# Patient Record
Sex: MALE | Race: WHITE | Employment: OTHER | RURAL
[De-identification: names, ages, dates, MRNs, and addresses within clinical notes are randomized per-mention and may not be internally consistent; named-entity substitution may affect disease eponyms.]

---

## 2017-01-15 RX ORDER — FUROSEMIDE 20 MG/1
TABLET ORAL
Qty: 30 TAB | Refills: 3 | Status: SHIPPED | OUTPATIENT
Start: 2017-01-15 | End: 2017-01-17 | Stop reason: SDUPTHER

## 2017-01-17 RX ORDER — FUROSEMIDE 20 MG/1
TABLET ORAL
Qty: 30 TAB | Refills: 0 | Status: SHIPPED | OUTPATIENT
Start: 2017-01-17 | End: 2017-01-25 | Stop reason: SDUPTHER

## 2017-01-23 DIAGNOSIS — E11.9 CONTROLLED TYPE 2 DIABETES MELLITUS WITHOUT COMPLICATION (HCC): ICD-10-CM

## 2017-01-23 DIAGNOSIS — E11.9 CONTROLLED TYPE 2 DIABETES MELLITUS WITHOUT COMPLICATION, WITHOUT LONG-TERM CURRENT USE OF INSULIN (HCC): Primary | ICD-10-CM

## 2017-01-24 RX ORDER — GLIMEPIRIDE 4 MG/1
TABLET ORAL
Qty: 30 TAB | Refills: 5 | Status: SHIPPED | OUTPATIENT
Start: 2017-01-24 | End: 2017-07-17 | Stop reason: SDUPTHER

## 2017-01-25 ENCOUNTER — OFFICE VISIT (OUTPATIENT)
Dept: FAMILY MEDICINE CLINIC | Age: 82
End: 2017-01-25

## 2017-01-25 VITALS
OXYGEN SATURATION: 98 % | SYSTOLIC BLOOD PRESSURE: 134 MMHG | HEART RATE: 61 BPM | BODY MASS INDEX: 26.82 KG/M2 | TEMPERATURE: 97.9 F | RESPIRATION RATE: 20 BRPM | DIASTOLIC BLOOD PRESSURE: 76 MMHG | HEIGHT: 72 IN | WEIGHT: 198 LBS

## 2017-01-25 DIAGNOSIS — E78.2 MIXED HYPERLIPIDEMIA: ICD-10-CM

## 2017-01-25 DIAGNOSIS — E11.9 CONTROLLED TYPE 2 DIABETES MELLITUS WITHOUT COMPLICATION, WITHOUT LONG-TERM CURRENT USE OF INSULIN (HCC): Primary | ICD-10-CM

## 2017-01-25 DIAGNOSIS — I10 ESSENTIAL HYPERTENSION, BENIGN: ICD-10-CM

## 2017-01-25 DIAGNOSIS — E03.9 ACQUIRED HYPOTHYROIDISM: ICD-10-CM

## 2017-01-25 DIAGNOSIS — Z23 ENCOUNTER FOR IMMUNIZATION: ICD-10-CM

## 2017-01-25 DIAGNOSIS — Z79.899 ENCOUNTER FOR LONG-TERM CURRENT USE OF MEDICATION: ICD-10-CM

## 2017-01-25 DIAGNOSIS — B35.1 ONYCHOMYCOSIS: ICD-10-CM

## 2017-01-25 RX ORDER — FUROSEMIDE 20 MG/1
TABLET ORAL
Qty: 90 TAB | Refills: 3 | Status: SHIPPED | COMMUNITY
Start: 2017-01-25 | End: 2017-10-14 | Stop reason: SDUPTHER

## 2017-01-25 NOTE — PATIENT INSTRUCTIONS
Vaccine Information Statement    Influenza (Flu) Vaccine (Inactivated or Recombinant): What you need to know    Many Vaccine Information Statements are available in Sets[place] and other languages. See www.immunize.org/vis  Hojas de Información Sobre Vacunas están disponibles en Español y en muchos otros idiomas. Visite www.immunize.org/vis    1. Why get vaccinated? Influenza (flu) is a contagious disease that spreads around the [place] every year, usually between October and May. Flu is caused by influenza viruses, and is spread mainly by coughing, sneezing, and close contact. Anyone can get flu. Flu strikes suddenly and can last several days. Symptoms vary by age, but can include:   fever/chills   sore throat   muscle aches   fatigue   cough   headache    runny or stuffy nose    Flu can also lead to pneumonia and blood infections, and cause diarrhea and seizures in children. If you have a medical condition, such as heart or lung disease, flu can make it worse. Flu is more dangerous for some people. Infants and young children, people 72years of age and older, pregnant women, and people with certain health conditions or a weakened immune system are at greatest risk. Each year thousands of people in the [facility] die from flu, and many more are hospitalized. Flu vaccine can:   keep you from getting flu,   make flu less severe if you do get it, and   keep you from spreading flu to your family and other people. 2. Inactivated and recombinant flu vaccines    A dose of flu vaccine is recommended every flu season. Children 6 months through 6years of age may need two doses during the same flu season. Everyone else needs only one dose each flu season.        Some inactivated flu vaccines contain a very small amount of a mercury-based preservative called thimerosal. Studies have not shown thimerosal in vaccines to be harmful, but flu vaccines that do not contain thimerosal are available. There is no live flu virus in flu shots. They cannot cause the flu. There are many flu viruses, and they are always changing. Each year a new flu vaccine is made to protect against three or four viruses that are likely to cause disease in the upcoming flu season. But even when the vaccine doesnt exactly match these viruses, it may still provide some protection    Flu vaccine cannot prevent:   flu that is caused by a virus not covered by the vaccine, or   illnesses that look like flu but are not. It takes about 2 weeks for protection to develop after vaccination, and protection lasts through the flu season. 3. Some people should not get this vaccine    Tell the person who is giving you the vaccine:     If you have any severe, life-threatening allergies. If you ever had a life-threatening allergic reaction after a dose of flu vaccine, or have a severe allergy to any part of this vaccine, you may be advised not to get vaccinated. Most, but not all, types of flu vaccine contain a small amount of egg protein.  If you ever had Guillain-Barré Syndrome (also called GBS). Some people with a history of GBS should not get this vaccine. This should be discussed with your doctor.  If you are not feeling well. It is usually okay to get flu vaccine when you have a mild illness, but you might be asked to come back when you feel better. 4. Risks of a vaccine reaction    With any medicine, including vaccines, there is a chance of reactions. These are usually mild and go away on their own, but serious reactions are also possible. Most people who get a flu shot do not have any problems with it.      Minor problems following a flu shot include:    soreness, redness, or swelling where the shot was given     hoarseness   sore, red or itchy eyes   cough   fever   aches   headache   itching   fatigue  If these problems occur, they usually begin soon after the shot and last 1 or 2 days. More serious problems following a flu shot can include the following:     There may be a small increased risk of Guillain-Barré Syndrome (GBS) after inactivated flu vaccine. This risk has been estimated at 1 or 2 additional cases per million people vaccinated. This is much lower than the risk of severe complications from flu, which can be prevented by flu vaccine.  Young children who get the flu shot along with pneumococcal vaccine (PCV13) and/or DTaP vaccine at the same time might be slightly more likely to have a seizure caused by fever. Ask your doctor for more information. Tell your doctor if a child who is getting flu vaccine has ever had a seizure. Problems that could happen after any injected vaccine:      People sometimes faint after a medical procedure, including vaccination. Sitting or lying down for about 15 minutes can help prevent fainting, and injuries caused by a fall. Tell your doctor if you feel dizzy, or have vision changes or ringing in the ears.  Some people get severe pain in the shoulder and have difficulty moving the arm where a shot was given. This happens very rarely.  Any medication can cause a severe allergic reaction. Such reactions from a vaccine are very rare, estimated at about 1 in a million doses, and would happen within a few minutes to a few hours after the vaccination. As with any medicine, there is a very remote chance of a vaccine causing a serious injury or death. The safety of vaccines is always being monitored. For more information, visit: www.cdc.gov/vaccinesafety/    5. What if there is a serious reaction? What should I look for?  Look for anything that concerns you, such as signs of a severe allergic reaction, very high fever, or unusual behavior.     Signs of a severe allergic reaction can include hives, swelling of the face and throat, difficulty breathing, a fast heartbeat, dizziness, and weakness - usually within a few minutes to a few hours after the vaccination. What should I do?  If you think it is a severe allergic reaction or other emergency that cant wait, call 9-1-1 and get the person to the nearest hospital. Otherwise, call your doctor.  Reactions should be reported to the Vaccine Adverse Event Reporting System (VAERS). Your doctor should file this report, or you can do it yourself through  the VAERS web site at www.vaers. [facility].gov, or by calling 7-198.351.4803. VAERS does not give medical advice. 6. The National Vaccine Injury Compensation Program    The [facility] Vaccine Injury Compensation Program (VICP) is a federal program that was created to compensate people who may have been injured by certain vaccines. Persons who believe they may have been injured by a vaccine can learn about the program and about filing a claim by calling 6-863-153--533-356-0349 or visiting the [employer] website at www.[facility].gov/vaccinecompensation. There is a time limit to file a claim for compensation. 7. How can I learn more?  Ask your healthcare provider. He or she can give you the vaccine package insert or suggest other sources of information.  Call your local or state health department.  Contact the Centers for Disease Control and Prevention (CDC):  - Call 1-136.444.5690 (1-800-CDC-INFO) or  - Visit CDCs website at www.cdc.gov/flu    Vaccine Information Statement   Inactivated Influenza Vaccine   8/7/2015  [address] KRISTINA [address] 507PR-10    Department of Health and Human Services  Centers for Disease Control and Prevention    Office Use Only       Diabetes Foot Health: Care Instructions  Your Care Instructions    When you have diabetes, your feet need extra care and attention. Diabetes can damage the nerve endings and blood vessels in your feet, making you less likely to notice when your feet are injured. Diabetes also limits your body's ability to fight infection and get blood to areas that need it.  If you get a minor foot injury, it could become an ulcer or a serious infection. With good foot care, you can prevent most of these problems. Caring for your feet can be quick and easy. Most of the care can be done when you are bathing or getting ready for bed. Follow-up care is a key part of your treatment and safety. Be sure to make and go to all appointments, and call your doctor if you are having problems. Its also a good idea to know your test results and keep a list of the medicines you take. How can you care for yourself at home? · Keep your blood sugar close to normal by watching what and how much you eat, monitoring blood sugar, taking medicines if prescribed, and getting regular exercise. · Do not smoke. Smoking affects blood flow and can make foot problems worse. If you need help quitting, talk to your doctor about stop-smoking programs and medicines. These can increase your chances of quitting for good. · Eat a diet that is low in fats. High fat intake can cause fat to build up in your blood vessels and decrease blood flow. · Inspect your feet daily for blisters, cuts, cracks, or sores. If you cannot see well, use a mirror or have someone help you. · Take care of your feet:  [facility] your feet every day. Use warm (not hot) water. Check the water temperature with your wrists or other part of your body, not your feet. ¨ Dry your feet well. Pat them dry. Do not rub the skin on your feet too hard. Dry well between your toes. If the skin on your feet stays moist, bacteria or a fungus can grow, which can lead to infection. ¨ Keep your skin soft. Use moisturizing skin cream to keep the skin on your feet soft and prevent calluses and cracks. But do not put the cream between your toes, and stop using any cream that causes a rash. ¨ Clean underneath your toenails carefully. Do not use a sharp object to clean underneath your toenails. Use the blunt end of a nail file or other rounded tool.   ¨ Trim and file your toenails straight across to prevent ingrown toenails. Use a nail clipper, not scissors. Use an emery board to smooth the edges. · Change socks daily. Socks without seams are best, because seams often rub the feet. You can find socks for people with diabetes from specialty catalogs. · Look inside your shoes every day for things like gravel or torn linings, which could cause blisters or sores. · Buy shoes that fit well:  ¨ Look for shoes that have plenty of space around the toes. This helps prevent bunions and blisters. ¨ Try on shoes while wearing the kind of socks you will usually wear with the shoes. ¨ Avoid plastic shoes. They may rub your feet and cause blisters. Good shoes should be made of materials that are flexible and breathable, such as leather or cloth. ¨ Break in new shoes slowly by wearing them for no more than an hour a day for several days. Take extra time to check your feet for red areas, blisters, or other problems after you wear new shoes. · Do not go barefoot. Do not wear sandals, and do not wear shoes with very thin soles. Thin soles are easy to puncture. They also do not protect your feet from hot pavement or cold weather. · Have your doctor check your feet during each visit. If you have a foot problem, see your doctor. Do not try to treat an early foot problem at home. Home remedies or treatments that you can buy without a prescription (such as corn removers) can be harmful. · Always get early treatment for foot problems. A minor irritation can lead to a major problem if not properly cared for early. When should you call for help? Call your doctor now or seek immediate medical care if:  · You have a foot sore, an ulcer or break in the skin that is not healing after 4 days, bleeding corns or calluses, or an ingrown toenail. · You have blue or black areas, which can mean bruising or blood flow problems. · You have peeling skin or tiny blisters between your toes or cracking or oozing of the skin.   · You have a fever for more than 24 hours and a foot sore. · You have new numbness or tingling in your feet that does not go away after you move your feet or change positions. · You have unexplained or unusual swelling of the foot or ankle. Watch closely for changes in your health, and be sure to contact your doctor if:  · You cannot do proper foot care. Where can you learn more? Go to http://milton-saud.info/. Enter A739 in the search box to learn more about \"Diabetes Foot Health: Care Instructions. \"  Current as of: May 23, 2016  Content Version: 11.1  © 3679-9448 [employer]. Care instructions adapted under license by [employer] (which disclaims liability or warranty for this information). If you have questions about a medical condition or this instruction, always ask your healthcare professional. [address] any warranty or liability for your use of this information.

## 2017-01-25 NOTE — PROGRESS NOTES
Identified pt with two pt identifiers(name and ). Chief Complaint   Patient presents with    Diabetes    Thyroid Problem    Hypertension    Immunization/Injection     flu        Health Maintenance Due   Topic    Pneumococcal 65+ High/Highest Risk (1 of 2 - PCV13)    EYE EXAM RETINAL OR DILATED Q1     MEDICARE YEARLY EXAM     FOOT EXAM Q1     HEMOGLOBIN A1C Q6M        Wt Readings from Last 3 Encounters:   17 198 lb (89.8 kg)   08/15/16 198 lb 6.4 oz (90 kg)   16 211 lb (95.7 kg)     Temp Readings from Last 3 Encounters:   17 97.9 °F (36.6 °C) (Oral)   08/15/16 97.8 °F (36.6 °C) (Oral)   16 97.9 °F (36.6 °C) (Oral)     BP Readings from Last 3 Encounters:   17 134/76   08/15/16 135/54   16 177/67     Pulse Readings from Last 3 Encounters:   17 61   08/15/16 (!) 58   16 (!) 50         Learning Assessment:  :     Learning Assessment 3/19/2014   PRIMARY LEARNER Patient   HIGHEST LEVEL OF EDUCATION - PRIMARY LEARNER  GRADUATED HIGH SCHOOL OR GED   BARRIERS PRIMARY LEARNER NONE   CO-LEARNER CAREGIVER No   PRIMARY LANGUAGE ENGLISH   LEARNER PREFERENCE PRIMARY LISTENING     DEMONSTRATION   ANSWERED BY patient   RELATIONSHIP SELF       Depression Screening:  :     PHQ 2 / 9, over the last two weeks 2016   Little interest or pleasure in doing things Not at all   Feeling down, depressed or hopeless Not at all   Total Score PHQ 2 0       Fall Risk Assessment:  :     Fall Risk Assessment, last 12 mths 2016   Able to walk? Yes   Fall in past 12 months? No       Abuse Screening:  :     Abuse Screening Questionnaire 8/15/2016 2015 3/19/2014   Do you ever feel afraid of your partner? N N N   Are you in a relationship with someone who physically or mentally threatens you? N N N   Is it safe for you to go home?  Alejandrina Evangelista       Coordination of Care Questionnaire:  :     1) Have you been to an emergency room, urgent care clinic since your last visit? no Hospitalized since your last visit? no             2) Have you seen or consulted any other health care providers outside of [address] [place] [address] since your last visit? yes   Tomorrow Dr Kashif Fuentes  (Include any pap smears or colon screenings in this section.)    3) Do you have an Advance Directive on file? yes  Are you interested in receiving information about Advance Directives? Yes paperwork given to add granddaughter. Patient is accompanied by daughter I have received verbal consent from Najma Michelle to discuss any/all medical information while they are present in the room. Reviewed record in preparation for visit and have obtained necessary documentation. Medication reconciliation up to date and corrected with patient at this time. Najma Michelle is a 80 y.o. male who presents for routine immunizations. He denies any symptoms , reactions or allergies that would exclude them from being immunized today. Risks and adverse reactions were discussed and the VIS was given to them. All questions were addressed. He was observed for 10 min post injection. There were no reactions observed.     Eliu Valdovinos LPN

## 2017-01-25 NOTE — LETTER
2/6/2017 1:41 PM 
 
Mr. Alexis [facility] [phone] Dear Santy Peterson.: 
 
Please find your most recent results below. Resulted Orders LIPID PANEL Result Value Ref Range Cholesterol, total 184 100 - 199 mg/dL Triglyceride 255 (H) 0 - 149 mg/dL HDL Cholesterol 33 (L) >39 mg/dL VLDL, calculated 51 (H) 5 - 40 mg/dL LDL, calculated 100 (H) 0 - 99 mg/dL Narrative Performed at:  [address]  [id number] : Kenia Tsai MD, Phone:  [phone] TSH 3RD GENERATION Result Value Ref Range TSH 0.455 0.450 - 4.500 uIU/mL Narrative Performed at:  [address]  [id number] : Kenia Tsai MD, Phone:  [phone] T4, FREE Result Value Ref Range T4, Free 0.93 0.82 - 1.77 ng/dL Narrative Performed at:  [address]  [id number] : Kenia Tsai MD, Phone:  [phone] HEMOGLOBIN A1C WITH EAG Result Value Ref Range Hemoglobin A1c 8.1 (H) 4.8 - 5.6 % Comment:  
            Pre-diabetes: 5.7 - 6.4 Diabetes: >6.4 Glycemic control for adults with diabetes: <7.0 Estimated average glucose 186 mg/dL Narrative Performed at:  [address]  [id number] : Kenia Tsai MD, Phone:  [phone] HEPATIC FUNCTION PANEL Result Value Ref Range Protein, total 7.4 6.0 - 8.5 g/dL Albumin 4.2 3.5 - 4.7 g/dL Bilirubin, total 0.8 0.0 - 1.2 mg/dL Bilirubin, direct 0.17 0.00 - 0.40 mg/dL Comment: **Effective January 30, 2017 the reference interval** 
  for Bilirubin, Direct will be changing to: Age                Male          Female 0 - 31 days       0.00 - 0.60    0.00 - 0.60     Children >31 days and 
 Adults:                 0.00 - 0.40    0.00 - 0.40 Alk. phosphatase 105 39 - 117 IU/L  
 AST (SGOT) 16 0 - 40 IU/L  
 ALT (SGPT) 8 0 - 44 IU/L Narrative Performed at:  [address]  [id number] : Melody Frey MD, Phone:  [phone] CVD REPORT Result Value Ref Range INTERPRETATION Note Comment:  
   Supplement report is available. Narrative Performed at:  [address] [place] [address] [address] [place] [address]  [id number] : Dell Gillis PhD, Phone:  [phone] RECOMMENDATIONS: 
Labs show good thyroid level. Cholesterol numbers are stable.  Blood sugar control is not at goal with high A1C.  We want this to be < 7%.  You are at 8.1%.  Need to continue Glimepiride daily and also cut back on sugar and starches in diet. Follow up in 6 months. Please call me if you have any questions: [phone] Sincerely, Marge Meyer MD

## 2017-01-25 NOTE — MR AVS SNAPSHOT
Visit Information Date & Time Provider Department Dept. Phone Encounter #  
 5/70/3558 92:05 AM Agnieszka DanielleAshu 108 [phone] [id number] Upcoming Health Maintenance Date Due Pneumococcal 65+ High/Highest Risk (1 of 2 - PCV13) 3/8/1996 EYE EXAM RETINAL OR DILATED Q1 10/20/2016 MEDICARE YEARLY EXAM 10/26/2016 FOOT EXAM Q1 10/26/2016 HEMOGLOBIN A1C Q6M 2/17/2017 MICROALBUMIN Q1 4/13/2017 LIPID PANEL Q1 8/17/2017 GLAUCOMA SCREENING Q2Y 10/20/2017 DTaP/Tdap/Td series (2 - Td) 1/25/2027 Allergies as of 1/25/2017  Review Complete On: 4/64/1681 By: Agnieszka Danielle MD  
  
 Severity Noted Reaction Type Reactions Synthroid [Levothyroxine]  08/20/2013   Systemic Other (comments) Confused, hallucinations Current Immunizations  Reviewed on 4/2/2015 Name Date Influenza High Dose Vaccine PF  Incomplete Influenza Vaccine 9/12/2014, 9/11/2013 Influenza Vaccine (Quad) PF 10/30/2015  9:31 AM  
 Influenza Vaccine Whole 10/16/2012 Zoster 10/16/2012 Not reviewed this visit You Were Diagnosed With   
  
 Codes Comments Controlled type 2 diabetes mellitus without complication, without long-term current use of insulin ([facility].)    -  Primary ICD-10-CM: E11.9 ICD-9-CM: 250.00 Encounter for immunization     ICD-10-CM: X81 ICD-9-CM: V03.89 Essential hypertension, benign     ICD-10-CM: I10 
ICD-9-CM: 401.1 Mixed hyperlipidemia     ICD-10-CM: E78.2 ICD-9-CM: 272.2 Acquired hypothyroidism     ICD-10-CM: E03.9 ICD-9-CM: 244.9 Encounter for long-term current use of medication     ICD-10-CM: Z79.899 ICD-9-CM: V58.69 Onychomycosis     ICD-10-CM: B35.1 ICD-9-CM: 110.1 Vitals BP Pulse Temp Resp Height(growth percentile) Weight(growth percentile) 134/76 (BP 1 Location: Left arm, BP Patient Position: Sitting) 61 97.9 °F (36.6 °C) (Oral) 20 6' (1.829 m) 198 lb (89.8 kg) SpO2 BMI Smoking Status 98% 26.85 kg/m2 Former Smoker Vitals History BMI and BSA Data Body Mass Index Body Surface Area  
 26.85 kg/m 2 2.14 m 2 Preferred Pharmacy Pharmacy Name Phone Rika Lucas [address] [place] [address] [address] [facility] [address] [place] [address] [phone] Your Updated Medication List  
  
   
This list is accurate as of: 1/25/17 12:18 PM.  Always use your most recent med list. amLODIPine 10 mg tablet Commonly known as:  Marine Colon TAKE 1 TABLET EVERY DAY FOR HYPERTENSION  (INCREASE  IN  DOSE  TO  10MG) aspirin 81 mg chewable tablet Take 1 Tab by mouth daily. CENTRUM SILVER Tab tablet Generic drug:  multivitamins-minerals-lutein Take 1 Tab by mouth daily. cetirizine 10 mg tablet Commonly known as:  ZYRTEC  
TAKE 1 TABLET EVERY DAY AS NEEDED FOR ALLERGIES  
  
 donepezil 10 mg tablet Commonly known as:  ARICEPT  
TAKE 1 TABLET EVERY NIGHT  
  
 ergocalciferol 50,000 unit capsule Commonly known as:  ERGOCALCIFEROL  
TAKE 1 CAPSULE EVERY 7 DAYS  
  
 ferrous sulfate 325 mg (65 mg iron) tablet Commonly known as:  Iron Take 1 Tab by mouth Daily (before breakfast). furosemide 20 mg tablet Commonly known as:  LASIX TAKE 1 TABLET BY MOUTH ONCE A DAY AS DIRECTED  
  
 glimepiride 4 mg tablet Commonly known as:  AMARYL  
TAKE 1 TABLET BY MOUTH EVERY MORNING. INCREASE IN DOSE.  
  
 lansoprazole 30 mg capsule Commonly known as:  PREVACID TAKE 1 CAPSULE EVERY DAY  (BEFORE BREAKFAST). loperamide 2 mg capsule Commonly known as:  IMODIUM Take 1 Cap by mouth Before breakfast, lunch, dinner and at bedtime. For diarrhea  
  
 losartan 100 mg tablet Commonly known as:  COZAAR  
TAKE ONE TABLET BY MOUTH DAILY  
  
 nicotinic acid 500 mg tablet Commonly known as:  NIACIN Take 500 mg by mouth Daily (before breakfast). thyroid (Pork) 60 mg tablet Commonly known as:  BRAIN THYROID  
 Take 1 tablet daily only six days a week. Skip on Sundays. Indications: HYPOTHYROIDISM Prescriptions Sent to Mail Order Refills  
 furosemide (LASIX) 20 mg tablet 3 Sig: TAKE 1 TABLET BY MOUTH ONCE A DAY AS DIRECTED Class: Mail Order Pharmacy: [address] [place] [address], [address] [place] [address] #: [phone] We Performed the Following ADMIN INFLUENZA VIRUS VAC [ HCPCS] HEMOGLOBIN A1C WITH EAG [42788 CPT(R)] HEPATIC FUNCTION PANEL [62295 CPT(R)]  DIABETES FOOT EXAM [HM7 Custom] INFLUENZA VIRUS VACCINE, HIGH DOSE SEASONAL, PRESERVATIVE FREE [11146 CPT(R)] LIPID PANEL [77356 CPT(R)] REFERRAL TO PODIATRY [REF90 Custom] Comments:  
 Please evaluate patient for left toe nail fungus and diabetic foot check. T4, FREE [id number] CPT(R)] TSH 3RD GENERATION [36376 CPT(R)] Referral Information Referral ID Referred By Referred To  
  
 [id number] Rubén HIDALGO, DPM   
   [address] [facility] [address] Phone: [phone] Fax: [phone] Visits Status Start Date End Date 1 New Request 1/25/17 1/25/18 If your referral has a status of pending review or denied, additional information will be sent to support the outcome of this decision. Patient Instructions Vaccine Information Statement Influenza (Flu) Vaccine (Inactivated or Recombinant): What you need to know Many Vaccine Information Statements are available in [place] and other languages. See www.immunize.org/vis Hojas de Información Sobre Vacunas están disponibles en Español y en muchos otros idiomas. Visite www.immunize.org/vis 1. Why get vaccinated? Influenza (flu) is a contagious disease that spreads around the [place] every year, usually between October and May. Flu is caused by influenza viruses, and is spread mainly by coughing, sneezing, and close contact. Anyone can get flu. Flu strikes suddenly and can last several days. Symptoms vary by age, but can include: 
 fever/chills  sore throat  muscle aches  fatigue  cough  headache  runny or stuffy nose Flu can also lead to pneumonia and blood infections, and cause diarrhea and seizures in children. If you have a medical condition, such as heart or lung disease, flu can make it worse. Flu is more dangerous for some people. Infants and young children, people 72years of age and older, pregnant women, and people with certain health conditions or a weakened immune system are at greatest risk. Each year thousands of people in the [facility] die from flu, and many more are hospitalized. Flu vaccine can: 
 keep you from getting flu, 
 make flu less severe if you do get it, and 
 keep you from spreading flu to your family and other people. 2. Inactivated and recombinant flu vaccines A dose of flu vaccine is recommended every flu season. Children 6 months through 6years of age may need two doses during the same flu season. Everyone else needs only one dose each flu season. Some inactivated flu vaccines contain a very small amount of a mercury-based preservative called thimerosal. Studies have not shown thimerosal in vaccines to be harmful, but flu vaccines that do not contain thimerosal are available. There is no live flu virus in flu shots. They cannot cause the flu. There are many flu viruses, and they are always changing. Each year a new flu vaccine is made to protect against three or four viruses that are likely to cause disease in the upcoming flu season. But even when the vaccine doesnt exactly match these viruses, it may still provide some protection Flu vaccine cannot prevent: 
 flu that is caused by a virus not covered by the vaccine, or 
 illnesses that look like flu but are not.  
 
It takes about 2 weeks for protection to develop after vaccination, and protection lasts through the flu season. 3. Some people should not get this vaccine Tell the person who is giving you the vaccine:  If you have any severe, life-threatening allergies. If you ever had a life-threatening allergic reaction after a dose of flu vaccine, or have a severe allergy to any part of this vaccine, you may be advised not to get vaccinated. Most, but not all, types of flu vaccine contain a small amount of egg protein.  If you ever had Guillain-Barré Syndrome (also called GBS). Some people with a history of GBS should not get this vaccine. This should be discussed with your doctor.  If you are not feeling well. It is usually okay to get flu vaccine when you have a mild illness, but you might be asked to come back when you feel better. 4. Risks of a vaccine reaction With any medicine, including vaccines, there is a chance of reactions. These are usually mild and go away on their own, but serious reactions are also possible. Most people who get a flu shot do not have any problems with it. Minor problems following a flu shot include:  
 soreness, redness, or swelling where the shot was given  hoarseness  sore, red or itchy eyes  cough  fever  aches  headache  itching  fatigue If these problems occur, they usually begin soon after the shot and last 1 or 2 days. More serious problems following a flu shot can include the following:  There may be a small increased risk of Guillain-Barré Syndrome (GBS) after inactivated flu vaccine. This risk has been estimated at 1 or 2 additional cases per million people vaccinated. This is much lower than the risk of severe complications from flu, which can be prevented by flu vaccine.    
 
 Young children who get the flu shot along with pneumococcal vaccine (PCV13) and/or DTaP vaccine at the same time might be slightly more likely to have a seizure caused by fever. Ask your doctor for more information. Tell your doctor if a child who is getting flu vaccine has ever had a seizure. Problems that could happen after any injected vaccine:  People sometimes faint after a medical procedure, including vaccination. Sitting or lying down for about 15 minutes can help prevent fainting, and injuries caused by a fall. Tell your doctor if you feel dizzy, or have vision changes or ringing in the ears.  Some people get severe pain in the shoulder and have difficulty moving the arm where a shot was given. This happens very rarely.  Any medication can cause a severe allergic reaction. Such reactions from a vaccine are very rare, estimated at about 1 in a million doses, and would happen within a few minutes to a few hours after the vaccination. As with any medicine, there is a very remote chance of a vaccine causing a serious injury or death. The safety of vaccines is always being monitored. For more information, visit: www.cdc.gov/vaccinesafety/ 
 
5. What if there is a serious reaction? What should I look for?  Look for anything that concerns you, such as signs of a severe allergic reaction, very high fever, or unusual behavior. Signs of a severe allergic reaction can include hives, swelling of the face and throat, difficulty breathing, a fast heartbeat, dizziness, and weakness  usually within a few minutes to a few hours after the vaccination. What should I do?  If you think it is a severe allergic reaction or other emergency that cant wait, call 9-1-1 and get the person to the nearest hospital. Otherwise, call your doctor.  Reactions should be reported to the Vaccine Adverse Event Reporting System (VAERS). Your doctor should file this report, or you can do it yourself through  the VAERS web site at www.vaers. [facility].gov, or by calling 4-801.521.9981. VAERS does not give medical advice. 6. The National Vaccine Injury Compensation Program 
 
The [facility] Vaccine Injury Compensation Program (VICP) is a federal program that was created to compensate people who may have been injured by certain vaccines. Persons who believe they may have been injured by a vaccine can learn about the program and about filing a claim by calling 5-610.644.5018 or visiting the [employer] website at www.[facility].gov/vaccinecompensation. There is a time limit to file a claim for compensation. 7. How can I learn more?  Ask your healthcare provider. He or she can give you the vaccine package insert or suggest other sources of information.  Call your local or state health department.  Contact the Centers for Disease Control and Prevention (CDC): 
- Call 6-885.627.5194 (1-800-CDC-INFO) or 
- Visit CDCs website at www.cdc.gov/flu Vaccine Information Statement Inactivated Influenza Vaccine 8/7/2015 
42 KRISTINA Deleon [id number] [facility] and [employer] Centers for Disease Control and Prevention Office Use Only Diabetes Foot Health: Care Instructions Your Care Instructions When you have diabetes, your feet need extra care and attention. Diabetes can damage the nerve endings and blood vessels in your feet, making you less likely to notice when your feet are injured. Diabetes also limits your body's ability to fight infection and get blood to areas that need it. If you get a minor foot injury, it could become an ulcer or a serious infection. With good foot care, you can prevent most of these problems. Caring for your feet can be quick and easy. Most of the care can be done when you are bathing or getting ready for bed. Follow-up care is a key part of your treatment and safety. Be sure to make and go to all appointments, and call your doctor if you are having problems. Its also a good idea to know your test results and keep a list of the medicines you take. How can you care for yourself at home? · Keep your blood sugar close to normal by watching what and how much you eat, monitoring blood sugar, taking medicines if prescribed, and getting regular exercise. · Do not smoke. Smoking affects blood flow and can make foot problems worse. If you need help quitting, talk to your doctor about stop-smoking programs and medicines. These can increase your chances of quitting for good. · Eat a diet that is low in fats. High fat intake can cause fat to build up in your blood vessels and decrease blood flow. · Inspect your feet daily for blisters, cuts, cracks, or sores. If you cannot see well, use a mirror or have someone help you. · Take care of your feet: 
[facility] AUTHORITY your feet every day. Use warm (not hot) water. Check the water temperature with your wrists or other part of your body, not your feet. ¨ Dry your feet well. Pat them dry. Do not rub the skin on your feet too hard. Dry well between your toes. If the skin on your feet stays moist, bacteria or a fungus can grow, which can lead to infection. ¨ Keep your skin soft. Use moisturizing skin cream to keep the skin on your feet soft and prevent calluses and cracks. But do not put the cream between your toes, and stop using any cream that causes a rash. ¨ Clean underneath your toenails carefully. Do not use a sharp object to clean underneath your toenails. Use the blunt end of a nail file or other rounded tool. ¨ Trim and file your toenails straight across to prevent ingrown toenails. Use a nail clipper, not scissors. Use an emery board to smooth the edges. · Change socks daily. Socks without seams are best, because seams often rub the feet. You can find socks for people with diabetes from specialty catalogs. · Look inside your shoes every day for things like gravel or torn linings, which could cause blisters or sores. · Buy shoes that fit well: 
¨ Look for shoes that have plenty of space around the toes. This helps prevent bunions and blisters. ¨ Try on shoes while wearing the kind of socks you will usually wear with the shoes. ¨ Avoid plastic shoes. They may rub your feet and cause blisters. Good shoes should be made of materials that are flexible and breathable, such as leather or cloth. ¨ Break in new shoes slowly by wearing them for no more than an hour a day for several days. Take extra time to check your feet for red areas, blisters, or other problems after you wear new shoes. · Do not go barefoot. Do not wear sandals, and do not wear shoes with very thin soles. Thin soles are easy to puncture. They also do not protect your feet from hot pavement or cold weather. · Have your doctor check your feet during each visit. If you have a foot problem, see your doctor. Do not try to treat an early foot problem at home. Home remedies or treatments that you can buy without a prescription (such as corn removers) can be harmful. · Always get early treatment for foot problems. A minor irritation can lead to a major problem if not properly cared for early. When should you call for help? Call your doctor now or seek immediate medical care if: 
· You have a foot sore, an ulcer or break in the skin that is not healing after 4 days, bleeding corns or calluses, or an ingrown toenail. · You have blue or black areas, which can mean bruising or blood flow problems. · You have peeling skin or tiny blisters between your toes or cracking or oozing of the skin. · You have a fever for more than 24 hours and a foot sore. · You have new numbness or tingling in your feet that does not go away after you move your feet or change positions. · You have unexplained or unusual swelling of the foot or ankle. Watch closely for changes in your health, and be sure to contact your doctor if: 
· You cannot do proper foot care. Where can you learn more? Go to http://milton-saud.info/.  
Enter A728 in the search box to learn more about \"Diabetes Foot Health: Care Instructions. \" Current as of: May 23, 2016 Content Version: 11.1 © 4575-8988 [employer]. Care instructions adapted under license by [employer] (which disclaims liability or warranty for this information). If you have questions about a medical condition or this instruction, always ask your healthcare professional. [address] any warranty or liability for your use of this information. Introducing [facility]! Nannette Harman introduces CatchFree patient portal. Now you can access parts of your medical record, email your doctor's office, and request medication refills online. 1. In your internet browser, go to [email or web address] 2. Click on the First Time User? Click Here link in the Sign In box. You will see the New Member Sign Up page. 3. Enter your CatchFree Access Code exactly as it appears below. You will not need to use this code after youve completed the sign-up process. If you do not sign up before the expiration date, you must request a new code. · CatchFree Access Code: [email or web address] Expires: 4/25/2017 11:04 AM 
 
4. Enter the last four digits of your Social Security Number (xxxx) and Date of Birth (mm/dd/yyyy) as indicated and click Submit. You will be taken to the next sign-up page. 5. Create a CatchFree ID. This will be your CatchFree login ID and cannot be changed, so think of one that is secure and easy to remember. 6. Create a CatchFree password. You can change your password at any time. 7. Enter your Password Reset Question and Answer. This can be used at a later time if you forget your password. 8. Enter your e-mail address. You will receive e-mail notification when new information is available in [address] 9. Click Sign Up. You can now view and download portions of your medical record. 10. Click the Download Summary menu link to download a portable copy of your medical information. If you have questions, please visit the Frequently Asked Questions section of the [facility] website. Remember, [employer] is NOT to be used for urgent needs. For medical emergencies, dial 911. Now available from your iPhone and Android! Please provide this summary of care documentation to your next provider. Your primary care clinician is listed as Bonilla Cleary. If you have any questions after today's visit, please call [phone].

## 2017-01-26 LAB
ALBUMIN SERPL-MCNC: 4.2 G/DL (ref 3.5–4.7)
ALP SERPL-CCNC: 105 IU/L (ref 39–117)
ALT SERPL-CCNC: 8 IU/L (ref 0–44)
AST SERPL-CCNC: 16 IU/L (ref 0–40)
BILIRUB DIRECT SERPL-MCNC: 0.17 MG/DL (ref 0–0.4)
BILIRUB SERPL-MCNC: 0.8 MG/DL (ref 0–1.2)
CHOLEST SERPL-MCNC: 184 MG/DL (ref 100–199)
EST. AVERAGE GLUCOSE BLD GHB EST-MCNC: 186 MG/DL
HBA1C MFR BLD: 8.1 % (ref 4.8–5.6)
HDLC SERPL-MCNC: 33 MG/DL
INTERPRETATION, 910389: NORMAL
LDLC SERPL CALC-MCNC: 100 MG/DL (ref 0–99)
PROT SERPL-MCNC: 7.4 G/DL (ref 6–8.5)
T4 FREE SERPL-MCNC: 0.93 NG/DL (ref 0.82–1.77)
TRIGL SERPL-MCNC: 255 MG/DL (ref 0–149)
TSH SERPL DL<=0.005 MIU/L-ACNC: 0.46 UIU/ML (ref 0.45–4.5)
VLDLC SERPL CALC-MCNC: 51 MG/DL (ref 5–40)

## 2017-01-26 NOTE — PROGRESS NOTES
Subjective: Karen Modi is a 80 y.o. male seen for follow up of diabetes. He also has hyperlipidemia, history of prior stroke and obesity. Diabetic Review of Systems - medication compliance: compliant most of the time, diabetic diet compliance: compliant most of the time, home glucose monitoring: is not performed. Other symptoms and concerns: he is due for fasting labs. Has appt with Nephrology tomorrow. Patient Active Problem List    Diagnosis Date Noted    Dementia without behavioral disturbance 08/16/2016    CKD (chronic kidney disease) 10/26/2015    Hypothyroidism, adult 07/15/2015   [facility] discharge follow-up 04/15/2015    UTI (lower urinary tract infection) 04/01/2015    Controlled type 2 diabetes mellitus without complication ([facility] [address] 54/42/8241    Altered mental status 08/28/2014    WILTON (acute kidney injury) ([facility] 08/02/2014    Urosepsis 07/29/2014    Unspecified vitamin D deficiency 09/11/2013    Odontoid fracture ([facility] [address] 09/10/2012    Colon cancer ([facility] [address] 08/23/2012    Polyp of colon 08/15/2012    Pernicious anemia 07/31/2012    Acquired hypothyroidism 04/26/2012    Microalbuminuria 03/06/2012    Diabetes mellitus type 2, controlled ([address] 02/09/2012    Essential hypertension, benign 02/09/2012    Mixed hyperlipidemia 02/09/2012    Allergic rhinitis due to other allergen 02/09/2012    Esophageal reflux 02/09/2012     Current Outpatient Prescriptions   Medication Sig Dispense Refill    furosemide (LASIX) 20 mg tablet TAKE 1 TABLET BY MOUTH ONCE A DAY AS DIRECTED 90 Tab 3    glimepiride (AMARYL) 4 mg tablet TAKE 1 TABLET BY MOUTH EVERY MORNING. INCREASE IN DOSE. 30 Tab 5    loperamide (IMODIUM) 2 mg capsule Take 1 Cap by mouth Before breakfast, lunch, dinner and at bedtime.  For diarrhea 270 Cap 1    donepezil (ARICEPT) 10 mg tablet TAKE 1 TABLET EVERY NIGHT 90 Tab 1    amLODIPine (NORVASC) 10 mg tablet TAKE 1 TABLET EVERY DAY FOR HYPERTENSION  (INCREASE  IN DOSE  TO  10MG) 90 Tab 1    lansoprazole (PREVACID) 30 mg capsule TAKE 1 CAPSULE EVERY DAY  (BEFORE BREAKFAST). 90 Cap 1    ergocalciferol (ERGOCALCIFEROL) 50,000 unit capsule TAKE 1 CAPSULE EVERY 7 DAYS 13 Cap 3    cetirizine (ZYRTEC) 10 mg tablet TAKE 1 TABLET EVERY DAY AS NEEDED FOR ALLERGIES 90 Tab 3    losartan (COZAAR) 100 mg tablet TAKE ONE TABLET BY MOUTH DAILY (Patient taking differently: TAKE ONE half TABLET BY MOUTH DAILY) 30 Tab 5    thyroid, Pork, (ARMOUR THYROID) 60 mg tablet Take 1 tablet daily only six days a week. Skip on Sundays. Indications: HYPOTHYROIDISM 30 Tab 5    aspirin 81 mg chewable tablet Take 1 Tab by mouth daily. 14 Tab 0    ferrous sulfate (IRON) 325 mg (65 mg iron) tablet Take 1 Tab by mouth Daily (before breakfast). 30 Tab 5    multivitamins-minerals-lutein (CENTRUM SILVER) tab tablet Take 1 Tab by mouth daily.  nicotinic acid (NIACIN) 500 mg tablet Take 500 mg by mouth Daily (before breakfast).          Allergies   Allergen Reactions    Synthroid [Levothyroxine] Other (comments)     Confused, hallucinations     Past Medical History   Diagnosis Date    WILTON [facility]acute kidney injury) ([facility] 8/2/2014    Cancer ([facility]      colon    Diabetes ([facility] [address] 2008     type 2    Dyslipidemia     Esophageal reflux     Esophageal reflux 2/9/2012    Hypertension 2008    Mixed hyperlipidemia 2/9/2012    Other ill-defined conditions(799.89)      broken neck 2012    Pernicious anemia 7/31/2012    Psychiatric disorder      dementia     Unspecified hypothyroidism 4/26/2012     Past Surgical History   Procedure Laterality Date    Hx cataract removal      Hx tonsil and adenoidectomy      Colonoscopy  5/29/12     Rectosigmoid adenocarcinoma    Hx tonsillectomy  1936    Hx hernia repair       Bilateral inguinal    Hx other surgical       colon resection     Family History   Problem Relation Age of Onset    Heart Disease Mother     Hypertension Mother     Heart Disease Father    Manish Georgiecora Hypertension Father      Social History   Substance Use Topics    Smoking status: Former Smoker     Packs/day: 1.50     Years: 30.00     Quit date: 7/30/1982    Smokeless tobacco: Never Used    Alcohol use No        Lab Results  Component Value Date/Time   Hemoglobin A1c 8.1 01/25/2017 12:12 PM   Hemoglobin A1c 7.6 08/17/2016 08:59 AM   Hemoglobin A1c 7.6 04/13/2016 11:17 AM   Hemoglobin A1c, External 7.6 04/13/2016   Glucose 149 08/17/2016 08:59 AM   Glucose (POC) 138 04/05/2015 07:42 AM   Glucose (POC) 200 08/28/2014 01:13 AM   Microalb/Creat ratio (ug/mg creat.) 39.2 09/24/2012 12:00 AM   LDL, calculated 100 01/25/2017 12:12 PM   Creatinine (POC) 2.6 08/28/2014 01:13 AM   Creatinine 1.87 08/17/2016 08:59 AM      Lab Results  Component Value Date/Time   Cholesterol, total 184 01/25/2017 12:12 PM   HDL Cholesterol 33 01/25/2017 12:12 PM   LDL, calculated 100 01/25/2017 12:12 PM   Triglyceride 255 01/25/2017 12:12 PM       Lab Results  Component Value Date/Time   ALT 8 01/25/2017 12:12 PM   AST 16 01/25/2017 12:12 PM   Alk. phosphatase 105 01/25/2017 12:12 PM   Bilirubin, direct 0.17 01/25/2017 12:12 PM   Bilirubin, total 0.8 01/25/2017 12:12 PM          Review of Systems  Pertinent items are noted in HPI. Objective:     Visit Vitals    /76 (BP 1 Location: Left arm, BP Patient Position: Sitting)  Comment: gian    Pulse 61    Temp 97.9 °F (36.6 °C) (Oral)    Resp 20    Ht 6' (1.829 m)    Wt 198 lb (89.8 kg)    SpO2 98%    BMI 26.85 kg/m2     Appearance: alert, well appearing, and in no distress and overweight.   Exam: heart sounds normal rate, regular rhythm, normal S1, S2, no murmurs, rubs, clicks or gallops, chest clear  Diabetic foot exam:     Left:   Filament test normal sensation with micro filament   Pulse DP: 2+ (normal)   Pulse PT: 2+ (normal)   Deformities: Yes - fungal nail first toe  Right:    Filament test normal sensation with micro filament   Pulse DP: 2+ (normal)   Pulse PT: 2+ (normal)   Deformities: None  Lab review: orders written for new lab studies as appropriate; see orders. Assessment/Plan:     diabetes , hypertension stable, hyperlipidemia . Diabetic issues reviewed with him: annual eye examinations at Ophthalmology discussed. ICD-10-CM ICD-9-CM    1. Controlled type 2 diabetes mellitus without complication, without long-term current use of insulin (HCC) E11.9 250.00 HEMOGLOBIN A1C WITH EAG      HM DIABETES FOOT EXAM      REFERRAL TO PODIATRY   2. Encounter for immunization Z23 V03.89 INFLUENZA VIRUS VACCINE, HIGH DOSE SEASONAL, PRESERVATIVE FREE      ADMIN INFLUENZA VIRUS VAC   3. Essential hypertension, benign I10 401.1 furosemide (LASIX) 20 mg tablet   4. Mixed hyperlipidemia E78.2 272.2 LIPID PANEL   5. Acquired hypothyroidism E03.9 244.9 TSH 3RD GENERATION      T4, FREE   6. Encounter for long-term current use of medication Z79.899 V58.69 HEPATIC FUNCTION PANEL   7. Onychomycosis B35.1 110.1 REFERRAL TO PODIATRY     Labs drawn. HD Flu vaccine given. Refer to Podiatry. Patient Instructions     Vaccine Information Statement    Influenza (Flu) Vaccine (Inactivated or Recombinant): What you need to know    Many Vaccine Information Statements are available in [place] and other languages. See www.immunize.org/vis  Hojas de Información Sobre Vacunas están disponibles en Español y en muchos otros idiomas. Visite www.immunize.org/vis    1. Why get vaccinated? Influenza (flu) is a contagious disease that spreads around the [place] every year, usually between October and May. Flu is caused by influenza viruses, and is spread mainly by coughing, sneezing, and close contact. Anyone can get flu. Flu strikes suddenly and can last several days.  Symptoms vary by age, but can include:   fever/chills   sore throat   muscle aches   fatigue   cough   headache    runny or stuffy nose    Flu can also lead to pneumonia and blood infections, and cause diarrhea and seizures in children. If you have a medical condition, such as heart or lung disease, flu can make it worse. Flu is more dangerous for some people. Infants and young children, people 72years of age and older, pregnant women, and people with certain health conditions or a weakened immune system are at greatest risk. Each year thousands of people in the [facility] die from flu, and many more are hospitalized. Flu vaccine can:   keep you from getting flu,   make flu less severe if you do get it, and   keep you from spreading flu to your family and other people. 2. Inactivated and recombinant flu vaccines    A dose of flu vaccine is recommended every flu season. Children 6 months through 6years of age may need two doses during the same flu season. Everyone else needs only one dose each flu season. Some inactivated flu vaccines contain a very small amount of a mercury-based preservative called thimerosal. Studies have not shown thimerosal in vaccines to be harmful, but flu vaccines that do not contain thimerosal are available. There is no live flu virus in flu shots. They cannot cause the flu. There are many flu viruses, and they are always changing. Each year a new flu vaccine is made to protect against three or four viruses that are likely to cause disease in the upcoming flu season. But even when the vaccine doesnt exactly match these viruses, it may still provide some protection    Flu vaccine cannot prevent:   flu that is caused by a virus not covered by the vaccine, or   illnesses that look like flu but are not. It takes about 2 weeks for protection to develop after vaccination, and protection lasts through the flu season. 3. Some people should not get this vaccine    Tell the person who is giving you the vaccine:     If you have any severe, life-threatening allergies.     If you ever had a life-threatening allergic reaction after a dose of flu vaccine, or have a severe allergy to any part of this vaccine, you may be advised not to get vaccinated. Most, but not all, types of flu vaccine contain a small amount of egg protein.  If you ever had Guillain-Barré Syndrome (also called GBS). Some people with a history of GBS should not get this vaccine. This should be discussed with your doctor.  If you are not feeling well. It is usually okay to get flu vaccine when you have a mild illness, but you might be asked to come back when you feel better. 4. Risks of a vaccine reaction    With any medicine, including vaccines, there is a chance of reactions. These are usually mild and go away on their own, but serious reactions are also possible. Most people who get a flu shot do not have any problems with it. Minor problems following a flu shot include:    soreness, redness, or swelling where the shot was given     hoarseness   sore, red or itchy eyes   cough   fever   aches   headache   itching   fatigue  If these problems occur, they usually begin soon after the shot and last 1 or 2 days. More serious problems following a flu shot can include the following:     There may be a small increased risk of Guillain-Barré Syndrome (GBS) after inactivated flu vaccine. This risk has been estimated at 1 or 2 additional cases per million people vaccinated. This is much lower than the risk of severe complications from flu, which can be prevented by flu vaccine.  Young children who get the flu shot along with pneumococcal vaccine (PCV13) and/or DTaP vaccine at the same time might be slightly more likely to have a seizure caused by fever. Ask your doctor for more information. Tell your doctor if a child who is getting flu vaccine has ever had a seizure. Problems that could happen after any injected vaccine:      People sometimes faint after a medical procedure, including vaccination.  Sitting or lying down for about 15 minutes can help prevent fainting, and injuries caused by a fall. Tell your doctor if you feel dizzy, or have vision changes or ringing in the ears.  Some people get severe pain in the shoulder and have difficulty moving the arm where a shot was given. This happens very rarely.  Any medication can cause a severe allergic reaction. Such reactions from a vaccine are very rare, estimated at about 1 in a million doses, and would happen within a few minutes to a few hours after the vaccination. As with any medicine, there is a very remote chance of a vaccine causing a serious injury or death. The safety of vaccines is always being monitored. For more information, visit: www.cdc.gov/vaccinesafety/    5. What if there is a serious reaction? What should I look for?  Look for anything that concerns you, such as signs of a severe allergic reaction, very high fever, or unusual behavior. Signs of a severe allergic reaction can include hives, swelling of the face and throat, difficulty breathing, a fast heartbeat, dizziness, and weakness - usually within a few minutes to a few hours after the vaccination. What should I do?  If you think it is a severe allergic reaction or other emergency that cant wait, call 9-1-1 and get the person to the nearest hospital. Otherwise, call your doctor.  Reactions should be reported to the Vaccine Adverse Event Reporting System (VAERS). Your doctor should file this report, or you can do it yourself through  the VAERS web site at www.vaers. hhs.gov, or by calling 7-477.972.7344. VAERS does not give medical advice. 6. The National Vaccine Injury Compensation Program    The [facility] Vaccine Injury Compensation Program (VICP) is a federal program that was created to compensate people who may have been injured by certain vaccines.     Persons who believe they may have been injured by a vaccine can learn about the program and about filing a claim by calling 8-805.309.3570 or visiting the VICP website at www.hrsa.gov/vaccinecompensation. There is a time limit to file a claim for compensation. 7. How can I learn more?  Ask your healthcare provider. He or she can give you the vaccine package insert or suggest other sources of information.  Call your local or state health department.  Contact the Centers for Disease Control and Prevention (CDC):  - Call 1-807.486.5762 (1-800-CDC-INFO) or  - Visit CDCs website at www.cdc.gov/flu    Vaccine Information Statement   Inactivated Influenza Vaccine   8/7/2015  42 KRISTINA Medina [id number]    Department of Health and Human Services  Centers for Disease Control and Prevention    Office Use Only       Diabetes Foot Health: Care Instructions  Your Care Instructions    When you have diabetes, your feet need extra care and attention. Diabetes can damage the nerve endings and blood vessels in your feet, making you less likely to notice when your feet are injured. Diabetes also limits your body's ability to fight infection and get blood to areas that need it. If you get a minor foot injury, it could become an ulcer or a serious infection. With good foot care, you can prevent most of these problems. Caring for your feet can be quick and easy. Most of the care can be done when you are bathing or getting ready for bed. Follow-up care is a key part of your treatment and safety. Be sure to make and go to all appointments, and call your doctor if you are having problems. Its also a good idea to know your test results and keep a list of the medicines you take. How can you care for yourself at home? · Keep your blood sugar close to normal by watching what and how much you eat, monitoring blood sugar, taking medicines if prescribed, and getting regular exercise. · Do not smoke. Smoking affects blood flow and can make foot problems worse. If you need help quitting, talk to your doctor about stop-smoking programs and medicines.  These can increase your chances of quitting for good. · Eat a diet that is low in fats. High fat intake can cause fat to build up in your blood vessels and decrease blood flow. · Inspect your feet daily for blisters, cuts, cracks, or sores. If you cannot see well, use a mirror or have someone help you. · Take care of your feet:  [facility] your feet every day. Use warm (not hot) water. Check the water temperature with your wrists or other part of your body, not your feet. ¨ Dry your feet well. Pat them dry. Do not rub the skin on your feet too hard. Dry well between your toes. If the skin on your feet stays moist, bacteria or a fungus can grow, which can lead to infection. ¨ Keep your skin soft. Use moisturizing skin cream to keep the skin on your feet soft and prevent calluses and cracks. But do not put the cream between your toes, and stop using any cream that causes a rash. ¨ Clean underneath your toenails carefully. Do not use a sharp object to clean underneath your toenails. Use the blunt end of a nail file or other rounded tool. ¨ Trim and file your toenails straight across to prevent ingrown toenails. Use a nail clipper, not scissors. Use an emery board to smooth the edges. · Change socks daily. Socks without seams are best, because seams often rub the feet. You can find socks for people with diabetes from specialty catalogs. · Look inside your shoes every day for things like gravel or torn linings, which could cause blisters or sores. · Buy shoes that fit well:  ¨ Look for shoes that have plenty of space around the toes. This helps prevent bunions and blisters. ¨ Try on shoes while wearing the kind of socks you will usually wear with the shoes. ¨ Avoid plastic shoes. They may rub your feet and cause blisters. Good shoes should be made of materials that are flexible and breathable, such as leather or cloth. ¨ Break in new shoes slowly by wearing them for no more than an hour a day for several days.  Take extra time to check your feet for red areas, blisters, or other problems after you wear new shoes. · Do not go barefoot. Do not wear sandals, and do not wear shoes with very thin soles. Thin soles are easy to puncture. They also do not protect your feet from hot pavement or cold weather. · Have your doctor check your feet during each visit. If you have a foot problem, see your doctor. Do not try to treat an early foot problem at home. Home remedies or treatments that you can buy without a prescription (such as corn removers) can be harmful. · Always get early treatment for foot problems. A minor irritation can lead to a major problem if not properly cared for early. When should you call for help? Call your doctor now or seek immediate medical care if:  · You have a foot sore, an ulcer or break in the skin that is not healing after 4 days, bleeding corns or calluses, or an ingrown toenail. · You have blue or black areas, which can mean bruising or blood flow problems. · You have peeling skin or tiny blisters between your toes or cracking or oozing of the skin. · You have a fever for more than 24 hours and a foot sore. · You have new numbness or tingling in your feet that does not go away after you move your feet or change positions. · You have unexplained or unusual swelling of the foot or ankle. Watch closely for changes in your health, and be sure to contact your doctor if:  · You cannot do proper foot care. Where can you learn more? Go to http://milton-saud.info/. Enter A739 in the search box to learn more about \"Diabetes Foot Health: Care Instructions. \"  Current as of: May 23, 2016  Content Version: 11.1  © 8355-1919 [employer]. Care instructions adapted under license by [employer] (which disclaims liability or warranty for this information).  If you have questions about a medical condition or this instruction, always ask your healthcare professional. Anthony Shoemaker disclaims any warranty or liability for your use of this information.

## 2017-02-05 NOTE — PROGRESS NOTES
Labs show good thyroid level. Cholesterol numbers are stable. Blood sugar control is not at goal with high A1C. We want this to be < 7%. You are at 8.1%. Need to continue Glimepiride daily and also cut back on sugar and starches in diet. Follow up in 6 months.

## 2017-02-27 DIAGNOSIS — E03.9 HYPOTHYROIDISM, ADULT: ICD-10-CM

## 2017-02-27 RX ORDER — LEVOTHYROXINE AND LIOTHYRONINE 38; 9 UG/1; UG/1
TABLET ORAL
Qty: 30 TAB | Refills: 5 | Status: SHIPPED | OUTPATIENT
Start: 2017-02-27 | End: 2017-08-15 | Stop reason: SDUPTHER

## 2017-02-28 ENCOUNTER — TELEPHONE (OUTPATIENT)
Dept: FAMILY MEDICINE CLINIC | Age: 82
End: 2017-02-28

## 2017-03-31 RX ORDER — LOPERAMIDE HYDROCHLORIDE 2 MG/1
CAPSULE ORAL
Qty: 360 CAP | Refills: 1 | Status: SHIPPED | OUTPATIENT
Start: 2017-03-31 | End: 2017-12-31 | Stop reason: SDUPTHER

## 2017-05-31 ENCOUNTER — OFFICE VISIT (OUTPATIENT)
Dept: FAMILY MEDICINE CLINIC | Age: 82
End: 2017-05-31

## 2017-05-31 VITALS
BODY MASS INDEX: 26.82 KG/M2 | WEIGHT: 198 LBS | OXYGEN SATURATION: 97 % | HEART RATE: 78 BPM | TEMPERATURE: 98.4 F | DIASTOLIC BLOOD PRESSURE: 60 MMHG | RESPIRATION RATE: 16 BRPM | HEIGHT: 72 IN | SYSTOLIC BLOOD PRESSURE: 126 MMHG

## 2017-05-31 DIAGNOSIS — Z23 NEED FOR PROPHYLACTIC VACCINATION WITH STREPTOCOCCUS PNEUMONIAE (PNEUMOCOCCUS) AND INFLUENZA VACCINES: ICD-10-CM

## 2017-05-31 DIAGNOSIS — E11.9 CONTROLLED TYPE 2 DIABETES MELLITUS WITHOUT COMPLICATION, WITHOUT LONG-TERM CURRENT USE OF INSULIN (HCC): ICD-10-CM

## 2017-05-31 DIAGNOSIS — Z00.00 MEDICARE ANNUAL WELLNESS VISIT, SUBSEQUENT: Primary | ICD-10-CM

## 2017-05-31 LAB
ALBUMIN UR QL STRIP: NORMAL MG/L
CREATININE, URINE POC: NORMAL MG/DL
MICROALBUMIN/CREAT RATIO POC: >300 MG/G

## 2017-05-31 NOTE — PROGRESS NOTES
Date of visit: 5/31/2017       This is a Subsequent Medicare Annual Wellness Visit (AWV), (Performed more than 12 months after effective date of Medicare Part B enrollment and 12 months after last preventive visit, Once in a lifetime)    I have reviewed the patient's medical history in detail and updated the computerized patient record. Effie Lewis is a 80 y.o. male   History obtained from: the patient.     Concerns today   (Patient understands that medical problems addressed today may incur additional cost as this is a preventive visit)  -    History     Patient Active Problem List   Diagnosis Code    Diabetes mellitus type 2, controlled ([address] E11.9    Essential hypertension, benign I10    Mixed hyperlipidemia E78.2    Allergic rhinitis due to other allergen J30.89    Esophageal reflux K21.9    Microalbuminuria R80.9    Acquired hypothyroidism E03.9    Pernicious anemia D51.0    Polyp of colon K63.5    Colon cancer (HCC) C18.9    Odontoid fracture ([address] S12.100A    Unspecified vitamin D deficiency E55.9    Urosepsis A41.9, N39.0    WILTON (acute kidney injury) ([address] N17.9    Controlled type 2 diabetes mellitus without complication ([address] X66.9    Altered mental status R41.82    UTI (lower urinary tract infection) N39.0   [facility] discharge follow-up Z09    Hypothyroidism, adult E03.9    CKD (chronic kidney disease) N18.9    Dementia without behavioral disturbance F03.90    Medicare annual wellness visit, subsequent Z00.00     Past Medical History:   Diagnosis Date    WILTON (acute kidney injury) ([address] 8/2/2014    Cancer ([address]     colon    Diabetes ([address] 2008    type 2    Dyslipidemia     Esophageal reflux     Esophageal reflux 2/9/2012    Hypertension 2008    Mixed hyperlipidemia 2/9/2012    Other ill-defined conditions     broken neck 2012    Pernicious anemia 7/31/2012    Psychiatric disorder     dementia     Unspecified hypothyroidism 4/26/2012      Past Surgical History:   Procedure Laterality Date    COLONOSCOPY  5/29/12    Rectosigmoid adenocarcinoma    HX CATARACT REMOVAL      HX HERNIA REPAIR      Bilateral inguinal    HX OTHER SURGICAL      colon resection    HX TONSIL AND ADENOIDECTOMY      HX TONSILLECTOMY  1936     Allergies   Allergen Reactions    Synthroid [Levothyroxine] Other (comments)     Confused, hallucinations     Current Outpatient Prescriptions   Medication Sig Dispense Refill    loperamide (IMODIUM) 2 mg capsule TAKE 1 CAPSULE BEFORE BREAKFAST, LUNCH, DINNER AND AT BEDTIME FOR DIARRHEA 360 Cap 1    thyroid, Pork, (ARMOUR THYROID) 60 mg tablet Take 1 tablet daily only six days a week. Skip on Sundays. Indications: hypothyroidism 30 Tab 5    furosemide (LASIX) 20 mg tablet TAKE 1 TABLET BY MOUTH ONCE A DAY AS DIRECTED 90 Tab 3    glimepiride (AMARYL) 4 mg tablet TAKE 1 TABLET BY MOUTH EVERY MORNING. INCREASE IN DOSE. 30 Tab 5    donepezil (ARICEPT) 10 mg tablet TAKE 1 TABLET EVERY NIGHT 90 Tab 1    amLODIPine (NORVASC) 10 mg tablet TAKE 1 TABLET EVERY DAY FOR HYPERTENSION  (INCREASE  IN  DOSE  TO  10MG) 90 Tab 1    lansoprazole (PREVACID) 30 mg capsule TAKE 1 CAPSULE EVERY DAY  (BEFORE BREAKFAST). 90 Cap 1    ergocalciferol (ERGOCALCIFEROL) 50,000 unit capsule TAKE 1 CAPSULE EVERY 7 DAYS 13 Cap 3    cetirizine (ZYRTEC) 10 mg tablet TAKE 1 TABLET EVERY DAY AS NEEDED FOR ALLERGIES 90 Tab 3    losartan (COZAAR) 100 mg tablet TAKE ONE TABLET BY MOUTH DAILY (Patient taking differently: TAKE ONE half TABLET BY MOUTH DAILY) 30 Tab 5    aspirin 81 mg chewable tablet Take 1 Tab by mouth daily. 14 Tab 0    ferrous sulfate (IRON) 325 mg (65 mg iron) tablet Take 1 Tab by mouth Daily (before breakfast). 30 Tab 5    multivitamins-minerals-lutein (CENTRUM SILVER) tab tablet Take 1 Tab by mouth daily.  nicotinic acid (NIACIN) 500 mg tablet Take 500 mg by mouth Daily (before breakfast).          Family History   Problem Relation Age of Onset    Heart Disease Mother     Hypertension Mother     Heart Disease Father     Hypertension Father      Social History   Substance Use Topics    Smoking status: Former Smoker     Packs/day: 1.50     Years: 30.00     Quit date: 7/30/1982    Smokeless tobacco: Never Used    Alcohol use No       Specialists/Care Team   Garrison Patch. has established care with the following healthcare providers:  Patient Care Team:  Castillo Haji MD as PCP - General (Family Practice)      Health Risk Assessment     Demographics   male  80 y.o. General Health Questions   -During the past 4 weeks:   -how would you rate your health in general? Good   -how often have you been bothered by feeling dizzy when standing up? occasionally   -how much have you been bothered by bodily pain? not   -Have you noticed any hearing difficulties? no   -has your physical and emotional health limited your social activities with family or friends? no    Emotional Health Questions   -Do you have a history of depression, anxiety, or emotional problems? no  -Over the past 2 weeks, have you felt down, depressed or hopeless? no  -Over the past 2 weeks, have you felt little interest or pleasure in doing things? no    Health Habits   Please describe your diet habits:   Do you get 5 servings of fruits or vegetables daily? no  Do you exercise regularly? no    Activities of Daily Living and Functional Status   -Do you need help with eating, walking, dressing, bathing, toileting, the phone, transportation, shopping, preparing meals, housework, laundry, medications or managing money? no  -In the past four weeks, was someone available to help you if you needed and wanted help with anything? yes  -Are you confident are you that you can control and manage most of your health problems? yes  -Have you been given information to help you keep track of your medications?  yes  -How often do you have trouble taking your medications as prescribed? never    Fall Risk and Home Safety   Have you fallen 2 or more times in the past year? no  Does your home have rugs in the hallway, lack grab bars in the bathroom, lack handrails on the stairs or have poor lighting? no  Do you have smoke detectors and check them regularly? yes  Do you have difficulties driving a car? He can drive, but he chooses not to  Do you always fasten your seat belt when you are in a car? yes    Review of Systems (if indicated for problems addressed today)       Physical Examination     Vitals:    05/31/17 1412   BP: 126/60   Pulse: 78   Resp: 16   Temp: 98.4 °F (36.9 °C)   TempSrc: Oral   SpO2: 97%   Weight: 198 lb (89.8 kg)   Height: 6' (1.829 m)     Body mass index is 26.85 kg/(m^2). No exam data present  Was the patient's timed Up & Go test unsteady or longer than 30 seconds? no    Evaluation of Cognitive Function   Mood/affect:  neutral, happy  Orientation: Person, Place, Time and Situation  Appearance: age appropriate  Family member/caregiver input:     Additional exam if indicated for problems addressed today:    Advice/Referrals/Counseling (as indicated)   Education and counseling provided for any problems identified above:     Preventive Services     (Preventive care checklist to be included in patient instructions)  Discussed today Done Previously Not Needed       Pneumococcal vaccines      Flu vaccine      Hepatitis B vaccine (if at risk)      Shingles vaccine      TDAP vaccine      Digital rectal exam      PSA      Colorectal cancer screening      Low-dose CT for lung cancer screening      Bone density test      Glaucoma screening      Cholesterol test      Diabetes screening test       Diabetes self-management class      Nutritionist referral for diabetes or renal disease     Discussion of Advance Directive   Discussed with Herbert Bravo. his ability to prepare and advance directive in the case that an injury or illness causes him to be unable to make health care decisions.    Pt has advanced directive and it is on file. Assessment/Plan   Z00.00    ICD-10-CM ICD-9-CM    1. Medicare annual wellness visit, subsequent Z00.00 V70.0    2. Controlled type 2 diabetes mellitus without complication, without long-term current use of insulin (HCC) E11.9 250.00 AMB POC URINE, MICROALBUMIN, SEMIQUANT (3 RESULTS)       Orders Placed This Encounter    AMB POC URINE, MICROALBUMIN, SEMIQUANT (3 RESULTS)     Rx for vaccine given. They are making appointment for eye doctor this month. Pt informed to return to office with worsening of symptoms, or PRN with any questions or concerns. Pt verbalizes understanding of plan of care and denies further questions or concerns at this time.       Follow-up Disposition: Not on File    Izzy Singh NP

## 2017-05-31 NOTE — PATIENT INSTRUCTIONS
Pneumococcal 13-Valent Vaccine, Diphtheria Conjugate (By injection)   Pneumococcal 13-Valent Vaccine, Diphtheria Conjugate (RPJ-qzq-FCG-al 13-VAY-lent VAX-een, dif-THEER-ee-a PLW-qdq-agks)  Prevents infections, such as pneumonia and meningitis. Brand Name(s): Prevnar 13   There may be other brand names for this medicine. When This Medicine Should Not Be Used: This vaccine is not right for everyone. You should not receive it if you had an allergic reaction to pneumococcal or diphtheria vaccine. How to Use This Medicine:   Injectable  · A nurse or other health provider will give you this medicine. This vaccine is usually given as a shot into a muscle in the thigh or upper arm. · The vaccine schedule is different for different people. ¨ Tell your doctor if your child was born prematurely. Children who were premature may need to follow a different schedule. ¨ Children younger than 10years of age: This vaccine is usually given as 3 or 4 separate shots over several months. Your child's doctor will tell you how many shots are needed and when to come back for the next one. ¨ Children older than 10years of age: This vaccine is given as a single shot. If your child recently received another pneumonia vaccine, this one should be given at least 8 weeks later. ¨ Adults older than 25years of age: This vaccine is given as a single dose. · It is very important for your child to receive all of the shots for the vaccine. · Missed dose: This vaccine must be given on a fixed schedule. If your child misses a dose, call your child's doctor for another appointment. Drugs and Foods to Avoid:   Ask your doctor or pharmacist before using any other medicine, including over-the-counter medicines, vitamins, and herbal products. · Some foods and medicines can affect how this vaccine works. Tell your doctor if you are receiving a treatment or medicine that causes a weak immune system.  This includes radiation treatment, steroid medicine (including hydrocortisone, methylprednisolone, prednisolone, prednisone), or cancer medicine. Warnings While Using This Medicine:   · Tell your doctor if you are pregnant or breastfeeding. · Tell your doctor if you have a weak immune system. You may not be fully protected by this vaccine. Possible Side Effects While Using This Medicine:   Call your doctor right away if you notice any of these side effects:  · Allergic reaction: Itching or hives, swelling in your face or hands, swelling or tingling in your mouth or throat, chest tightness, trouble breathing  · High fever  If you notice these less serious side effects, talk with your doctor:   · Crying, irritability, or fussiness  · Joint or muscle pain  · Mild skin rash  · Pain, burning, redness, or swelling where the shot was given  · Poor appetite  · Sleep changes  If you notice other side effects that you think are caused by this medicine, tell your doctor. Call your doctor for medical advice about side effects. You may report side effects to FDA at 5-059-FDA-2787  © 2017 [address] [employer] Information is for End User's use only and may not be sold, redistributed or otherwise used for commercial purposes. The above information is an  only. It is not intended as medical advice for individual conditions or treatments. Talk to your doctor, nurse or pharmacist before following any medical regimen to see if it is safe and effective for you.

## 2017-05-31 NOTE — MR AVS SNAPSHOT
Visit Information Date & Time Provider Department Dept. Phone Encounter #  
 5/31/2017  2:00 PM Rukhsana George  [address] [phone] [id number] Follow-up Instructions Return in about 1 year (around 5/31/2018), or if symptoms worsen or fail to improve. Upcoming Health Maintenance Date Due Pneumococcal 65+ High/Highest Risk (1 of 2 - PCV13) 7/21/2017* EYE EXAM RETINAL OR DILATED Q1 7/21/2017* HEMOGLOBIN A1C Q6M 7/25/2017 INFLUENZA AGE 9 TO ADULT 8/1/2017 GLAUCOMA SCREENING Q2Y 10/20/2017 FOOT EXAM Q1 1/25/2018 LIPID PANEL Q1 1/25/2018 MICROALBUMIN Q1 5/31/2018 MEDICARE YEARLY EXAM 6/1/2018 DTaP/Tdap/Td series (2 - Td) 1/25/2027 *Topic was postponed. The date shown is not the original due date. Allergies as of 5/31/2017  Review Complete On: 5/31/2017 By: Rukhsana George NP Severity Noted Reaction Type Reactions Synthroid [Levothyroxine]  08/20/2013   Systemic Other (comments) Confused, hallucinations Current Immunizations  Reviewed on 4/2/2015 Name Date Influenza High Dose Vaccine PF 1/25/2017 Influenza Vaccine 9/12/2014, 9/11/2013 Influenza Vaccine (Quad) PF 10/30/2015  9:31 AM  
 Influenza Vaccine Whole 10/16/2012 Zoster 10/16/2012 Not reviewed this visit You Were Diagnosed With   
  
 Codes Comments Medicare annual wellness visit, subsequent    -  Primary ICD-10-CM: Z00.00 ICD-9-CM: V70.0 Controlled type 2 diabetes mellitus without complication, without long-term current use of insulin ([facility]     ICD-10-CM: E11.9 ICD-9-CM: 250.00 Need for prophylactic vaccination with Streptococcus pneumoniae (Pneumococcus) and Influenza vaccines     ICD-10-CM: Z23 ICD-9-CM: V06.6 Vitals BP Pulse Temp Resp Height(growth percentile) Weight(growth percentile) 126/60 78 98.4 °F (36.9 °C) (Oral) 16 6' (1.829 m) 198 lb (89.8 kg) SpO2 BMI Smoking Status 97% 26.85 kg/m2 Former Smoker BMI and BSA Data Body Mass Index Body Surface Area  
 26.85 kg/m 2 2.14 m 2 Preferred Pharmacy Pharmacy Name Phone Rika Porras [facility] [id number] [facility] [address] [place] [address] [phone] Your Updated Medication List  
  
   
This list is accurate as of: 5/31/17  2:25 PM.  Always use your most recent med list. amLODIPine 10 mg tablet Commonly known as:  Niraj Card TAKE 1 TABLET EVERY DAY FOR HYPERTENSION  (INCREASE  IN  DOSE  TO  10MG) aspirin 81 mg chewable tablet Take 1 Tab by mouth daily. CENTRUM SILVER Tab tablet Generic drug:  multivitamins-minerals-lutein Take 1 Tab by mouth daily. cetirizine 10 mg tablet Commonly known as:  ZYRTEC  
TAKE 1 TABLET EVERY DAY AS NEEDED FOR ALLERGIES  
  
 donepezil 10 mg tablet Commonly known as:  ARICEPT  
TAKE 1 TABLET EVERY NIGHT  
  
 ergocalciferol 50,000 unit capsule Commonly known as:  ERGOCALCIFEROL  
TAKE 1 CAPSULE EVERY 7 DAYS  
  
 ferrous sulfate 325 mg (65 mg iron) tablet Commonly known as:  Iron Take 1 Tab by mouth Daily (before breakfast). furosemide 20 mg tablet Commonly known as:  LASIX TAKE 1 TABLET BY MOUTH ONCE A DAY AS DIRECTED  
  
 glimepiride 4 mg tablet Commonly known as:  AMARYL  
TAKE 1 TABLET BY MOUTH EVERY MORNING. INCREASE IN DOSE.  
  
 lansoprazole 30 mg capsule Commonly known as:  PREVACID TAKE 1 CAPSULE EVERY DAY  (BEFORE BREAKFAST). loperamide 2 mg capsule Commonly known as:  IMODIUM  
TAKE 1 CAPSULE BEFORE BREAKFAST, LUNCH, DINNER AND AT BEDTIME FOR DIARRHEA  
  
 losartan 100 mg tablet Commonly known as:  COZAAR  
TAKE ONE TABLET BY MOUTH DAILY  
  
 nicotinic acid 500 mg tablet Commonly known as:  NIACIN Take 500 mg by mouth Daily (before breakfast). pneumococcal 13 estrella conj dip 0.5 mL Syrg injection Commonly known as:  PREVNAR-13  
0.5 mL by IntraMUSCular route once for 1 dose. thyroid (Pork) 60 mg tablet Commonly known as:  BRAIN THYROID Take 1 tablet daily only six days a week. Skip on Sundays. Indications: hypothyroidism Prescriptions Printed Refills  
 pneumococcal 13 estrella conj dip (PREVNAR-13) 0.5 mL syrg injection 0 Si.5 mL by IntraMUSCular route once for 1 dose. Class: Print Route: IntraMUSCular We Performed the Following AMB POC URINE, MICROALBUMIN, SEMIQUANT (3 RESULTS) [81091 CPT(R)] Follow-up Instructions Return in about 1 year (around 2018), or if symptoms worsen or fail to improve. Patient Instructions Pneumococcal 13-Valent Vaccine, Diphtheria Conjugate (By injection) Pneumococcal 13-Valent Vaccine, Diphtheria Conjugate (EXL-gbv-HIY-al 13-VAY-lent VAX-een, dif-THEER-ee-a QKT-tpm-oduz) Prevents infections, such as pneumonia and meningitis. Brand Name(s): Prevnar 13 There may be other brand names for this medicine. When This Medicine Should Not Be Used: This vaccine is not right for everyone. You should not receive it if you had an allergic reaction to pneumococcal or diphtheria vaccine. How to Use This Medicine:  
Injectable · A nurse or other health provider will give you this medicine. This vaccine is usually given as a shot into a muscle in the thigh or upper arm. · The vaccine schedule is different for different people. ¨ Tell your doctor if your child was born prematurely. Children who were premature may need to follow a different schedule. ¨ Children younger than 10years of age: This vaccine is usually given as 3 or 4 separate shots over several months. Your child's doctor will tell you how many shots are needed and when to come back for the next one. ¨ Children older than 10years of age: This vaccine is given as a single shot. If your child recently received another pneumonia vaccine, this one should be given at least 8 weeks later. ¨ Adults older than 25years of age: This vaccine is given as a single dose. · It is very important for your child to receive all of the shots for the vaccine. · Missed dose: This vaccine must be given on a fixed schedule. If your child misses a dose, call your child's doctor for another appointment. Drugs and Foods to Avoid: Ask your doctor or pharmacist before using any other medicine, including over-the-counter medicines, vitamins, and herbal products. · Some foods and medicines can affect how this vaccine works. Tell your doctor if you are receiving a treatment or medicine that causes a weak immune system. This includes radiation treatment, steroid medicine (including hydrocortisone, methylprednisolone, prednisolone, prednisone), or cancer medicine. Warnings While Using This Medicine: · Tell your doctor if you are pregnant or breastfeeding. · Tell your doctor if you have a weak immune system. You may not be fully protected by this vaccine. Possible Side Effects While Using This Medicine:  
Call your doctor right away if you notice any of these side effects: · Allergic reaction: Itching or hives, swelling in your face or hands, swelling or tingling in your mouth or throat, chest tightness, trouble breathing · High fever If you notice these less serious side effects, talk with your doctor: · Crying, irritability, or fussiness · Joint or muscle pain · Mild skin rash · Pain, burning, redness, or swelling where the shot was given · Poor appetite · Sleep changes If you notice other side effects that you think are caused by this medicine, tell your doctor. Call your doctor for medical advice about side effects. You may report side effects to FDA at 6-483-QFR-0774 © 2017 [address] Information is for End User's use only and may not be sold, redistributed or otherwise used for commercial purposes. The above information is an  only.  It is not intended as medical advice for individual conditions or treatments. Talk to your doctor, nurse or pharmacist before following any medical regimen to see if it is safe and effective for you. Introducing [facility]! Yassine Guillen introduces [employer] patient portal. Now you can access parts of your medical record, email your doctor's office, and request medication refills online. 1. In your internet browser, go to [email or web address] 2. Click on the First Time User? Click Here link in the Sign In box. You will see the New Member Sign Up page. 3. Enter your [employer] Access Code exactly as it appears below. You will not need to use this code after youve completed the sign-up process. If you do not sign up before the expiration date, you must request a new code. · [employer] Access Code: [email or web address] Expires: 8/29/2017  2:25 PM 
 
4. Enter the last four digits of your Social Security Number (xxxx) and Date of Birth (mm/dd/yyyy) as indicated and click Submit. You will be taken to the next sign-up page. 5. Create a [employer] ID. This will be your [employer] login ID and cannot be changed, so think of one that is secure and easy to remember. 6. Create a [employer] password. You can change your password at any time. 7. Enter your Password Reset Question and Answer. This can be used at a later time if you forget your password. 8. Enter your e-mail address. You will receive e-mail notification when new information is available in [address] 9. Click Sign Up. You can now view and download portions of your medical record. 10. Click the Download Summary menu link to download a portable copy of your medical information. If you have questions, please visit the Frequently Asked Questions section of the [employer] website. Remember, [employer] is NOT to be used for urgent needs. For medical emergencies, dial 911. Now available from your iPhone and Android! Please provide this summary of care documentation to your next provider. Your primary care clinician is listed as Yeni Diane. If you have any questions after today's visit, please call [phone].

## 2017-05-31 NOTE — PROGRESS NOTES
Identified pt with two pt identifiers(name and ). Chief Complaint   Patient presents with    Annual Wellness Visit        Health Maintenance Due   Topic    Pneumococcal 65+ High/Highest Risk (1 of 2 - PCV13)    EYE EXAM RETINAL OR DILATED Q1        Wt Readings from Last 3 Encounters:   17 198 lb (89.8 kg)   17 198 lb (89.8 kg)   08/15/16 198 lb 6.4 oz (90 kg)     Temp Readings from Last 3 Encounters:   17 98.4 °F (36.9 °C) (Oral)   17 97.9 °F (36.6 °C) (Oral)   08/15/16 97.8 °F (36.6 °C) (Oral)     BP Readings from Last 3 Encounters:   17 126/60   17 134/76   08/15/16 135/54     Pulse Readings from Last 3 Encounters:   17 78   17 61   08/15/16 (!) 58         Learning Assessment:  :     Learning Assessment 3/19/2014   PRIMARY LEARNER Patient   HIGHEST LEVEL OF EDUCATION - PRIMARY LEARNER  GRADUATED HIGH SCHOOL OR GED   BARRIERS PRIMARY LEARNER NONE   CO-LEARNER CAREGIVER No   PRIMARY LANGUAGE ENGLISH   LEARNER PREFERENCE PRIMARY LISTENING     DEMONSTRATION   ANSWERED BY patient   RELATIONSHIP SELF       Depression Screening:  :     PHQ over the last two weeks 2017   Little interest or pleasure in doing things Not at all   Feeling down, depressed or hopeless Not at all   Total Score PHQ 2 0       Fall Risk Assessment:  :     Fall Risk Assessment, last 12 mths 2017   Able to walk? Yes   Fall in past 12 months? No       Abuse Screening:  :     Abuse Screening Questionnaire 8/15/2016 2015 3/19/2014   Do you ever feel afraid of your partner? N N N   Are you in a relationship with someone who physically or mentally threatens you? N N N   Is it safe for you to go home?  Giana Pereyra       Coordination of Care Questionnaire:  :     1) Have you been to an emergency room, urgent care clinic since your last visit? no   Hospitalized since your last visit? no             2) Have you seen or consulted any other health care providers outside of [facility] since your last visit? no  (Include any pap smears or colon screenings in this section.)    3) Do you have an Advance Directive on file? yes      Patient is accompanied by self I have received verbal consent from Tina Rider. to discuss any/all medical information while they are present in the room. Reviewed record in preparation for visit and have obtained necessary documentation. Medication reconciliation up to date and corrected with patient at this time.

## 2017-07-12 ENCOUNTER — TELEPHONE (OUTPATIENT)
Dept: FAMILY MEDICINE CLINIC | Age: 82
End: 2017-07-12

## 2017-07-12 DIAGNOSIS — E78.00 HYPERCHOLESTEROLEMIA: ICD-10-CM

## 2017-07-12 DIAGNOSIS — E11.9 CONTROLLED TYPE 2 DIABETES MELLITUS WITHOUT COMPLICATION, WITHOUT LONG-TERM CURRENT USE OF INSULIN (HCC): ICD-10-CM

## 2017-07-12 DIAGNOSIS — I10 ESSENTIAL HYPERTENSION: ICD-10-CM

## 2017-07-12 DIAGNOSIS — D51.0 PERNICIOUS ANEMIA: ICD-10-CM

## 2017-07-12 DIAGNOSIS — E03.9 ACQUIRED HYPOTHYROIDISM: ICD-10-CM

## 2017-07-12 DIAGNOSIS — Z79.899 ENCOUNTER FOR LONG-TERM CURRENT USE OF MEDICATION: Primary | ICD-10-CM

## 2017-07-12 NOTE — TELEPHONE ENCOUNTER
Pt's daughter returned office call about pt coming in for fasting labs but no orders in the system and they wanted to know does he need an appt and if the kidney blood work can be added on as well.  Please call Jessica and confirm

## 2017-07-19 LAB
ALBUMIN SERPL-MCNC: 4 G/DL (ref 3.5–4.7)
ALBUMIN/GLOB SERPL: 1.5 {RATIO} (ref 1.2–2.2)
ALP SERPL-CCNC: 100 IU/L (ref 39–117)
ALT SERPL-CCNC: 9 IU/L (ref 0–44)
AST SERPL-CCNC: 14 IU/L (ref 0–40)
BILIRUB SERPL-MCNC: 0.6 MG/DL (ref 0–1.2)
BUN SERPL-MCNC: 27 MG/DL (ref 8–27)
BUN/CREAT SERPL: 15 (ref 10–24)
CALCIUM SERPL-MCNC: 8.9 MG/DL (ref 8.6–10.2)
CHLORIDE SERPL-SCNC: 101 MMOL/L (ref 96–106)
CHOLEST SERPL-MCNC: 168 MG/DL (ref 100–199)
CO2 SERPL-SCNC: 24 MMOL/L (ref 18–29)
CREAT SERPL-MCNC: 1.83 MG/DL (ref 0.76–1.27)
ERYTHROCYTE [DISTWIDTH] IN BLOOD BY AUTOMATED COUNT: 13.2 % (ref 12.3–15.4)
EST. AVERAGE GLUCOSE BLD GHB EST-MCNC: 189 MG/DL
GLOBULIN SER CALC-MCNC: 2.6 G/DL (ref 1.5–4.5)
GLUCOSE SERPL-MCNC: 168 MG/DL (ref 65–99)
HBA1C MFR BLD: 8.2 % (ref 4.8–5.6)
HCT VFR BLD AUTO: 40.7 % (ref 37.5–51)
HDLC SERPL-MCNC: 32 MG/DL
HGB BLD-MCNC: 13.8 G/DL (ref 12.6–17.7)
INTERPRETATION, 910389: NORMAL
INTERPRETATION: NORMAL
LDLC SERPL CALC-MCNC: 89 MG/DL (ref 0–99)
Lab: NORMAL
MCH RBC QN AUTO: 33.2 PG (ref 26.6–33)
MCHC RBC AUTO-ENTMCNC: 33.9 G/DL (ref 31.5–35.7)
MCV RBC AUTO: 98 FL (ref 79–97)
PDF IMAGE, 910387: NORMAL
PLATELET # BLD AUTO: 245 X10E3/UL (ref 150–379)
POTASSIUM SERPL-SCNC: 4.7 MMOL/L (ref 3.5–5.2)
PROT SERPL-MCNC: 6.6 G/DL (ref 6–8.5)
RBC # BLD AUTO: 4.16 X10E6/UL (ref 4.14–5.8)
SODIUM SERPL-SCNC: 143 MMOL/L (ref 134–144)
T4 FREE SERPL-MCNC: 0.81 NG/DL (ref 0.82–1.77)
TRIGL SERPL-MCNC: 233 MG/DL (ref 0–149)
TSH SERPL DL<=0.005 MIU/L-ACNC: 13.9 UIU/ML (ref 0.45–4.5)
VIT B12 SERPL-MCNC: 573 PG/ML (ref 211–946)
VLDLC SERPL CALC-MCNC: 47 MG/DL (ref 5–40)
WBC # BLD AUTO: 9.1 X10E3/UL (ref 3.4–10.8)

## 2017-07-27 ENCOUNTER — TELEPHONE (OUTPATIENT)
Dept: FAMILY MEDICINE CLINIC | Age: 82
End: 2017-07-27

## 2017-07-27 NOTE — TELEPHONE ENCOUNTER
Please call pt's daughter. Need OV to discuss abnormal labs: high TSH, high A1C. Is he taking all medicines?

## 2017-07-28 NOTE — TELEPHONE ENCOUNTER
Alie Pompa [facility]) will bring him in next week. To discuss labs. He says he is 80 and will eat what he wants. So diet is out.

## 2017-08-15 DIAGNOSIS — E03.9 HYPOTHYROIDISM, ADULT: ICD-10-CM

## 2017-08-15 RX ORDER — LEVOTHYROXINE AND LIOTHYRONINE 38; 9 UG/1; UG/1
60 TABLET ORAL DAILY
Qty: 30 TAB | Refills: 0 | Status: SHIPPED | OUTPATIENT
Start: 2017-08-15 | End: 2017-08-30 | Stop reason: SDUPTHER

## 2017-08-21 DIAGNOSIS — E11.9 CONTROLLED TYPE 2 DIABETES MELLITUS WITHOUT COMPLICATION, WITHOUT LONG-TERM CURRENT USE OF INSULIN (HCC): ICD-10-CM

## 2017-08-21 RX ORDER — GLIMEPIRIDE 4 MG/1
TABLET ORAL
Qty: 30 TAB | Refills: 0 | Status: SHIPPED | OUTPATIENT
Start: 2017-08-21 | End: 2017-08-30 | Stop reason: SDUPTHER

## 2017-08-30 ENCOUNTER — OFFICE VISIT (OUTPATIENT)
Dept: FAMILY MEDICINE CLINIC | Age: 82
End: 2017-08-30

## 2017-08-30 VITALS
DIASTOLIC BLOOD PRESSURE: 66 MMHG | RESPIRATION RATE: 20 BRPM | HEART RATE: 63 BPM | WEIGHT: 201.8 LBS | OXYGEN SATURATION: 95 % | BODY MASS INDEX: 27.33 KG/M2 | TEMPERATURE: 98.2 F | HEIGHT: 72 IN | SYSTOLIC BLOOD PRESSURE: 136 MMHG

## 2017-08-30 DIAGNOSIS — E03.9 HYPOTHYROIDISM, ADULT: ICD-10-CM

## 2017-08-30 DIAGNOSIS — I10 ESSENTIAL HYPERTENSION, BENIGN: ICD-10-CM

## 2017-08-30 DIAGNOSIS — Z23 ENCOUNTER FOR IMMUNIZATION: ICD-10-CM

## 2017-08-30 DIAGNOSIS — E11.9 CONTROLLED TYPE 2 DIABETES MELLITUS WITHOUT COMPLICATION, WITHOUT LONG-TERM CURRENT USE OF INSULIN (HCC): ICD-10-CM

## 2017-08-30 RX ORDER — LEVOTHYROXINE AND LIOTHYRONINE 38; 9 UG/1; UG/1
60 TABLET ORAL DAILY
Qty: 30 TAB | Refills: 3 | Status: SHIPPED | OUTPATIENT
Start: 2017-08-30 | End: 2017-10-18 | Stop reason: SDUPTHER

## 2017-08-30 RX ORDER — LOSARTAN POTASSIUM 25 MG/1
25 TABLET ORAL DAILY
COMMUNITY
Start: 2017-07-12 | End: 2018-03-28 | Stop reason: SDUPTHER

## 2017-08-30 RX ORDER — GLIMEPIRIDE 4 MG/1
TABLET ORAL
Qty: 30 TAB | Refills: 5 | Status: SHIPPED | OUTPATIENT
Start: 2017-08-30 | End: 2017-10-18 | Stop reason: SDUPTHER

## 2017-08-30 NOTE — PROGRESS NOTES
Subjective: Jessica Ta. is a 80 y.o. male who presents for follow up of diabetes, hypertension, hyperlipidemia and hypothyroidism. Diet and Lifestyle: not attempting to follow a low fat, low cholesterol diet, not attempting to follow a low sodium diet, does not rigorously follow a diabetic diet, sedentary, nonsmoker  Home BP Monitoring: is not measured at home    Cardiovascular ROS: taking medications as instructed, no medication side effects noted, no TIA's, no chest pain on exertion, no dyspnea on exertion, no swelling of ankles. New concerns: his daughter fixes his pill box for meds. Claims not missed medicines. He does take the [place] thyroid with the rest of AM meds. Labs show elevated A1C and very low thyroid.  kidney function stable.     Patient Active Problem List    Diagnosis Date Noted    Medicare annual wellness visit, subsequent 05/31/2017    Need for prophylactic vaccination with Streptococcus pneumoniae (Pneumococcus) and Influenza vaccines 05/31/2017    Dementia without behavioral disturbance 08/16/2016    CKD (chronic kidney disease) 10/26/2015    Hypothyroidism, adult 07/15/2015   [facility] discharge follow-up 04/15/2015    UTI (lower urinary tract infection) 04/01/2015    Controlled type 2 diabetes mellitus without complication ([address] 82/41/0265    Altered mental status 08/28/2014    WILTON (acute kidney injury) ([address] 08/02/2014    Urosepsis 07/29/2014    Unspecified vitamin D deficiency 09/11/2013    Odontoid fracture ([address] 09/10/2012    Colon cancer ([address] 08/23/2012    Polyp of colon 08/15/2012    Pernicious anemia 07/31/2012    Acquired hypothyroidism 04/26/2012    Microalbuminuria 03/06/2012    Diabetes mellitus type 2, controlled ([address] 02/09/2012    Essential hypertension, benign 02/09/2012    Mixed hyperlipidemia 02/09/2012    Allergic rhinitis due to other allergen 02/09/2012    Esophageal reflux 02/09/2012     Current Outpatient Prescriptions Medication Sig Dispense Refill    losartan (COZAAR) 25 mg tablet Take 25 mg by mouth daily.  thyroid, Pork, (ARMOUR THYROID) 60 mg tablet Take 1 Tab by mouth daily. 30 Tab 3    glimepiride (AMARYL) 4 mg tablet TAKE 1 TABLET BY MOUTH EVERY MORNING. INCREASE IN DOSE. 30 Tab 5    amLODIPine (NORVASC) 10 mg tablet TAKE 1 TABLET EVERY DAY FOR HYPERTENSION  (INCREASE  IN  DOSE  TO  10MG) 90 Tab 1    lansoprazole (PREVACID) 30 mg capsule TAKE 1 CAPSULE EVERY DAY  BEFORE  BREAKFAST 90 Cap 1    loperamide (IMODIUM) 2 mg capsule TAKE 1 CAPSULE BEFORE BREAKFAST, LUNCH, DINNER AND AT BEDTIME FOR DIARRHEA 360 Cap 1    furosemide (LASIX) 20 mg tablet TAKE 1 TABLET BY MOUTH ONCE A DAY AS DIRECTED 90 Tab 3    donepezil (ARICEPT) 10 mg tablet TAKE 1 TABLET EVERY NIGHT 90 Tab 1    ergocalciferol (ERGOCALCIFEROL) 50,000 unit capsule TAKE 1 CAPSULE EVERY 7 DAYS 13 Cap 3    cetirizine (ZYRTEC) 10 mg tablet TAKE 1 TABLET EVERY DAY AS NEEDED FOR ALLERGIES 90 Tab 3    aspirin 81 mg chewable tablet Take 1 Tab by mouth daily. 14 Tab 0    ferrous sulfate (IRON) 325 mg (65 mg iron) tablet Take 1 Tab by mouth Daily (before breakfast). 30 Tab 5    multivitamins-minerals-lutein (CENTRUM SILVER) tab tablet Take 1 Tab by mouth daily.  nicotinic acid (NIACIN) 500 mg tablet Take 500 mg by mouth Daily (before breakfast).          Allergies   Allergen Reactions    Synthroid [Levothyroxine] Other (comments)     Confused, hallucinations     Past Medical History:   Diagnosis Date    WILTON (acute kidney injury) ([facility] 8/2/2014    Cancer ([facility]     colon    Diabetes ([facility] 2008    type 2    Dyslipidemia     Esophageal reflux     Esophageal reflux 2/9/2012    Hypertension 2008    Mixed hyperlipidemia 2/9/2012    Other ill-defined conditions     broken neck 2012    Pernicious anemia 7/31/2012    Psychiatric disorder     dementia     Unspecified hypothyroidism 4/26/2012     Past Surgical History:   Procedure Laterality Date    COLONOSCOPY  5/29/12    Rectosigmoid adenocarcinoma    HX CATARACT REMOVAL      HX HERNIA REPAIR      Bilateral inguinal    HX OTHER SURGICAL      colon resection    HX TONSIL AND ADENOIDECTOMY      HX TONSILLECTOMY  1936     Family History   Problem Relation Age of Onset    Heart Disease Mother     Hypertension Mother     Heart Disease Father     Hypertension Father      Social History   Substance Use Topics    Smoking status: Former Smoker     Packs/day: 1.50     Years: 30.00     Quit date: 7/30/1982    Smokeless tobacco: Never Used    Alcohol use No        Lab Results  Component Value Date/Time   Hemoglobin A1c 8.2 07/18/2017 09:35 AM   Hemoglobin A1c 8.1 01/25/2017 12:12 PM   Hemoglobin A1c 7.6 08/17/2016 08:59 AM   Hemoglobin A1c, External 7.6 04/13/2016   Glucose 168 07/18/2017 09:35 AM   Glucose (POC) 138 04/05/2015 07:42 AM   Microalb/Creat ratio (ug/mg creat.) 39.2 09/24/2012 12:00 AM   LDL, calculated 89 07/18/2017 09:35 AM   Creatinine (POC) 2.6 08/28/2014 01:13 AM   Creatinine 1.83 07/18/2017 09:35 AM      Lab Results  Component Value Date/Time   Cholesterol, total 168 07/18/2017 09:35 AM   HDL Cholesterol 32 07/18/2017 09:35 AM   LDL, calculated 89 07/18/2017 09:35 AM   Triglyceride 233 07/18/2017 09:35 AM   Lab Results  Component Value Date/Time   ALT (SGPT) 9 07/18/2017 09:35 AM   AST (SGOT) 14 07/18/2017 09:35 AM   Alk.  phosphatase 100 07/18/2017 09:35 AM   Bilirubin, direct 0.17 01/25/2017 12:12 PM   Bilirubin, total 0.6 07/18/2017 09:35 AM   Albumin 4.0 07/18/2017 09:35 AM   Protein, total 6.6 07/18/2017 09:35 AM   Ammonia <25 06/18/2012 03:54 PM   INR 1.1 07/29/2014 04:45 PM   Prothrombin time 11.0 07/29/2014 04:45 PM   PLATELET 962 87/46/1376 09:35 AM       Lab Results  Component Value Date/Time   GFR est non-AA 33 07/18/2017 09:35 AM   GFRNA, POC 24 08/28/2014 01:13 AM   GFR est AA 38 07/18/2017 09:35 AM   GFRAA, POC 29 08/28/2014 01:13 AM   Creatinine 1.83 07/18/2017 09:35 AM Creatinine (POC) 2.6 08/28/2014 01:13 AM   BUN 27 07/18/2017 09:35 AM   BUN (POC) 31 08/28/2014 01:13 AM   Sodium 143 07/18/2017 09:35 AM   Sodium (POC) 141 08/28/2014 01:13 AM   Potassium 4.7 07/18/2017 09:35 AM   Potassium (POC) 4.6 08/28/2014 01:13 AM   Chloride 101 07/18/2017 09:35 AM   Chloride (POC) 107 08/28/2014 01:13 AM   CO2 24 07/18/2017 09:35 AM   Magnesium 1.5 06/18/2012 03:45 AM   Phosphorus 3.1 06/17/2012 10:25 PM   Lab Results  Component Value Date/Time   TSH 13.900 07/18/2017 09:35 AM   T4, Free 0.81 07/18/2017 09:35 AM                       Review of Systems, additional:  Pertinent items are noted in HPI. Objective:   Visit Vitals    /66 (BP 1 Location: Right arm, BP Patient Position: Sitting)  Comment: gian    Pulse 63    Temp 98.2 °F (36.8 °C) (Oral)    Resp 20    Ht 6' (1.829 m)    Wt 201 lb 12.8 oz (91.5 kg)    SpO2 95%    BMI 27.37 kg/m2     Appearance: alert, well appearing, and in no distress. General exam: CVS exam BP noted to be well controlled today in office, S1, S2 normal, no gallop, no murmur, chest clear, no JVD, no HSM, no edema. Lab review: orders written for new lab studies as appropriate; see orders. Assessment/Plan:     diabetes poorly controlled, hypertension stable, hyperlipidemia stable. specific diabetic recommendations: diabetic diet discussed in detail, written exchange diet given and glycohemoglobin and other lab monitoring discussed. ICD-10-CM ICD-9-CM    1. Uncontrolled type 2 diabetes mellitus without complication, without long-term current use of insulin (HCC) E11.65 250.02 HEMOGLOBIN A1C WITH EAG   2. Essential hypertension, benign I10 401.1    3. Encounter for immunization Z23 V03.89 INFLUENZA VIRUS VACCINE, HIGH DOSE SEASONAL, PRESERVATIVE FREE      ADMIN INFLUENZA VIRUS VAC   4. Hypothyroidism, adult E03.9 244.9 TSH 3RD GENERATION      T4, FREE      thyroid, Pork, (ARMOUR THYROID) 60 mg tablet   5.  Controlled type 2 diabetes mellitus without complication, without long-term current use of insulin (HCC) E11.9 250.00 glimepiride (AMARYL) 4 mg tablet     Discuss importance of taking thyroid med by itself. Plan to recheck labs in 3 months. HD Flu vaccine today.

## 2017-08-30 NOTE — PROGRESS NOTES
Identified pt with two pt identifiers(name and ). Chief Complaint   Patient presents with    Diabetes     discuss labs, he is pretty good at taking his meds     Thyroid Problem        Health Maintenance Due   Topic    Pneumococcal 65+ High/Highest Risk (1 of 2 - PCV13)    EYE EXAM RETINAL OR DILATED Q1     INFLUENZA AGE 9 TO ADULT     GLAUCOMA SCREENING Q2Y        Wt Readings from Last 3 Encounters:   17 201 lb 12.8 oz (91.5 kg)   17 198 lb (89.8 kg)   17 198 lb (89.8 kg)     Temp Readings from Last 3 Encounters:   17 98.2 °F (36.8 °C) (Oral)   17 98.4 °F (36.9 °C) (Oral)   17 97.9 °F (36.6 °C) (Oral)     BP Readings from Last 3 Encounters:   17 136/66   17 126/60   17 134/76     Pulse Readings from Last 3 Encounters:   17 63   17 78   17 61         Learning Assessment:  :     Learning Assessment 3/19/2014   PRIMARY LEARNER Patient   HIGHEST LEVEL OF EDUCATION - PRIMARY LEARNER  GRADUATED HIGH SCHOOL OR GED   BARRIERS PRIMARY LEARNER NONE   CO-LEARNER CAREGIVER No   PRIMARY LANGUAGE ENGLISH   LEARNER PREFERENCE PRIMARY LISTENING     DEMONSTRATION   ANSWERED BY patient   RELATIONSHIP SELF       Depression Screening:  :     PHQ over the last two weeks 2017   Little interest or pleasure in doing things Not at all   Feeling down, depressed or hopeless Not at all   Total Score PHQ 2 0       Fall Risk Assessment:  :     Fall Risk Assessment, last 12 mths 2017   Able to walk? Yes   Fall in past 12 months? No   Fall with injury? -   Number of falls in past 12 months -       Abuse Screening:  :     Abuse Screening Questionnaire 2017 8/15/2016 2015 3/19/2014   Do you ever feel afraid of your partner? N N N N   Are you in a relationship with someone who physically or mentally threatens you? N N N N   Is it safe for you to go home?  Chandrakant Sanchez       Coordination of Care Questionnaire:  :     1) Have you been to an emergency room, urgent care clinic since your last visit? no   Hospitalized since your last visit? no             2) Have you seen or consulted any other health care providers outside of [address] [place] [address] since your last visit? no  (Include any pap smears or colon screenings in this section.)    3) Do you have an Advance Directive on file? yes  Are you interested in receiving information about Advance Directives? no    Patient is accompanied by daughter I have received verbal consent from Luis Salcedo to discuss any/all medical information while they are present in the room. Reviewed record in preparation for visit and have obtained necessary documentation. Medication reconciliation up to date and corrected with patient at this time. Luis Salcedo is a 80 y.o. male who presents for routine immunizations. He denies any symptoms , reactions or allergies that would exclude them from being immunized today. Risks and adverse reactions were discussed and the VIS was given to them. All questions were addressed. He was observed for 10 min post injection. There were no reactions observed.     Manish Spain LPN

## 2017-08-30 NOTE — PATIENT INSTRUCTIONS
Vaccine Information Statement    Influenza (Flu) Vaccine (Inactivated or Recombinant): What you need to know    Many Vaccine Information Statements are available in Nepali and other languages. See www.immunize.org/vis  Hojas de Información Sobre Vacunas están disponibles en Español y en muchos otros idiomas. Visite www.immunize.org/vis    1. Why get vaccinated? Influenza (flu) is a contagious disease that spreads around the [place] every year, usually between October and May. Flu is caused by influenza viruses, and is spread mainly by coughing, sneezing, and close contact. Anyone can get flu. Flu strikes suddenly and can last several days. Symptoms vary by age, but can include:   fever/chills   sore throat   muscle aches   fatigue   cough   headache    runny or stuffy nose    Flu can also lead to pneumonia and blood infections, and cause diarrhea and seizures in children. If you have a medical condition, such as heart or lung disease, flu can make it worse. Flu is more dangerous for some people. Infants and young children, people 72years of age and older, pregnant women, and people with certain health conditions or a weakened immune system are at greatest risk. Each year thousands of people in the [facility] die from flu, and many more are hospitalized. Flu vaccine can:   keep you from getting flu,   make flu less severe if you do get it, and   keep you from spreading flu to your family and other people. 2. Inactivated and recombinant flu vaccines    A dose of flu vaccine is recommended every flu season. Children 6 months through 6years of age may need two doses during the same flu season. Everyone else needs only one dose each flu season.        Some inactivated flu vaccines contain a very small amount of a mercury-based preservative called thimerosal. Studies have not shown thimerosal in vaccines to be harmful, but flu vaccines that do not contain thimerosal are available. There is no live flu virus in flu shots. They cannot cause the flu. There are many flu viruses, and they are always changing. Each year a new flu vaccine is made to protect against three or four viruses that are likely to cause disease in the upcoming flu season. But even when the vaccine doesnt exactly match these viruses, it may still provide some protection    Flu vaccine cannot prevent:   flu that is caused by a virus not covered by the vaccine, or   illnesses that look like flu but are not. It takes about 2 weeks for protection to develop after vaccination, and protection lasts through the flu season. 3. Some people should not get this vaccine    Tell the person who is giving you the vaccine:     If you have any severe, life-threatening allergies. If you ever had a life-threatening allergic reaction after a dose of flu vaccine, or have a severe allergy to any part of this vaccine, you may be advised not to get vaccinated. Most, but not all, types of flu vaccine contain a small amount of egg protein.  If you ever had Guillain-Barré Syndrome (also called GBS). Some people with a history of GBS should not get this vaccine. This should be discussed with your doctor.  If you are not feeling well. It is usually okay to get flu vaccine when you have a mild illness, but you might be asked to come back when you feel better. 4. Risks of a vaccine reaction    With any medicine, including vaccines, there is a chance of reactions. These are usually mild and go away on their own, but serious reactions are also possible. Most people who get a flu shot do not have any problems with it.      Minor problems following a flu shot include:    soreness, redness, or swelling where the shot was given     hoarseness   sore, red or itchy eyes   cough   fever   aches   headache   itching   fatigue  If these problems occur, they usually begin soon after the shot and last 1 or 2 days. More serious problems following a flu shot can include the following:     There may be a small increased risk of Guillain-Barré Syndrome (GBS) after inactivated flu vaccine. This risk has been estimated at 1 or 2 additional cases per million people vaccinated. This is much lower than the risk of severe complications from flu, which can be prevented by flu vaccine.  Young children who get the flu shot along with pneumococcal vaccine (PCV13) and/or DTaP vaccine at the same time might be slightly more likely to have a seizure caused by fever. Ask your doctor for more information. Tell your doctor if a child who is getting flu vaccine has ever had a seizure. Problems that could happen after any injected vaccine:      People sometimes faint after a medical procedure, including vaccination. Sitting or lying down for about 15 minutes can help prevent fainting, and injuries caused by a fall. Tell your doctor if you feel dizzy, or have vision changes or ringing in the ears.  Some people get severe pain in the shoulder and have difficulty moving the arm where a shot was given. This happens very rarely.  Any medication can cause a severe allergic reaction. Such reactions from a vaccine are very rare, estimated at about 1 in a million doses, and would happen within a few minutes to a few hours after the vaccination. As with any medicine, there is a very remote chance of a vaccine causing a serious injury or death. The safety of vaccines is always being monitored. For more information, visit: www.cdc.gov/vaccinesafety/    5. What if there is a serious reaction? What should I look for?  Look for anything that concerns you, such as signs of a severe allergic reaction, very high fever, or unusual behavior.     Signs of a severe allergic reaction can include hives, swelling of the face and throat, difficulty breathing, a fast heartbeat, dizziness, and weakness - usually within a few minutes to a few hours after the vaccination. What should I do?  If you think it is a severe allergic reaction or other emergency that cant wait, call 9-1-1 and get the person to the nearest hospital. Otherwise, call your doctor.  Reactions should be reported to the Vaccine Adverse Event Reporting System (VAERS). Your doctor should file this report, or you can do it yourself through  the VAERS web site at www.vaers. [facility].gov, or by calling 4-202.274.4610. VAERS does not give medical advice. 6. The National Vaccine Injury Compensation Program    The [facility] Vaccine Injury Compensation Program (VICP) is a federal program that was created to compensate people who may have been injured by certain vaccines. Persons who believe they may have been injured by a vaccine can learn about the program and about filing a claim by calling 4-907.328.8958 or visiting the [facility] website at www.[facility].gov/vaccinecompensation. There is a time limit to file a claim for compensation. 7. How can I learn more?  Ask your healthcare provider. He or she can give you the vaccine package insert or suggest other sources of information.  Call your local or state health department.  Contact the Centers for Disease Control and Prevention (CDC):  - Call 0-777.506.7420 (1-800-CDC-INFO) or  - Visit CDCs website at www.cdc.gov/flu    Vaccine Information Statement   Inactivated Influenza Vaccine   8/7/2015  42 MARKDurga Vargas [id number]    Department of Health and Human Services  Centers for Disease Control and Prevention    Office Use Only       Diabetes Foot Health: Care Instructions  Your Care Instructions    When you have diabetes, your feet need extra care and attention. Diabetes can damage the nerve endings and blood vessels in your feet, making you less likely to notice when your feet are injured. Diabetes also limits your body's ability to fight infection and get blood to areas that need it.  If you get a minor foot injury, it could become an ulcer or a serious infection. With good foot care, you can prevent most of these problems. Caring for your feet can be quick and easy. Most of the care can be done when you are bathing or getting ready for bed. Follow-up care is a key part of your treatment and safety. Be sure to make and go to all appointments, and call your doctor if you are having problems. Its also a good idea to know your test results and keep a list of the medicines you take. How can you care for yourself at home? · Keep your blood sugar close to normal by watching what and how much you eat, monitoring blood sugar, taking medicines if prescribed, and getting regular exercise. · Do not smoke. Smoking affects blood flow and can make foot problems worse. If you need help quitting, talk to your doctor about stop-smoking programs and medicines. These can increase your chances of quitting for good. · Eat a diet that is low in fats. High fat intake can cause fat to build up in your blood vessels and decrease blood flow. · Inspect your feet daily for blisters, cuts, cracks, or sores. If you cannot see well, use a mirror or have someone help you. · Take care of your feet:  [facility] your feet every day. Use warm (not hot) water. Check the water temperature with your wrists or other part of your body, not your feet. ¨ Dry your feet well. Pat them dry. Do not rub the skin on your feet too hard. Dry well between your toes. If the skin on your feet stays moist, bacteria or a fungus can grow, which can lead to infection. ¨ Keep your skin soft. Use moisturizing skin cream to keep the skin on your feet soft and prevent calluses and cracks. But do not put the cream between your toes, and stop using any cream that causes a rash. ¨ Clean underneath your toenails carefully. Do not use a sharp object to clean underneath your toenails. Use the blunt end of a nail file or other rounded tool.   ¨ Trim and file your toenails straight across to prevent ingrown toenails. Use a nail clipper, not scissors. Use an emery board to smooth the edges. · Change socks daily. Socks without seams are best, because seams often rub the feet. You can find socks for people with diabetes from specialty catalogs. · Look inside your shoes every day for things like gravel or torn linings, which could cause blisters or sores. · Buy shoes that fit well:  ¨ Look for shoes that have plenty of space around the toes. This helps prevent bunions and blisters. ¨ Try on shoes while wearing the kind of socks you will usually wear with the shoes. ¨ Avoid plastic shoes. They may rub your feet and cause blisters. Good shoes should be made of materials that are flexible and breathable, such as leather or cloth. ¨ Break in new shoes slowly by wearing them for no more than an hour a day for several days. Take extra time to check your feet for red areas, blisters, or other problems after you wear new shoes. · Do not go barefoot. Do not wear sandals, and do not wear shoes with very thin soles. Thin soles are easy to puncture. They also do not protect your feet from hot pavement or cold weather. · Have your doctor check your feet during each visit. If you have a foot problem, see your doctor. Do not try to treat an early foot problem at home. Home remedies or treatments that you can buy without a prescription (such as corn removers) can be harmful. · Always get early treatment for foot problems. A minor irritation can lead to a major problem if not properly cared for early. When should you call for help? Call your doctor now or seek immediate medical care if:  · You have a foot sore, an ulcer or break in the skin that is not healing after 4 days, bleeding corns or calluses, or an ingrown toenail. · You have blue or black areas, which can mean bruising or blood flow problems. · You have peeling skin or tiny blisters between your toes or cracking or oozing of the skin.   · You have a fever for more than 24 hours and a foot sore. · You have new numbness or tingling in your feet that does not go away after you move your feet or change positions. · You have unexplained or unusual swelling of the foot or ankle. Watch closely for changes in your health, and be sure to contact your doctor if:  · You cannot do proper foot care. Where can you learn more? Go to http://milton-saud.info/. Enter A739 in the search box to learn more about \"Diabetes Foot Health: Care Instructions. \"  Current as of: March 13, 2017  Content Version: 11.3  © 6258-2907 [employer]. Care instructions adapted under license by [employer] (which disclaims liability or warranty for this information). If you have questions about a medical condition or this instruction, always ask your healthcare professional. [address] any warranty or liability for your use of this information.

## 2017-08-30 NOTE — MR AVS SNAPSHOT
Visit Information Date & Time Provider Department Dept. Phone Encounter #  
 [id number]/[id number]  3:21 PM Milly RamirezAshu  Follow-up Instructions Return in about 3 months (around 11/30/2017) for labs only. Upcoming Health Maintenance Date Due Pneumococcal 65+ High/Highest Risk (1 of 2 - PCV13) 3/8/1996 EYE EXAM RETINAL OR DILATED Q1 10/20/2016 INFLUENZA AGE 9 TO ADULT 8/1/2017 GLAUCOMA SCREENING Q2Y 10/20/2017 HEMOGLOBIN A1C Q6M 1/18/2018 FOOT EXAM Q1 1/25/2018 MICROALBUMIN Q1 5/31/2018 MEDICARE YEARLY EXAM 6/1/2018 LIPID PANEL Q1 7/18/2018 DTaP/Tdap/Td series (2 - Td) 1/25/2027 Allergies as of 8/30/2017  Review Complete On: 3/88/0533 By: Milly Ramirez MD  
  
 Severity Noted Reaction Type Reactions Synthroid [Levothyroxine]  08/20/2013   Systemic Other (comments) Confused, hallucinations Current Immunizations  Reviewed on 4/2/2015 Name Date Influenza High Dose Vaccine PF  Incomplete, 1/25/2017 Influenza Vaccine 9/12/2014, 9/11/2013 Influenza Vaccine (Quad) PF 10/30/2015  9:31 AM  
 Influenza Vaccine Whole 10/16/2012 Zoster 10/16/2012 Not reviewed this visit You Were Diagnosed With   
  
 Codes Comments Uncontrolled type 2 diabetes mellitus without complication, without long-term current use of insulin ([facility] [address]    -  Primary ICD-10-CM: E11.65 ICD-9-CM: 250.02 Essential hypertension, benign     ICD-10-CM: I10 
ICD-9-CM: 401.1 Encounter for immunization     ICD-10-CM: G25 ICD-9-CM: V03.89 Hypothyroidism, adult     ICD-10-CM: E03.9 ICD-9-CM: 244.9 Controlled type 2 diabetes mellitus without complication, without long-term current use of insulin ([address]     ICD-10-CM: E11.9 ICD-9-CM: 250.00 Vitals BP Pulse Temp Resp Height(growth percentile) Weight(growth percentile)  136/66 (BP 1 Location: Right arm, BP Patient Position: Sitting) 63 98.2 °F (36.8 °C) (Oral) 20 6' (1.829 m) 201 lb 12.8 oz (91.5 kg) SpO2 BMI Smoking Status 95% 27.37 kg/m2 Former Smoker Vitals History BMI and BSA Data Body Mass Index Body Surface Area  
 27.37 kg/m 2 2.16 m 2 Preferred Pharmacy Pharmacy Name Phone [facility] [place] - [address] [phone] Your Updated Medication List  
  
   
This list is accurate as of: 8/30/17  2:47 PM.  Always use your most recent med list. amLODIPine 10 mg tablet Commonly known as:  Caralee Dupes TAKE 1 TABLET EVERY DAY FOR HYPERTENSION  (INCREASE  IN  DOSE  TO  10MG) aspirin 81 mg chewable tablet Take 1 Tab by mouth daily. CENTRUM SILVER Tab tablet Generic drug:  multivitamins-minerals-lutein Take 1 Tab by mouth daily. cetirizine 10 mg tablet Commonly known as:  ZYRTEC  
TAKE 1 TABLET EVERY DAY AS NEEDED FOR ALLERGIES  
  
 donepezil 10 mg tablet Commonly known as:  ARICEPT  
TAKE 1 TABLET EVERY NIGHT  
  
 ergocalciferol 50,000 unit capsule Commonly known as:  ERGOCALCIFEROL  
TAKE 1 CAPSULE EVERY 7 DAYS  
  
 ferrous sulfate 325 mg (65 mg iron) tablet Commonly known as:  Iron Take 1 Tab by mouth Daily (before breakfast). furosemide 20 mg tablet Commonly known as:  LASIX TAKE 1 TABLET BY MOUTH ONCE A DAY AS DIRECTED  
  
 glimepiride 4 mg tablet Commonly known as:  AMARYL  
TAKE 1 TABLET BY MOUTH EVERY MORNING. INCREASE IN DOSE.  
  
 lansoprazole 30 mg capsule Commonly known as:  PREVACID TAKE 1 CAPSULE EVERY DAY  BEFORE  BREAKFAST  
  
 loperamide 2 mg capsule Commonly known as:  IMODIUM  
TAKE 1 CAPSULE BEFORE BREAKFAST, LUNCH, DINNER AND AT BEDTIME FOR DIARRHEA  
  
 losartan 25 mg tablet Commonly known as:  COZAAR Take 25 mg by mouth daily. nicotinic acid 500 mg tablet Commonly known as:  NIACIN Take 500 mg by mouth Daily (before breakfast). thyroid (Pork) 60 mg tablet Commonly known as:  ARMOUR THYROID Take 1 Tab by mouth daily. Prescriptions Sent to Pharmacy Refills  
 thyroid, Pork, (ARMOUR THYROID) 60 mg tablet 3 Sig: Take 1 Tab by mouth daily. Class: Normal  
 [id number] [address] Ph #: [phone] Route: Oral  
 glimepiride (AMARYL) 4 mg tablet 5 Sig: TAKE 1 TABLET BY MOUTH EVERY MORNING. INCREASE IN DOSE. Class: Normal  
 [id number] [place] [id number] [facility]. Ph #: [phone] We Performed the Following ADMIN INFLUENZA VIRUS VAC [ HCPCS] HEMOGLOBIN A1C WITH EAG [89513 CPT(R)] INFLUENZA VIRUS VACCINE, HIGH DOSE SEASONAL, PRESERVATIVE FREE [26554 CPT(R)] T4, FREE [id number] CPT(R)] TSH 3RD GENERATION [12913 CPT(R)] Follow-up Instructions Return in about 3 months (around 11/30/2017) for labs only. Patient Instructions Vaccine Information Statement Influenza (Flu) Vaccine (Inactivated or Recombinant): What you need to know Many Vaccine Information Statements are available in Hebrew and other languages. See www.immunize.org/vis Hojas de Información Sobre Vacunas están disponibles en Español y en muchos otros idiomas. Visite www.immunize.org/vis 1. Why get vaccinated? Influenza (flu) is a contagious disease that spreads around the [place] every year, usually between October and May. Flu is caused by influenza viruses, and is spread mainly by coughing, sneezing, and close contact. Anyone can get flu. Flu strikes suddenly and can last several days. Symptoms vary by age, but can include: 
 fever/chills  sore throat  muscle aches  fatigue  cough  headache  runny or stuffy nose Flu can also lead to pneumonia and blood infections, and cause diarrhea and seizures in children.   If you have a medical condition, such as heart or lung disease, flu can make it worse. Flu is more dangerous for some people. Infants and young children, people 72years of age and older, pregnant women, and people with certain health conditions or a weakened immune system are at greatest risk. Each year thousands of people in the [facility] die from flu, and many more are hospitalized. Flu vaccine can: 
 keep you from getting flu, 
 make flu less severe if you do get it, and 
 keep you from spreading flu to your family and other people. 2. Inactivated and recombinant flu vaccines A dose of flu vaccine is recommended every flu season. Children 6 months through 6years of age may need two doses during the same flu season. Everyone else needs only one dose each flu season. Some inactivated flu vaccines contain a very small amount of a mercury-based preservative called thimerosal. Studies have not shown thimerosal in vaccines to be harmful, but flu vaccines that do not contain thimerosal are available. There is no live flu virus in flu shots. They cannot cause the flu. There are many flu viruses, and they are always changing. Each year a new flu vaccine is made to protect against three or four viruses that are likely to cause disease in the upcoming flu season. But even when the vaccine doesnt exactly match these viruses, it may still provide some protection Flu vaccine cannot prevent: 
 flu that is caused by a virus not covered by the vaccine, or 
 illnesses that look like flu but are not. It takes about 2 weeks for protection to develop after vaccination, and protection lasts through the flu season. 3. Some people should not get this vaccine Tell the person who is giving you the vaccine:  If you have any severe, life-threatening allergies.    
If you ever had a life-threatening allergic reaction after a dose of flu vaccine, or have a severe allergy to any part of this vaccine, you may be advised not to get vaccinated. Most, but not all, types of flu vaccine contain a small amount of egg protein.  If you ever had Guillain-Barré Syndrome (also called GBS). Some people with a history of GBS should not get this vaccine. This should be discussed with your doctor.  If you are not feeling well. It is usually okay to get flu vaccine when you have a mild illness, but you might be asked to come back when you feel better. 4. Risks of a vaccine reaction With any medicine, including vaccines, there is a chance of reactions. These are usually mild and go away on their own, but serious reactions are also possible. Most people who get a flu shot do not have any problems with it. Minor problems following a flu shot include:  
 soreness, redness, or swelling where the shot was given  hoarseness  sore, red or itchy eyes  cough  fever  aches  headache  itching  fatigue If these problems occur, they usually begin soon after the shot and last 1 or 2 days. More serious problems following a flu shot can include the following:  There may be a small increased risk of Guillain-Barré Syndrome (GBS) after inactivated flu vaccine. This risk has been estimated at 1 or 2 additional cases per million people vaccinated. This is much lower than the risk of severe complications from flu, which can be prevented by flu vaccine.  Young children who get the flu shot along with pneumococcal vaccine (PCV13) and/or DTaP vaccine at the same time might be slightly more likely to have a seizure caused by fever. Ask your doctor for more information. Tell your doctor if a child who is getting flu vaccine has ever had a seizure. Problems that could happen after any injected vaccine:  People sometimes faint after a medical procedure, including vaccination.  Sitting or lying down for about 15 minutes can help prevent fainting, and injuries caused by a fall. Tell your doctor if you feel dizzy, or have vision changes or ringing in the ears.  Some people get severe pain in the shoulder and have difficulty moving the arm where a shot was given. This happens very rarely.  Any medication can cause a severe allergic reaction. Such reactions from a vaccine are very rare, estimated at about 1 in a million doses, and would happen within a few minutes to a few hours after the vaccination. As with any medicine, there is a very remote chance of a vaccine causing a serious injury or death. The safety of vaccines is always being monitored. For more information, visit: www.cdc.gov/vaccinesafety/ 
 
 
The [facility] Vaccine Injury Compensation Program (VICP) is a federal program that was created to compensate people who may have been injured by certain vaccines.  
 
Persons who believe they may have been injured by a vaccine can learn about the program and about filing a claim by calling 8-908.699.2997 or visiting the [employer] website at www.[facility].gov/vaccinecompensation. There is a time limit to file a claim for compensation. 7. How can I learn more?  Ask your healthcare provider. He or she can give you the vaccine package insert or suggest other sources of information.  Call your local or state health department.  Contact the Centers for Disease Control and Prevention (CDC): 
- Call 3-729.296.3089 (1-800-CDC-INFO) or 
- Visit CDCs website at www.cdc.gov/flu Vaccine Information Statement Inactivated Influenza Vaccine 8/7/2015 
42 KRISTINA Kim [place] Department of [employer] and [employer] Centers for Disease Control and Prevention Office Use Only Diabetes Foot Health: Care Instructions Your Care Instructions When you have diabetes, your feet need extra care and attention. Diabetes can damage the nerve endings and blood vessels in your feet, making you less likely to notice when your feet are injured. Diabetes also limits your body's ability to fight infection and get blood to areas that need it. If you get a minor foot injury, it could become an ulcer or a serious infection. With good foot care, you can prevent most of these problems. Caring for your feet can be quick and easy. Most of the care can be done when you are bathing or getting ready for bed. Follow-up care is a key part of your treatment and safety. Be sure to make and go to all appointments, and call your doctor if you are having problems. Its also a good idea to know your test results and keep a list of the medicines you take. How can you care for yourself at home? · Keep your blood sugar close to normal by watching what and how much you eat, monitoring blood sugar, taking medicines if prescribed, and getting regular exercise. · Do not smoke. Smoking affects blood flow and can make foot problems worse.  If you need help quitting, talk to your doctor about stop-smoking programs and medicines. These can increase your chances of quitting for good. · Eat a diet that is low in fats. High fat intake can cause fat to build up in your blood vessels and decrease blood flow. · Inspect your feet daily for blisters, cuts, cracks, or sores. If you cannot see well, use a mirror or have someone help you. · Take care of your feet: 
[facility] AUTHORITY your feet every day. Use warm (not hot) water. Check the water temperature with your wrists or other part of your body, not your feet. ¨ Dry your feet well. Pat them dry. Do not rub the skin on your feet too hard. Dry well between your toes. If the skin on your feet stays moist, bacteria or a fungus can grow, which can lead to infection. ¨ Keep your skin soft. Use moisturizing skin cream to keep the skin on your feet soft and prevent calluses and cracks. But do not put the cream between your toes, and stop using any cream that causes a rash. ¨ Clean underneath your toenails carefully. Do not use a sharp object to clean underneath your toenails. Use the blunt end of a nail file or other rounded tool. ¨ Trim and file your toenails straight across to prevent ingrown toenails. Use a nail clipper, not scissors. Use an emery board to smooth the edges. · Change socks daily. Socks without seams are best, because seams often rub the feet. You can find socks for people with diabetes from specialty catalogs. · Look inside your shoes every day for things like gravel or torn linings, which could cause blisters or sores. · Buy shoes that fit well: 
¨ Look for shoes that have plenty of space around the toes. This helps prevent bunions and blisters. ¨ Try on shoes while wearing the kind of socks you will usually wear with the shoes. ¨ Avoid plastic shoes. They may rub your feet and cause blisters. Good shoes should be made of materials that are flexible and breathable, such as leather or cloth. ¨ Break in new shoes slowly by wearing them for no more than an hour a day for several days. Take extra time to check your feet for red areas, blisters, or other problems after you wear new shoes. · Do not go barefoot. Do not wear sandals, and do not wear shoes with very thin soles. Thin soles are easy to puncture. They also do not protect your feet from hot pavement or cold weather. · Have your doctor check your feet during each visit. If you have a foot problem, see your doctor. Do not try to treat an early foot problem at home. Home remedies or treatments that you can buy without a prescription (such as corn removers) can be harmful. · Always get early treatment for foot problems. A minor irritation can lead to a major problem if not properly cared for early. When should you call for help? Call your doctor now or seek immediate medical care if: 
· You have a foot sore, an ulcer or break in the skin that is not healing after 4 days, bleeding corns or calluses, or an ingrown toenail. · You have blue or black areas, which can mean bruising or blood flow problems. · You have peeling skin or tiny blisters between your toes or cracking or oozing of the skin. · You have a fever for more than 24 hours and a foot sore. · You have new numbness or tingling in your feet that does not go away after you move your feet or change positions. · You have unexplained or unusual swelling of the foot or ankle. Watch closely for changes in your health, and be sure to contact your doctor if: 
· You cannot do proper foot care. Where can you learn more? Go to http://milton-saud.info/. Enter A739 in the search box to learn more about \"Diabetes Foot Health: Care Instructions. \" Current as of: March 13, 2017 Content Version: 11.3 © 7689-0259 [employer].  Care instructions adapted under license by [employer] (which disclaims liability or warranty for this information). If you have questions about a medical condition or this instruction, always ask your healthcare professional. [address] any warranty or liability for your use of this information. Introducing [facility] SERVICES! Demetrice Sneed introduces [employer] patient portal. Now you can access parts of your medical record, email your doctor's office, and request medication refills online. 1. In your internet browser, go to [email or web address] 2. Click on the First Time User? Click Here link in the Sign In box. You will see the New Member Sign Up page. 3. Enter your [employer] Access Code exactly as it appears below. You will not need to use this code after youve completed the sign-up process. If you do not sign up before the expiration date, you must request a new code. · [employer] Access Code: [email or web address] Expires: 11/28/2017  2:27 PM 
 
4. Enter the last four digits of your Social Security Number (xxxx) and Date of Birth (mm/dd/yyyy) as indicated and click Submit. You will be taken to the next sign-up page. 5. Create a [employer] ID. This will be your [employer] login ID and cannot be changed, so think of one that is secure and easy to remember. 6. Create a [employer] password. You can change your password at any time. 7. Enter your Password Reset Question and Answer. This can be used at a later time if you forget your password. 8. Enter your e-mail address. You will receive e-mail notification when new information is available in [address] 9. Click Sign Up. You can now view and download portions of your medical record. 10. Click the Download Summary menu link to download a portable copy of your medical information. If you have questions, please visit the Frequently Asked Questions section of the [employer] website. Remember, [employer] is NOT to be used for urgent needs. For medical emergencies, dial 911. Now available from your iPhone and Android! Please provide this summary of care documentation to your next provider. Your primary care clinician is listed as Wesley Culp. If you have any questions after today's visit, please call [phone].

## 2017-09-19 RX ORDER — DONEPEZIL HYDROCHLORIDE 10 MG/1
TABLET, FILM COATED ORAL
Qty: 90 TAB | Refills: 1 | Status: SHIPPED | OUTPATIENT
Start: 2017-09-19 | End: 2018-02-27 | Stop reason: SDUPTHER

## 2017-10-13 DIAGNOSIS — E03.9 HYPOTHYROIDISM, ADULT: ICD-10-CM

## 2017-10-13 RX ORDER — SULFAMETHOXAZOLE AND TRIMETHOPRIM 800; 160 MG/1; MG/1
TABLET ORAL
Qty: 30 TAB | Refills: 5 | OUTPATIENT
Start: 2017-10-13

## 2017-10-14 DIAGNOSIS — I10 ESSENTIAL HYPERTENSION, BENIGN: ICD-10-CM

## 2017-10-15 RX ORDER — FUROSEMIDE 20 MG/1
TABLET ORAL
Qty: 90 TAB | Refills: 3 | Status: SHIPPED | OUTPATIENT
Start: 2017-10-15 | End: 2018-12-20 | Stop reason: SDUPTHER

## 2017-10-18 DIAGNOSIS — E03.9 HYPOTHYROIDISM, ADULT: ICD-10-CM

## 2017-10-18 DIAGNOSIS — E11.9 CONTROLLED TYPE 2 DIABETES MELLITUS WITHOUT COMPLICATION, WITHOUT LONG-TERM CURRENT USE OF INSULIN (HCC): ICD-10-CM

## 2017-10-19 DIAGNOSIS — E11.9 CONTROLLED TYPE 2 DIABETES MELLITUS WITHOUT COMPLICATION, WITHOUT LONG-TERM CURRENT USE OF INSULIN (HCC): ICD-10-CM

## 2017-10-19 DIAGNOSIS — E03.9 HYPOTHYROIDISM, ADULT: ICD-10-CM

## 2017-10-19 RX ORDER — LEVOTHYROXINE AND LIOTHYRONINE 38; 9 UG/1; UG/1
60 TABLET ORAL DAILY
Qty: 30 TAB | Refills: 5 | Status: SHIPPED | OUTPATIENT
Start: 2017-10-19 | End: 2017-10-19 | Stop reason: SDUPTHER

## 2017-10-19 RX ORDER — GLIMEPIRIDE 4 MG/1
TABLET ORAL
Qty: 30 TAB | Refills: 5 | Status: SHIPPED | OUTPATIENT
Start: 2017-10-19 | End: 2017-10-19 | Stop reason: SDUPTHER

## 2017-10-20 RX ORDER — LEVOTHYROXINE AND LIOTHYRONINE 38; 9 UG/1; UG/1
60 TABLET ORAL DAILY
Qty: 90 TAB | Refills: 1 | Status: SHIPPED | OUTPATIENT
Start: 2017-10-20 | End: 2017-10-25 | Stop reason: SDUPTHER

## 2017-10-20 RX ORDER — GLIMEPIRIDE 4 MG/1
TABLET ORAL
Qty: 90 TAB | Refills: 1 | Status: SHIPPED | OUTPATIENT
Start: 2017-10-20 | End: 2018-03-28 | Stop reason: SDUPTHER

## 2017-10-25 ENCOUNTER — TELEPHONE (OUTPATIENT)
Dept: FAMILY MEDICINE CLINIC | Age: 82
End: 2017-10-25

## 2017-10-25 DIAGNOSIS — E03.9 HYPOTHYROIDISM, ADULT: ICD-10-CM

## 2017-10-25 LAB
EST. AVERAGE GLUCOSE BLD GHB EST-MCNC: 200 MG/DL
HBA1C MFR BLD: 8.6 % (ref 4.8–5.6)
T4 FREE SERPL-MCNC: 0.8 NG/DL (ref 0.82–1.77)
TSH SERPL DL<=0.005 MIU/L-ACNC: 9.41 UIU/ML (ref 0.45–4.5)

## 2017-10-25 RX ORDER — LEVOTHYROXINE AND LIOTHYRONINE 38; 9 UG/1; UG/1
90 TABLET ORAL DAILY
Qty: 135 TAB | Refills: 1 | Status: SHIPPED | OUTPATIENT
Start: 2017-10-25 | End: 2018-03-29 | Stop reason: DRUGHIGH

## 2017-10-25 NOTE — TELEPHONE ENCOUNTER
I spoke with daughter J Luis Barrett about labs. Need to increase Tenino thyroid dose to 90 mg (1 and half tab) daily. Also A1C too high. Will need to add 2 nd diabetes med. He cannot take Metformin due to CKD. .  Will try Linagliptin (Tradjenta) 5 mg daily. FU labs in 3 months.

## 2018-01-25 LAB
EST. AVERAGE GLUCOSE BLD GHB EST-MCNC: 180 MG/DL
HBA1C MFR BLD: 7.9 % (ref 4.8–5.6)

## 2018-02-27 RX ORDER — DONEPEZIL HYDROCHLORIDE 10 MG/1
TABLET, FILM COATED ORAL
Qty: 90 TAB | Refills: 1 | Status: SHIPPED | OUTPATIENT
Start: 2018-02-27 | End: 2018-10-09 | Stop reason: SDUPTHER

## 2018-03-19 RX ORDER — LANSOPRAZOLE 30 MG/1
CAPSULE, DELAYED RELEASE ORAL
Qty: 90 CAP | Refills: 0 | Status: SHIPPED | OUTPATIENT
Start: 2018-03-19 | End: 2018-03-28 | Stop reason: SDUPTHER

## 2018-03-28 ENCOUNTER — OFFICE VISIT (OUTPATIENT)
Dept: FAMILY MEDICINE CLINIC | Age: 83
End: 2018-03-28

## 2018-03-28 VITALS
DIASTOLIC BLOOD PRESSURE: 60 MMHG | BODY MASS INDEX: 26.06 KG/M2 | OXYGEN SATURATION: 97 % | TEMPERATURE: 97.7 F | WEIGHT: 192.4 LBS | SYSTOLIC BLOOD PRESSURE: 146 MMHG | HEIGHT: 72 IN | HEART RATE: 60 BPM | RESPIRATION RATE: 20 BRPM

## 2018-03-28 DIAGNOSIS — E11.9 CONTROLLED TYPE 2 DIABETES MELLITUS WITHOUT COMPLICATION, WITHOUT LONG-TERM CURRENT USE OF INSULIN (HCC): ICD-10-CM

## 2018-03-28 DIAGNOSIS — C18.9 MALIGNANT NEOPLASM OF COLON, UNSPECIFIED PART OF COLON (HCC): ICD-10-CM

## 2018-03-28 DIAGNOSIS — E03.9 HYPOTHYROIDISM, ADULT: ICD-10-CM

## 2018-03-28 DIAGNOSIS — E03.9 ACQUIRED HYPOTHYROIDISM: ICD-10-CM

## 2018-03-28 DIAGNOSIS — I10 ESSENTIAL HYPERTENSION, BENIGN: Primary | ICD-10-CM

## 2018-03-28 DIAGNOSIS — K21.9 GASTROESOPHAGEAL REFLUX DISEASE, ESOPHAGITIS PRESENCE NOT SPECIFIED: ICD-10-CM

## 2018-03-28 DIAGNOSIS — N18.30 STAGE 3 CHRONIC KIDNEY DISEASE (HCC): ICD-10-CM

## 2018-03-28 PROBLEM — E11.21 TYPE 2 DIABETES WITH NEPHROPATHY (HCC): Status: ACTIVE | Noted: 2018-03-28

## 2018-03-28 RX ORDER — LOSARTAN POTASSIUM 25 MG/1
25 TABLET ORAL DAILY
Qty: 90 TAB | Refills: 1 | Status: SHIPPED | COMMUNITY
Start: 2018-03-28 | End: 2018-10-09 | Stop reason: SDUPTHER

## 2018-03-28 RX ORDER — LANSOPRAZOLE 30 MG/1
CAPSULE, DELAYED RELEASE ORAL
Qty: 90 CAP | Refills: 1 | Status: SHIPPED | COMMUNITY
Start: 2018-03-28 | End: 2018-10-09 | Stop reason: SDUPTHER

## 2018-03-28 RX ORDER — GLIMEPIRIDE 4 MG/1
TABLET ORAL
Qty: 90 TAB | Refills: 1 | Status: SHIPPED | COMMUNITY
Start: 2018-03-28 | End: 2018-05-17 | Stop reason: SDUPTHER

## 2018-03-28 RX ORDER — AMLODIPINE BESYLATE 10 MG/1
TABLET ORAL
Qty: 90 TAB | Refills: 1 | Status: SHIPPED | COMMUNITY
Start: 2018-03-28 | End: 2018-10-09 | Stop reason: SDUPTHER

## 2018-03-28 RX ORDER — LEVOTHYROXINE AND LIOTHYRONINE 57; 13.5 UG/1; UG/1
90 TABLET ORAL DAILY
Qty: 90 TAB | Refills: 1 | Status: CANCELLED | COMMUNITY
Start: 2018-03-28

## 2018-03-28 NOTE — MR AVS SNAPSHOT
[address] [address] [address] [facility] 
[phone] Patient: Luz Mcdaniel. MRN: [id number] UEV:9/8/8583 Visit Information Date & Time Provider Department Dept. Phone Encounter #  
 3/80/8248  3:56 PM Benji Conner Luis [phone] [id number] Upcoming Health Maintenance Date Due Pneumococcal 65+ High/Highest Risk (1 of 2 - PCV13) 3/8/1996 EYE EXAM RETINAL OR DILATED Q1 10/20/2016 GLAUCOMA SCREENING Q2Y 10/20/2017 MICROALBUMIN Q1 5/31/2018 MEDICARE YEARLY EXAM 6/1/2018 LIPID PANEL Q1 7/18/2018 HEMOGLOBIN A1C Q6M 7/24/2018 FOOT EXAM Q1 3/28/2019 DTaP/Tdap/Td series (2 - Td) 1/25/2027 Allergies as of 3/28/2018  Review Complete On: 9/44/8586 By: Jenni Irvin MD  
  
 Severity Noted Reaction Type Reactions Synthroid [Levothyroxine]  08/20/2013   Systemic Other (comments) Confused, hallucinations Current Immunizations  Reviewed on 4/2/2015 Name Date Influenza High Dose Vaccine PF 8/30/2017, 1/25/2017 Influenza Vaccine 9/12/2014, 9/11/2013 Influenza Vaccine (Quad) PF 10/30/2015  9:31 AM  
 Influenza Vaccine Whole 10/16/2012 Zoster 10/16/2012 Not reviewed this visit You Were Diagnosed With   
  
 Codes Comments Essential hypertension, benign    -  Primary ICD-10-CM: I10 
ICD-9-CM: 401.1 Hypothyroidism, adult     ICD-10-CM: E03.9 ICD-9-CM: 244.9 Uncontrolled type 2 diabetes mellitus without complication, without long-term current use of insulin ([facility] [address]     ICD-10-CM: E11.65 ICD-9-CM: 250.02 Controlled type 2 diabetes mellitus without complication, without long-term current use of insulin ([address]     ICD-10-CM: E11.9 ICD-9-CM: 250.00 Acquired hypothyroidism     ICD-10-CM: E03.9 ICD-9-CM: 244.9 Gastroesophageal reflux disease, esophagitis presence not specified     ICD-10-CM: K21.9 ICD-9-CM: 530.81 Vitals BP Pulse Temp Resp Height(growth percentile) Weight(growth percentile) 146/60 (BP 1 Location: Left arm, BP Patient Position: Sitting) 60 97.7 °F (36.5 °C) (Oral) 20 6' (1.829 m) 192 lb 6.4 oz (87.3 kg) SpO2 BMI Smoking Status 97% 26.09 kg/m2 Former Smoker BMI and BSA Data Body Mass Index Body Surface Area 26.09 kg/m 2 2.11 m 2 Preferred Pharmacy Pharmacy Name Phone Rika Porras, [facility] - [address] [facility] [address] [place] [address] [phone] Your Updated Medication List  
  
   
This list is accurate as of 3/28/18  3:25 PM.  Always use your most recent med list. amLODIPine 10 mg tablet Commonly known as:  Daly Yovany TAKE 1 TABLET EVERY DAY FOR HYPERTENSION  (INCREASE  IN  DOSE  TO  10MG) aspirin 81 mg chewable tablet Take 1 Tab by mouth daily. CENTRUM SILVER Tab tablet Generic drug:  multivitamins-minerals-lutein Take 1 Tab by mouth daily. cetirizine 10 mg tablet Commonly known as:  ZYRTEC  
TAKE 1 TABLET EVERY DAY AS NEEDED FOR ALLERGIES  
  
 donepezil 10 mg tablet Commonly known as:  ARICEPT  
TAKE 1 TABLET EVERY NIGHT  
  
 ferrous sulfate 325 mg (65 mg iron) tablet Commonly known as:  Iron Take 1 Tab by mouth Daily (before breakfast). furosemide 20 mg tablet Commonly known as:  LASIX TAKE 1 TABLET EVERY DAY AS DIRECTED  
  
 glimepiride 4 mg tablet Commonly known as:  AMARYL  
TAKE 1 TABLET BY MOUTH EVERY MORNING.  
  
 lansoprazole 30 mg capsule Commonly known as:  PREVACID TAKE 1 CAPSULE EVERY DAY  BEFORE  BREAKFAST  
  
 linagliptin 5 mg tablet Commonly known as:  Nithya Burow Take 1 Tab by mouth daily. Indications: type 2 diabetes mellitus  
  
 loperamide 2 mg capsule Commonly known as:  IMODIUM  
TAKE 1 CAPSULE BEFORE BREAKFAST, LUNCH, DINNER AND AT BEDTIME FOR DIARRHEA  
  
 losartan 25 mg tablet Commonly known as:  COZAAR Take 1 Tab by mouth daily. nicotinic acid 500 mg tablet Commonly known as:  NIACIN Take 500 mg by mouth Daily (before breakfast). thyroid (Pork) 60 mg tablet Commonly known as:  ARMOUR THYROID Take 1.5 Tabs by mouth daily. Indications: hypothyroidism VITAMIN D2 50,000 unit capsule Generic drug:  ergocalciferol TAKE 1 CAPSULE EVERY 7 DAYS Prescriptions Sent to Mail Order Refills  
 lansoprazole (PREVACID) 30 mg capsule 1 Sig: TAKE 1 CAPSULE EVERY DAY  BEFORE  BREAKFAST Class: Mail Order Pharmacy: [address] [place] [address] Ph #: [phone]  
 amLODIPine (NORVASC) 10 mg tablet 1 Sig: TAKE 1 TABLET EVERY DAY FOR HYPERTENSION  (INCREASE  IN  DOSE  TO  10MG) Class: Mail Order Pharmacy: [address] [place] [address] Ph #: [phone]  
 linagliptin (TRADJENTA) 5 mg tablet 1 Sig: Take 1 Tab by mouth daily. Indications: type 2 diabetes mellitus Class: Mail Order Pharmacy: [address] [place] [address] Ph #: [phone] Route: Oral  
 glimepiride (AMARYL) 4 mg tablet 1 Sig: TAKE 1 TABLET BY MOUTH EVERY MORNING. Class: Mail Order Pharmacy: [address] [place] [address] Ph #: [phone]  
 losartan (COZAAR) 25 mg tablet 1 Sig: Take 1 Tab by mouth daily. Class: Mail Order Pharmacy: [address] [place] [address] Ph #: [phone] Route: Oral  
  
We Performed the Following  DIABETES FOOT EXAM [[facility] Custom] T4, FREE [id number] CPT(R)] TSH 3RD GENERATION [82846 CPT(R)] Patient Instructions Diabetes and Preventing Falls: Care Instructions Your Care Instructions If you are an older adult who has diabetes, you may have a higher risk of falling.  Complications of diabetes-such as nerve damage, foot problems, and reduced vision-may increase your risk of a fall. Some of your medicines also may add to your risk. By making your home safer, you can lower your risk of falling. Doing things to prevent diabetes complications may also help to lower your risk. You can make your home safer with a few simple measures. Follow-up care is a key part of your treatment and safety. Be sure to make and go to all appointments, and call your doctor if you are having problems. It's also a good idea to know your test results and keep a list of the medicines you take. How can you care for yourself at home? Taking care of yourself · Keep your blood sugar at a target level (which you set with your doctor). · Exercise regularly to improve your strength, muscle tone, and balance. Walk if you can. Swimming may be a good choice if you cannot walk easily. · Have your vision checked as often as your doctor recommends. It is usually once a year or more often if you have eye problems. · Know the side effects of the medicines you take. Ask your doctor or pharmacist whether the medicines you take can affect your balance. Sleeping pills or sedatives can affect your balance. · Limit the amount of alcohol you drink. Alcohol can impair your balance and other senses. · Have your doctor check your feet during each visit. If you have a foot problem, see your doctor. Preventing falls at home · Remove raised doorway thresholds, throw rugs, and clutter. Repair loose carpet or raised areas in the floor. · Move furniture and electrical cords to keep them out of walking paths. · Use nonskid floor wax, and wipe up spills right away, especially on ceramic tile floors. · If you use a walker or cane, put rubber tips on it. If you use crutches, clean the bottoms of them regularly with an abrasive pad, such as steel wool.  
· Keep your house well lit, especially Jacqualine Keto, and outside walkways. Use night-lights in areas such as hallways and bathrooms. Add extra light switches or use remote switches (such as switches that go on or off when you clap your hands) to make it easier to turn lights on if you have to get up during the night. · Install sturdy handrails on stairways. Put grab bars near your shower, bathtub, and toilet. · Store household items on low shelves so that you do not have to climb or reach high. Or use a reaching device that you can get at a medical supply store. If you have to climb for something, use a step stool with handrails, or ask someone to get it for you. · Keep a cordless phone and a flashlight with new batteries by your bed. If possible, put a phone in each of the main rooms of your house, or carry a cell phone in case you fall and cannot reach a phone. Or you can wear a device around your neck or wrist. You push a button that sends a signal for help. · Wear low-heeled shoes that fit well and give your feet good support. Use footwear with nonskid soles. Check the heels and soles of your shoes for wear. Repair or replace worn heels or soles. · Do not wear socks without shoes on wood floors. · Walk on the grass when the sidewalks are slippery. If you live in an area that gets snow and ice in the winter, sprinkle salt on slippery steps and sidewalks. Where can you learn more? Go to http://miltno-saud.info/. Enter F462 in the search box to learn more about \"Diabetes and Preventing Falls: Care Instructions. \" Current as of: May 12, 2017 Content Version: 11.4 © 7318-0958 [employer]. Care instructions adapted under license by [employer] (which disclaims liability or warranty for this information). If you have questions about a medical condition or this instruction, always ask your healthcare professional. [address] [address] any warranty or liability for your use of this information. Reminder: get eye exam done for diabetes. Introducing [facility]! Emelina Shaw introduces Fineline patient portal. Now you can access parts of your medical record, email your doctor's office, and request medication refills online. 1. In your internet browser, go to [email or web address] 2. Click on the First Time User? Click Here link in the Sign In box. You will see the New Member Sign Up page. 3. Enter your Fineline Access Code exactly as it appears below. You will not need to use this code after youve completed the sign-up process. If you do not sign up before the expiration date, you must request a new code. · Fineline Access Code: [email or web address] 
Expires: 6/26/2018  3:17 PM 
 
4. Enter the last four digits of your Social Security Number (xxxx) and Date of Birth (mm/dd/yyyy) as indicated and click Submit. You will be taken to the next sign-up page. 5. Create a Fineline ID. This will be your Fineline login ID and cannot be changed, so think of one that is secure and easy to remember. 6. Create a Fineline password. You can change your password at any time. 7. Enter your Password Reset Question and Answer. This can be used at a later time if you forget your password. 8. Enter your e-mail address. You will receive e-mail notification when new information is available in [address] 9. Click Sign Up. You can now view and download portions of your medical record. 10. Click the Download Summary menu link to download a portable copy of your medical information. If you have questions, please visit the Frequently Asked Questions section of the Fineline website. Remember, Fineline is NOT to be used for urgent needs. For medical emergencies, dial 911. Now available from your iPhone and Android! Please provide this summary of care documentation to your next provider. Your primary care clinician is listed as Christine Velázquez.  If you have any questions after today's visit, please call [phone].

## 2018-03-28 NOTE — LETTER
3/29/2018 4:38 PM 
 
[address] [id number] Dear Candis Tarango.: 
 
Please find your most recent results below. Resulted Orders TSH 3RD GENERATION Result Value Ref Range TSH 0.893 0.450 - 4.500 uIU/mL Narrative Performed at:  [address]  [id number] : Arlice Meckel MD, Phone:  [phone] T4, FREE Result Value Ref Range T4, Free 0.96 0.82 - 1.77 ng/dL Narrative Performed at:  [address]  [id number] : Arlice Meckel MD, Phone:  [phone] RECOMMENDATIONS: 
Thyroid level looks better.  Continue on current dose of Quinter Thyroid. I will send refill of med. Please call me if you have any questions: [phone] Sincerely, Zulema Joshua MD

## 2018-03-28 NOTE — PROGRESS NOTES
Subjective: Марина Herrera is a 80 y.o. male who presents for follow up of diabetes, hypertension and hyperlipidemia. Diet and Lifestyle: not attempting to follow a low fat, low cholesterol diet, not attempting to follow a low sodium diet, sedentary  Home BP Monitoring: is not measured at home    Cardiovascular ROS: taking medications as instructed, no medication side effects noted, no TIA's, no chest pain on exertion, no dyspnea on exertion, no swelling of ankles. New concerns: due for FU thyroid lab. Patient Active Problem List    Diagnosis Date Noted    Type 2 diabetes with nephropathy ([facility] 03/28/2018    Dementia without behavioral disturbance 08/16/2016    CKD (chronic kidney disease) 10/26/2015    Unspecified vitamin D deficiency 09/11/2013    Colon cancer ([facility] 08/23/2012    Pernicious anemia 07/31/2012    Acquired hypothyroidism 04/26/2012    Microalbuminuria 03/06/2012    Diabetes mellitus type 2, controlled ([facility] 02/09/2012    Essential hypertension, benign 02/09/2012    Mixed hyperlipidemia 02/09/2012    Allergic rhinitis due to other allergen 02/09/2012    Esophageal reflux 02/09/2012     Current Outpatient Prescriptions   Medication Sig Dispense Refill    lansoprazole (PREVACID) 30 mg capsule TAKE 1 CAPSULE EVERY DAY  BEFORE  BREAKFAST 90 Cap 1    amLODIPine (NORVASC) 10 mg tablet TAKE 1 TABLET EVERY DAY FOR HYPERTENSION  (INCREASE  IN  DOSE  TO  10MG) 90 Tab 1    linagliptin (TRADJENTA) 5 mg tablet Take 1 Tab by mouth daily. Indications: type 2 diabetes mellitus 90 Tab 1    glimepiride (AMARYL) 4 mg tablet TAKE 1 TABLET BY MOUTH EVERY MORNING. 90 Tab 1    losartan (COZAAR) 25 mg tablet Take 1 Tab by mouth daily.  90 Tab 1    donepezil (ARICEPT) 10 mg tablet TAKE 1 TABLET EVERY NIGHT 90 Tab 1    loperamide (IMODIUM) 2 mg capsule TAKE 1 CAPSULE BEFORE BREAKFAST, LUNCH, DINNER AND AT BEDTIME FOR DIARRHEA (Patient taking differently: TAKE 1 CAPSULE BEFORE BREAKFAST, LUNCH, DINNER AND AT BEDTIME FOR DIARRHEA  - taking twice a day) 360 Cap 0    VITAMIN D2 50,000 unit capsule TAKE 1 CAPSULE EVERY 7 DAYS 13 Cap 3    thyroid, Pork, (ARMOUR THYROID) 60 mg tablet Take 1.5 Tabs by mouth daily. Indications: hypothyroidism 135 Tab 1    furosemide (LASIX) 20 mg tablet TAKE 1 TABLET EVERY DAY AS DIRECTED 90 Tab 3    cetirizine (ZYRTEC) 10 mg tablet TAKE 1 TABLET EVERY DAY AS NEEDED FOR ALLERGIES 90 Tab 3    aspirin 81 mg chewable tablet Take 1 Tab by mouth daily. 14 Tab 0    ferrous sulfate (IRON) 325 mg (65 mg iron) tablet Take 1 Tab by mouth Daily (before breakfast). 30 Tab 5    multivitamins-minerals-lutein (CENTRUM SILVER) tab tablet Take 1 Tab by mouth daily.  nicotinic acid (NIACIN) 500 mg tablet Take 500 mg by mouth Daily (before breakfast).          Allergies   Allergen Reactions    Synthroid [Levothyroxine] Other (comments)     Confused, hallucinations     Past Medical History:   Diagnosis Date    WILTON (acute kidney injury) ([facility] [address] 8/2/2014    Cancer ([facility] [address]     colon    Diabetes ([facility] [address] 2008    type 2    Dyslipidemia     Esophageal reflux     Esophageal reflux 2/9/2012    Hypertension 2008    Mixed hyperlipidemia 2/9/2012    Other ill-defined conditions(799.89)     broken neck 2012    Pernicious anemia 7/31/2012    Psychiatric disorder     dementia     Unspecified hypothyroidism 4/26/2012     Past Surgical History:   Procedure Laterality Date    COLONOSCOPY  5/29/12    Rectosigmoid adenocarcinoma    HX CATARACT REMOVAL      HX HERNIA REPAIR      Bilateral inguinal    HX OTHER SURGICAL      colon resection    HX TONSIL AND ADENOIDECTOMY      HX TONSILLECTOMY  1936     Family History   Problem Relation Age of Onset    Heart Disease Mother     Hypertension Mother     Heart Disease Father     Hypertension Father      Social History   Substance Use Topics    Smoking status: Former Smoker     Packs/day: 1.50     Years: 30.00     Quit date: 7/30/1982    Smokeless tobacco: Never Used    Alcohol use No        Lab Results   Component Value Date/Time    WBC 9.1 07/18/2017 09:35 AM    HGB 13.8 07/18/2017 09:35 AM    Hemoglobin (POC) 10.9 (L) 08/28/2014 01:13 AM    HCT 40.7 07/18/2017 09:35 AM    Hematocrit (POC) 32 (L) 08/28/2014 01:13 AM    PLATELET 685 28/85/1067 09:35 AM    MCV 98 (H) 07/18/2017 09:35 AM     Lab Results   Component Value Date/Time    Hemoglobin A1c 7.9 (H) 01/24/2018 09:47 AM    Hemoglobin A1c 8.6 (H) 10/24/2017 08:45 AM    Hemoglobin A1c 8.2 (H) 07/18/2017 09:35 AM    Hemoglobin A1c, External 7.6 04/13/2016    Glucose 168 (H) 07/18/2017 09:35 AM    Glucose (POC) 138 (H) 04/05/2015 07:42 AM    Microalb/Creat ratio (ug/mg creat.) 39.2 (H) 09/24/2012 12:00 AM    LDL, calculated 89 07/18/2017 09:35 AM    Creatinine (POC) 2.6 (H) 08/28/2014 01:13 AM    Creatinine 1.83 (H) 07/18/2017 09:35 AM      Lab Results   Component Value Date/Time    Cholesterol, total 168 07/18/2017 09:35 AM    HDL Cholesterol 32 (L) 07/18/2017 09:35 AM    LDL, calculated 89 07/18/2017 09:35 AM    Triglyceride 233 (H) 07/18/2017 09:35 AM     Lab Results   Component Value Date/Time    ALT (SGPT) 9 07/18/2017 09:35 AM    AST (SGOT) 14 07/18/2017 09:35 AM    Alk.  phosphatase 100 07/18/2017 09:35 AM    Bilirubin, direct 0.17 01/25/2017 12:12 PM    Bilirubin, total 0.6 07/18/2017 09:35 AM    Albumin 4.0 07/18/2017 09:35 AM    Protein, total 6.6 07/18/2017 09:35 AM    Ammonia <25 06/18/2012 03:54 PM    INR 1.1 07/29/2014 04:45 PM    Prothrombin time 11.0 07/29/2014 04:45 PM    PLATELET 681 46/43/0848 09:35 AM       Lab Results   Component Value Date/Time    GFR est non-AA 33 (L) 07/18/2017 09:35 AM    GFRNA, POC 24 (L) 08/28/2014 01:13 AM    GFR est AA 38 (L) 07/18/2017 09:35 AM    GFRAA, POC 29 (L) 08/28/2014 01:13 AM    Creatinine 1.83 (H) 07/18/2017 09:35 AM    Creatinine (POC) 2.6 (H) 08/28/2014 01:13 AM    BUN 27 07/18/2017 09:35 AM    BUN (POC) 31 (H) 08/28/2014 01:13 AM    Sodium 143 07/18/2017 09:35 AM    Sodium (POC) 141 08/28/2014 01:13 AM    Potassium 4.7 07/18/2017 09:35 AM    Potassium (POC) 4.6 08/28/2014 01:13 AM    Chloride 101 07/18/2017 09:35 AM    Chloride (POC) 107 08/28/2014 01:13 AM    CO2 24 07/18/2017 09:35 AM    Magnesium 1.5 (L) 06/18/2012 03:45 AM    Phosphorus 3.1 06/17/2012 10:25 PM     Lab Results   Component Value Date/Time    TSH 9.410 (H) 10/24/2017 08:45 AM    T4, Free 0.80 (L) 10/24/2017 08:45 AM         Review of Systems, additional:  Pertinent items are noted in HPI. Objective:     Visit Vitals    /60 (BP 1 Location: Left arm, BP Patient Position: Sitting)  Comment: manual    Pulse 60    Temp 97.7 °F (36.5 °C) (Oral)    Resp 20    Ht 6' (1.829 m)    Wt 192 lb 6.4 oz (87.3 kg)    SpO2 97%    BMI 26.09 kg/m2     Appearance: alert, well appearing, and in no distress. General exam: CVS exam BP noted to be well controlled today in office, S1, S2 normal, no gallop, no murmur, chest clear, no JVD, no HSM, 2 + edema left lower leg, ankle. Diabetic foot exam:     Left:    Filament test normal sensation with micro filament   Pulse DP: 2+ (normal)   Pulse PT: 2+ (normal)   Deformities: elongated 1 st toenail, SOA  Right:    Filament test reduced sensation with micro filament   Pulse DP: 2+ (normal)   Pulse PT: 2+ (normal)   Deformities: None  Lab review: orders written for new lab studies as appropriate; see orders. Assessment/Plan:     diabetes needs improvement, hypertension poorly controlled, hyperlipidemia . orders and follow up as documented in patient record  recommended sodium restriction. ICD-10-CM ICD-9-CM    1. Essential hypertension, benign I10 401.1 amLODIPine (NORVASC) 10 mg tablet      losartan (COZAAR) 25 mg tablet   2. Hypothyroidism, adult E03.9 244.9    3.  Uncontrolled type 2 diabetes mellitus without complication, without long-term current use of insulin (HCC) E11.65 250.02 linagliptin (TRADJENTA) 5 mg tablet      glimepiride (AMARYL) 4 mg tablet       DIABETES FOOT EXAM   4. Controlled type 2 diabetes mellitus without complication, without long-term current use of insulin (HCC) E11.9 250.00    5. Acquired hypothyroidism E03.9 244.9 TSH 3RD GENERATION      T4, FREE   6. Gastroesophageal reflux disease, esophagitis presence not specified K21.9 530.81 lansoprazole (PREVACID) 30 mg capsule   7. Malignant neoplasm of colon, unspecified part of colon ([facility] [address] C18.9 153.9    8. Stage 3 chronic kidney disease N18.3 585. 3      Med refills ordered for [employer]. Check thyroid labs before refilling [place].   Recommend get eye exam.

## 2018-03-28 NOTE — PROGRESS NOTES
Identified pt with two pt identifiers(name and ). Chief Complaint   Patient presents with    Diabetes    Hypertension        Health Maintenance Due   Topic    Pneumococcal 65+ High/Highest Risk (1 of 2 - PCV13)    EYE EXAM RETINAL OR DILATED Q1     GLAUCOMA SCREENING Q2Y     FOOT EXAM Q1        Wt Readings from Last 3 Encounters:   18 192 lb 6.4 oz (87.3 kg)   17 201 lb 12.8 oz (91.5 kg)   17 198 lb (89.8 kg)     Temp Readings from Last 3 Encounters:   18 97.7 °F (36.5 °C) (Oral)   17 98.2 °F (36.8 °C) (Oral)   17 98.4 °F (36.9 °C) (Oral)     BP Readings from Last 3 Encounters:   18 146/60   17 136/66   17 126/60     Pulse Readings from Last 3 Encounters:   18 60   17 63   17 78         Learning Assessment:  :     Learning Assessment 3/19/2014   PRIMARY LEARNER Patient   HIGHEST LEVEL OF EDUCATION - PRIMARY LEARNER  GRADUATED HIGH SCHOOL OR GED   BARRIERS PRIMARY LEARNER NONE   CO-LEARNER CAREGIVER No   PRIMARY LANGUAGE ENGLISH   LEARNER PREFERENCE PRIMARY LISTENING     DEMONSTRATION   ANSWERED BY patient   RELATIONSHIP SELF       Depression Screening:  :     PHQ over the last two weeks 3/28/2018   Little interest or pleasure in doing things Not at all   Feeling down, depressed or hopeless Not at all   Total Score PHQ 2 0       Fall Risk Assessment:  :     Fall Risk Assessment, last 12 mths 3/28/2018   Able to walk? Yes   Fall in past 12 months? No   Fall with injury? -   Number of falls in past 12 months -       Abuse Screening:  :     Abuse Screening Questionnaire 3/28/2018 2017 8/15/2016 2015 3/19/2014   Do you ever feel afraid of your partner? N N N N N   Are you in a relationship with someone who physically or mentally threatens you? N N N N N   Is it safe for you to go home?  Y Y Y Y Y       Coordination of Care Questionnaire:  :     1) Have you been to an emergency room, urgent care clinic since your last visit? no Hospitalized since your last visit? no             2) Have you seen or consulted any other health care providers outside of [address] [place] [address] since your last visit? no  (Include any pap smears or colon screenings in this section.)    3) Do you have an Advance Directive on file? yes  Are you interested in receiving information about Advance Directives? no    Patient is accompanied by daughter I have received verbal consent from Verna Abraham. to discuss any/all medical information while they are present in the room. Reviewed record in preparation for visit and have obtained necessary documentation. Medication reconciliation up to date and corrected with patient at this time.

## 2018-03-28 NOTE — PATIENT INSTRUCTIONS
Diabetes and Preventing Falls: Care Instructions  Your Care Instructions    If you are an older adult who has diabetes, you may have a higher risk of falling. Complications of diabetes-such as nerve damage, foot problems, and reduced vision-may increase your risk of a fall. Some of your medicines also may add to your risk. By making your home safer, you can lower your risk of falling. Doing things to prevent diabetes complications may also help to lower your risk. You can make your home safer with a few simple measures. Follow-up care is a key part of your treatment and safety. Be sure to make and go to all appointments, and call your doctor if you are having problems. It's also a good idea to know your test results and keep a list of the medicines you take. How can you care for yourself at home? Taking care of yourself  · Keep your blood sugar at a target level (which you set with your doctor). · Exercise regularly to improve your strength, muscle tone, and balance. Walk if you can. Swimming may be a good choice if you cannot walk easily. · Have your vision checked as often as your doctor recommends. It is usually once a year or more often if you have eye problems. · Know the side effects of the medicines you take. Ask your doctor or pharmacist whether the medicines you take can affect your balance. Sleeping pills or sedatives can affect your balance. · Limit the amount of alcohol you drink. Alcohol can impair your balance and other senses. · Have your doctor check your feet during each visit. If you have a foot problem, see your doctor. Preventing falls at home  · Remove raised doorway thresholds, throw rugs, and clutter. Repair loose carpet or raised areas in the floor. · Move furniture and electrical cords to keep them out of walking paths. · Use nonskid floor wax, and wipe up spills right away, especially on ceramic tile floors. · If you use a walker or cane, put rubber tips on it.  If you use crutches, clean the bottoms of them regularly with an abrasive pad, such as steel wool. · Keep your house well lit, especially [facility], and outside walkways. Use night-lights in areas such as hallways and bathrooms. Add extra light switches or use remote switches (such as switches that go on or off when you clap your hands) to make it easier to turn lights on if you have to get up during the night. · Install sturdy handrails on stairways. Put grab bars near your shower, bathtub, and toilet. · Store household items on low shelves so that you do not have to climb or reach high. Or use a reaching device that you can get at a medical supply store. If you have to climb for something, use a step stool with handrails, or ask someone to get it for you. · Keep a cordless phone and a flashlight with new batteries by your bed. If possible, put a phone in each of the main rooms of your house, or carry a cell phone in case you fall and cannot reach a phone. Or you can wear a device around your neck or wrist. You push a button that sends a signal for help. · Wear low-heeled shoes that fit well and give your feet good support. Use footwear with nonskid soles. Check the heels and soles of your shoes for wear. Repair or replace worn heels or soles. · Do not wear socks without shoes on wood floors. · Walk on the grass when the sidewalks are slippery. If you live in an area that gets snow and ice in the winter, sprinkle salt on slippery steps and sidewalks. Where can you learn more? Go to http://milton-saud.info/. Enter Q416 in the search box to learn more about \"Diabetes and Preventing Falls: Care Instructions. \"  Current as of: May 12, 2017  Content Version: 11.4  © 1993-7188 [employer]. Care instructions adapted under license by [employer] (which disclaims liability or warranty for this information).  If you have questions about a medical condition or this instruction, always ask your healthcare professional. [place] [address] any warranty or liability for your use of this information. Reminder: get eye exam done for diabetes.

## 2018-03-29 ENCOUNTER — TELEPHONE (OUTPATIENT)
Dept: FAMILY MEDICINE CLINIC | Age: 83
End: 2018-03-29

## 2018-03-29 DIAGNOSIS — E03.9 ACQUIRED HYPOTHYROIDISM: Primary | ICD-10-CM

## 2018-03-29 LAB
T4 FREE SERPL-MCNC: 0.96 NG/DL (ref 0.82–1.77)
TSH SERPL DL<=0.005 MIU/L-ACNC: 0.89 UIU/ML (ref 0.45–4.5)

## 2018-03-29 RX ORDER — LEVOTHYROXINE AND LIOTHYRONINE 57; 13.5 UG/1; UG/1
90 TABLET ORAL DAILY
Qty: 90 TAB | Refills: 1 | Status: SHIPPED | COMMUNITY
Start: 2018-03-29 | End: 2018-10-04 | Stop reason: DRUGHIGH

## 2018-03-29 NOTE — ASSESSMENT & PLAN NOTE
This condition is managed by Specialist. Dr. Sarah Garcia. Last colonoscopy 2013. Key Oncology Meds     Patient is on no Oncologic meds. Key Pain Meds     The patient is on no pain meds.         Lab Results   Component Value Date/Time    WBC 9.1 07/18/2017 09:35 AM    HGB 13.8 07/18/2017 09:35 AM    HCT 40.7 07/18/2017 09:35 AM    PLATELET 354 96/45/4578 09:35 AM    Creatinine 1.83 07/18/2017 09:35 AM    BUN 27 07/18/2017 09:35 AM    ALT (SGPT) 9 07/18/2017 09:35 AM    AST (SGOT) 14 07/18/2017 09:35 AM    Albumin 4.0 07/18/2017 09:35 AM

## 2018-03-29 NOTE — ASSESSMENT & PLAN NOTE
Key Antihyperglycemic Medications             linagliptin (TRADJENTA) 5 mg tablet  (Taking) Take 1 Tab by mouth daily. Indications: type 2 diabetes mellitus    glimepiride (AMARYL) 4 mg tablet  (Taking) TAKE 1 TABLET BY MOUTH EVERY MORNING. Other Key Diabetic Medications             losartan (COZAAR) 25 mg tablet  (Taking) Take 1 Tab by mouth daily. nicotinic acid (NIACIN) 500 mg tablet  (Taking) Take 500 mg by mouth Daily (before breakfast).           Lab Results   Component Value Date/Time    Hemoglobin A1c 7.9 01/24/2018 09:47 AM    Glucose 168 07/18/2017 09:35 AM    Creatinine 1.83 07/18/2017 09:35 AM    Microalbumin/creat ratio (POC) >300 05/31/2017 02:19 PM    Cholesterol, total 168 07/18/2017 09:35 AM    HDL Cholesterol 32 07/18/2017 09:35 AM    LDL, calculated 89 07/18/2017 09:35 AM    Triglyceride 233 07/18/2017 09:35 AM     Diabetic Foot and Eye Exam HM Status   Topic Date Due    Eye Exam  10/20/2016    Diabetic Foot Care  03/28/2019

## 2018-03-29 NOTE — TELEPHONE ENCOUNTER
LVM - your thyroid level is good. Dr Patrizia Sharma ordered the [place] thyroid 90mg. Per Dr Patrizia Sharma no more splitting pills.

## 2018-03-29 NOTE — TELEPHONE ENCOUNTER
Please let his daughter know that thyroid level is good. There is [place] thyroid 90 mg tablet. So I sent order for this dose to [facility]. There is no need to be splitting tablet anymore once this comes in.

## 2018-04-22 RX ORDER — LINAGLIPTIN 5 MG/1
TABLET, FILM COATED ORAL
Qty: 90 TAB | Refills: 1 | Status: SHIPPED | OUTPATIENT
Start: 2018-04-22 | End: 2018-12-20 | Stop reason: SDUPTHER

## 2018-05-17 DIAGNOSIS — E11.9 CONTROLLED TYPE 2 DIABETES MELLITUS WITHOUT COMPLICATION, WITHOUT LONG-TERM CURRENT USE OF INSULIN (HCC): ICD-10-CM

## 2018-05-17 RX ORDER — GLIMEPIRIDE 4 MG/1
TABLET ORAL
Qty: 90 TAB | Refills: 1 | Status: SHIPPED | OUTPATIENT
Start: 2018-05-17 | End: 2018-10-01 | Stop reason: SDUPTHER

## 2018-05-17 RX ORDER — SULFAMETHOXAZOLE AND TRIMETHOPRIM 800; 160 MG/1; MG/1
TABLET ORAL
Qty: 90 TAB | Refills: 1 | OUTPATIENT
Start: 2018-05-17

## 2018-08-13 ENCOUNTER — OFFICE VISIT (OUTPATIENT)
Dept: FAMILY MEDICINE CLINIC | Age: 83
End: 2018-08-13

## 2018-08-13 VITALS
HEIGHT: 72 IN | HEART RATE: 57 BPM | RESPIRATION RATE: 20 BRPM | SYSTOLIC BLOOD PRESSURE: 118 MMHG | TEMPERATURE: 97.6 F | BODY MASS INDEX: 27.17 KG/M2 | WEIGHT: 200.6 LBS | OXYGEN SATURATION: 96 % | DIASTOLIC BLOOD PRESSURE: 72 MMHG

## 2018-08-13 DIAGNOSIS — Z12.5 SPECIAL SCREENING FOR MALIGNANT NEOPLASM OF PROSTATE: ICD-10-CM

## 2018-08-13 DIAGNOSIS — Z00.00 MEDICARE ANNUAL WELLNESS VISIT, SUBSEQUENT: Primary | ICD-10-CM

## 2018-08-13 DIAGNOSIS — D51.0 PERNICIOUS ANEMIA: ICD-10-CM

## 2018-08-13 DIAGNOSIS — E03.9 ACQUIRED HYPOTHYROIDISM: ICD-10-CM

## 2018-08-13 DIAGNOSIS — I10 ESSENTIAL HYPERTENSION, BENIGN: ICD-10-CM

## 2018-08-13 DIAGNOSIS — E55.9 VITAMIN D DEFICIENCY: ICD-10-CM

## 2018-08-13 DIAGNOSIS — Z79.899 ENCOUNTER FOR LONG-TERM CURRENT USE OF MEDICATION: ICD-10-CM

## 2018-08-13 DIAGNOSIS — E11.21 TYPE 2 DIABETES WITH NEPHROPATHY (HCC): ICD-10-CM

## 2018-08-13 DIAGNOSIS — Z23 ENCOUNTER FOR IMMUNIZATION: ICD-10-CM

## 2018-08-13 DIAGNOSIS — E78.2 MIXED HYPERLIPIDEMIA: ICD-10-CM

## 2018-08-13 DIAGNOSIS — N18.30 STAGE 3 CHRONIC KIDNEY DISEASE (HCC): ICD-10-CM

## 2018-08-13 NOTE — MR AVS SNAPSHOT
[address] [place] [address] 
 
 
 [facility] [address] [facility] 
[phone] Patient: Anette Mnedosa. MRN: [id number] PQE:[birth year] Visit Information Date & Time Provider Department Dept. Phone Encounter #  
 [id number]  0:65 PM Wilfrido BartonAshu 108 [phone] [id number] Upcoming Health Maintenance Date Due Pneumococcal 65+ High/Highest Risk (1 of 2 - PCV13) 3/8/1996 MICROALBUMIN Q1 5/31/2018 LIPID PANEL Q1 7/18/2018 HEMOGLOBIN A1C Q6M 7/24/2018 Influenza Age 5 to Adult 8/1/2018 FOOT EXAM Q1 3/28/2019 EYE EXAM RETINAL OR DILATED Q1 7/16/2019 MEDICARE YEARLY EXAM 8/14/2019 GLAUCOMA SCREENING Q2Y 7/16/2020 DTaP/Tdap/Td series (2 - Td) 1/25/2027 Allergies as of 8/13/2018  Review Complete On: 1/96/5611 By: Wilfrido Barton MD  
  
 Severity Noted Reaction Type Reactions Synthroid [Levothyroxine]  08/20/2013   Systemic Other (comments) Confused, hallucinations Current Immunizations  Reviewed on 8/13/2018 Name Date Influenza High Dose Vaccine PF 8/30/2017, 1/25/2017 Influenza Vaccine 9/12/2014, 9/11/2013 Influenza Vaccine (Quad) PF 10/30/2015  9:31 AM  
 Influenza Vaccine Whole 10/16/2012 Zoster 10/16/2012 Reviewed by Wilfrido Barton MD on 8/13/2018 at  5:11 PM  
 Reviewed by Wilfrido Barton MD on 8/13/2018 at  5:19 PM  
You Were Diagnosed With   
  
 Codes Comments Medicare annual wellness visit, subsequent    -  Primary ICD-10-CM: Z00.00 ICD-9-CM: V70.0 Encounter for immunization     ICD-10-CM: L49 ICD-9-CM: V03.89 Special screening for malignant neoplasm of prostate     ICD-10-CM: Z12.5 ICD-9-CM: V76.44 Type 2 diabetes with nephropathy (HCC)     ICD-10-CM: E11.21 
ICD-9-CM: 250.40, 583.81 Stage 3 chronic kidney disease     ICD-10-CM: N18.3 ICD-9-CM: 660. 3 Acquired hypothyroidism     ICD-10-CM: E03.9 ICD-9-CM: 244.9 Pernicious anemia     ICD-10-CM: D51.0 ICD-9-CM: 281.0 Essential hypertension, benign     ICD-10-CM: I10 
ICD-9-CM: 401.1 Mixed hyperlipidemia     ICD-10-CM: E78.2 ICD-9-CM: 272.2 Encounter for long-term current use of medication     ICD-10-CM: Z79.899 ICD-9-CM: V58.69 Vitamin D deficiency     ICD-10-CM: E55.9 ICD-9-CM: 268.9 Vitals BP Pulse Temp Resp Height(growth percentile) Weight(growth percentile) 118/72 (BP 1 Location: Left arm, BP Patient Position: Sitting) (!) 57 97.6 °F (36.4 °C) (Oral) 20 6' (1.829 m) 200 lb 9.6 oz (91 kg) SpO2 BMI Smoking Status 96% 27.21 kg/m2 Former Smoker BMI and BSA Data Body Mass Index Body Surface Area  
 27.21 kg/m 2 2.15 m 2 Preferred Pharmacy Pharmacy Name Phone [id number] [place] [phone] Your Updated Medication List  
  
   
This list is accurate as of 8/13/18  5:25 PM.  Always use your most recent med list. amLODIPine 10 mg tablet Commonly known as:  Horris Bills TAKE 1 TABLET EVERY DAY FOR HYPERTENSION  (INCREASE  IN  DOSE  TO  10MG) aspirin 81 mg chewable tablet Take 1 Tab by mouth daily. CENTRUM SILVER Tab tablet Generic drug:  multivitamins-minerals-lutein Take 1 Tab by mouth daily. cetirizine 10 mg tablet Commonly known as:  ZYRTEC  
TAKE 1 TABLET EVERY DAY AS NEEDED FOR ALLERGIES  
  
 donepezil 10 mg tablet Commonly known as:  ARICEPT  
TAKE 1 TABLET EVERY NIGHT  
  
 ferrous sulfate 325 mg (65 mg iron) tablet Commonly known as:  Iron Take 1 Tab by mouth Daily (before breakfast). furosemide 20 mg tablet Commonly known as:  LASIX TAKE 1 TABLET EVERY DAY AS DIRECTED  
  
 glimepiride 4 mg tablet Commonly known as:  AMARYL  
TAKE 1 TABLET BY MOUTH EVERY MORNING.  
  
 lansoprazole 30 mg capsule Commonly known as:  PREVACID TAKE 1 CAPSULE EVERY DAY  BEFORE  BREAKFAST loperamide 2 mg capsule Commonly known as:  IMODIUM  
TAKE 1 CAPSULE BEFORE BREAKFAST, LUNCH, DINNER AND AT BEDTIME FOR DIARRHEA  
  
 losartan 25 mg tablet Commonly known as:  COZAAR Take 1 Tab by mouth daily. nicotinic acid 500 mg tablet Commonly known as:  NIACIN Take 500 mg by mouth Daily (before breakfast). pneumococcal 13 estrella conj dip 0.5 mL Syrg injection Commonly known as:  PREVNAR 13 (PF)  
0.5 mL by IntraMUSCular route once for 1 dose. thyroid ([place]) 90 mg tablet Commonly known as:  [place] THYROID Take 1 Tab by mouth daily. Indications: hypothyroidism TRADJENTA 5 mg tablet Generic drug:  linagliptin TAKE 1 TAB BY MOUTH DAILY. INDICATIONS: TYPE 2 DIABETES MELLITUS  
  
 varicella-zoster recombinant (PF) 50 mcg/0.5 mL Susr injection Commonly known as:  SHINGRIX (PF)  
0.5mL by IntraMUSCular route once now and then repeat in 2-6 months VITAMIN D2 50,000 unit capsule Generic drug:  ergocalciferol TAKE 1 CAPSULE EVERY 7 DAYS Prescriptions Printed Refills  
 pneumococcal 13 estrella conj dip (PREVNAR 13, PF,) 0.5 mL syrg injection 0 Si.5 mL by IntraMUSCular route once for 1 dose. Class: Print Route: IntraMUSCular  
 varicella-zoster recombinant, PF, (SHINGRIX, PF,) 50 mcg/0.5 mL susr injection 1 Si.5mL by IntraMUSCular route once now and then repeat in 2-6 months Class: Print We Performed the Following CBC WITH AUTOMATED DIFF [89739 CPT(R)] HEMOGLOBIN A1C WITH EAG [63764 CPT(R)] LIPID PANEL [88735 CPT(R)] METABOLIC PANEL, COMPREHENSIVE [34278 CPT(R)] MICROALBUMIN, UR, RAND W/ MICROALB/CREAT RATIO [id number] CPT(R)] SC PROSTATE CA SCREENING; INDU [ [facility]] PSA SCREENING (SCREENING) [ [facility]] T4, FREE E3913216 CPT(R)] TSH 3RD GENERATION [75243 CPT(R)] VITAMIN D, 25 HYDROXY [id number] CPT(R)] Patient Instructions Medicare Wellness Visit, Male The best way to live healthy is to have a lifestyle where you eat a well-balanced diet, exercise regularly, limit alcohol use, and quit all forms of tobacco/nicotine, if applicable. Regular preventive services are another way to keep healthy. Preventive services (vaccines, screening tests, monitoring & exams) can help personalize your care plan, which helps you manage your own care. Screening tests can find health problems at the earliest stages, when they are easiest to treat. 50Ella Billings Bakari follows the current, evidence-based guidelines published by the [facility] ([facility]) when recommending preventive services for our patients. Because we follow these guidelines, sometimes recommendations change over time as research supports it. (For example, a prostate screening blood test is no longer routinely recommended for men with no symptoms.) Of course, you and your provider may decide to screen more often for some diseases, based on your risk and co-morbidities (chronic disease you are already diagnosed with). Preventive services for you include: - Medicare offers their members a free annual wellness visit, which is time for you and your primary care provider to discuss and plan for your preventive service needs. Take advantage of this benefit every year! 
 
-All people over age 72 should receive the recommended pneumonia vaccines. Current USPSTF guidelines recommend a series of two vaccines for the best pneumonia protection.  
 
-All adults should have a yearly flu vaccine and a tetanus vaccine every 10 years.  All adults age 61 years should receive a shingles vaccine once in their lifetime.   
 
-All adults age 38-68 years who are overweight should have a diabetes screening test once every three years.  
 
-Other screening tests & preventive services for persons with diabetes include: an eye exam to screen for diabetic retinopathy, a kidney function test, a foot exam, and stricter control over your cholesterol.  
 
-Cardiovascular screening for adults with routine risk involves an electrocardiogram (ECG) at intervals determined by the provider.  
 
-Colorectal cancer screenings should be done for adults age 54-65 years with normal risk. There are a number of acceptable methods of screening for this type of cancer. Each test has its own benefits and drawbacks. Discuss with your provider what is most appropriate for you during your annual wellness visit. The different tests include: colonoscopy (considered the best screening method), a fecal occult blood test, a fecal DNA test, and sigmoidoscopy. 
 
-All adults born between [facility] should be screened once for Hepatitis C. 
 
-An Abdominal Aortic Aneurysm (AAA) Screening is recommended for men age 73-68 who has ever smoked in their lifetime. Here is a list of your current Health Maintenance items (your personalized list of preventive services) with a due date: 
Health Maintenance Due Topic Date Due  Pneumococcal Vaccine (1 of 2 - PCV13) 03/08/1996  Albumin Urine Test  05/31/2018 [place] Annual Well Visit  06/01/2018  Cholesterol Test   07/18/2018  Hemoglobin A1C    07/24/2018  Flu Vaccine  08/01/2018 Medicare Wellness Visit, Male The best way to live healthy is to have a lifestyle where you eat a well-balanced diet, exercise regularly, limit alcohol use, and quit all forms of tobacco/nicotine, if applicable. Regular preventive services are another way to keep healthy. Preventive services (vaccines, screening tests, monitoring & exams) can help personalize your care plan, which helps you manage your own care. Screening tests can find health problems at the earliest stages, when they are easiest to treat.  
  
Cosme Villegas follows the current, evidence-based guidelines published by the [facility]aulette (USPSTF) when recommending preventive services for our patients. Because we follow these guidelines, sometimes recommendations change over time as research supports it. (For example, a prostate screening blood test is no longer routinely recommended for men with no symptoms.) Of course, you and your provider may decide to screen more often for some diseases, based on your risk and co-morbidities (chronic disease you are already diagnosed with). Preventive services for you include: - Medicare offers their members a free annual wellness visit, which is time for you and your primary care provider to discuss and plan for your preventive service needs. Take advantage of this benefit every year! 
 
-All people over age 72 should receive the recommended pneumonia vaccines. Current USPSTF guidelines recommend a series of two vaccines for the best pneumonia protection.  
 
-All adults should have a yearly flu vaccine and a tetanus vaccine every 10 years. All adults age 61 years should receive a shingles vaccine once in their lifetime.   
 
-All adults age 38-68 years who are overweight should have a diabetes screening test once every three years.  
 
-Other screening tests & preventive services for persons with diabetes include: an eye exam to screen for diabetic retinopathy, a kidney function test, a foot exam, and stricter control over your cholesterol.  
 
-Cardiovascular screening for adults with routine risk involves an electrocardiogram (ECG) at intervals determined by the provider.  
 
-Colorectal cancer screenings should be done for adults age 54-65 years with normal risk. There are a number of acceptable methods of screening for this type of cancer. Each test has its own benefits and drawbacks. Discuss with your provider what is most appropriate for you during your annual wellness visit.  The different tests include: colonoscopy (considered the best screening method), a fecal occult blood test, a fecal DNA test, and sigmoidoscopy. 
 
-All adults born between [facility] should be screened once for Hepatitis C. 
 
-An Abdominal Aortic Aneurysm (AAA) Screening is recommended for men age 73-68 who has ever smoked in their lifetime. Here is a list of your current Health Maintenance items (your personalized list of preventive services) with a due date: 
Health Maintenance Due Topic Date Due  Pneumococcal Vaccine (1 of 2 - PCV13) 03/08/1996  Albumin Urine Test  05/31/2018 [address] [place] [address] Annual Well Visit  06/01/2018  Cholesterol Test   07/18/2018  Hemoglobin A1C    07/24/2018  Flu Vaccine  08/01/2018 Introducing [facility]! [employer] introduces [employer] patient portal. Now you can access parts of your medical record, email your doctor's office, and request medication refills online. 1. In your internet browser, go to [email or web address] 2. Click on the First Time User? Click Here link in the Sign In box. You will see the New Member Sign Up page. 3. Enter your [employer] Access Code exactly as it appears below. You will not need to use this code after youve completed the sign-up process. If you do not sign up before the expiration date, you must request a new code. · [employer] Access Code: [email or web address] Expires: 11/11/2018  3:45 PM 
 
4. Enter the last four digits of your Social Security Number (xxxx) and Date of Birth (mm/dd/yyyy) as indicated and click Submit. You will be taken to the next sign-up page. 5. Create a [employer] ID. This will be your [employer] login ID and cannot be changed, so think of one that is secure and easy to remember. 6. Create a [employer] password. You can change your password at any time. 7. Enter your Password Reset Question and Answer. This can be used at a later time if you forget your password. 8. Enter your e-mail address. You will receive e-mail notification when new information is available in [address] 9. Click Sign Up. You can now view and download portions of your medical record. 10. Click the Download Summary menu link to download a portable copy of your medical information. If you have questions, please visit the Frequently Asked Questions section of the [employer] website. Remember, [employer] is NOT to be used for urgent needs. For medical emergencies, dial 911. Now available from your iPhone and Android! Please provide this summary of care documentation to your next provider. Your primary care clinician is listed as Neeta Hong. If you have any questions after today's visit, please call [phone].

## 2018-08-13 NOTE — PROGRESS NOTES
Identified pt with two pt identifiers(name and ). Chief Complaint   Patient presents with    Annual Wellness Visit        Health Maintenance Due   Topic    Pneumococcal 65+ High/Highest Risk (1 of 2 - PCV13)    MICROALBUMIN Q1     MEDICARE YEARLY EXAM     LIPID PANEL Q1     HEMOGLOBIN A1C Q6M     Influenza Age 5 to Adult        Wt Readings from Last 3 Encounters:   18 200 lb 9.6 oz (91 kg)   18 192 lb 6.4 oz (87.3 kg)   17 201 lb 12.8 oz (91.5 kg)     Temp Readings from Last 3 Encounters:   18 97.6 °F (36.4 °C) (Oral)   18 97.7 °F (36.5 °C) (Oral)   17 98.2 °F (36.8 °C) (Oral)     BP Readings from Last 3 Encounters:   18 118/72   18 146/60   17 136/66     Pulse Readings from Last 3 Encounters:   18 (!) 57   18 60   17 63         Learning Assessment:  :     Learning Assessment 3/19/2014   PRIMARY LEARNER Patient   HIGHEST LEVEL OF EDUCATION - PRIMARY LEARNER  GRADUATED HIGH SCHOOL OR GED   BARRIERS PRIMARY LEARNER NONE   CO-LEARNER CAREGIVER No   PRIMARY LANGUAGE ENGLISH   LEARNER PREFERENCE PRIMARY LISTENING     DEMONSTRATION   ANSWERED BY patient   RELATIONSHIP SELF       Depression Screening:  :     PHQ over the last two weeks 2018   Little interest or pleasure in doing things Not at all   Feeling down, depressed, irritable, or hopeless Not at all   Total Score PHQ 2 0       Fall Risk Assessment:  :     Fall Risk Assessment, last 12 mths 2018   Able to walk? Yes   Fall in past 12 months? No   Fall with injury? -   Number of falls in past 12 months -       Abuse Screening:  :     Abuse Screening Questionnaire 2018 3/28/2018 2017 8/15/2016 2015 3/19/2014   Do you ever feel afraid of your partner? N N N N N N   Are you in a relationship with someone who physically or mentally threatens you? N N N N N N   Is it safe for you to go home?  Y Y Y Y Y Y       Coordination of Care Questionnaire:  :     1) Have you been to an emergency room, urgent care clinic since your last visit? no   Hospitalized since your last visit? no             2) Have you seen or consulted any other health care providers outside of [address] [place] [address] since your last visit? no  (Include any pap smears or colon screenings in this section.)    3) Do you have an Advance Directive on file? yes  Are you interested in receiving information about Advance Directives? no    Patient is accompanied by daughter I have received verbal consent from Lore Mcdaniel. to discuss any/all medical information while they are present in the room. Reviewed record in preparation for visit and have obtained necessary documentation. Medication reconciliation up to date and corrected with patient at this time.

## 2018-08-13 NOTE — PROGRESS NOTES
This is the Subsequent Medicare Annual Wellness Exam, performed 12 months or more after the Initial AWV or the last Subsequent AWV    I have reviewed the patient's medical history in detail and updated the computerized patient record. History     Past Medical History:   Diagnosis Date    WILTON (acute kidney injury) ([facility] [address] 8/2/2014    Cancer ([facility] [address]     colon    Diabetes ([facility] [address] 2008    type 2    Dyslipidemia     Esophageal reflux     Esophageal reflux 2/9/2012    Hypertension 2008    Mixed hyperlipidemia 2/9/2012    Other ill-defined conditions(799.89)     broken neck 2012    Pernicious anemia 7/31/2012    Psychiatric disorder     dementia     Unspecified hypothyroidism 4/26/2012      Past Surgical History:   Procedure Laterality Date    COLONOSCOPY  5/29/12    Rectosigmoid adenocarcinoma    HX CATARACT REMOVAL      HX HERNIA REPAIR      Bilateral inguinal    HX OTHER SURGICAL      colon resection    HX TONSIL AND ADENOIDECTOMY      HX TONSILLECTOMY  1936     Current Outpatient Prescriptions   Medication Sig Dispense Refill    pneumococcal 13 estrella conj dip (PREVNAR 13, PF,) 0.5 mL syrg injection 0.5 mL by IntraMUSCular route once for 1 dose. 0.5 mL 0    varicella-zoster recombinant, PF, (SHINGRIX, PF,) 50 mcg/0.5 mL susr injection 0.5mL by IntraMUSCular route once now and then repeat in 2-6 months 0.5 mL 1    glimepiride (AMARYL) 4 mg tablet TAKE 1 TABLET BY MOUTH EVERY MORNING. 90 Tab 1    TRADJENTA 5 mg tablet TAKE 1 TAB BY MOUTH DAILY. INDICATIONS: TYPE 2 DIABETES MELLITUS 90 Tab 1    thyroid, Pork, (ARMOUR THYROID) 90 mg tablet Take 1 Tab by mouth daily. Indications: hypothyroidism 90 Tab 1    lansoprazole (PREVACID) 30 mg capsule TAKE 1 CAPSULE EVERY DAY  BEFORE  BREAKFAST 90 Cap 1    amLODIPine (NORVASC) 10 mg tablet TAKE 1 TABLET EVERY DAY FOR HYPERTENSION  (INCREASE  IN  DOSE  TO  10MG) 90 Tab 1    losartan (COZAAR) 25 mg tablet Take 1 Tab by mouth daily.  90 Tab 1    donepezil (ARICEPT) 10 mg tablet TAKE 1 TABLET EVERY NIGHT 90 Tab 1    loperamide (IMODIUM) 2 mg capsule TAKE 1 CAPSULE BEFORE BREAKFAST, LUNCH, DINNER AND AT BEDTIME FOR DIARRHEA (Patient taking differently: TAKE 1 CAPSULE BEFORE BREAKFAST, LUNCH, DINNER AND AT BEDTIME FOR DIARRHEA  - taking twice a day) 360 Cap 0    VITAMIN D2 50,000 unit capsule TAKE 1 CAPSULE EVERY 7 DAYS 13 Cap 3    furosemide (LASIX) 20 mg tablet TAKE 1 TABLET EVERY DAY AS DIRECTED 90 Tab 3    cetirizine (ZYRTEC) 10 mg tablet TAKE 1 TABLET EVERY DAY AS NEEDED FOR ALLERGIES 90 Tab 3    aspirin 81 mg chewable tablet Take 1 Tab by mouth daily. 14 Tab 0    ferrous sulfate (IRON) 325 mg (65 mg iron) tablet Take 1 Tab by mouth Daily (before breakfast). 30 Tab 5    multivitamins-minerals-lutein (CENTRUM SILVER) tab tablet Take 1 Tab by mouth daily.  nicotinic acid (NIACIN) 500 mg tablet Take 500 mg by mouth Daily (before breakfast).          Allergies   Allergen Reactions    Synthroid [Levothyroxine] Other (comments)     Confused, hallucinations     Family History   Problem Relation Age of Onset    Heart Disease Mother     Hypertension Mother     Heart Disease Father     Hypertension Father      Social History   Substance Use Topics    Smoking status: Former Smoker     Packs/day: 1.50     Years: 30.00     Quit date: 7/30/1982    Smokeless tobacco: Never Used    Alcohol use No     Patient Active Problem List   Diagnosis Code    Diabetes mellitus type 2, controlled ([facility] [address] E11.9    Essential hypertension, benign I10    Mixed hyperlipidemia E78.2    Allergic rhinitis due to other allergen J30.89    Esophageal reflux K21.9    Microalbuminuria R80.9    Acquired hypothyroidism E03.9    Pernicious anemia D51.0    Colon cancer (HCC) C18.9    Unspecified vitamin D deficiency E55.9    CKD (chronic kidney disease) N18.9    Dementia without behavioral disturbance F03.90    Type 2 diabetes with nephropathy (HCC) E11.21       Depression Risk Factor Screening:     PHQ over the last two weeks 8/13/2018   Little interest or pleasure in doing things Not at all   Feeling down, depressed, irritable, or hopeless Not at all   Total Score PHQ 2 0     Alcohol Risk Factor Screening: You do not drink alcohol or very rarely. Functional Ability and Level of Safety:   Hearing Loss  Hearing is good. Activities of Daily Living  The home contains: no safety equipment. Patient does total self care    Fall Risk  Fall Risk Assessment, last 12 mths 8/13/2018   Able to walk? Yes   Fall in past 12 months? No   Fall with injury? -   Number of falls in past 12 months -       Abuse Screen  Patient is not abused    Cognitive Screening   Evaluation of Cognitive Function:  Has your family/caregiver stated any concerns about your memory: yes  Stable with Aricept    Patient Care Team   Patient Care Team:  Chacha Macdonald MD as PCP - General (Family Practice)    Assessment/Plan   Education and counseling provided:  Are appropriate based on today's review and evaluation  End-of-Life planning (with patient's consent)  Pneumococcal Vaccine  Influenza Vaccine  Prostate cancer screening tests (PSA, covered annually)  Cardiovascular screening blood test  Screening for glaucoma  Diabetes screening test    Diagnoses and all orders for this visit:    1. Medicare annual wellness visit, subsequent    2. Encounter for immunization  -     pneumococcal 13 estrella conj dip (PREVNAR 13, PF,) 0.5 mL syrg injection; 0.5 mL by IntraMUSCular route once for 1 dose.  -     varicella-zoster recombinant, PF, (SHINGRIX, PF,) 50 mcg/0.5 mL susr injection; 0.5mL by IntraMUSCular route once now and then repeat in 2-6 months    3. Special screening for malignant neoplasm of prostate  -     Digital Rectal Exam ()  -     PSA Screening ([id number])    4. Type 2 diabetes with nephropathy (HCC)  -     HEMOGLOBIN A1C WITH EAG  -     MICROALBUMIN, UR, RAND W/ MICROALB/CREAT RATIO    5.  Stage 3 chronic kidney disease    6. Acquired hypothyroidism  -     TSH 3RD GENERATION  -     T4, FREE    7. Pernicious anemia    8. Essential hypertension, benign    9. Mixed hyperlipidemia  -     LIPID PANEL    10. Encounter for long-term current use of medication  -     METABOLIC PANEL, COMPREHENSIVE  -     CBC WITH AUTOMATED DIFF    11. Vitamin D deficiency  -     VITAMIN D, 25 HYDROXY        Health Maintenance Due   Topic Date Due    Pneumococcal 65+ High/Highest Risk (1 of 2 - PCV13) 03/08/1996    MICROALBUMIN Q1  05/31/2018    LIPID PANEL Q1  07/18/2018    HEMOGLOBIN A1C Q6M  07/24/2018    Influenza Age 9 to Adult  08/01/2018     He will return for fasting labs and urine test for micro albumin.

## 2018-08-13 NOTE — PATIENT INSTRUCTIONS
Medicare Wellness Visit, Male    The best way to live healthy is to have a lifestyle where you eat a well-balanced diet, exercise regularly, limit alcohol use, and quit all forms of tobacco/nicotine, if applicable. Regular preventive services are another way to keep healthy. Preventive services (vaccines, screening tests, monitoring & exams) can help personalize your care plan, which helps you manage your own care. Screening tests can find health problems at the earliest stages, when they are easiest to treat. [facility] follows the current, evidence-based guidelines published by the [facility] ([facility]) when recommending preventive services for our patients. Because we follow these guidelines, sometimes recommendations change over time as research supports it. (For example, a prostate screening blood test is no longer routinely recommended for men with no symptoms.)    Of course, you and your provider may decide to screen more often for some diseases, based on your risk and co-morbidities (chronic disease you are already diagnosed with). Preventive services for you include:    - Medicare offers their members a free annual wellness visit, which is time for you and your primary care provider to discuss and plan for your preventive service needs. Take advantage of this benefit every year!    -All people over age 72 should receive the recommended pneumonia vaccines. Current USPSTF guidelines recommend a series of two vaccines for the best pneumonia protection.     -All adults should have a yearly flu vaccine and a tetanus vaccine every 10 years.  All adults age 61 years should receive a shingles vaccine once in their lifetime.      -All adults age 38-68 years who are overweight should have a diabetes screening test once every three years.     -Other screening tests & preventive services for persons with diabetes include: an eye exam to screen for diabetic retinopathy, a kidney function test, a foot exam, and stricter control over your cholesterol.     -Cardiovascular screening for adults with routine risk involves an electrocardiogram (ECG) at intervals determined by the provider.     -Colorectal cancer screenings should be done for adults age 54-65 years with normal risk. There are a number of acceptable methods of screening for this type of cancer. Each test has its own benefits and drawbacks. Discuss with your provider what is most appropriate for you during your annual wellness visit. The different tests include: colonoscopy (considered the best screening method), a fecal occult blood test, a fecal DNA test, and sigmoidoscopy.    -All adults born between [facility] should be screened once for Hepatitis C.    -An Abdominal Aortic Aneurysm (AAA) Screening is recommended for men age 73-68 who has ever smoked in their lifetime. Here is a list of your current Health Maintenance items (your personalized list of preventive services) with a due date:  Health Maintenance Due   Topic Date Due    Pneumococcal Vaccine (1 of 2 - PCV13) 03/08/1996    Albumin Urine Test  05/31/2018    Annual Well Visit  06/01/2018    Cholesterol Test   07/18/2018    Hemoglobin A1C    07/24/2018    Flu Vaccine  08/01/2018       Medicare Wellness Visit, Male    The best way to live healthy is to have a lifestyle where you eat a well-balanced diet, exercise regularly, limit alcohol use, and quit all forms of tobacco/nicotine, if applicable. Regular preventive services are another way to keep healthy. Preventive services (vaccines, screening tests, monitoring & exams) can help personalize your care plan, which helps you manage your own care. Screening tests can find health problems at the earliest stages, when they are easiest to treat.      [facility] follows the current, evidence-based guidelines published by the [facility] [place] (USPSTF) when recommending preventive services for our patients. Because we follow these guidelines, sometimes recommendations change over time as research supports it. (For example, a prostate screening blood test is no longer routinely recommended for men with no symptoms.)    Of course, you and your provider may decide to screen more often for some diseases, based on your risk and co-morbidities (chronic disease you are already diagnosed with). Preventive services for you include:    - Medicare offers their members a free annual wellness visit, which is time for you and your primary care provider to discuss and plan for your preventive service needs. Take advantage of this benefit every year!    -All people over age 72 should receive the recommended pneumonia vaccines. Current USPSTF guidelines recommend a series of two vaccines for the best pneumonia protection.     -All adults should have a yearly flu vaccine and a tetanus vaccine every 10 years. All adults age 61 years should receive a shingles vaccine once in their lifetime.      -All adults age 38-68 years who are overweight should have a diabetes screening test once every three years.     -Other screening tests & preventive services for persons with diabetes include: an eye exam to screen for diabetic retinopathy, a kidney function test, a foot exam, and stricter control over your cholesterol.     -Cardiovascular screening for adults with routine risk involves an electrocardiogram (ECG) at intervals determined by the provider.     -Colorectal cancer screenings should be done for adults age 54-65 years with normal risk. There are a number of acceptable methods of screening for this type of cancer. Each test has its own benefits and drawbacks. Discuss with your provider what is most appropriate for you during your annual wellness visit.  The different tests include: colonoscopy (considered the best screening method), a fecal occult blood test, a fecal DNA test, and sigmoidoscopy.    -All adults born between 1945 and 1965 should be screened once for Hepatitis C.    -An Abdominal Aortic Aneurysm (AAA) Screening is recommended for men age 73-68 who has ever smoked in their lifetime.      Here is a list of your current Health Maintenance items (your personalized list of preventive services) with a due date:  Health Maintenance Due   Topic Date Due    Pneumococcal Vaccine (1 of 2 - PCV13) 03/08/1996    Albumin Urine Test  05/31/2018    Annual Well Visit  06/01/2018    Cholesterol Test   07/18/2018    Hemoglobin A1C    07/24/2018    Flu Vaccine  08/01/2018

## 2018-08-14 NOTE — ACP (ADVANCE CARE PLANNING)
Advance Care Planning (ACP) Provider Conversation Snapshot    Date of ACP Conversation: 08/13/18  Persons included in Conversation:  patient and family  Length of ACP Conversation in minutes:  <16 minutes (Non-Billable)    Authorized Decision Maker (if patient is incapable of making informed decisions):    This person is:   Healthcare Agent/Medical Power of  under Advance Directive          For Patients with Decision Making Capacity:   Values/Goals: Exploration of values, goals, and preferences if recovery is not expected, even with continued medical treatment in the event of:  Imminent death  Severe, permanent brain injury    Conversation Outcomes / Follow-Up Plan:   Reviewed existing Advance Directive

## 2018-10-01 DIAGNOSIS — E11.9 CONTROLLED TYPE 2 DIABETES MELLITUS WITHOUT COMPLICATION, WITHOUT LONG-TERM CURRENT USE OF INSULIN (HCC): ICD-10-CM

## 2018-10-02 RX ORDER — GLIMEPIRIDE 4 MG/1
TABLET ORAL
Qty: 90 TAB | Refills: 1 | Status: SHIPPED | OUTPATIENT
Start: 2018-10-02 | End: 2019-01-01 | Stop reason: SDUPTHER

## 2018-10-04 ENCOUNTER — TELEPHONE (OUTPATIENT)
Dept: FAMILY MEDICINE CLINIC | Age: 83
End: 2018-10-04

## 2018-10-04 DIAGNOSIS — I10 ESSENTIAL HYPERTENSION, BENIGN: ICD-10-CM

## 2018-10-04 DIAGNOSIS — K21.9 GASTROESOPHAGEAL REFLUX DISEASE, ESOPHAGITIS PRESENCE NOT SPECIFIED: ICD-10-CM

## 2018-10-04 DIAGNOSIS — E03.9 ACQUIRED HYPOTHYROIDISM: Primary | ICD-10-CM

## 2018-10-04 LAB
25(OH)D3+25(OH)D2 SERPL-MCNC: 42.1 NG/ML (ref 30–100)
ALBUMIN SERPL-MCNC: 4 G/DL (ref 3.5–4.7)
ALBUMIN/GLOB SERPL: 1.5 {RATIO} (ref 1.2–2.2)
ALP SERPL-CCNC: 91 IU/L (ref 39–117)
ALT SERPL-CCNC: 9 IU/L (ref 0–44)
AST SERPL-CCNC: 16 IU/L (ref 0–40)
BASOPHILS # BLD AUTO: 0 X10E3/UL (ref 0–0.2)
BASOPHILS NFR BLD AUTO: 0 %
BILIRUB SERPL-MCNC: 0.7 MG/DL (ref 0–1.2)
BUN SERPL-MCNC: 26 MG/DL (ref 8–27)
BUN/CREAT SERPL: 13 (ref 10–24)
CALCIUM SERPL-MCNC: 9.2 MG/DL (ref 8.6–10.2)
CHLORIDE SERPL-SCNC: 104 MMOL/L (ref 96–106)
CHOLEST SERPL-MCNC: 182 MG/DL (ref 100–199)
CO2 SERPL-SCNC: 22 MMOL/L (ref 20–29)
CREAT SERPL-MCNC: 1.97 MG/DL (ref 0.76–1.27)
EOSINOPHIL # BLD AUTO: 0.1 X10E3/UL (ref 0–0.4)
EOSINOPHIL NFR BLD AUTO: 1 %
ERYTHROCYTE [DISTWIDTH] IN BLOOD BY AUTOMATED COUNT: 13.5 % (ref 12.3–15.4)
EST. AVERAGE GLUCOSE BLD GHB EST-MCNC: 166 MG/DL
GLOBULIN SER CALC-MCNC: 2.7 G/DL (ref 1.5–4.5)
GLUCOSE SERPL-MCNC: 192 MG/DL (ref 65–99)
HBA1C MFR BLD: 7.4 % (ref 4.8–5.6)
HCT VFR BLD AUTO: 42.8 % (ref 37.5–51)
HDLC SERPL-MCNC: 28 MG/DL
HGB BLD-MCNC: 13.9 G/DL (ref 13–17.7)
IMM GRANULOCYTES # BLD: 0 X10E3/UL (ref 0–0.1)
IMM GRANULOCYTES NFR BLD: 0 %
INTERPRETATION, 910389: NORMAL
INTERPRETATION: NORMAL
LDLC SERPL CALC-MCNC: 88 MG/DL (ref 0–99)
LYMPHOCYTES # BLD AUTO: 1.6 X10E3/UL (ref 0.7–3.1)
LYMPHOCYTES NFR BLD AUTO: 16 %
Lab: NORMAL
MCH RBC QN AUTO: 32.7 PG (ref 26.6–33)
MCHC RBC AUTO-ENTMCNC: 32.5 G/DL (ref 31.5–35.7)
MCV RBC AUTO: 101 FL (ref 79–97)
MONOCYTES # BLD AUTO: 0.7 X10E3/UL (ref 0.1–0.9)
MONOCYTES NFR BLD AUTO: 7 %
NEUTROPHILS # BLD AUTO: 7.6 X10E3/UL (ref 1.4–7)
NEUTROPHILS NFR BLD AUTO: 76 %
PDF IMAGE, 910387: NORMAL
PLATELET # BLD AUTO: 219 X10E3/UL (ref 150–379)
POTASSIUM SERPL-SCNC: 4.9 MMOL/L (ref 3.5–5.2)
PROT SERPL-MCNC: 6.7 G/DL (ref 6–8.5)
PSA SERPL-MCNC: 0.8 NG/ML (ref 0–4)
RBC # BLD AUTO: 4.25 X10E6/UL (ref 4.14–5.8)
SODIUM SERPL-SCNC: 143 MMOL/L (ref 134–144)
T4 FREE SERPL-MCNC: 0.77 NG/DL (ref 0.82–1.77)
TRIGL SERPL-MCNC: 332 MG/DL (ref 0–149)
TSH SERPL DL<=0.005 MIU/L-ACNC: 6.21 UIU/ML (ref 0.45–4.5)
VLDLC SERPL CALC-MCNC: 66 MG/DL (ref 5–40)
WBC # BLD AUTO: 10.1 X10E3/UL (ref 3.4–10.8)

## 2018-10-04 RX ORDER — LEVOTHYROXINE AND LIOTHYRONINE 76; 18 UG/1; UG/1
120 TABLET ORAL DAILY
Qty: 90 TAB | Refills: 1 | Status: SHIPPED | OUTPATIENT
Start: 2018-10-04 | End: 2019-01-18

## 2018-10-04 NOTE — PROGRESS NOTES
Low thyroid level. Is he taking medicine properly? Need to increase dose of Lena. Elevated sugar but improved diabetes control (A1C). Worsening kidney function. I recommend seeing Nephrology. Is he willing to go? Make sure he is drinking plenty of water. Normal prostate test and Vit D level.

## 2018-10-04 NOTE — TELEPHONE ENCOUNTER
Call his daughter about lab results:  Low thyroid level. Is he taking medicine properly? Need to increase dose of Larslan. Elevated sugar but improved diabetes control (A1C). Worsening kidney function. I recommend seeing Nephrology. Is he willing to go? Make sure he is drinking plenty of water. Normal prostate test and Vit D level.

## 2018-10-05 NOTE — TELEPHONE ENCOUNTER
LVM - I wanted to talk to you about Mr Alma Leone lab results. Dr Srinivasan Males sent a increased dose of armour to pharmacy. Please call back or I will try again.

## 2018-10-08 NOTE — TELEPHONE ENCOUNTER
She said [facility] on recall. I told her I would check on it. She said she calls her dad and reminds him to take his meds. She said he drinks lots of water. I called a local [facility] and they do not have recall. [address] said last year there was a recall. All is ok now. He does need refills. I called daughter and let her know.

## 2018-10-09 RX ORDER — LANSOPRAZOLE 30 MG/1
CAPSULE, DELAYED RELEASE ORAL
Qty: 90 CAP | Refills: 1 | Status: SHIPPED | OUTPATIENT
Start: 2018-10-09 | End: 2019-01-01 | Stop reason: SDUPTHER

## 2018-10-09 RX ORDER — LOSARTAN POTASSIUM 25 MG/1
25 TABLET ORAL DAILY
Qty: 90 TAB | Refills: 1 | Status: SHIPPED | OUTPATIENT
Start: 2018-10-09 | End: 2019-01-01 | Stop reason: SDUPTHER

## 2018-10-09 RX ORDER — AMLODIPINE BESYLATE 10 MG/1
TABLET ORAL
Qty: 90 TAB | Refills: 1 | Status: SHIPPED | OUTPATIENT
Start: 2018-10-09 | End: 2019-01-01 | Stop reason: SDUPTHER

## 2018-10-09 RX ORDER — DONEPEZIL HYDROCHLORIDE 10 MG/1
TABLET, FILM COATED ORAL
Qty: 90 TAB | Refills: 1 | Status: SHIPPED | OUTPATIENT
Start: 2018-10-09 | End: 2019-01-01 | Stop reason: SDUPTHER

## 2018-10-10 ENCOUNTER — APPOINTMENT (OUTPATIENT)
Dept: CT IMAGING | Age: 83
End: 2018-10-10
Attending: EMERGENCY MEDICINE
Payer: MEDICARE

## 2018-10-10 ENCOUNTER — HOSPITAL ENCOUNTER (EMERGENCY)
Age: 83
Discharge: HOME | End: 2018-10-10
Attending: EMERGENCY MEDICINE | Admitting: EMERGENCY MEDICINE
Payer: MEDICARE

## 2018-10-10 ENCOUNTER — APPOINTMENT (OUTPATIENT)
Dept: GENERAL RADIOLOGY | Age: 83
End: 2018-10-10
Attending: EMERGENCY MEDICINE
Payer: MEDICARE

## 2018-10-10 VITALS
SYSTOLIC BLOOD PRESSURE: 206 MMHG | BODY MASS INDEX: 27.09 KG/M2 | DIASTOLIC BLOOD PRESSURE: 66 MMHG | HEIGHT: 72 IN | RESPIRATION RATE: 20 BRPM | WEIGHT: 200 LBS | HEART RATE: 60 BPM | OXYGEN SATURATION: 97 % | TEMPERATURE: 97.9 F

## 2018-10-10 DIAGNOSIS — F03.90 DEMENTIA WITHOUT BEHAVIORAL DISTURBANCE, UNSPECIFIED DEMENTIA TYPE: ICD-10-CM

## 2018-10-10 DIAGNOSIS — N30.00 ACUTE CYSTITIS WITHOUT HEMATURIA: Primary | ICD-10-CM

## 2018-10-10 LAB
ALBUMIN SERPL-MCNC: 3.2 G/DL (ref 3.5–5)
ALBUMIN/GLOB SERPL: 0.8 {RATIO} (ref 1.1–2.2)
ALP SERPL-CCNC: 92 U/L (ref 45–117)
ALT SERPL-CCNC: 18 U/L (ref 12–78)
ANION GAP SERPL CALC-SCNC: 7 MMOL/L (ref 5–15)
APPEARANCE UR: CLEAR
AST SERPL-CCNC: 13 U/L (ref 15–37)
BACTERIA URNS QL MICRO: NEGATIVE /HPF
BASOPHILS # BLD: 0.1 K/UL (ref 0–0.1)
BASOPHILS NFR BLD: 1 % (ref 0–1)
BILIRUB SERPL-MCNC: 0.8 MG/DL (ref 0.2–1)
BILIRUB UR QL: NEGATIVE
BUN SERPL-MCNC: 29 MG/DL (ref 6–20)
BUN/CREAT SERPL: 12 (ref 12–20)
CALCIUM SERPL-MCNC: 8.5 MG/DL (ref 8.5–10.1)
CHLORIDE SERPL-SCNC: 104 MMOL/L (ref 97–108)
CO2 SERPL-SCNC: 28 MMOL/L (ref 21–32)
COLOR UR: ABNORMAL
COMMENT, HOLDF: NORMAL
CREAT SERPL-MCNC: 2.45 MG/DL (ref 0.7–1.3)
DIFFERENTIAL METHOD BLD: NORMAL
EOSINOPHIL # BLD: 0.2 K/UL (ref 0–0.4)
EOSINOPHIL NFR BLD: 2 % (ref 0–7)
EPITH CASTS URNS QL MICRO: ABNORMAL /LPF
ERYTHROCYTE [DISTWIDTH] IN BLOOD BY AUTOMATED COUNT: 12 % (ref 11.5–14.5)
GLOBULIN SER CALC-MCNC: 3.8 G/DL (ref 2–4)
GLUCOSE BLD STRIP.AUTO-MCNC: 213 MG/DL (ref 65–100)
GLUCOSE SERPL-MCNC: 214 MG/DL (ref 65–100)
GLUCOSE UR STRIP.AUTO-MCNC: 100 MG/DL
HCT VFR BLD AUTO: 41 % (ref 36.6–50.3)
HGB BLD-MCNC: 13.6 G/DL (ref 12.1–17)
HGB UR QL STRIP: NEGATIVE
HYALINE CASTS URNS QL MICRO: ABNORMAL /LPF (ref 0–5)
IMM GRANULOCYTES # BLD: 0 K/UL (ref 0–0.04)
IMM GRANULOCYTES NFR BLD AUTO: 0 % (ref 0–0.5)
KETONES UR QL STRIP.AUTO: NEGATIVE MG/DL
LEUKOCYTE ESTERASE UR QL STRIP.AUTO: ABNORMAL
LYMPHOCYTES # BLD: 2.3 K/UL (ref 0.8–3.5)
LYMPHOCYTES NFR BLD: 26 % (ref 12–49)
MCH RBC QN AUTO: 32.7 PG (ref 26–34)
MCHC RBC AUTO-ENTMCNC: 33.2 G/DL (ref 30–36.5)
MCV RBC AUTO: 98.6 FL (ref 80–99)
MONOCYTES # BLD: 0.9 K/UL (ref 0–1)
MONOCYTES NFR BLD: 10 % (ref 5–13)
NEUTS SEG # BLD: 5.3 K/UL (ref 1.8–8)
NEUTS SEG NFR BLD: 61 % (ref 32–75)
NITRITE UR QL STRIP.AUTO: NEGATIVE
NRBC # BLD: 0 K/UL (ref 0–0.01)
NRBC BLD-RTO: 0 PER 100 WBC
PH UR STRIP: 5.5 [PH] (ref 5–8)
PLATELET # BLD AUTO: 208 K/UL (ref 150–400)
PMV BLD AUTO: 9.6 FL (ref 8.9–12.9)
POTASSIUM SERPL-SCNC: 4.5 MMOL/L (ref 3.5–5.1)
PROT SERPL-MCNC: 7 G/DL (ref 6.4–8.2)
PROT UR STRIP-MCNC: 300 MG/DL
RBC # BLD AUTO: 4.16 M/UL (ref 4.1–5.7)
RBC #/AREA URNS HPF: ABNORMAL /HPF (ref 0–5)
SAMPLES BEING HELD,HOLD: NORMAL
SERVICE CMNT-IMP: ABNORMAL
SODIUM SERPL-SCNC: 139 MMOL/L (ref 136–145)
SP GR UR REFRACTOMETRY: 1.02 (ref 1–1.03)
TROPONIN I SERPL-MCNC: <0.05 NG/ML
UR CULT HOLD, URHOLD: NORMAL
UROBILINOGEN UR QL STRIP.AUTO: 0.2 EU/DL (ref 0.2–1)
WBC # BLD AUTO: 8.8 K/UL (ref 4.1–11.1)
WBC URNS QL MICRO: ABNORMAL /HPF (ref 0–4)

## 2018-10-10 PROCEDURE — 99284 EMERGENCY DEPT VISIT MOD MDM: CPT

## 2018-10-10 PROCEDURE — 74011000258 HC RX REV CODE- 258: Performed by: EMERGENCY MEDICINE

## 2018-10-10 PROCEDURE — 82962 GLUCOSE BLOOD TEST: CPT

## 2018-10-10 PROCEDURE — 84484 ASSAY OF TROPONIN QUANT: CPT | Performed by: EMERGENCY MEDICINE

## 2018-10-10 PROCEDURE — 96361 HYDRATE IV INFUSION ADD-ON: CPT

## 2018-10-10 PROCEDURE — 85025 COMPLETE CBC W/AUTO DIFF WBC: CPT | Performed by: EMERGENCY MEDICINE

## 2018-10-10 PROCEDURE — 74011250636 HC RX REV CODE- 250/636: Performed by: EMERGENCY MEDICINE

## 2018-10-10 PROCEDURE — 80053 COMPREHEN METABOLIC PANEL: CPT | Performed by: EMERGENCY MEDICINE

## 2018-10-10 PROCEDURE — 70450 CT HEAD/BRAIN W/O DYE: CPT

## 2018-10-10 PROCEDURE — 96365 THER/PROPH/DIAG IV INF INIT: CPT

## 2018-10-10 PROCEDURE — 87086 URINE CULTURE/COLONY COUNT: CPT | Performed by: EMERGENCY MEDICINE

## 2018-10-10 PROCEDURE — 93005 ELECTROCARDIOGRAM TRACING: CPT

## 2018-10-10 PROCEDURE — 81001 URINALYSIS AUTO W/SCOPE: CPT | Performed by: EMERGENCY MEDICINE

## 2018-10-10 PROCEDURE — 71046 X-RAY EXAM CHEST 2 VIEWS: CPT

## 2018-10-10 PROCEDURE — 36415 COLL VENOUS BLD VENIPUNCTURE: CPT | Performed by: EMERGENCY MEDICINE

## 2018-10-10 RX ORDER — CEFUROXIME AXETIL 500 MG/1
500 TABLET ORAL 2 TIMES DAILY
Qty: 14 TAB | Refills: 0 | Status: SHIPPED | OUTPATIENT
Start: 2018-10-10 | End: 2018-10-11

## 2018-10-10 RX ADMIN — SODIUM CHLORIDE 500 ML: 900 INJECTION, SOLUTION INTRAVENOUS at 18:24

## 2018-10-10 RX ADMIN — CEFTRIAXONE SODIUM 1 G: 1 INJECTION, POWDER, FOR SOLUTION INTRAMUSCULAR; INTRAVENOUS at 20:32

## 2018-10-10 NOTE — ED TRIAGE NOTES
\"The last couple of days he forgets and his cognitive skills are down. He gets this way when he has some kind of infection. \"

## 2018-10-10 NOTE — ED PROVIDER NOTES
HPI Comments: 6:10 PM 
I have evaluated the patient as the Provider in Triage. I have reviewed His vital signs and the triage nurse assessment. I have talked with the patient and any available family and advised that I am the provider in triage and have ordered the appropriate study to initiate their work up based on the clinical presentation during my assessment. I have advised that the patient will be accommodated in the Main ED as soon as possible. I have also requested to contact the triage nurse or myself immediately if the patient experiences any changes in their condition during this brief waiting period. More forgetful the last few days. Happens when he gets an infection. Hx uti. Some cough. Was found on the floor yesterday. Unsure if he fell. Was out of his aricept for a few days Irish Subramanian MD 
 
80 y.o. male with past medical history significant for esophageal reflux, dyslipidemia, diabetes, dementia, hypertension, colon cancer, acute kidney injury, and anemia who presents ambulatory from home accompanied by a family member with chief complaint of confusion. The family member reports that the patient has been confused for the last 1-2 days and \"is not recognizing and remembering things. \" Patient thought he was at someone else's house yesterday and was unable to recognize his cat. He had a history of similar symptoms with a UTI. Patient also had an unwitnessed fall yesterday and was found lying on the kitchen floor. Of note, patient did not take Aricept for 4 days but started taking it yesterday. Pertinent negatives include vomiting, rhinorrhea, cough, and appetite loss. There are no other acute medical concerns at this time. Social hx: Previous tobacco use (quit date: 7/30/82 - smoked 1.5 packs/day for 30 years); denies alcohol use; denies illicit drug use PCP: Ira Tinajero MD 
 
Note written by Lazara Wolfe, as dictated by Grant Goodson MD 6:18 PM 
 
 
 
 The history is provided by a relative and the patient. No  was used. Past Medical History:  
Diagnosis Date  WILTON (acute kidney injury) ([facility] [address] 8/2/2014  Cancer [facility])   
 colon  Diabetes ([facility] [address] 2008  
 type 2  Dyslipidemia  Esophageal reflux  Esophageal reflux 2/9/2012  Hypertension 2008  Mixed hyperlipidemia 2/9/2012  Other ill-defined conditions(799.89) broken neck 2012  Pernicious anemia 7/31/2012  Psychiatric disorder   
 dementia  Unspecified hypothyroidism 4/26/2012 Past Surgical History:  
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    
 HX HERNIA REPAIR Bilateral inguinal  
 HX OTHER SURGICAL    
 colon resection  HX TONSIL AND ADENOIDECTOMY  HX TONSILLECTOMY  1936 Family History:  
Problem Relation Age of Onset  Heart Disease Mother  Hypertension Mother  Heart Disease Father  Hypertension Father Social History Social History  Marital status:  Spouse name: N/A  
 Number of children: N/A  
 Years of education: N/A Occupational History  Not on file. Social History Main Topics  Smoking status: Former Smoker Packs/day: 1.50 Years: 30.00 Quit date: 7/30/1982  Smokeless tobacco: Never Used  Alcohol use No  
 Drug use: No  
 Sexual activity: Not Currently Partners: Female Other Topics Concern  Not on file Social History Narrative ALLERGIES: Synthroid [levothyroxine] Review of Systems Constitutional: Negative for appetite change, chills and fever. HENT: Negative for rhinorrhea and sore throat. Respiratory: Negative for cough and shortness of breath. Cardiovascular: Negative for chest pain. Gastrointestinal: Negative for abdominal pain, diarrhea, nausea and vomiting. Genitourinary: Negative for dysuria and hematuria. Musculoskeletal: Negative for arthralgias and myalgias. Skin: Negative for pallor and rash. Neurological: Negative for dizziness, weakness and light-headedness. Psychiatric/Behavioral: Positive for confusion. There were no vitals filed for this visit. Physical Exam  
Vital signs reviewed. Nursing notes reviewed. Const:  No acute distress, well developed, well nourished Head:  Atraumatic, normocephalic Eyes:  PERRL, conjunctiva normal, no scleral icterus Neck:  Supple, trachea midline Cardiovascular:  RRR, no murmurs, no gallops, no rubs Resp:  No resp distress, no increased work of breathing, no wheezes, no rhonchi, no rales, Abd:  Soft, non-tender, non-distended, no rebound, no guarding, no CVA tenderness :  Deferred MSK:  No pedal edema, normal ROM Neuro:  Alert and oriented to place, no cranial nerve defect Skin:  Warm, dry, intact Psych: normal mood and affect, behavior is normal, judgement and thought content is normal 
 
Note written by Lazara Watson, as dictated by Zenon Hinds MD 6:18 PM 
 
 
MDM Number of Diagnoses or Management Options Acute cystitis without hematuria:  
Dementia without behavioral disturbance, unspecified dementia type:  
  
Amount and/or Complexity of Data Reviewed Clinical lab tests: ordered and reviewed Tests in the radiology section of CPT®: ordered and reviewed Review and summarize past medical records: yes Patient Progress Patient progress: stable ED Course Pt. Presents to the ER with mild confusion. Pt. Likely has a UTI. In part, his sx could also be related to him having been off his aricept for several days. No signs of sepsis. Pt. Has no complaints. I will start him on ceftin with f/u with his PCP or return to the ER with worsening sx. Procedures

## 2018-10-11 ENCOUNTER — APPOINTMENT (OUTPATIENT)
Dept: CT IMAGING | Age: 83
End: 2018-10-11
Attending: EMERGENCY MEDICINE
Payer: MEDICARE

## 2018-10-11 ENCOUNTER — APPOINTMENT (OUTPATIENT)
Dept: CT IMAGING | Age: 83
End: 2018-10-11
Attending: STUDENT IN AN ORGANIZED HEALTH CARE EDUCATION/TRAINING PROGRAM
Payer: MEDICARE

## 2018-10-11 ENCOUNTER — HOSPITAL ENCOUNTER (OUTPATIENT)
Age: 83
Setting detail: OBSERVATION
Discharge: HOME | End: 2018-10-12
Attending: EMERGENCY MEDICINE | Admitting: FAMILY MEDICINE
Payer: MEDICARE

## 2018-10-11 ENCOUNTER — APPOINTMENT (OUTPATIENT)
Dept: GENERAL RADIOLOGY | Age: 83
End: 2018-10-11
Attending: PHYSICIAN ASSISTANT
Payer: MEDICARE

## 2018-10-11 DIAGNOSIS — N39.0 URINARY TRACT INFECTION WITHOUT HEMATURIA, SITE UNSPECIFIED: Primary | ICD-10-CM

## 2018-10-11 DIAGNOSIS — G93.40 ACUTE ENCEPHALOPATHY: ICD-10-CM

## 2018-10-11 DIAGNOSIS — K80.20 GALL STONES: ICD-10-CM

## 2018-10-11 PROBLEM — R41.0 DELIRIUM: Status: ACTIVE | Noted: 2018-10-11

## 2018-10-11 LAB
ALBUMIN SERPL-MCNC: 3.4 G/DL (ref 3.5–5)
ALBUMIN/GLOB SERPL: 1 {RATIO} (ref 1.1–2.2)
ALP SERPL-CCNC: 90 U/L (ref 45–117)
ALT SERPL-CCNC: 17 U/L (ref 12–78)
AMPHET UR QL SCN: NEGATIVE
ANION GAP BLD CALC-SCNC: 15 MMOL/L (ref 10–20)
APAP SERPL-MCNC: <2 UG/ML (ref 10–30)
AST SERPL-CCNC: 25 U/L (ref 15–37)
ATRIAL RATE: 59 BPM
ATRIAL RATE: 60 BPM
BARBITURATES UR QL SCN: NEGATIVE
BENZODIAZ UR QL: NEGATIVE
BILIRUB DIRECT SERPL-MCNC: 0.2 MG/DL (ref 0–0.2)
BILIRUB SERPL-MCNC: 1 MG/DL (ref 0.2–1)
BUN BLD-MCNC: 27 MG/DL (ref 9–20)
CA-I BLD-MCNC: 1.15 MMOL/L (ref 1.12–1.32)
CALCULATED P AXIS, ECG09: 13 DEGREES
CALCULATED P AXIS, ECG09: 69 DEGREES
CALCULATED R AXIS, ECG10: 15 DEGREES
CALCULATED R AXIS, ECG10: 25 DEGREES
CALCULATED T AXIS, ECG11: 46 DEGREES
CALCULATED T AXIS, ECG11: 52 DEGREES
CANNABINOIDS UR QL SCN: NEGATIVE
CHLORIDE BLD-SCNC: 107 MMOL/L (ref 98–107)
CO2 BLD-SCNC: 23 MMOL/L (ref 21–32)
COCAINE UR QL SCN: NEGATIVE
COMMENT, HOLDF: NORMAL
CREAT BLD-MCNC: 1.9 MG/DL (ref 0.6–1.3)
DIAGNOSIS, 93000: NORMAL
DIAGNOSIS, 93000: NORMAL
DRUG SCRN COMMENT,DRGCM: NORMAL
ETHANOL SERPL-MCNC: <10 MG/DL
GLOBULIN SER CALC-MCNC: 3.4 G/DL (ref 2–4)
GLUCOSE BLD STRIP.AUTO-MCNC: 104 MG/DL (ref 65–100)
GLUCOSE BLD STRIP.AUTO-MCNC: 130 MG/DL (ref 65–100)
GLUCOSE BLD-MCNC: 146 MG/DL (ref 65–100)
HCT VFR BLD CALC: 42 % (ref 36.6–50.3)
LACTATE BLD-SCNC: 1.5 MMOL/L (ref 0.4–2)
LACTATE SERPL-SCNC: 1.6 MMOL/L (ref 0.4–2)
METHADONE UR QL: NEGATIVE
OPIATES UR QL: NEGATIVE
P-R INTERVAL, ECG05: 156 MS
P-R INTERVAL, ECG05: 162 MS
PCP UR QL: NEGATIVE
POTASSIUM BLD-SCNC: 4.4 MMOL/L (ref 3.5–5.1)
PROT SERPL-MCNC: 6.8 G/DL (ref 6.4–8.2)
Q-T INTERVAL, ECG07: 410 MS
Q-T INTERVAL, ECG07: 412 MS
QRS DURATION, ECG06: 90 MS
QRS DURATION, ECG06: 96 MS
QTC CALCULATION (BEZET), ECG08: 405 MS
QTC CALCULATION (BEZET), ECG08: 412 MS
SALICYLATES SERPL-MCNC: <1.7 MG/DL (ref 2.8–20)
SAMPLES BEING HELD,HOLD: NORMAL
SERVICE CMNT-IMP: ABNORMAL
SODIUM BLD-SCNC: 139 MMOL/L (ref 136–145)
TROPONIN I BLD-MCNC: <0.04 NG/ML (ref 0–0.08)
VENTRICULAR RATE, ECG03: 59 BPM
VENTRICULAR RATE, ECG03: 60 BPM

## 2018-10-11 PROCEDURE — 93005 ELECTROCARDIOGRAM TRACING: CPT

## 2018-10-11 PROCEDURE — 99285 EMERGENCY DEPT VISIT HI MDM: CPT

## 2018-10-11 PROCEDURE — 96372 THER/PROPH/DIAG INJ SC/IM: CPT

## 2018-10-11 PROCEDURE — 80307 DRUG TEST PRSMV CHEM ANLYZR: CPT

## 2018-10-11 PROCEDURE — 74176 CT ABD & PELVIS W/O CONTRAST: CPT

## 2018-10-11 PROCEDURE — 70450 CT HEAD/BRAIN W/O DYE: CPT

## 2018-10-11 PROCEDURE — 96360 HYDRATION IV INFUSION INIT: CPT

## 2018-10-11 PROCEDURE — 96361 HYDRATE IV INFUSION ADD-ON: CPT

## 2018-10-11 PROCEDURE — 80076 HEPATIC FUNCTION PANEL: CPT

## 2018-10-11 PROCEDURE — 83605 ASSAY OF LACTIC ACID: CPT | Performed by: STUDENT IN AN ORGANIZED HEALTH CARE EDUCATION/TRAINING PROGRAM

## 2018-10-11 PROCEDURE — 74011250636 HC RX REV CODE- 250/636: Performed by: PHYSICIAN ASSISTANT

## 2018-10-11 PROCEDURE — 36415 COLL VENOUS BLD VENIPUNCTURE: CPT | Performed by: STUDENT IN AN ORGANIZED HEALTH CARE EDUCATION/TRAINING PROGRAM

## 2018-10-11 PROCEDURE — 73521 X-RAY EXAM HIPS BI 2 VIEWS: CPT

## 2018-10-11 PROCEDURE — 96374 THER/PROPH/DIAG INJ IV PUSH: CPT

## 2018-10-11 PROCEDURE — 65270000029 HC RM PRIVATE

## 2018-10-11 PROCEDURE — 82962 GLUCOSE BLOOD TEST: CPT

## 2018-10-11 PROCEDURE — 74011250636 HC RX REV CODE- 250/636: Performed by: STUDENT IN AN ORGANIZED HEALTH CARE EDUCATION/TRAINING PROGRAM

## 2018-10-11 PROCEDURE — 65390000012 HC CONDITION CODE 44 OBSERVATION

## 2018-10-11 PROCEDURE — 83605 ASSAY OF LACTIC ACID: CPT

## 2018-10-11 PROCEDURE — 74011250637 HC RX REV CODE- 250/637: Performed by: STUDENT IN AN ORGANIZED HEALTH CARE EDUCATION/TRAINING PROGRAM

## 2018-10-11 PROCEDURE — 74011000250 HC RX REV CODE- 250: Performed by: STUDENT IN AN ORGANIZED HEALTH CARE EDUCATION/TRAINING PROGRAM

## 2018-10-11 PROCEDURE — 84484 ASSAY OF TROPONIN QUANT: CPT

## 2018-10-11 PROCEDURE — 80047 BASIC METABLC PNL IONIZED CA: CPT

## 2018-10-11 PROCEDURE — 87040 BLOOD CULTURE FOR BACTERIA: CPT

## 2018-10-11 RX ORDER — LOSARTAN POTASSIUM 50 MG/1
25 TABLET ORAL DAILY
Status: DISCONTINUED | OUTPATIENT
Start: 2018-10-12 | End: 2018-10-12 | Stop reason: HOSPADM

## 2018-10-11 RX ORDER — SODIUM CHLORIDE 0.9 % (FLUSH) 0.9 %
5-10 SYRINGE (ML) INJECTION AS NEEDED
Status: DISCONTINUED | OUTPATIENT
Start: 2018-10-11 | End: 2018-10-12 | Stop reason: HOSPADM

## 2018-10-11 RX ORDER — ASPIRIN 81 MG/1
81 TABLET ORAL DAILY
COMMUNITY
End: 2019-01-01

## 2018-10-11 RX ORDER — SODIUM CHLORIDE 0.9 % (FLUSH) 0.9 %
5-10 SYRINGE (ML) INJECTION EVERY 8 HOURS
Status: DISCONTINUED | OUTPATIENT
Start: 2018-10-11 | End: 2018-10-12 | Stop reason: HOSPADM

## 2018-10-11 RX ORDER — FUROSEMIDE 20 MG/1
20 TABLET ORAL DAILY
Status: DISCONTINUED | OUTPATIENT
Start: 2018-10-12 | End: 2018-10-12 | Stop reason: HOSPADM

## 2018-10-11 RX ORDER — DONEPEZIL HYDROCHLORIDE 5 MG/1
10 TABLET, FILM COATED ORAL DAILY
Status: DISCONTINUED | OUTPATIENT
Start: 2018-10-11 | End: 2018-10-12 | Stop reason: HOSPADM

## 2018-10-11 RX ORDER — HEPARIN SODIUM 5000 [USP'U]/ML
5000 INJECTION, SOLUTION INTRAVENOUS; SUBCUTANEOUS EVERY 8 HOURS
Status: DISCONTINUED | OUTPATIENT
Start: 2018-10-11 | End: 2018-10-12 | Stop reason: HOSPADM

## 2018-10-11 RX ORDER — PANTOPRAZOLE SODIUM 40 MG/1
40 TABLET, DELAYED RELEASE ORAL
Status: DISCONTINUED | OUTPATIENT
Start: 2018-10-12 | End: 2018-10-12 | Stop reason: HOSPADM

## 2018-10-11 RX ORDER — ASPIRIN 81 MG/1
81 TABLET ORAL DAILY
Status: DISCONTINUED | OUTPATIENT
Start: 2018-10-12 | End: 2018-10-12 | Stop reason: HOSPADM

## 2018-10-11 RX ORDER — INSULIN LISPRO 100 [IU]/ML
INJECTION, SOLUTION INTRAVENOUS; SUBCUTANEOUS
Status: DISCONTINUED | OUTPATIENT
Start: 2018-10-11 | End: 2018-10-12 | Stop reason: HOSPADM

## 2018-10-11 RX ORDER — DEXTROSE 50 % IN WATER (D50W) INTRAVENOUS SYRINGE
25-50 AS NEEDED
Status: DISCONTINUED | OUTPATIENT
Start: 2018-10-11 | End: 2018-10-12 | Stop reason: HOSPADM

## 2018-10-11 RX ORDER — AMLODIPINE BESYLATE 5 MG/1
10 TABLET ORAL DAILY
Status: DISCONTINUED | OUTPATIENT
Start: 2018-10-11 | End: 2018-10-12 | Stop reason: HOSPADM

## 2018-10-11 RX ORDER — LEVOTHYROXINE AND LIOTHYRONINE 19; 4.5 UG/1; UG/1
120 TABLET ORAL DAILY
Status: DISCONTINUED | OUTPATIENT
Start: 2018-10-12 | End: 2018-10-12 | Stop reason: HOSPADM

## 2018-10-11 RX ORDER — CETIRIZINE HCL 10 MG
10 TABLET ORAL DAILY
Status: DISCONTINUED | OUTPATIENT
Start: 2018-10-12 | End: 2018-10-12 | Stop reason: HOSPADM

## 2018-10-11 RX ORDER — LANOLIN ALCOHOL/MO/W.PET/CERES
325 CREAM (GRAM) TOPICAL
Status: DISCONTINUED | OUTPATIENT
Start: 2018-10-12 | End: 2018-10-12 | Stop reason: HOSPADM

## 2018-10-11 RX ORDER — MAGNESIUM SULFATE 100 %
4 CRYSTALS MISCELLANEOUS AS NEEDED
Status: DISCONTINUED | OUTPATIENT
Start: 2018-10-11 | End: 2018-10-12 | Stop reason: HOSPADM

## 2018-10-11 RX ORDER — LOPERAMIDE HYDROCHLORIDE 2 MG/1
2 CAPSULE ORAL 2 TIMES DAILY
Status: DISCONTINUED | OUTPATIENT
Start: 2018-10-12 | End: 2018-10-12 | Stop reason: HOSPADM

## 2018-10-11 RX ADMIN — DONEPEZIL HYDROCHLORIDE 10 MG: 5 TABLET, FILM COATED ORAL at 22:13

## 2018-10-11 RX ADMIN — AMLODIPINE BESYLATE 10 MG: 5 TABLET ORAL at 22:13

## 2018-10-11 RX ADMIN — HEPARIN SODIUM 5000 UNITS: 5000 INJECTION INTRAVENOUS; SUBCUTANEOUS at 21:39

## 2018-10-11 RX ADMIN — SODIUM CHLORIDE 500 ML: 900 INJECTION, SOLUTION INTRAVENOUS at 16:34

## 2018-10-11 RX ADMIN — Medication 10 ML: at 22:13

## 2018-10-11 RX ADMIN — WATER 1 G: 1 INJECTION INTRAMUSCULAR; INTRAVENOUS; SUBCUTANEOUS at 21:37

## 2018-10-11 NOTE — IP AVS SNAPSHOT
[address] 
[phone] Patient: Vinh Villeda. MRN: [id number] XUY:[id number] About your hospitalization You were admitted on:  October 11, 2018 You last received care in the:  [facility] 5M1 MED SURG 1 You were discharged on:  October 12, 2018 Why you were hospitalized Your primary diagnosis was:  Not on File Your diagnoses also included:  Delirium, Urinary Tract Infection Follow-up Information Follow up With Details Comments Contact Info [address] [facility], [address] Jeri Thomas MD On 10/15/2018 10AM - For hospital follow up [address] [place] [address] [address]  
[phone] Your Scheduled Appointments Monday October 15, 2018 10:00 AM EDT TRANSITIONAL CARE MANAGEMENT with Jeri Thomas MD  
[address] [facility]) [facility] [address]  
[phone] Discharge Orders None A check kanchan indicates which time of day the medication should be taken. My Medications CHANGE how you take these medications Instructions Each Dose to Equal  
 Morning Noon Evening Bedtime  
 loperamide 2 mg capsule Commonly known as:  IMODIUM What changed:  See the new instructions. TAKE 1 CAPSULE BEFORE BREAKFAST, LUNCH, DINNER AND AT BEDTIME FOR DIARRHEA  
     
10/12/18:57 AM  
   
   
   
  
  
CONTINUE taking these medications Instructions Each Dose to Equal  
 Morning Noon Evening Bedtime  
 amLODIPine 10 mg tablet Commonly known as:  Jerome Greer TAKE 1 TABLET EVERY DAY FOR HYPERTENSION  (INCREASE  IN  DOSE  TO  10MG) 10/11/18 10:13 PM  
   
  
 aspirin delayed-release 81 mg tablet Take 81 mg by mouth daily. 81 mg CENTRUM SILVER Tab tablet Generic drug:  multivitamins-minerals-lutein Take 1 Tab by mouth daily. 1 Tab  
    
   
   
   
  
 cetirizine 10 mg tablet Commonly known as:  ZYRTEC  
   
 TAKE 1 TABLET EVERY DAY AS NEEDED FOR ALLERGIES  
     
10/12/18 8:57 AM  
   
   
   
  
 donepezil 10 mg tablet Commonly known as:  ARICEPT  
   
 TAKE 1 TABLET EVERY NIGHT  
     
   
   
10/11/18 10:13 PM  
   
  
 ferrous sulfate 325 mg (65 mg iron) tablet Commonly known as:  Iron Take 1 Tab by mouth Daily (before breakfast). 325 mg  
    
10/12/18 8:57 AM  
   
   
   
  
 furosemide 20 mg tablet Commonly known as:  LASIX TAKE 1 TABLET EVERY DAY AS DIRECTED  
     
10/12/18 8:56 AM  
   
   
   
  
 glimepiride 4 mg tablet Commonly known as:  AMARYL  
   
 TAKE 1 TABLET EVERY MORNING  
     
   
   
   
  
 lansoprazole 30 mg capsule Commonly known as:  PREVACID TAKE 1 CAPSULE EVERY DAY  BEFORE  BREAKFAST  
     
   
   
   
  
 losartan 25 mg tablet Commonly known as:  COZAAR Take 1 Tab by mouth daily. 25 mg  
    
10/12/18 8:56 AM  
   
   
   
  
 nicotinic acid 500 mg tablet Commonly known as:  NIACIN Take 500 mg by mouth Daily (before breakfast). 500 mg Thyroid (Pork) 120 mg Tab Commonly known as:  ARMOUR THYROID Take 1 Tab by mouth daily. 120 mg  
    
10/12/18 8:56 AM  
   
   
   
  
 TRADJENTA 5 mg tablet Generic drug:  linagliptin TAKE 1 TAB BY MOUTH DAILY. INDICATIONS: TYPE 2 DIABETES MELLITUS  
     
   
   
   
  
 VITAMIN D2 50,000 unit capsule Generic drug:  ergocalciferol TAKE 1 CAPSULE EVERY 7 DAYS Discharge Instructions HOME DISCHARGE INSTRUCTIONS Chuck Rizzo. / [id number] : 3/8/1931 Admission date: 10/11/2018 Discharge date: 10/12/2018 Please bring this form with you to show your care provider at your follow-up appointment. Primary care provider:  Andrey Bang MD 
 
Discharging provider:  Rosa Contreras MD  - Family Medicine Resident Christophe Jules MD - Attending, Family Medicine You have been admitted to the hospital with the following diagnoses: 
 
ACUTE DIAGNOSES: 
· Delirium · Urinary tract infection Nahed Oliver . . . . . . . . . . . . . . . . . . . . . . . . . . . . . . . . . . . . . . . . . . . . . . . . . . . . . . . . . . . . . . . . . . . . . . Nahed Oliver FOLLOW-UP CARE RECOMMENDATIONS: 
 
Medication changes:  
 
-Stop taking the ceftin antibiotic, you do not have a urinary tract infection  
-Continue to take your aricept to help your memory Appointments: Listed on page 2 of these documents. Follow-up tests needed: CMP Pending test results: At the time of your discharge the following test results are still pending: Blood cultures Please make sure you review these results with your outpatient follow-up provider(s). Specific symptoms to watch for: chest pain, shortness of breath, fever, chills, nausea, vomiting, diarrhea, change in mentation, falling, weakness, bleeding. DIET/what to eat: Diabetic Diet ACTIVITY:  Activity as tolerated Wound care: none Equipment needed:  None What to do if new or unexpected symptoms occur? If you experience any of the above symptoms (or should other concerns or questions arise after discharge) please call your primary care physician. Return to the emergency room if you cannot get hold of your doctor. · It is very important that you keep your follow-up appointment(s). · Please bring discharge papers, medication list (and/or medication bottles) to your follow-up appointments for review by your outpatient provider(s). · Please check the list of medications and be sure it includes every medication (even non-prescription medications) that your provider wants you to take. · It is important that you take the medication exactly as they are prescribed. · Keep your medication in the bottles provided by the pharmacist and keep a list of the medication names, dosages, and times to be taken in your wallet. · Do not take other medications without consulting your doctor. · If you have any questions about your medications or other instructions, please talk to your nurse or care provider before you leave the hospital.  
 
Information obtained by:  
 
I understand that if any problems occur once I am at home I am to contact my physician. These instructions were explained to me and I had the opportunity to ask questions. I understand and acknowledge receipt of the instructions indicated above. Physician's or R.N.'s Signature                                                                  Date/Time Patient or Representative Signature                                                          Date/Time Follow-up Information Follow up With Details Comments Contact Info [address] [facility], [address] [place] [address] [address] Phyllis Hawk MD On 10/15/2018 10AM - For hospital follow up [address] [place] [address] [address] [place] [address] 
[phone] Huupy Announcement We are excited to announce that we are making your provider's discharge notes available to you in Huupy. You will see these notes when they are completed and signed by the physician that discharged you from your recent hospital stay.   If you have any questions or concerns about any information you see in Huupy, please call the Health Information Department where you were seen or reach out to your Primary Care Provider for more information about your plan of care. Introducing [facility]! [facility] introduces [employer] patient portal. Now you can access parts of your medical record, email your doctor's office, and request medication refills online. 1. In your internet browser, go to [email or web address] 2. Click on the First Time User? Click Here link in the Sign In box. You will see the New Member Sign Up page. 3. Enter your Tuloko Access Code exactly as it appears below. You will not need to use this code after youve completed the sign-up process. If you do not sign up before the expiration date, you must request a new code. · Tuloko Access Code: [email or web address] Expires: 11/11/2018  3:45 PM 
 
4. Enter the last four digits of your Social Security Number (xxxx) and Date of Birth (mm/dd/yyyy) as indicated and click Submit. You will be taken to the next sign-up page. 5. Create a Tuloko ID. This will be your Tuloko login ID and cannot be changed, so think of one that is secure and easy to remember. 6. Create a Tuloko password. You can change your password at any time. 7. Enter your Password Reset Question and Answer. This can be used at a later time if you forget your password. 8. Enter your e-mail address. You will receive e-mail notification when new information is available in [address] 9. Click Sign Up. You can now view and download portions of your medical record. 10. Click the Download Summary menu link to download a portable copy of your medical information. If you have questions, please visit the Frequently Asked Questions section of the Tuloko website. Remember, Tuloko is NOT to be used for urgent needs. For medical emergencies, dial 911. Now available from your iPhone and Android! Introducing Jeronimo Buck As a [facility] patient, I wanted to make you aware of our electronic visit tool called Jeronimo Buck. [employer] allows you to connect within minutes with a medical provider 24 hours a day, seven days a week via a mobile device or tablet or logging into a secure website from your computer. You can access [facility] from anywhere in the [place]. A virtual visit might be right for you when you have a simple condition and feel like you just dont want to get out of bed, or cant get away from work for an appointment, when your regular [facility] provider is not available (evenings, weekends or holidays), or when youre out of town and need minor care. Electronic visits cost only $49 and if the [employer] provider determines a prescription is needed to treat your condition, one can be electronically transmitted to a nearby pharmacy*. Please take a moment to enroll today if you have not already done so. The enrollment process is free and takes just a few minutes. To enroll, please download the [employer] viviane to your tablet or phone, or visit [email or web address] to enroll on your computer. And, as an [address] [place] [address] patient with a The Library account, the results of your visits will be scanned into your electronic medical record and your primary care provider will be able to view the scanned results. We urge you to continue to see your regular [employer] provider for your ongoing medical care. And while your primary care provider may not be the one available when you seek a [employer] virtual visit, the peace of mind you get from getting a real diagnosis real time can be priceless. For more information on [employer], view our Frequently Asked Questions (FAQs) at [email or web address]. Sincerely, 
 
Feliberto Orta MD 
Chief Medical Officer Cosme Villegas *:  certain medications cannot be prescribed via [facility] Unresulted Labs-Please follow up with your PCP about these lab tests Order Current Status CULTURE, BLOOD Preliminary result CULTURE, BLOOD Preliminary result Providers Seen During Your Hospitalization Provider Specialty Primary office phone Karen Haq MD Emergency Medicine [phone] Tramaine Rowe MD Emergency Medicine [phone] Bautista Nieto MD [facility] [phone] Your Primary Care Physician (PCP) Primary Care Physician Office Phone Office Fax Janette Augustine [phone] You are allergic to the following Allergen Reactions Synthroid (Levothyroxine) Other (comments) Confused, hallucinations Recent Documentation Height Weight BMI Smoking Status 1.829 m 90.7 kg 27.12 kg/m2 Former Smoker Emergency Contacts Name Discharge Info Relation Home Work Mobile Jessica Jain DISCHARGE CAREGIVER [3] Child [2] [phone] Magali Hernandez  Son [22] [phone] Patient Belongings The following personal items are in your possession at time of discharge: 
  Dental Appliances: At home  Visual Aid: Glasses, With patient      Home Medications: None   Jewelry: None  Clothing: None    Other Valuables: Cell Phone Please provide this summary of care documentation to your next provider. Signatures-by signing, you are acknowledging that this After Visit Summary has been reviewed with you and you have received a copy. Patient Signature:  ____________________________________________________________ Date:  ____________________________________________________________  
  
Cris Figures Provider Signature:  ____________________________________________________________ Date:  ____________________________________________________________

## 2018-10-11 NOTE — ED TRIAGE NOTES
The patient was seen here yesterday for urinary tract infection. Returns today for increased confusion and lower abdominal pain.

## 2018-10-11 NOTE — H&P
Jake Iyer Jesus [address]  Office [phone] Fax [phone] Admission H&P Name: Jeannie Batista MRN: [id number]  Sex: Male YOB: 1931  Age: 80 y.o. PCP: Eloina Pisano MD  
 
Source of Information: patient, medical records Chief complaint: Pelvic pain/UTI History of Present Illness Jeannie Batista is a 80 y.o. male with known PMH significant for dementia, dyslipidemia, DM II with neuropathy, esophageal reflux, HTN, precancerous colonic polyps s/p partial bowel resection, mixed hyperlipdemia, hypthyroidism, Vit D def and iron def anemia who presents to the ER accompanied with family due to family's concern of finding the patient on the floor this morning, pt then complained of pelvic & hip pain later in the day. Per chart review, pt came to the ED yesterday assisted by family who were concerned about his AMS since the last 1-2 days. He was unable to recognize this cat and unable to answer questions which is not normal for him. Per family at baseline pt is fully alert and oriented and does not have any cognitive deficits. Pt was diagnosed with UTI at the time and was sent home on Ceftin 500 mg BID X7 days with PCP follow up (Urinalysis: Trace LE, No Nitrites, No Bacteria). Today family says that his delirium has slightly improved but they were concerned about his fall and wanted to rule out a fracture. It was also noted during ED visit yesterday that pt has not been taking his Aricept for about 4 days but started taking it on 10/9. Pt was placed on synthroid when he was 1st diagnosed with hypothyroid. He experienced side effects of memory loss and was started on Aricept at the time. Pt then stopped Synthroid and started  [place] (pork thyroid) and returned back to his baseline mentation but Aricept was continued per PCP. In the ED: - Vitals - 97.8F, HR 70, 167/66, RR 21, 98% on RA 
 - Labs - Remarkable for LA 1.6, Trop <0.04, Glu 146, BUN 27, Cr 1.9, WBC 8.8 (taken on 10/10) - Imaging -  
· XR Hips BI: No acute abnormality · CT Abd Pelv: Distended gallbladder and dilated common bile duct, with suggestion of small gallstones in the gallbladder. No visualized common bile duct calculi. Small hiatal hernia.  
· CT head (10/10): No acute intracranial abnormality · CXR (10/10): No acute cardiopulmonary process - Treatment - NaCl Bolus 500 mL EKG: Normal Sinus Rhythm, VR 60 Past Medical History:  
Diagnosis Date  WILTON (acute kidney injury) ([facility] [address] 8/2/2014  Cancer [facility])   
 colon  Diabetes ([facility] [address] 2008  
 type 2  Dyslipidemia  Esophageal reflux  Esophageal reflux 2/9/2012  Hypertension 2008  Mixed hyperlipidemia 2/9/2012  Other ill-defined conditions(799.89) broken neck 2012  Pernicious anemia 7/31/2012  Psychiatric disorder   
 dementia  Unspecified hypothyroidism 4/26/2012 Patient Vitals for the past 12 hrs: 
 Temp Pulse Resp BP SpO2  
10/11/18 2243 97.4 °F (36.3 °C) (!) 54 22 161/62 98 % 10/11/18 2157 98.6 °F (37 °C) 60 16 166/64 97 % 10/11/18 2045 - (!) 57 14 179/44 96 % 10/11/18 2030 - 60 17 (!) 160/98 98 % 10/11/18 2015 - 62 20 186/63 97 % 10/11/18 2000 - 61 21 163/71 97 % 10/11/18 1930 - (!) 59 16 177/56 97 % 10/11/18 1745 - (!) 56 15 184/56 91 % 10/11/18 1700 - 62 19 145/82 98 % 10/11/18 1630 - 61 14 189/71 100 % 10/11/18 1600 - (!) 55 14 181/74 100 % 10/11/18 1545 - (!) 56 20 183/64 97 % 10/11/18 1530 - (!) 57 19 171/53 97 % 10/11/18 1500 - 60 20 132/55 99 % 10/11/18 1445 - 62 19 147/78 100 % 10/11/18 1430 97.8 °F (36.6 °C) 70 21 167/66 98 % Home Medications Prior to Admission medications Medication Sig Start Date End Date Taking? Authorizing Provider  
aspirin delayed-release 81 mg tablet Take 81 mg by mouth daily. Yes Historical Provider donepezil (ARICEPT) 10 mg tablet TAKE 1 TABLET EVERY NIGHT 50/6/75  Yes Tomy Chapin MD  
losartan (COZAAR) 25 mg tablet Take 1 Tab by mouth daily. 92/2/81  Yes Tomy Chapin MD  
lansoprazole (PREVACID) 30 mg capsule TAKE 1 CAPSULE EVERY DAY  BEFORE  BREAKFAST 89/9/23  Yes Tomy Chapin MD  
amLODIPine (NORVASC) 10 mg tablet TAKE 1 TABLET EVERY DAY FOR HYPERTENSION  (INCREASE  IN  DOSE  TO  70SC) 03/0/10  Yes Tomy Chapin MD  
Thyroid, Pork, (ARMOUR THYROID) 120 mg tab Take 1 Tab by mouth daily. 52/7/52  Yes Tomy Chapin MD  
glimepiride (AMARYL) 4 mg tablet TAKE 1 TABLET EVERY MORNING 03/9/84  Yes Tomy Chapin MD  
TRADJENTA 5 mg tablet TAKE 1 TAB BY MOUTH DAILY. INDICATIONS: TYPE 2 DIABETES MELLITUS 4/98/27  Yes Tomy Chapin MD  
loperamide (IMODIUM) 2 mg capsule TAKE 1 CAPSULE BEFORE BREAKFAST, LUNCH, DINNER AND AT BEDTIME FOR DIARRHEA Patient taking differently: TAKE 1 CAPSULE BEFORE BREAKFAST, LUNCH, DINNER AND AT BEDTIME FOR DIARRHEA  - taking twice a day 2/7/83  Yes Tomy Chapin MD  
cetirizine (ZYRTEC) 10 mg tablet TAKE 1 TABLET EVERY DAY AS NEEDED FOR ALLERGIES 16/98/49  Yes Tomy Chapin MD  
ferrous sulfate (IRON) 325 mg (65 mg iron) tablet Take 1 Tab by mouth Daily (before breakfast). 2/89/47  Yes Tomy Chapin MD  
multivitamins-minerals-lutein (CENTRUM SILVER) tab tablet Take 1 Tab by mouth daily. Yes Historical Provider  
nicotinic acid (NIACIN) 500 mg tablet Take 500 mg by mouth Daily (before breakfast). Yes Historical Provider VITAMIN D2 50,000 unit capsule TAKE 1 CAPSULE EVERY 7 DAYS 2/9/02   Tomy Chapin MD  
furosemide (LASIX) 20 mg tablet TAKE 1 TABLET EVERY DAY AS DIRECTED 19/44/20   Tomy Chapin MD  
 
 
Allergies Allergies Allergen Reactions  Synthroid [Levothyroxine] Other (comments) Confused, hallucinations Past Medical History:  
Diagnosis Date  WILTON (acute kidney injury) ([facility] [address] 8/2/2014  Cancer [facility])   
 colon  Diabetes ([facility] [address] 2008  
 type 2  Dyslipidemia  Esophageal reflux  Esophageal reflux 2/9/2012  Hypertension 2008  Mixed hyperlipidemia 2/9/2012  Other ill-defined conditions(799.89) broken neck 2012  Pernicious anemia 7/31/2012  Psychiatric disorder   
 dementia  Unspecified hypothyroidism 4/26/2012 Previous Hospitalization(s) Past Surgical History:  
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    
 HX HERNIA REPAIR Bilateral inguinal  
 HX OTHER SURGICAL    
 colon resection  HX TONSIL AND ADENOIDECTOMY  HX TONSILLECTOMY  1936 Family History Problem Relation Age of Onset  Heart Disease Mother  Hypertension Mother  Heart Disease Father  Hypertension Father Social History Social History Social History  Marital status:  Spouse name: N/A  
 Number of children: N/A  
 Years of education: N/A Occupational History  Not on file. Social History Main Topics  Smoking status: Former Smoker Packs/day: 1.50 Years: 30.00 Quit date: 7/30/1982  Smokeless tobacco: Never Used  Alcohol use No  
 Drug use: No  
 Sexual activity: Not Currently Partners: Female Other Topics Concern  Not on file Social History Narrative Alcohol history: rarely, 1 drink on new year's christina per family Smoking history: none Illicit drug history: none Living arrangement: patient lives with their family. Ambulates: Independently Review of Systems Review of Systems Constitutional: Negative for chills, fatigue and fever. HENT: Negative for congestion, postnasal drip, sinus pressure, sneezing and sore throat. Respiratory: Negative for cough, chest tightness, shortness of breath and wheezing. Cardiovascular: Negative for chest pain and palpitations.   
Gastrointestinal: Negative for abdominal distention, abdominal pain, constipation, diarrhea, nausea and vomiting. Genitourinary: Negative for frequency and urgency. Musculoskeletal: Negative for back pain. Denies pelvic pain Neurological: Negative for dizziness, weakness, light-headedness and numbness. Physical Exam 
Visit Vitals  /62 (BP 1 Location: Right arm, BP Patient Position: At rest)  Pulse (!) 54  Temp 97.4 °F (36.3 °C)  Resp 22  
 Ht 6' (1.829 m)  Wt 200 lb (90.7 kg)  SpO2 98%  BMI 27.12 kg/m2 General: No acute distress. Alert. Cooperative. Head: Normocephalic. Atraumatic. Neck: Supple. Normal ROM. No stiffness. Respiratory: CTAB. No w/r/r/c.  
Cardiovascular: RRR. Normal S1,S2. No m/r/g. Pulses 2+ throughout. GI: 
 
:  + bowel sounds. Nontender. No rebound tenderness or guarding. Nondistended. David's sign negative No suprapubic tenderness Musculoskeletal: Full ROM in all extremities. Trace LE edema. Distal pulses intact. No Pelvic tenderness Skin: Warm, dry. No rashes. Neuro: CN II-XII grossly intact. Oriented to person and place (\"hospital\") Laboratory Data Recent Results (from the past 8 hour(s)) DRUG SCREEN, URINE Collection Time: 10/11/18  9:15 PM  
Result Value Ref Range AMPHETAMINES NEGATIVE  NEG    
 BARBITURATES NEGATIVE  NEG BENZODIAZEPINES NEGATIVE  NEG    
 COCAINE NEGATIVE  NEG METHADONE NEGATIVE  NEG    
 OPIATES NEGATIVE  NEG    
 PCP(PHENCYCLIDINE) NEGATIVE  NEG    
 THC (TH-CANNABINOL) NEGATIVE  NEG Drug screen comment (NOTE) Imaging CXR Results  (Last 48 hours) 10/10/18 1850  XR CHEST PA LAT Final result Impression:  IMPRESSION: No acute intrathoracic disease. Narrative:  CLINICAL HISTORY: Cough and altered mental status INDICATION: Cough COMPARISON: 4/1/2015 FINDINGS:   
PA and lateral views of the chest are obtained. The cardiopericardial silhouette is within normal limits.  There is no pleural  
 effusion, pneumothorax or focal consolidation present. Osteopenia. Aortic  
atherosclerotic change. Patient is on a cardiac monitor. CT Results  (Last 48 hours) 10/11/18 2110  CT HEAD WO CONT Final result Impression:  IMPRESSION: No acute intracranial process. No significant change since  
yesterday. Narrative:  EXAM:  CT HEAD WO CONT INDICATION:   Altered mental status COMPARISON: October 10, 2018. CONTRAST:  None. TECHNIQUE: Unenhanced CT of the head was performed using 5 mm images. Brain and  
bone windows were generated. CT dose reduction was achieved through use of a  
standardized protocol tailored for this examination and automatic exposure  
control for dose modulation. FINDINGS:  
The ventricles and sulci are stable in size, shape and configuration and  
midline. There is unchanged diffuse periventricular white matter disease. There  
is no intracranial hemorrhage, extra-axial collection, mass, mass effect or  
midline shift. The basilar cisterns are open. No acute infarct is identified. The bone windows demonstrate no abnormalities. The visualized portions of the  
paranasal sinuses and mastoid air cells are clear. 10/10/18 1844  CT HEAD WO CONT Final result Impression:  IMPRESSION:   
   
No acute intracranial process. Narrative:  EXAM:  CT HEAD WO CONT  
   
   
HISTORY: Altered mental status INDICATION:   possible head injury AMS  
   
COMPARISON: 9/10/2012. CONTRAST:  None. TECHNIQUE: Unenhanced CT of the head was performed using 5 mm images. Brain and  
bone windows were generated. CT dose reduction was achieved through use of a  
standardized protocol tailored for this examination and automatic exposure  
control for dose modulation. FINDINGS:  
Sulcal and ventricular prominence. . Confluent periventricular and scattered hypodensities in the cerebral white matter. . There is no intracranial  
hemorrhage, extra-axial collection, mass, mass effect or midline shift. The  
basilar cisterns are open. No acute infarct is identified. The bone windows  
demonstrate no abnormalities. The visualized portions of the paranasal sinuses  
and mastoid air cells are clear. 10/11/18 1832  CT ABD PELV WO CONT Final result Impression:  IMPRESSION:  
Distended gallbladder and dilated common bile duct, with suggestion of small  
gallstones in the gallbladder. No visualized common bile duct calculi. Small  
hiatal hernia. Narrative:  EXAM:  CT ABD PELV WO CONT INDICATION: abd pain?  hip pain? COMPARISON: July 30, 2012 CONTRAST:  None. TECHNIQUE:   
Thin axial images were obtained through the abdomen and pelvis. Coronal and  
sagittal reconstructions were generated. Oral contrast was not administered. CT  
dose reduction was achieved through use of a standardized protocol tailored for  
this examination and automatic exposure control for dose modulation. The absence of intravenous contrast material reduces the sensitivity for  
evaluation of the solid parenchymal organs of the abdomen. FINDINGS:   
LUNG BASES: Clear. INCIDENTALLY IMAGED HEART AND MEDIASTINUM: Unremarkable. LIVER: No mass or biliary dilatation. GALLBLADDER: Distended. Contains probable small calculi. No wall thickening or  
pericholecystic fluid. Dilated common bile duct, measuring 12 mm in diameter. SPLEEN: No mass. PANCREAS: No mass or ductal dilatation. ADRENALS: Unremarkable. KIDNEYS/URETERS: No mass, calculus, or hydronephrosis. STOMACH: Small hiatal hernia. SMALL BOWEL: No dilatation or wall thickening. COLON: No dilatation or wall thickening. APPENDIX: Normal.  
PERITONEUM: No ascites or pneumoperitoneum. RETROPERITONEUM: No lymphadenopathy or aortic aneurysm. REPRODUCTIVE ORGANS: Normal sized prostate gland. URINARY BLADDER: No mass or calculus. BONES: No destructive bone lesion. Degenerative changes in the thoracolumbar  
spine. No evidence of hip fracture. ADDITIONAL COMMENTS: N/A Assessment and Plan Radha Galeano is a 80 y.o. male with a PMH significant for dementia, dyslipidemia, DM II with neuropathy, esophageal reflux, HTN, precancerous colonic polyps s/p bowel resection, mixed hyperlipdemia, hypthyroidism, Vit D def and iron def anemia Acute Problems:  
 
Delirium possibly secondary to UTI with recent fall: Urinalysis shows trace LE, No Nitrites & No Bacteria. Pt was given a dose of Rocephin 1 g in the ED yesterday. He was sent home on Ceftin 500 mg BID X7, he was supposed to start the medicine today 10/11 but has not started them since he came back to the ED. Per family pt's Delirium has improved and he was able to follow commands and complete ROS for me but he is not back to baseline. We will evaluate pt for other causes of Delirium.  
- Rocephin 1 g q24h- reevaluate after Ucx return - CXR performed in ED on 10/10: Negative for any cardiopulmonary process - Bilat Hip XR: No acute abnormality 
- CT head ordered to r/o neurologic cause- Pt presented with sluggish speech which per family is normal during an infection  
- CT Abd showed distended gallbladder and dilated common bile duct with no stones in CBD- monitor with daily CMP  
- Bcx ordered and Ucx from 10/10 is in process - Acetaminophen & Salicylate levels - Trop X1 in the ED 
- UDS ordered - Mg and Phos  
- Daily CBC and CMP Chronic Problems:  
 
Possible underlying Dementia: Pt was placed was synthroid when 1st dx with hypothyroid. He experienced side effects of memory loss and was placed on Aricept at the time.  Pt then stopped Synthroid and started  [place] (pork thyroid) and returned back to his baseline mentation but Aricept was continued per PCP. Pt did not take Aricept for about 4 days and restarted on 10/9  
- Continue Aricept 10 mg daily Hypothyroidism: TSH: 6.210 on 10/3- Pork Thyroid recently increased from 90 mg to 120 mg but pt has not started taking the new dose at home since he is waiting on mail in prescription  
- Start new dose of Pork Thyroid 120 mg  
 
CKD III: Cr today 1.9, (BL: 1.8-2.0) - Avoid nephrotoxic drugs - Monitor with daily CMP Hypertension: - BP on admission 167/66. At this time, 161/62 
- Continuing home medications of Losartan 25 mg, Amlodipine 5 mg & Lasix 20 mg  
- Will continue to monitor at this time and readjust as BP's trend. DMII: A1c 7.9 (01/2018) - Holding home medications of Glimepride 4 mg daily & Tradjenta 5 mg  
- Started on Lispro SS with meals with ACHS POC Gluc checks - Hypoglycemia protocols ordered. GERD:  
- Continue home lansoprazole 30 mg daily  
  
Iron Deficiency Anemia: Hgb on 10/10: 13.6  
- Continue home ferrous sulfate 325 mg daily CAD: 
- Continue home Aspirin 81 mg  
 
Diarrhea s/p partial bowel resection due to precancerous polyps:  
- Continue home Loperamide 2 mg BID 
 
FEN/GI - Diabetic diet. Activity - Ambulate with assistance DVT prophylaxis - Sub Q Heparin GI prophylaxis - Lansoprazole 30 mg home med Fall prophylaxis - Fall precautions ordered. Disposition - Admit to Medical. Plan to d/c to Home. Code Status - DNR, discussed with patient / caregivers. Patient Paulina Santos. will be discussed Dr. Kilo Durham. 7:33 PM, 10/11/18 Michael Wallace MD 
Family Medicine Resident For Billing Chief Complaint Patient presents with  Altered mental status Hospital Problems  Date Reviewed: 8/13/2018 Codes Class Noted POA Delirium ICD-10-CM: R41.0 ICD-9-CM: 780.09  10/11/2018 Unknown Urinary tract infection ICD-10-CM: N39.0 ICD-9-CM: 599.0  10/11/2018 Unknown

## 2018-10-11 NOTE — ED NOTES
The patient's daughter states she found the patient on the floor beside his bed this morning and assisted him back into bed where he went back to sleep. She states he complained of hip pain when she was helping him to stand.

## 2018-10-11 NOTE — ED PROVIDER NOTES
HPI Comments: 80 y.o. male with past medical history significant for dementia, dyslipidemia, DM, esophageal reflux, HTN, colon cancer, WILTON, mixed hyperlipdemia, unspecified hypthyroidism, and pernicious anemia who presents from home with chief complaint of altered mental status. Pt was seen in the ED yesterday for a UTI. Per EMS, Pt's family reports increased confusion and lower abdominal pain. There are no other acute medical concerns at this time. PCP: Seven Lambert MD 
 
Full history, physical exam, and ROS unable to be obtained due to:  dementia. Note written by Lazara Akers, as dictated by FIDELINA Arnold 2:45 PM  
 
 
The history is provided by the patient, the EMS personnel and a relative. Past Medical History:  
Diagnosis Date  WILTON (acute kidney injury) ([facility] 8/2/2014  Cancer [facility])   
 colon  Diabetes ([facility] 2008  
 type 2  Dyslipidemia  Esophageal reflux  Esophageal reflux 2/9/2012  Hypertension 2008  Mixed hyperlipidemia 2/9/2012  Other ill-defined conditions(799.89) broken neck 2012  Pernicious anemia 7/31/2012  Psychiatric disorder   
 dementia  Unspecified hypothyroidism 4/26/2012 Past Surgical History:  
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    
 HX HERNIA REPAIR Bilateral inguinal  
 HX OTHER SURGICAL    
 colon resection  HX TONSIL AND ADENOIDECTOMY  HX TONSILLECTOMY  1936 Family History:  
Problem Relation Age of Onset  Heart Disease Mother  Hypertension Mother  Heart Disease Father  Hypertension Father Social History Social History  Marital status:  Spouse name: N/A  
 Number of children: N/A  
 Years of education: N/A Occupational History  Not on file. Social History Main Topics  Smoking status: Former Smoker Packs/day: 1.50 Years: 30.00 Quit date: 7/30/1982  Smokeless tobacco: Never Used  Alcohol use No  
 Drug use: No  
 Sexual activity: Not Currently Partners: Female Other Topics Concern  Not on file Social History Narrative ALLERGIES: Synthroid [levothyroxine] Review of Systems Unable to perform ROS: Dementia Vitals:  
 10/11/18 1545 10/11/18 1600 10/11/18 1630 10/11/18 1700 BP: 183/64 181/74 189/71 145/82 Pulse: (!) 56 (!) 55 61 62 Resp: 20 14 14 19 Temp:      
SpO2: 97% 100% 100% 98% Weight:      
Height:      
      
 
Physical Exam  
Constitutional: He appears well-developed and well-nourished. No distress. HENT:  
Head: Normocephalic and atraumatic. Right Ear: External ear normal.  
Left Ear: External ear normal.  
Eyes: EOM are normal. Pupils are equal, round, and reactive to light. Neck: Neck supple. Cardiovascular: Normal rate, regular rhythm, normal heart sounds and intact distal pulses. Exam reveals no gallop and no friction rub. No murmur heard. Pulmonary/Chest: Effort normal and breath sounds normal. No stridor. No respiratory distress. He has no wheezes. He has no rales. He exhibits no tenderness. Abdominal: Soft. Bowel sounds are normal. He exhibits no distension and no mass. There is no tenderness. There is no rebound and no guarding. Musculoskeletal: Normal range of motion. He exhibits no edema, tenderness or deformity. Neurological: He is alert. No cranial nerve deficit. Coordination normal.  
oriented to person and place, not time, follows commands. Skin: No rash noted. No erythema. No pallor. Psychiatric: He has a normal mood and affect. His behavior is normal.  
Nursing note and vitals reviewed. MDM Number of Diagnoses or Management Options Acute encephalopathy:  
Gall stones:  
Urinary tract infection without hematuria, site unspecified:  
  
Amount and/or Complexity of Data Reviewed Clinical lab tests: ordered and reviewed Tests in the radiology section of CPT®: ordered and reviewed Tests in the medicine section of CPT®: reviewed and ordered Obtain history from someone other than the patient: yes (Ems, family) Review and summarize past medical records: yes Independent visualization of images, tracings, or specimens: yes Patient Progress Patient progress: stable ED Course Procedures ED EKG interpretation:2:48 PM 
Rhythm: normal sinus rhythm; and regular . Rate (approx.): 60; Axis: normal; P wave: normal; QRS interval: normal ; ST/T wave: normal; Other findings: normal. This EKG was interpreted by Tameka Betts MD,ED Provider. 3:05 PM 
Discussed pt, sx, hx and current findings with Marika Esqueda MD He is in agreement with plan and will see pt. Will check, ekg, chem 8, lactic. Pt had labs, urine, ct head yesterday. Nisa Pool. JENNIFER Madrid 
 
4:46 pM 
 pt's family in room noting pt had been complaining of hip pain last night and this am. Will get xrays, family is unaware of a fall, but they are not certain he did not have an injury. Nisa Pool. JENNIFER Madrid 
 
7:29 PM 
Nisa Pool. JENNIFER Madrid spoke with [facility] Consult for Cardiology and Medicine. Discussed available diagnostic tests and clinical findings. He/she is in agreement with care plans as outlined. He/she :0 
FIDELINA Genao 
 
LABS COMPLETED AND REVIEWED: 
Recent Results (from the past 12 hour(s)) GLUCOSE, POC Collection Time: 10/11/18  2:27 PM  
Result Value Ref Range Glucose (POC) 130 (H) 65 - 100 mg/dL Performed by Danyelle Carter (PCT) POC LACTIC ACID Collection Time: 10/11/18  2:34 PM  
Result Value Ref Range Lactic Acid (POC) 1.5 0.4 - 2.0 mmol/L  
POC TROPONIN-I Collection Time: 10/11/18  2:34 PM  
Result Value Ref Range Troponin-I (POC) <0.04 0.00 - 0.08 ng/mL POC CHEM8 Collection Time: 10/11/18  2:38 PM  
Result Value Ref Range Calcium, ionized (POC) 1.15 1.12 - 1.32 mmol/L  Sodium (POC) 139 136 - 145 mmol/L  
 Potassium (POC) 4.4 3.5 - 5.1 mmol/L Chloride (POC) 107 98 - 107 mmol/L  
 CO2 (POC) 23 21 - 32 mmol/L Anion gap (POC) 15 10 - 20 mmol/L Glucose (POC) 146 (H) 65 - 100 mg/dL BUN (POC) 27 (H) 9 - 20 mg/dL Creatinine (POC) 1.9 (H) 0.6 - 1.3 mg/dL GFRAA, POC 41 (L) >60 ml/min/1.73m2 GFRNA, POC 34 (L) >60 ml/min/1.73m2 Hematocrit (POC) 42 36.6 - 50.3 % Comment Comment Not Indicated. SAMPLES BEING HELD Collection Time: 10/11/18  2:39 PM  
Result Value Ref Range SAMPLES BEING HELD RED.ANN.PST.LV.SST   
 COMMENT Add-on orders for these samples will be processed based on acceptable specimen integrity and analyte stability, which may vary by analyte. LACTIC ACID Collection Time: 10/11/18  2:40 PM  
Result Value Ref Range Lactic acid 1.6 0.4 - 2.0 MMOL/L  
EKG, 12 LEAD, INITIAL Collection Time: 10/11/18  2:42 PM  
Result Value Ref Range Ventricular Rate 60 BPM  
 Atrial Rate 60 BPM  
 P-R Interval 156 ms QRS Duration 90 ms Q-T Interval 412 ms QTC Calculation (Bezet) 412 ms Calculated P Axis 13 degrees Calculated R Axis 15 degrees Calculated T Axis 46 degrees Diagnosis Normal sinus rhythm Normal ECG When compared with ECG of 10-OCT-2018 18:21, No significant change was found Confirmed by George Rice MD. ([id number]) on 10/11/2018 5:21:00 PM 
  
 
 
IMAGING COMPLETED AND REVIEWED: 
The following have been ordered and reviewed: 
 
Ct Abd Pelv Wo Cont Result Date: 10/11/2018 EXAM:  CT ABD PELV WO CONT INDICATION: abd pain?  hip pain? COMPARISON: July 30, 2012 CONTRAST:  None. TECHNIQUE: Thin axial images were obtained through the abdomen and pelvis. Coronal and sagittal reconstructions were generated. Oral contrast was not administered. CT dose reduction was achieved through use of a standardized protocol tailored for this examination and automatic exposure control for dose modulation.  The absence of intravenous contrast material reduces the sensitivity for evaluation of the solid parenchymal organs of the abdomen. FINDINGS: LUNG BASES: Clear. INCIDENTALLY IMAGED HEART AND MEDIASTINUM: Unremarkable. LIVER: No mass or biliary dilatation. GALLBLADDER: Distended. Contains probable small calculi. No wall thickening or pericholecystic fluid. Dilated common bile duct, measuring 12 mm in diameter. SPLEEN: No mass. PANCREAS: No mass or ductal dilatation. ADRENALS: Unremarkable. KIDNEYS/URETERS: No mass, calculus, or hydronephrosis. STOMACH: Small hiatal hernia. SMALL BOWEL: No dilatation or wall thickening. COLON: No dilatation or wall thickening. APPENDIX: Normal. PERITONEUM: No ascites or pneumoperitoneum. RETROPERITONEUM: No lymphadenopathy or aortic aneurysm. REPRODUCTIVE ORGANS: Normal sized prostate gland. URINARY BLADDER: No mass or calculus. BONES: No destructive bone lesion. Degenerative changes in the thoracolumbar spine. No evidence of hip fracture. ADDITIONAL COMMENTS: N/A IMPRESSION: Distended gallbladder and dilated common bile duct, with suggestion of small gallstones in the gallbladder. No visualized common bile duct calculi. Small hiatal hernia. Xr Hips Bi W Ap Pelv Result Date: 10/11/2018 EXAM:  XR HIPS BI W AP PELV INDICATION:   Altered mental status. Hip pain following ground-level fall. COMPARISON: None. FINDINGS: An AP view of the pelvis and frogleg lateral views of both hips demonstrate no fracture, dislocation or other acute abnormality. There is mild bilateral hip osteoarthritis. Atherosclerotic vascular calcifications are noted. IMPRESSION:  No acute abnormality MEDICATIONS GIVEN: 
Medications  
sodium chloride 0.9 % bolus infusion 500 mL (500 mL IntraVENous New Bag 10/11/18 4024) CLINICAL IMPRESSION: 
1. Urinary tract infection without hematuria, site unspecified 2. Acute encephalopathy 3. Gall stones Plan 1.  Admission per  [facility] 
 
 
 7:37 PM 
The patient is being admitted to the hospital.  The results of their tests and reasons for their admission have been discussed with them and/or available family. The patient/family has conveyed agreement and understanding for the need to be admitted and for their admission diagnosis. Consultation has been made with the inpatient physician specialist for hospitalization.

## 2018-10-11 NOTE — ED NOTES
Bedside and Verbal shift change report received from [facility] to [facility]. Report included the following information SBAR, ED Summary, MAR and Recent Results.

## 2018-10-12 VITALS
TEMPERATURE: 99 F | DIASTOLIC BLOOD PRESSURE: 61 MMHG | SYSTOLIC BLOOD PRESSURE: 158 MMHG | HEART RATE: 62 BPM | HEIGHT: 72 IN | WEIGHT: 200 LBS | BODY MASS INDEX: 27.09 KG/M2 | OXYGEN SATURATION: 97 % | RESPIRATION RATE: 21 BRPM

## 2018-10-12 LAB
ALBUMIN SERPL-MCNC: 2.9 G/DL (ref 3.5–5)
ALBUMIN/GLOB SERPL: 0.8 {RATIO} (ref 1.1–2.2)
ALP SERPL-CCNC: 83 U/L (ref 45–117)
ALT SERPL-CCNC: 16 U/L (ref 12–78)
ANION GAP SERPL CALC-SCNC: 9 MMOL/L (ref 5–15)
AST SERPL-CCNC: 24 U/L (ref 15–37)
BACTERIA SPEC CULT: NORMAL
BASOPHILS # BLD: 0.1 K/UL (ref 0–0.1)
BASOPHILS NFR BLD: 1 % (ref 0–1)
BILIRUB SERPL-MCNC: 0.9 MG/DL (ref 0.2–1)
BUN SERPL-MCNC: 25 MG/DL (ref 6–20)
BUN/CREAT SERPL: 12 (ref 12–20)
CALCIUM SERPL-MCNC: 8.6 MG/DL (ref 8.5–10.1)
CC UR VC: NORMAL
CHLORIDE SERPL-SCNC: 107 MMOL/L (ref 97–108)
CO2 SERPL-SCNC: 25 MMOL/L (ref 21–32)
CREAT SERPL-MCNC: 2.05 MG/DL (ref 0.7–1.3)
DIFFERENTIAL METHOD BLD: ABNORMAL
EOSINOPHIL # BLD: 0.2 K/UL (ref 0–0.4)
EOSINOPHIL NFR BLD: 3 % (ref 0–7)
ERYTHROCYTE [DISTWIDTH] IN BLOOD BY AUTOMATED COUNT: 12 % (ref 11.5–14.5)
GLOBULIN SER CALC-MCNC: 3.6 G/DL (ref 2–4)
GLUCOSE BLD STRIP.AUTO-MCNC: 169 MG/DL (ref 65–100)
GLUCOSE BLD STRIP.AUTO-MCNC: 201 MG/DL (ref 65–100)
GLUCOSE SERPL-MCNC: 114 MG/DL (ref 65–100)
HCT VFR BLD AUTO: 37.8 % (ref 36.6–50.3)
HGB BLD-MCNC: 12.5 G/DL (ref 12.1–17)
IMM GRANULOCYTES # BLD: 0 K/UL (ref 0–0.04)
IMM GRANULOCYTES NFR BLD AUTO: 0 % (ref 0–0.5)
LYMPHOCYTES # BLD: 1.5 K/UL (ref 0.8–3.5)
LYMPHOCYTES NFR BLD: 18 % (ref 12–49)
MAGNESIUM SERPL-MCNC: 2 MG/DL (ref 1.6–2.4)
MCH RBC QN AUTO: 32.2 PG (ref 26–34)
MCHC RBC AUTO-ENTMCNC: 33.1 G/DL (ref 30–36.5)
MCV RBC AUTO: 97.4 FL (ref 80–99)
MONOCYTES # BLD: 0.9 K/UL (ref 0–1)
MONOCYTES NFR BLD: 10 % (ref 5–13)
NEUTS SEG # BLD: 5.9 K/UL (ref 1.8–8)
NEUTS SEG NFR BLD: 69 % (ref 32–75)
NRBC # BLD: 0 K/UL (ref 0–0.01)
NRBC BLD-RTO: 0 PER 100 WBC
PHOSPHATE SERPL-MCNC: 3.3 MG/DL (ref 2.6–4.7)
PLATELET # BLD AUTO: 193 K/UL (ref 150–400)
PMV BLD AUTO: 9.7 FL (ref 8.9–12.9)
POTASSIUM SERPL-SCNC: 4.6 MMOL/L (ref 3.5–5.1)
PROT SERPL-MCNC: 6.5 G/DL (ref 6.4–8.2)
RBC # BLD AUTO: 3.88 M/UL (ref 4.1–5.7)
SERVICE CMNT-IMP: ABNORMAL
SERVICE CMNT-IMP: ABNORMAL
SERVICE CMNT-IMP: NORMAL
SODIUM SERPL-SCNC: 141 MMOL/L (ref 136–145)
WBC # BLD AUTO: 8.6 K/UL (ref 4.1–11.1)

## 2018-10-12 PROCEDURE — 96372 THER/PROPH/DIAG INJ SC/IM: CPT

## 2018-10-12 PROCEDURE — G8978 MOBILITY CURRENT STATUS: HCPCS

## 2018-10-12 PROCEDURE — 36415 COLL VENOUS BLD VENIPUNCTURE: CPT | Performed by: FAMILY MEDICINE

## 2018-10-12 PROCEDURE — 84100 ASSAY OF PHOSPHORUS: CPT | Performed by: FAMILY MEDICINE

## 2018-10-12 PROCEDURE — 94760 N-INVAS EAR/PLS OXIMETRY 1: CPT

## 2018-10-12 PROCEDURE — 97165 OT EVAL LOW COMPLEX 30 MIN: CPT

## 2018-10-12 PROCEDURE — 85025 COMPLETE CBC W/AUTO DIFF WBC: CPT | Performed by: FAMILY MEDICINE

## 2018-10-12 PROCEDURE — 83735 ASSAY OF MAGNESIUM: CPT | Performed by: FAMILY MEDICINE

## 2018-10-12 PROCEDURE — 99218 HC RM OBSERVATION: CPT

## 2018-10-12 PROCEDURE — 82962 GLUCOSE BLOOD TEST: CPT

## 2018-10-12 PROCEDURE — 74011250636 HC RX REV CODE- 250/636: Performed by: STUDENT IN AN ORGANIZED HEALTH CARE EDUCATION/TRAINING PROGRAM

## 2018-10-12 PROCEDURE — 51798 US URINE CAPACITY MEASURE: CPT

## 2018-10-12 PROCEDURE — 97116 GAIT TRAINING THERAPY: CPT

## 2018-10-12 PROCEDURE — 97530 THERAPEUTIC ACTIVITIES: CPT

## 2018-10-12 PROCEDURE — G8979 MOBILITY GOAL STATUS: HCPCS

## 2018-10-12 PROCEDURE — 97535 SELF CARE MNGMENT TRAINING: CPT

## 2018-10-12 PROCEDURE — 74011250637 HC RX REV CODE- 250/637: Performed by: STUDENT IN AN ORGANIZED HEALTH CARE EDUCATION/TRAINING PROGRAM

## 2018-10-12 PROCEDURE — 65390000012 HC CONDITION CODE 44 OBSERVATION

## 2018-10-12 PROCEDURE — 74011636637 HC RX REV CODE- 636/637: Performed by: STUDENT IN AN ORGANIZED HEALTH CARE EDUCATION/TRAINING PROGRAM

## 2018-10-12 PROCEDURE — 80053 COMPREHEN METABOLIC PANEL: CPT | Performed by: FAMILY MEDICINE

## 2018-10-12 PROCEDURE — 97161 PT EVAL LOW COMPLEX 20 MIN: CPT

## 2018-10-12 RX ADMIN — HEPARIN SODIUM 5000 UNITS: 5000 INJECTION INTRAVENOUS; SUBCUTANEOUS at 05:53

## 2018-10-12 RX ADMIN — Medication 10 ML: at 05:44

## 2018-10-12 RX ADMIN — LOPERAMIDE HYDROCHLORIDE 2 MG: 2 CAPSULE ORAL at 08:57

## 2018-10-12 RX ADMIN — CETIRIZINE HYDROCHLORIDE 10 MG: 10 TABLET, FILM COATED ORAL at 08:57

## 2018-10-12 RX ADMIN — LEVOTHYROXINE, LIOTHYRONINE 120 MG: 19; 4.5 TABLET ORAL at 08:56

## 2018-10-12 RX ADMIN — LOSARTAN POTASSIUM 25 MG: 50 TABLET ORAL at 08:56

## 2018-10-12 RX ADMIN — INSULIN LISPRO 3 UNITS: 100 INJECTION, SOLUTION INTRAVENOUS; SUBCUTANEOUS at 08:55

## 2018-10-12 RX ADMIN — Medication 10 ML: at 14:00

## 2018-10-12 RX ADMIN — FERROUS SULFATE TAB 325 MG (65 MG ELEMENTAL FE) 325 MG: 325 (65 FE) TAB at 08:57

## 2018-10-12 RX ADMIN — FUROSEMIDE 20 MG: 20 TABLET ORAL at 08:56

## 2018-10-12 RX ADMIN — PANTOPRAZOLE SODIUM 40 MG: 40 TABLET, DELAYED RELEASE ORAL at 08:57

## 2018-10-12 RX ADMIN — INSULIN LISPRO 2 UNITS: 100 INJECTION, SOLUTION INTRAVENOUS; SUBCUTANEOUS at 11:48

## 2018-10-12 RX ADMIN — HEPARIN SODIUM 5000 UNITS: 5000 INJECTION INTRAVENOUS; SUBCUTANEOUS at 12:37

## 2018-10-12 RX ADMIN — ASPIRIN 81 MG: 81 TABLET, COATED ORAL at 08:57

## 2018-10-12 NOTE — DISCHARGE INSTRUCTIONS
HOME DISCHARGE INSTRUCTIONS    Dottie Corona. / [id number] : 3/8/1931    Admission date: 10/11/2018 Discharge date: 10/12/2018     Please bring this form with you to show your care provider at your follow-up appointment. Primary care provider:  Juliann Buitrago MD    Discharging provider:  Ratna Parks MD  - Family Medicine Resident  Melissa Cleveland MD - Attending, Family Medicine     You have been admitted to the hospital with the following diagnoses:    ACUTE DIAGNOSES:  · Delirium  · Urinary tract infection  . . . . . . . . . . . . . . . . . . . . . . . . . . . . . . . . . . . . . . . . . . . . . . . . . . . . . . . . . . . . . . . . . . . . . . . [place] FOLLOW-UP CARE RECOMMENDATIONS:    Medication changes:     -Stop taking the ceftin antibiotic, you do not have a urinary tract infection   -Continue to take your aricept to help your memory         Appointments: Listed on page 2 of these documents. Follow-up tests needed: CMP    Pending test results: At the time of your discharge the following test results are still pending: Blood cultures   Please make sure you review these results with your outpatient follow-up provider(s). Specific symptoms to watch for: chest pain, shortness of breath, fever, chills, nausea, vomiting, diarrhea, change in mentation, falling, weakness, bleeding. DIET/what to eat: Diabetic Diet    ACTIVITY:  Activity as tolerated    Wound care: none    Equipment needed:  None     What to do if new or unexpected symptoms occur? If you experience any of the above symptoms (or should other concerns or questions arise after discharge) please call your primary care physician. Return to the emergency room if you cannot get hold of your doctor. · It is very important that you keep your follow-up appointment(s).   · Please bring discharge papers, medication list (and/or medication bottles) to your follow-up appointments for review by your outpatient provider(s). · Please check the list of medications and be sure it includes every medication (even non-prescription medications) that your provider wants you to take. · It is important that you take the medication exactly as they are prescribed. · Keep your medication in the bottles provided by the pharmacist and keep a list of the medication names, dosages, and times to be taken in your wallet. · Do not take other medications without consulting your doctor. · If you have any questions about your medications or other instructions, please talk to your nurse or care provider before you leave the hospital.     Information obtained by:     I understand that if any problems occur once I am at home I am to contact my physician. These instructions were explained to me and I had the opportunity to ask questions. I understand and acknowledge receipt of the instructions indicated above.                                                                                                                                                Physician's or R.N.'s Signature                                                                  Date/Time                                                                                                                                              Patient or Representative Signature                                                          Date/Time          Follow-up Information     Follow up With Details Comments [facility], [address] [place] [address]    Carmen Man MD On 10/15/2018 10AM - For hospital follow up [address]  [address] [place] [phone]

## 2018-10-12 NOTE — PROGRESS NOTES
[id number] [facility] Resident Admission Note DATE: 10/11/2018 Chief Complain: AMS History of Present Illness: Hannah Infante is a 80 y.o. male with Hx of Dementia, UTI (Dx on 10/10/18 Tx with Ceftin 500 mg BID, prescribed for 7 days of treatment), HTN, NIDDM2, CKD-3B, GERD, Vit D Def, who presents to the brought by daughter to ER due to AMS. Presentation started on 10/10/18 with slight slurred speech and confusion. Today morning, patient was found in the floor, but it was not clear for family member if patient had a fall or if he position himself to the floor as patient's AMS was worsen. Later in the morning patient started to complain about pelvic and hip pain, for he was brought to the ED. Daughter reports previous episodes of similar AMS with prior UTI's. In the ED patient showed Cr 1.9 (at Baseline), on 10/10/18 Cr 2.45, Glu 214, UA: Trace LE, WBC 20-50, Protein 300, Glucose 100. UDS (-). CT head was negative for acute changes on both 10/10/18 (first ED visit) and on 10/11/18 (second visit, and after fall), CXR on 10/10/18 showed no acute abnormalities. CT abd showed distended gallbladder and dilated common bile duct, with suggestion of small gallstones in the gallbladder. No visualized common bile duct calculi. On the physical exam patient was alert and oriented to person and place, was able to follow commands and answer questions with notable slurred speech. Chest and abdominal exam unremarkable. Patient is admitted for Delirium and UTI. Chart reviewed. Vital Signs Visit Vitals  /63  Pulse 62  Temp 97.8 °F (36.6 °C)  Resp 20  
 Ht 6' (1.829 m)  Wt 200 lb (90.7 kg)  SpO2 97%  BMI 27.12 kg/m2 Physical Exam 
Constitutional: Well-developed, well-nourished, and in no distress. HENT:  
Head: Normocephalic and atraumatic.   
Right Ear: External ear normal.  
Left Ear: External ear normal.  
Nose: Nose normal.  
 Mouth/Throat: Oropharynx is clear and moist. No oropharyngeal erythema, no exudate. Eyes: Conjunctivae and EOM are normal. Pupils are equal, round, and reactive to light. Right eye exhibits no discharge. Left eye exhibits no discharge. No scleral icterus. Neck: Normal range of motion. Neck supple. No JVD present. No tracheal deviation present. No thyromegaly present. Lymphadenopathy: No cervical adenopathy. Cardiovascular: Normal rate, regular rhythm, normal heart sounds and intact distal pulses. Exam reveals no gallop and no friction rub. No murmur heard. Pulmonary/Chest: Normal breathing effort. CTA bilaterally, no wheezing, crackles, or rhonchi. Abdominal: Soft. Bowel sounds are normal. No distension and no mass. There is no tenderness. There is no rebound and no guarding. Musculoskeletal: Normal range of motion. Exhibits no edema, tenderness or deformity. Neurological: Alert and oriented to person and place. Slurred speech, no other gross focal abnormalities. Skin: Skin is warm and dry. No rash noted. Not diaphoretic. No erythema. No pallor. Laboratory Results Recent Results (from the past 12 hour(s)) GLUCOSE, POC Collection Time: 10/11/18  2:27 PM  
Result Value Ref Range Glucose (POC) 130 (H) 65 - 100 mg/dL Performed by Linda Pacheco ([facility]) POC LACTIC ACID Collection Time: 10/11/18  2:34 PM  
Result Value Ref Range Lactic Acid (POC) 1.5 0.4 - 2.0 mmol/L  
POC TROPONIN-I Collection Time: 10/11/18  2:34 PM  
Result Value Ref Range Troponin-I (POC) <0.04 0.00 - 0.08 ng/mL POC CHEM8 Collection Time: 10/11/18  2:38 PM  
Result Value Ref Range Calcium, ionized (POC) 1.15 1.12 - 1.32 mmol/L Sodium (POC) 139 136 - 145 mmol/L Potassium (POC) 4.4 3.5 - 5.1 mmol/L Chloride (POC) 107 98 - 107 mmol/L  
 CO2 (POC) 23 21 - 32 mmol/L Anion gap (POC) 15 10 - 20 mmol/L Glucose (POC) 146 (H) 65 - 100 mg/dL BUN (POC) 27 (H) 9 - 20 mg/dL Creatinine (POC) 1.9 (H) 0.6 - 1.3 mg/dL GFRAA, POC 41 (L) >60 ml/min/1.73m2 GFRNA, POC 34 (L) >60 ml/min/1.73m2 Hematocrit (POC) 42 36.6 - 50.3 % Comment Comment Not Indicated. SAMPLES BEING HELD Collection Time: 10/11/18  2:39 PM  
Result Value Ref Range SAMPLES BEING HELD RED.ANN.PST.LV.SST   
 COMMENT Add-on orders for these samples will be processed based on acceptable specimen integrity and analyte stability, which may vary by analyte. HEPATIC FUNCTION PANEL Collection Time: 10/11/18  2:39 PM  
Result Value Ref Range Protein, total 6.8 6.4 - 8.2 g/dL Albumin 3.4 (L) 3.5 - 5.0 g/dL Globulin 3.4 2.0 - 4.0 g/dL A-G Ratio 1.0 (L) 1.1 - 2.2 Bilirubin, total 1.0 0.2 - 1.0 MG/DL Bilirubin, direct 0.2 0.0 - 0.2 MG/DL Alk. phosphatase 90 45 - 117 U/L  
 AST (SGOT) 25 15 - 37 U/L  
 ALT (SGPT) 17 12 - 78 U/L  
LACTIC ACID Collection Time: 10/11/18  2:40 PM  
Result Value Ref Range Lactic acid 1.6 0.4 - 2.0 MMOL/L  
EKG, 12 LEAD, INITIAL Collection Time: 10/11/18  2:42 PM  
Result Value Ref Range Ventricular Rate 60 BPM  
 Atrial Rate 60 BPM  
 P-R Interval 156 ms QRS Duration 90 ms Q-T Interval 412 ms QTC Calculation (Bezet) 412 ms Calculated P Axis 13 degrees Calculated R Axis 15 degrees Calculated T Axis 46 degrees Diagnosis Normal sinus rhythm Normal ECG When compared with ECG of 10-OCT-2018 18:21, No significant change was found Confirmed by Teena Khanna MD., [employer] ([id number]) on 10/11/2018 5:21:00 PM 
  
 
 
Imaging Results XR Results: 
 
Results from Hospital Encounter encounter on 10/11/18 XR HIPS BI W AP PELV Narrative EXAM:  XR HIPS BI W AP PELV 
 
INDICATION:   Altered mental status. Hip pain following ground-level fall. COMPARISON: None. FINDINGS: An AP view of the pelvis and frogleg lateral views of both hips 
demonstrate no fracture, dislocation or other acute abnormality. There is mild bilateral hip osteoarthritis. Atherosclerotic vascular calcifications are noted. Impression IMPRESSION:  No acute abnormality CT Results: 
 
Results from Hospital Encounter encounter on 10/11/18 CT ABD PELV WO CONT Narrative EXAM:  CT ABD PELV WO CONT INDICATION: abd pain?  hip pain? COMPARISON: July 30, 2012 CONTRAST:  None. TECHNIQUE:  
Thin axial images were obtained through the abdomen and pelvis. Coronal and 
sagittal reconstructions were generated. Oral contrast was not administered. CT 
dose reduction was achieved through use of a standardized protocol tailored for 
this examination and automatic exposure control for dose modulation. The absence of intravenous contrast material reduces the sensitivity for 
evaluation of the solid parenchymal organs of the abdomen. FINDINGS:  
LUNG BASES: Clear. INCIDENTALLY IMAGED HEART AND MEDIASTINUM: Unremarkable. LIVER: No mass or biliary dilatation. GALLBLADDER: Distended. Contains probable small calculi. No wall thickening or 
pericholecystic fluid. Dilated common bile duct, measuring 12 mm in diameter. SPLEEN: No mass. PANCREAS: No mass or ductal dilatation. ADRENALS: Unremarkable. KIDNEYS/URETERS: No mass, calculus, or hydronephrosis. STOMACH: Small hiatal hernia. SMALL BOWEL: No dilatation or wall thickening. COLON: No dilatation or wall thickening. APPENDIX: Normal. 
PERITONEUM: No ascites or pneumoperitoneum. RETROPERITONEUM: No lymphadenopathy or aortic aneurysm. REPRODUCTIVE ORGANS: Normal sized prostate gland. URINARY BLADDER: No mass or calculus. BONES: No destructive bone lesion. Degenerative changes in the thoracolumbar 
spine. No evidence of hip fracture. ADDITIONAL COMMENTS: N/A Impression IMPRESSION: 
Distended gallbladder and dilated common bile duct, with suggestion of small 
gallstones in the gallbladder. No visualized common bile duct calculi. Small 
hiatal hernia. Assessment and Plan Symptoms likely d/t delirium 2/2 UTI. Patient improving as per daughter. Patient able to follow commands and answer questions, but with notable slurred speech. Will treat with IV Abx Rocephin 1 g IV daily and follow up BCx and UCx. Will follow up on CBD dilation on CT Abd wit daily CMP, will consider General Surgery evaluation. Patient seen, examined, and discussed with Dr. Brigette Schroeder (PGY-1). For the remaining assessment and plan of other medical problems please refer to Dr. Gilbert Chamorro H&P for more details. Pt discussed with on-call attending physician Brisa Welch MD 
PGY-3 Family Medicine Resident DATE: 10/11/2018 Patient Active Problem List  
Diagnosis Code  Diabetes mellitus type 2, controlled ([facility] E11.9  
 Essential hypertension, benign I10  
 Mixed hyperlipidemia E78.2  Allergic rhinitis due to other allergen J30.89  Esophageal reflux K21.9  Microalbuminuria R80.9  Acquired hypothyroidism E03.9  Pernicious anemia D51.0  Colon cancer ([facility] C18.9  
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

## 2018-10-12 NOTE — PROGRESS NOTES
Problem: Self Care Deficits Care Plan (Adult) Goal: *Acute Goals and Plan of Care (Insert Text) Occupational Therapy Goals Initiated 10/12/2018 1. Patient will perform lower body dressing with supervision/set-up within 7 day(s). 2.  Patient will perform grooming with supervision/set-up within 7 day(s). 3.  Patient will perform toilet transfers with supervision/set-up within 7 day(s). 4.  Patient will perform all aspects of toileting with supervision/set-up within 7 day(s). 5.  Patient will participate in upper extremity therapeutic exercise/activities with supervision/set-up for 10 minutes within 7 day(s). Occupational Therapy EVALUATION Patient: Low Morillo (80 y.o. male) Date: 10/12/2018 Primary Diagnosis: Delirium Urinary tract infection Delirium Precautions:   WBAT, Fall ASSESSMENT : 
Based on the objective data described below, the patient presents with hospital admission secondary to delirium and UTI. Patient received sitting in bedside chair, agreeable to OT, no family present. Patient agreeable to ambulate to bathroom with min assist and support of hand from OT. Patient performs transfers with min assist, and stands at sink for hand hygiene with min assist for balance. Patient requires verbal cues to use soap to wash hands, and directional cues to locate paper towels to dry hands. Patient returned to chair at end of session and left in NAD. He is somewhat confused regarding current situation. He reports daughter stays with him some times but not every night. Patient will require 24hr supervision for safety if he is to return home. If family cannot provide, recommend SNF at discharge. Patient will benefit from skilled intervention to address the above impairments. Patients rehabilitation potential is considered to be Good Factors which may influence rehabilitation potential include:  
[]             None noted [x]             Mental ability/status []             Medical condition []             Home/family situation and support systems [x]             Safety awareness []             Pain tolerance/management 
[]             Other: PLAN : 
Recommendations and Planned Interventions: 
[x]               Self Care Training                  [x]        Therapeutic Activities [x]               Functional Mobility Training    []        Cognitive Retraining 
[x]               Therapeutic Exercises           [x]        Endurance Activities [x]               Balance Training                   []        Neuromuscular Re-Education []               Visual/Perceptual Training     [x]   Home Safety Training 
[x]               Patient Education                 [x]        Family Training/Education []               Other (comment): Frequency/Duration: Patient will be followed by occupational therapy 3 times a week to address goals. Discharge Recommendations: Home Health with 24hr supervision vs [facility] Further Equipment Recommendations for Discharge: TBD SUBJECTIVE:  
Patient stated Well I guess we can do that.  OBJECTIVE DATA SUMMARY:  
HISTORY:  
Past Medical History:  
Diagnosis Date  WILTON (acute kidney injury) ([facility] 8/2/2014  Cancer [facility])   
 colon  Diabetes ([facility] 2008  
 type 2  Dyslipidemia  Esophageal reflux  Esophageal reflux 2/9/2012  Hypertension 2008  Mixed hyperlipidemia 2/9/2012  Other ill-defined conditions(799.89) broken neck 2012  Pernicious anemia 7/31/2012  Psychiatric disorder   
 dementia  Unspecified hypothyroidism 4/26/2012 Past Surgical History:  
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    
 HX HERNIA REPAIR Bilateral inguinal  
 HX OTHER SURGICAL    
 colon resection  HX TONSIL AND ADENOIDECTOMY  HX TONSILLECTOMY  1936 Prior Level of Function/Environment/Context:  
 Occupations in which the patient is/was successful, what are the barriers preventing that success:  
Performance Patterns (routines, roles, habits, and rituals):  
Personal Interests and/or values:  
Expanded or extensive additional review of patient history:  
 
Home Situation Home Environment: Private residence # Steps to Enter: 5 Rails to Enter: Yes Hand Rails : Right Wheelchair Ramp: No 
One/Two Story Residence: One story Living Alone: No 
Support Systems:  (family- intermitternt (work during the day)) Patient Expects to be Discharged to[de-identified] Private residence Current DME Used/Available at Home: Cane, straight, Walker, rolling Tub or Shower Type: Shower Hand dominance: Right EXAMINATION OF PERFORMANCE DEFICITS: 
Cognitive/Behavioral Status: 
Neurologic State: Alert Orientation Level: Oriented to person;Oriented to place Cognition: Follows commands Perception: Appears intact Perseveration: No perseveration noted Safety/Judgement: Decreased awareness of need for safety Skin: intact as seen Edema: none noted Hearing: Auditory Auditory Impairment: Hard of hearing, bilateral 
 
Vision/Perceptual:   
    
    
    
  
    
    
  
 
Range of Motion: 
AROM: Within functional limits PROM: Within functional limits Strength: 
Strength: Generally decreased, functional 
  
  
  
  
 
Coordination: 
Coordination: Generally decreased, functional 
    
  
 
Tone & Sensation: 
Tone: Normal 
Sensation: Intact Balance: 
Sitting: Intact Standing: Impaired Standing - Static: Constant support Standing - Dynamic : Poor Functional Mobility and Transfers for ADLs: 
Bed Mobility: 
Rolling: Minimum assistance Supine to Sit: Minimum assistance Transfers: 
Sit to Stand: Contact guard assistance Stand to Sit: Contact guard assistance Bed to Chair: Contact guard assistance Bathroom Mobility: Minimum assistance Toilet Transfer : Minimum assistance ADL Assessment: 
Feeding: Supervision Oral Facial Hygiene/Grooming: Minimum assistance (standing) Bathing: Minimum assistance (seated) Upper Body Dressing: Supervision Lower Body Dressing: Minimum assistance Toileting: Minimum assistance ADL Intervention and task modifications: 
  
 
  
 
  
 
  
 
  
 
  
 
  
 
Cognitive Retraining Safety/Judgement: Decreased awareness of need for safety Therapeutic Exercise: 
  
Functional Measure: 
Barthel Index: 
 
Bathin Bladder: 10 Bowels: 10 
Groomin Dressin Feeding: 10 Mobility: 10 Stairs: 5 Toilet Use: 10 Transfer (Bed to Chair and Back): 10 Total: 75 Barthel and G-code impairment scale: 
Percentage of impairment CH 
0% CI 
1-19% CJ 
20-39% CK 
40-59% CL 
60-79% CM 
80-99% CN 
100% Barthel Score 0-100 100 99-80 79-60 59-40 20-39 1-19 
 0 Barthel Score 0-20 20 17-19 13-16 9-12 5-8 1-4 0 The Barthel ADL Index: Guidelines 1. The index should be used as a record of what a patient does, not as a record of what a patient could do. 2. The main aim is to establish degree of independence from any help, physical or verbal, however minor and for whatever reason. 3. The need for supervision renders the patient not independent. 4. A patient's performance should be established using the best available evidence. Asking the patient, friends/relatives and nurses are the usual sources, but direct observation and common sense are also important. However direct testing is not needed. 5. Usually the patient's performance over the preceding 24-48 hours is important, but occasionally longer periods will be relevant. 6. Middle categories imply that the patient supplies over 50 per cent of the effort. 7. Use of aids to be independent is allowed. Tavares Jean-Baptiste., Barthel, D.W. ([id number]). Functional evaluation: the Barthel Index. [facility] (14)2.  
TYLER Graff, Ludy Mendosa., Xiomara Burnette., Azar Mora. (1999). Measuring the change indisability after inpatient rehabilitation; comparison of the responsiveness of the Barthel Index and Functional [place] Measure. Journal of Neurology, Neurosurgery, and Psychiatry, 66(4), 871-898. HADLEY Small, MANJINDER Ribeiro, & Mario Luciano M.A. (2004.) Assessment of post-stroke quality of life in cost-effectiveness studies: The usefulness of the Barthel Index and the EuroQoL-5D. [facility], 13, 770-33 G codes: In compliance with CMSs Claims Based Outcome Reporting, the following G-code set was chosen for this patient based on their primary functional limitation being treated: The outcome measure chosen to determine the severity of the functional limitation was the Barthel Index with a score of 75/100 which was correlated with the impairment scale. ? Self Care:  
  - CURRENT STATUS: CJ - 20%-39% impaired, limited or restricted  - GOAL STATUS: CI - 1%-19% impaired, limited or restricted  - D/C STATUS:  ---------------To be determined--------------- Occupational Therapy Evaluation Charge Determination History Examination Decision-Making LOW Complexity : Brief history review  LOW Complexity : 1-3 performance deficits relating to physical, cognitive , or psychosocial skils that result in activity limitations and / or participation restrictions  LOW Complexity : No comorbidities that affect functional and no verbal or physical assistance needed to complete eval tasks Based on the above components, the patient evaluation is determined to be of the following complexity level: LOW Pain: 
Pain Scale 1: Numeric (0 - 10) Pain Intensity 1: 0 Activity Tolerance: VSS Please refer to the flowsheet for vital signs taken during this treatment. After treatment:  
[x] Patient left in no apparent distress sitting up in chair 
[] Patient left in no apparent distress in bed [x] Call bell left within reach [x] Nursing notified 
[] Caregiver present [x] Bed alarm activated COMMUNICATION/EDUCATION:  
The patients plan of care was discussed with: Physical Therapist and Registered Nurse. [x] Home safety education was provided and the patient/caregiver indicated understanding. [x] Patient/family have participated as able in goal setting and plan of care. [x] Patient/family agree to work toward stated goals and plan of care. [] Patient understands intent and goals of therapy, but is neutral about his/her participation. [] Patient is unable to participate in goal setting and plan of care. This patients plan of care is appropriate for delegation to [facility]. Thank you for this referral. 
Sparkle Joseph, OTR/L Time Calculation: 26 mins

## 2018-10-12 NOTE — ED NOTES
Spoke with Amaya Moreno in lab. States that salicylate, acetaminophen, and ethyl alcohol will be run as add ons.

## 2018-10-12 NOTE — PROGRESS NOTES
Jake Herrera [address] Office [phone] Fax [phone] Assessment and Plan Srikanth P[place]. is a 80 y.o. male with a PMH significant for dementia, dyslipidemia, DM II with neuropathy, esophageal reflux, HTN, precancerous colonic polyps s/p bowel resection, mixed hyperlipdemia, hypthyroidism, Vit D def and iron def anemia Overnight Events:  
 
- CT head did not show any acute intracranial abnormality - Acetaminophen & Salicylate levels: normal & UDS neg - Did not receive any Lispro overnight - Labs: WBC wnl at 8.6, awaiting Mg &Phos & LFTs Acute Problems:  
  
Delirium possibly secondary to UTI with recent fall: Urinalysis shows trace LE, No Nitrites & No Bacteria. 
- Rocephin 1 g q24h- reevaluate after Ucx return - CT head ordered to r/o neurologic cause- No acute intracranial abnormality  
- CT Abd showed distended gallbladder and dilated common bile duct with no stones in CBD · Awaiting LFTs (LFTs on 10/10 were wnl) - Bcx ordered and Ucx from 10/10 - awaiting results - Acetaminophen & Salicylate levels- normal 
- UDS ordered- negative - Mg and Phos- awaiting results  
- Daily CBC and CMP  
  
Chronic Problems:  
  
Possible underlying Dementia: 
- Continue Aricept 10 mg daily  
  
Hypothyroidism: TSH: 6.210 on 10/3 
- Continue Pork Thyroid 120 mg  
  
CKD III: Cr today 1.9, (BL: 1.8-2.0) - Avoid nephrotoxic drugs - Monitor with daily CMP  
  
Hypertension: - BP on admission 167/66. At this time, 156/62  
- Continuing home medications of Losartan 25 mg, Amlodipine 5 mg & Lasix 20 mg  
- Will continue to monitor at this time and readjust as BP's trend. 
  
DMII: A1c 7.9 (01/2018) - Holding home medications of Glimepride 4 mg daily & Tradjenta 5 mg - Lispro SS with meals with ACHS POC Gluc checks · Did not receive any SS overnight  
- Hypoglycemia protocols ordered. 
  
GERD:  
- Continue home lansoprazole 30 mg daily  
  
 Iron Deficiency Anemia: Hgb on 10/10: 13.6  
- Continue home ferrous sulfate 325 mg daily  
  
CAD: 
- Continue home Aspirin 81 mg  
  
Diarrhea s/p partial bowel resection due to precancerous polyps:  
- Continue home Loperamide 2 mg BID 
  
FEN/GI - Diabetic diet. Activity - Ambulate with assistance DVT prophylaxis - Sub Q Heparin GI prophylaxis - Lansoprazole 30 mg home med Fall prophylaxis - Fall precautions ordered. Disposition - Admit to Medical. Plan to d/c to Home. Code Status - DNR, discussed with patient / caregivers. I appreciate the opportunity to participate in the care of this patient, Alli Altamirano MD 
Family Medicine Resident Subjective / Objective Subjective Denies any pelvic or suprapubic pain this morning Temp (24hrs), Av.9 °F (36.6 °C), Min:97.4 °F (36.3 °C), Max:98.6 °F (37 °C) Objective Respiratory:   O2 Device: Room air Visit Vitals  /62 (BP 1 Location: Right arm, BP Patient Position: At rest)  Pulse (!) 54  Temp 97.4 °F (36.3 °C)  Resp 22  
 Ht 6' (1.829 m)  Wt 200 lb (90.7 kg)  SpO2 98%  BMI 27.12 kg/m2 General: No acute distress. Alert. Cooperative. Head: Normocephalic. Atraumatic. Neck: Supple. Normal ROM. No stiffness. Respiratory: CTAB. No w/r/r/c.  
Cardiovascular: RRR. Normal S1,S2. No m/r/g. Pulses 2+ throughout. GI: 
  
:  + bowel sounds. Nontender. No rebound tenderness or guarding. Nondistended. No suprapubic tenderness Musculoskeletal: Full ROM in all extremities. Trace LE edema. Distal pulses intact. No Pelvic tenderness Skin: Warm, dry. No rashes. Neuro: CN II-XII grossly intact. Alert and Oriented to person and place I/O: 
 
Date 10/11/18 0700 - 10/12/18 8798 10/12/18 0700 - 10/13/18 1979 Shift [id number] 24 Hour Total  24 Hour Total  
I 
N 
T 
A 
K 
E Shift Total 
(mL/kg) O 
U T 
P 
U Steven Bustard Urine (mL/kg/hr) Urine Occurrence(s)  1 x 1 x Stool Stool Occurrence(s)  1 x 1 x Shift Total 
(mL/kg) NET Weight (kg) 90.7 90.7 90.7 90.7 90.7 90.7 CBC: 
Recent Labs 10/10/18 
 1825 WBC  8.8 HGB  13.6 HCT  41.0  
PLT  208 Metabolic Panel: 
Recent Labs 10/11/18 
 1439  10/10/18 
 1825 NA   --   139 K   --   4.5  
CL   --   104 CO2   --   28 BUN   --   29* CREA   --   2.45* GLU   --   214* CA   --   8.5 ALB  3.4*  3.2* SGOT  25  13* ALT  17  18 For Billing Chief Complaint Patient presents with  Altered mental status Hospital Problems  Date Reviewed: 8/13/2018 Codes Class Noted POA Delirium ICD-10-CM: R41.0 ICD-9-CM: 780.09  10/11/2018 Unknown Urinary tract infection ICD-10-CM: N39.0 ICD-9-CM: 599.0  10/11/2018 Unknown

## 2018-10-12 NOTE — ED NOTES
TRANSFER - OUT REPORT: 
 
Verbal report given to [facility] (name) on Sujata Reyes.  being transferred to [facility] (unit) for routine progression of care Report consisted of patients Situation, Background, Assessment and  
Recommendations(SBAR). Information from the following report(s) SBAR, ED Summary, [facility] and Recent Results was reviewed with the receiving nurse. Lines:  
Peripheral IV 10/11/18 Left Wrist (Active) Site Assessment Clean, dry, & intact 10/11/2018  2:37 PM  
Phlebitis Assessment 0 10/11/2018  2:37 PM  
Infiltration Assessment 0 10/11/2018  2:37 PM  
Dressing Status Clean, dry, & intact 10/11/2018  2:37 PM  
Dressing Type Transparent 10/11/2018  2:37 PM  
Hub Color/Line Status Green 10/11/2018  2:37 PM  
  
 
Opportunity for questions and clarification was provided. Patient transported with: 
 [employer]

## 2018-10-12 NOTE — PROGRESS NOTES
TRANSFER - IN REPORT: 
 
Verbal report received from [facility], [address] (name) on [facility].  being received from ED(unit) for routine progression of care Report consisted of patients Situation, Background, Assessment and  
Recommendations(SBAR). Information from the following report(s) SBAR was reviewed with the receiving nurse. Opportunity for questions and clarification was provided. Assessment completed upon patients arrival to unit and care assumed.

## 2018-10-12 NOTE — PROGRESS NOTES
[facility]: MED RECONCILIATION Comments/Recommendations: Of note patient was supposed to start cefuroxime today and waas admitted prior to time dose was due, family members also report jaden thyroid dose has recently increased to 120 mg. Patient takes Vitamin D2 on Fridays Medications added:  
 
· none Medications removed: · cefuroxime Medications adjusted: 
 
· none Information obtained from: family member Significant PMH/Disease States:  
Past Medical History:  
Diagnosis Date  WILTON (acute kidney injury) ([facility] 8/2/2014  Cancer [facility])   
 colon  Diabetes ([facility] 2008  
 type 2  Dyslipidemia  Esophageal reflux  Esophageal reflux 2/9/2012  Hypertension 2008  Mixed hyperlipidemia 2/9/2012  Other ill-defined conditions(799.89) broken neck 2012  Pernicious anemia 7/31/2012  Psychiatric disorder   
 dementia  Unspecified hypothyroidism 4/26/2012 Chief Complaint for this Admission: Chief Complaint Patient presents with  Altered mental status Allergies: Synthroid [levothyroxine] Prior to Admission Medications:  
 
Medication Documentation Review Audit Reviewed by JULIANNA WilkinsD (Pharmacist) on 10/11/18 at 2047 Medication Sig Documenting Provider Last Dose Status Taking? amLODIPine (NORVASC) 10 mg tablet TAKE 1 TABLET EVERY DAY FOR HYPERTENSION  (INCREASE  IN  DOSE  TO  24TN) Nadia Miller MD 58/24/5899 AM Active Yes  
 aspirin delayed-release 81 mg tablet Take 81 mg by mouth daily. Historical Provider 10/10/2018 AM Active Yes  
 cetirizine (ZYRTEC) 10 mg tablet TAKE 1 TABLET EVERY DAY AS NEEDED FOR ALLERGIES Nadia Miller MD 94/09/2226 AM Active Yes  
 donepezil (ARICEPT) 10 mg tablet TAKE 1 TABLET EVERY NIGHT Nadia Miller MD 89/81/9774 PM Active Yes  
 ferrous sulfate (IRON) 325 mg (65 mg iron) tablet Take 1 Tab by mouth Daily (before breakfast). Nadia Miller MD 04/65/8969 AM Active Yes furosemide (LASIX) 20 mg tablet TAKE 1 TABLET EVERY DAY AS DIRECTED Bina Gonsales MD  Active No  
 glimepiride (AMARYL) 4 mg tablet TAKE 1 TABLET EVERY MORNING Bina Gonsales MD 35/14/9214 AM Active Yes  
 lansoprazole (PREVACID) 30 mg capsule TAKE 1 CAPSULE EVERY DAY  BEFORE  BREAKFAST Bina Gonsales MD 09/02/0646 AM Active Yes  
 loperamide (IMODIUM) 2 mg capsule TAKE 1 CAPSULE BEFORE BREAKFAST, LUNCH, DINNER AND AT BEDTIME FOR DIARRHEA Patient taking differently:  TAKE 1 CAPSULE BEFORE BREAKFAST, LUNCH, DINNER AND AT BEDTIME FOR DIARRHEA  - taking twice a day Bina Gonsales MD 76/73/6975 PM Active Yes  
 losartan (COZAAR) 25 mg tablet Take 1 Tab by mouth daily. Bina Gonsales MD 90/31/5785 AM Active Yes  
 multivitamins-minerals-lutein (CENTRUM SILVER) tab tablet Take 1 Tab by mouth daily. Historical Provider 10/10/2018 AM Active Yes  
 nicotinic acid (NIACIN) 500 mg tablet Take 500 mg by mouth Daily (before breakfast). Historical Provider 10/10/2018 AM Active Yes Thyroid, Pork, (ARMOUR THYROID) 120 mg tab Take 1 Tab by mouth daily. Bina Gonsales MD  Active Yes TRADJENTA 5 mg tablet TAKE 1 TAB BY MOUTH DAILY. INDICATIONS: TYPE 2 DIABETES MELLITUS Bina Gonsales MD 40/21/0201 AM Active Yes VITAMIN D2 50,000 unit capsule TAKE 1 CAPSULE EVERY 7 DAYS Bina Gonsales MD 54/7/7164 Active No  
 
  
  
 
  
 
 
 
 
Mari Villareal, PHARMD   Contact: Gonzalez Davies Pharm. D. Clinical Staff Pharmacist 
Ariella Caro [facility] 88 [phone]

## 2018-10-12 NOTE — CDMP QUERY
Please clarify if this patient is (was) being treated/managed for:  
 
=> Metabolic encephalopathy POA on top of dementia secondary to UTI and Fall AEB confusion and delirium treated with IV NS Bolus and Iv Rocephin  
=> Other explanation of clinical findings  
=> Clinically Undetermined (no explanation for clinical findings) The medical record reflects the following clinical findings, treatment, and risk factors: 
Risk Factors:  81 yo M presents to ED after fall and c/o hip pan Clinical Indicators: h/o dementia, was in ED on 10/10  for UTI and Encephalopathy, was sent on home antibiotics 10/11 H&P states \" Delirium possibly secondary to UTI with recent fall\" \"he is not back to baseline\" Treatment: IV NS bolus and IV Rocephin Please clarify and document your clinical opinion in the progress notes and discharge summary including the definitive and/or presumptive diagnosis, (suspected or probable), related to the above clinical findings. Please include clinical findings supporting your diagnosis. Thank you for your time [facility] RN/BSN, CCDS Desk:   [phone] Other:  [phone]

## 2018-10-12 NOTE — PROGRESS NOTES
Bedside and Verbal shift change report given to Lu(oncoming nurse) by Audie Zamora (offgoing nurse). Report included the following information SBAR, Kardex, Intake/Output, MAR and Recent Results.

## 2018-10-12 NOTE — PROGRESS NOTES
Bedside shift change report given to David Hathaway RN (oncoming nurse) by Chucky Carranza RN (offgoing nurse). Report included the following information SBAR.
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
## 2018-10-12 NOTE — PROGRESS NOTES
Code 40 letter and observation letters have been provided to pt and his family. Signed observation letters have been placed in pt's hard chart. Irasema Louie LCSW

## 2018-10-12 NOTE — DISCHARGE SUMMARY
Jake Herrera [address]  Office [phone], Fax [phone] Discharge Summary Patient: Heike Palomo MRN: [id number] YOB: 1931 Age: 80 y.o. Date of admission:  10/11/2018 Date of discharge:  10/12/2018 Primary care provider:  Bina Gonsales MD  
 
Admitting provider:  Jose Saeed MD 
 
Discharging provider(s): Rachel Harris MD - Family Medicine Resident Dr. Vilma Closs Attending Consultations None Procedures · none Discharge destination: Home. The patient is stable for discharge. Admission diagnosis Delirium Urinary tract infection Delirium Admission HPI per admitting provider: Heike Palomo is a 80 y.o. male with known PMH significant for dementia, dyslipidemia, DM II with neuropathy, esophageal reflux, HTN, precancerous colonic polyps s/p partial bowel resection, mixed hyperlipdemia, hypthyroidism, Vit D def and iron def anemia who presents to the ER accompanied with family due to family's concern of finding the patient on the floor this morning, pt then complained of pelvic & hip pain later in the day.  
  
Per chart review, pt came to the ED yesterday assisted by family who were concerned about his AMS since the last 1-2 days. He was unable to recognize this cat and unable to answer questions which is not normal for him. Per family at baseline pt is fully alert and oriented and does not have any cognitive deficits. Pt was diagnosed with UTI at the time and was sent home on Ceftin 500 mg BID X7 days with PCP follow up (Urinalysis: Trace LE, No Nitrites, No Bacteria). Today family says that his delirium has slightly improved but they were concerned about his fall and wanted to rule out a fracture.  
  
It was also noted during ED visit yesterday that pt has not been taking his Aricept for about 4 days but started taking it on 10/9.  Pt was placed on synthroid when he was 1st diagnosed with hypothyroid. He experienced side effects of memory loss and was started on Aricept at the time. Pt then stopped Synthroid and started  [place] (pork thyroid) and returned back to his baseline mentation but Aricept was continued per PCP.    
In the ED: - Vitals - 97.8F, HR 70, 167/66, RR 21, 98% on RA 
- Labs - Remarkable for LA 1.6, Trop <0.04, Glu 146, BUN 27, Cr 1.9, WBC 8.8 (taken on 10/10) - Imaging -  
¨ XR Hips BI: No acute abnormality ¨ CT Abd Pelv: Distended gallbladder and dilated common bile duct, with suggestion of small gallstones in the gallbladder. No visualized common bile duct calculi. Small hiatal hernia.  
¨ CT head (10/10): No acute intracranial abnormality ¨ CXR (10/10): No acute cardiopulmonary process - Treatment - NaCl Bolus 500 mL  
  
EKG: Normal Sinus Rhythm, VR 60 Final discharge diagnoses and brief hospital course 
 
   
Delirium possibly secondary to UTI with recent fall: Urinalysis showed trace LE, No Nitrites & No Bacteria. Patient treated with one dose of Ctx. Ucx showed no significant growth and abx discontinued. HCT negative. Hip x-ray was negative for fracture. CT abdomen and pelvis showed distended gallbladder and dilated CBD with possible stones in gallbladder but no CBD stones. LFTs were within normal limits. UDS wnL, acetaminophen and salicylate levels low. Bcx NG X 9hrs at discharge. Patient had complaint of vague lower abdomen/pelvic pain prior to admission per family. Given patient's dementia it was challenging to determine if this could have been pain related to an abdominal process. At discharge, patient had no complaints of pain/discomfort, thus no further work up was pursued. Patient will follow up with PCP outpatient.  
   
 Dementia: Patient has underlying dementia but has yet to have formal testing.  
- Continue home Aricept 10 mg daily    
 Hypothyroidism: TSH: 6.210 on 10/3. Per patient's family, patient's pork thyroid recently increased from 80 to 120mg.  
- Continue Pork Thyroid 120 mg  
   
CKD III: Cr today 1.9 (BL: 1.8-2.0) - Cr at baseline at discharge 
   
Hypertension: 
- Continuing home medications of Losartan 25 mg, Amlodipine 5 mg & Lasix 20 mg  
   
DMII: A1c 7.9 (01/2018) - Held home medications of Glimepride 4 mg daily & Tradjenta 5 mg during admission but restarted at discharge.  
   
GERD:  
- Continue home lansoprazole 30 mg daily Iron Deficiency Anemia: Hgb on 10/10: 13.6  
- Continue home ferrous sulfate 325 mg daily  
   
CAD: 
- Continue home Aspirin 81 mg  
   
Diarrhea s/p partial bowel resection due to precancerous polyps:  
- Continue home Loperamide 2 mg BID Labs/Imaging Needed on follow up: CMP Follow-up Care: Follow-up Information Follow up With Details Comments Contact Info [address] [place] [address], [address] [place] [address] [address] Marley Tan MD On 10/15/2018 10AM - For hospital follow up [address] [place] [address] [address] [facility] 
[phone] Physical examination at discharge Visit Vitals  /61 (BP 1 Location: Left arm, BP Patient Position: At rest)  Pulse 62  Temp 99 °F (37.2 °C)  Resp 21  
 Ht 6' (1.829 m)  Wt 200 lb (90.7 kg)  SpO2 97%  BMI 27.12 kg/m2 General:  Alert, cooperative, no distress Head:  Normocephalic, without obvious abnormality, atraumatic Eyes:  Conjunctivae/corneas clear. PERRL, EOMs intact E/N/M/T: Nares normal. Septum midline. No nasal drainage or sinus tenderness Lips, mucosa, and tongue normal  
Clear oropharynx Neck: Normal appearance and movements, symmetrical, trachea midline No palpable adenopathy No thyroid enlargement, tenderness or nodules No carotid bruit Normal JVP Lungs:   Symmetrical chest expansion and respiratory effort Clear to auscultation bilaterally Chest wall:  No tenderness or deformity Heart:  Regular rhythm Sounds normal; no murmur, click, rub or gallop Abdomen:   Soft, no tenderness Bowel sounds normal 
  
Back: No CVA tenderness Extremities: Extremities normal, atraumatic No cyanosis or edema No DVT signs Pulses 2+ and symmetric all extremities Skin: No rashes or ulcers Musculo- 
    skeletal: Gait not tested Normal symmetry, ROM, strength and tone Neuro: Normal cranial nerves Normal reflexes and sensation Psych: Alert, oriented to person and place but not time, Normal affect, judgement and insight Recent Labs 10/12/18 
 0540  10/10/18 
 1825 WBC  8.6  8.8 HGB  12.5  13.6 HCT  37.8  41.0  
PLT  193  208 Recent Labs 10/12/18 
 0540  10/10/18 
 1825 NA  141  139  
K  4.6  4.5  
CL  107  104 CO2  25  28 BUN  25*  29* CREA  2.05*  2.45* GLU  114*  214* CA  8.6  8.5 MG  2.0   --   
PHOS  3.3   --   
 
Recent Labs 10/12/18 
 0540  10/11/18 
 1439  10/10/18 
 1825 SGOT  24  25  13* AP  83  90  92 TP  6.5  6.8  7.0 ALB  2.9*  3.4*  3.2*  
GLOB  3.6  3.4  3.8 No results for input(s): INR, PTP, APTT in the last 72 hours. No lab exists for component: INREXT, INREXT No results for input(s): FE, TIBC, PSAT, FERR in the last 72 hours. No results for input(s): PH, PCO2, PO2 in the last 72 hours. No results for input(s): CPK, CKMB in the last 72 hours. No lab exists for component: TROPONINI No components found for: [place] Discharge Medication List as of 10/12/2018  3:22 PM  
  
CONTINUE these medications which have NOT CHANGED Details  
aspirin delayed-release 81 mg tablet Take 81 mg by mouth daily. , Historical Med  
  
donepezil (ARICEPT) 10 mg tablet TAKE 1 TABLET EVERY NIGHT, Normal, Disp-90 Tab, R-1  
  
losartan (COZAAR) 25 mg tablet Take 1 Tab by mouth daily. , Normal, Disp-90 Tab, R-1  
  
 lansoprazole (PREVACID) 30 mg capsule TAKE 1 CAPSULE EVERY DAY  BEFORE  BREAKFAST, Normal, Disp-90 Cap, R-1  
  
amLODIPine (NORVASC) 10 mg tablet TAKE 1 TABLET EVERY DAY FOR HYPERTENSION  (INCREASE  IN  DOSE  TO  10MG), Normal, Disp-90 Tab, R-1 Thyroid, Pork, (ARMOUR THYROID) 120 mg tab Take 1 Tab by mouth daily. , NormalNote change in dose of medication. Disp-90 Tab, R-1  
  
glimepiride (AMARYL) 4 mg tablet TAKE 1 TABLET EVERY MORNING, Normal, Disp-90 Tab, R-1  
  
TRADJENTA 5 mg tablet TAKE 1 TAB BY MOUTH DAILY. INDICATIONS: TYPE 2 DIABETES MELLITUS, Normal, Disp-90 Tab, R-1  
  
loperamide (IMODIUM) 2 mg capsule TAKE 1 CAPSULE BEFORE BREAKFAST, LUNCH, DINNER AND AT BEDTIME FOR DIARRHEA, Normal, Disp-360 Cap, R-0  
  
cetirizine (ZYRTEC) 10 mg tablet TAKE 1 TABLET EVERY DAY AS NEEDED FOR ALLERGIES, Normal, Disp-90 Tab, R-3  
  
ferrous sulfate (IRON) 325 mg (65 mg iron) tablet Take 1 Tab by mouth Daily (before breakfast). , Normal, Disp-30 Tab, R-5  
  
multivitamins-minerals-lutein (CENTRUM SILVER) tab tablet Take 1 Tab by mouth daily. , Historical Med  
  
nicotinic acid (NIACIN) 500 mg tablet Take 500 mg by mouth Daily (before breakfast). , Historical Med  
  
VITAMIN D2 50,000 unit capsule TAKE 1 CAPSULE EVERY 7 DAYS, Normal, Disp-13 Cap, R-3  
  
furosemide (LASIX) 20 mg tablet TAKE 1 TABLET EVERY DAY AS DIRECTED, Normal, Disp-90 Tab, R-3 Admission imaging studies: 
 
 
Results from [facility] Encounter encounter on 10/11/18 XR HIPS BI W AP PELV Narrative EXAM:  XR HIPS BI W AP PELV 
 
INDICATION:   Altered mental status. Hip pain following ground-level fall. COMPARISON: None. FINDINGS: An AP view of the pelvis and frogleg lateral views of both hips 
demonstrate no fracture, dislocation or other acute abnormality. There is mild 
bilateral hip osteoarthritis. Atherosclerotic vascular calcifications are noted. Impression IMPRESSION:  No acute abnormality Results from [facility] Encounter encounter on 05/26/15 US RETROPERITONEUM COMP Narrative **Final Report** 
 
 
ICD Codes / Adm. Diagnosis: 585.3  599.0 / Chronic kidney disease, stage Examination:  US RENAL  RETROPERITONEAL  - [id number] - May 26 2015 11:50AM 
Accession No:  [id number] Reason:  Chronic kidney disease, stage III (moderate) REPORT: 
INDICATION:  Chronic kidney disease, stage III (moderate) Realtime sonographic imaging of the retroperitoneum was performed. The right  
kidney measures 11.1 cm and the left kidney measures 12.8 cm. They are  
normal in size and appearance without evidence of mass lesion,  
hydronephrosis, or calcification. The visualized ivc, aorta and proximal  
common iliacs are unremarkable. Bladder unremarkable. IMPRESSION: Normal renal ultrasound. Signing/Reading Doctor: Kofi Rivera ([id number]) Cezar Montiel ([id number])  May 26 2015  1:22PM                      
 
 
   
        
 
 
Results from Hospital Encounter encounter on 10/11/18 CT HEAD WO CONT Narrative EXAM:  CT HEAD WO CONT INDICATION:   Altered mental status COMPARISON: October 10, 2018. CONTRAST:  None. TECHNIQUE: Unenhanced CT of the head was performed using 5 mm images. Brain and 
bone windows were generated. CT dose reduction was achieved through use of a 
standardized protocol tailored for this examination and automatic exposure 
control for dose modulation. FINDINGS: 
The ventricles and sulci are stable in size, shape and configuration and 
midline. There is unchanged diffuse periventricular white matter disease. There 
is no intracranial hemorrhage, extra-axial collection, mass, mass effect or 
midline shift. The basilar cisterns are open. No acute infarct is identified. The bone windows demonstrate no abnormalities. The visualized portions of the 
paranasal sinuses and mastoid air cells are clear. Impression IMPRESSION: No acute intracranial process. No significant change since 
yesterday. No procedure found. 
 
 
------------------------------------------------------------------------------------------------------------------- Chronic Diagnoses:   
Problem List as of 10/12/2018  Date Reviewed: 8/13/2018 Codes Class Noted - Resolved Delirium ICD-10-CM: R41.0 ICD-9-CM: 780.09  10/11/2018 - Present Urinary tract infection ICD-10-CM: N39.0 ICD-9-CM: 599.0  10/11/2018 - Present Type 2 diabetes with nephropathy ([facility] ICD-10-CM: E11.21 
ICD-9-CM: 250.40, 583.81  3/28/2018 - Present Dementia without behavioral disturbance ICD-10-CM: F03.90 ICD-9-CM: 294.20  8/16/2016 - Present CKD (chronic kidney disease) ICD-10-CM: N18.9 ICD-9-CM: 585.9  10/26/2015 - Present Unspecified vitamin D deficiency ICD-10-CM: E55.9 ICD-9-CM: 268.9  9/11/2013 - Present Colon cancer [facility]) ICD-10-CM: C18.9 ICD-9-CM: 153.9  8/23/2012 - Present Pernicious anemia ICD-10-CM: D51.0 ICD-9-CM: 281.0  7/31/2012 - Present Acquired hypothyroidism ICD-10-CM: E03.9 ICD-9-CM: 244.9  4/26/2012 - Present Microalbuminuria ICD-10-CM: R80.9 ICD-9-CM: 791.0  3/6/2012 - Present Diabetes mellitus type 2, controlled ([facility] ICD-10-CM: E11.9 ICD-9-CM: 250.00  2/9/2012 - Present Essential hypertension, benign ICD-10-CM: I10 
ICD-9-CM: 401.1  2/9/2012 - Present Mixed hyperlipidemia ICD-10-CM: E78.2 ICD-9-CM: 272.2  2/9/2012 - Present Allergic rhinitis due to other allergen ICD-10-CM: J30.89 ICD-9-CM: 477.8  2/9/2012 - Present Esophageal reflux ICD-10-CM: K21.9 ICD-9-CM: 530.81  2/9/2012 - Present RESOLVED: UTI (lower urinary tract infection) ICD-10-CM: N39.0 ICD-9-CM: 599.0  4/1/2015 - 3/28/2018 RESOLVED: Altered mental status ICD-10-CM: R41.82 
ICD-9-CM: 780.97  8/28/2014 - 3/28/2018 RESOLVED: WILTON (acute kidney injury) ([facility] ICD-10-CM: N17.9 ICD-9-CM: 584.9  8/2/2014 - 3/28/2018 RESOLVED: Urosepsis ICD-10-CM: A41.9, N39.0 ICD-9-CM: 038.9, 995.91, 599.0  7/29/2014 - 3/28/2018 RESOLVED: C2 cervical fracture (HCC) ICD-10-CM: S12.100A ICD-9-CM: 805.02  9/24/2012 - 9/24/2012 RESOLVED: Odontoid fracture ([facility] ICD-10-CM: S12.100A ICD-9-CM: 805.02  9/10/2012 - 3/28/2018 RESOLVED: Altered mental status ICD-10-CM: R41.82 
ICD-9-CM: 780.97  6/20/2012 - 6/20/2012 Overview Signed 6/20/2012  4:20 PM by Emre Jacobs MD  
  Patient admitted for altered mental status changes. Normal neurological imaging, no significant finding on biochemical, and hematologic labs  to support a definitive diagnoses. Patient regained his base level of functionality prior to discharge. Patient Vit. B12 deficiency treated. Signed:  
 
 Billy Grijalva MD 
 Family Medicine Resident 10/12/2018 
 9:55 AM 
 
 Dr. Mary Lou Patino Family Medicine Attending Cc: Carmelia Peabody, MD

## 2018-10-15 ENCOUNTER — OFFICE VISIT (OUTPATIENT)
Dept: FAMILY MEDICINE CLINIC | Age: 83
End: 2018-10-15

## 2018-10-15 VITALS
TEMPERATURE: 97.8 F | HEART RATE: 58 BPM | BODY MASS INDEX: 25.55 KG/M2 | OXYGEN SATURATION: 97 % | RESPIRATION RATE: 20 BRPM | WEIGHT: 188.6 LBS | DIASTOLIC BLOOD PRESSURE: 50 MMHG | HEIGHT: 72 IN | SYSTOLIC BLOOD PRESSURE: 110 MMHG

## 2018-10-15 DIAGNOSIS — R41.0 DELIRIUM: Primary | ICD-10-CM

## 2018-10-15 DIAGNOSIS — F03.90 DEMENTIA WITHOUT BEHAVIORAL DISTURBANCE, UNSPECIFIED DEMENTIA TYPE: ICD-10-CM

## 2018-10-15 DIAGNOSIS — B35.1 ONYCHOMYCOSIS: ICD-10-CM

## 2018-10-15 DIAGNOSIS — Z23 ENCOUNTER FOR IMMUNIZATION: ICD-10-CM

## 2018-10-15 NOTE — PROGRESS NOTES
Identified pt with two pt identifiers(name and ). Chief Complaint   Patient presents with   [facility] Follow Up     went to [facility] 10/11/18 ED    [facility] Follow Up     went back 10/11- Daughter found on floor - He does not remember going to [facility] [place] Altered mental status     he knows his name and family and nothing else         Health Maintenance Due   Topic    Shingrix Vaccine Age 49> (1 of 2)    Pneumococcal 65+ High/Highest Risk (1 of 2 - PCV13)    MICROALBUMIN Q1     Influenza Age 5 to Adult        Wt Readings from Last 3 Encounters:   10/15/18 188 lb 9.6 oz (85.5 kg)   10/11/18 200 lb (90.7 kg)   10/10/18 200 lb (90.7 kg)     Temp Readings from Last 3 Encounters:   10/15/18 97.8 °F (36.6 °C) (Oral)   10/12/18 99 °F (37.2 °C)   10/10/18 97.9 °F (36.6 °C)     BP Readings from Last 3 Encounters:   10/15/18 110/50   10/12/18 158/61   10/10/18 (!) 206/66     Pulse Readings from Last 3 Encounters:   10/15/18 (!) 58   10/12/18 62   10/10/18 60         Learning Assessment:  :     Learning Assessment 3/19/2014   PRIMARY LEARNER Patient   HIGHEST LEVEL OF EDUCATION - PRIMARY LEARNER  GRADUATED HIGH SCHOOL OR GED   BARRIERS PRIMARY LEARNER NONE   CO-LEARNER CAREGIVER No   PRIMARY LANGUAGE ENGLISH   LEARNER PREFERENCE PRIMARY LISTENING     DEMONSTRATION   ANSWERED BY patient   RELATIONSHIP SELF       Depression Screening:  :     PHQ over the last two weeks 2018   Little interest or pleasure in doing things Not at all   Feeling down, depressed, irritable, or hopeless Not at all   Total Score PHQ 2 0       Fall Risk Assessment:  :     Fall Risk Assessment, last 12 mths 2018   Able to walk? Yes   Fall in past 12 months? No   Fall with injury? -   Number of falls in past 12 months -       Abuse Screening:  :     Abuse Screening Questionnaire 2018 3/28/2018 2017 8/15/2016 2015 3/19/2014   Do you ever feel afraid of your partner?  N N N N N N   Are you in a relationship with someone who physically or mentally threatens you? N N N N N N   Is it safe for you to go home? Y Y Y Y Y Y       Coordination of Care Questionnaire:  :     1) Have you been to an emergency room, urgent care clinic since your last visit? yes 10/11/18 and 10/12/18 [facility]  Hospitalized since your last visit? yes  10/11-12/18 [facility]         2) Have you seen or consulted any other health care providers outside of [address] [place] [address] since your last visit? no  (Include any pap smears or colon screenings in this section.)    3) Do you have an Advance Directive on file? yes  Are you interested in receiving information about Advance Directives? no    Reviewed record in preparation for visit and have obtained necessary documentation. Medication reconciliation up to date and corrected with patient at this time.

## 2018-10-15 NOTE — PROGRESS NOTES
HISTORY OF PRESENT ILLNESS  Jessica Dumont is a 80 y.o. male. [facility] hospital admission: 10-11- 10-12-18 for altered mental status. He has background dementia but sudden change in awareness. He lives alone. Hospital ruled out UTI, CT head and abdomen. Patient Active Problem List   Diagnosis Code    Diabetes mellitus type 2, controlled ([facility] E11.9    Essential hypertension, benign I10    Mixed hyperlipidemia E78.2    Allergic rhinitis due to other allergen J30.89    Esophageal reflux K21.9    Microalbuminuria R80.9    Acquired hypothyroidism E03.9    Pernicious anemia D51.0    Colon cancer (HCC) C18.9    Unspecified vitamin D deficiency E55.9    CKD (chronic kidney disease) N18.9    Dementia without behavioral disturbance F03.90    Type 2 diabetes with nephropathy (HCC) E11.21    Delirium R41.0    Urinary tract infection N39.0     Recent labs show CKD, hypothyroidism - low T 4. Questionable medicine compliance. He is currently staying with daughter. Home health PT has evaluated him. Pt denies any pain. Review of Systems   Psychiatric/Behavioral: Positive for memory loss. All other systems reviewed and are negative. Visit Vitals    /50 (BP 1 Location: Right arm, BP Patient Position: Sitting)  Comment: manual    Pulse (!) 58    Temp 97.8 °F (36.6 °C) (Oral)    Resp 20    Ht 6' (1.829 m)    Wt 188 lb 9.6 oz (85.5 kg)    SpO2 97%    BMI 25.58 kg/m2       Physical Exam   Constitutional: He appears well-developed and well-nourished. No distress. Cardiovascular: Normal rate, regular rhythm and normal heart sounds. Pulmonary/Chest: Effort normal and breath sounds normal.   Musculoskeletal:        Feet:    Long discolored, thickened toe nail. Neurological: He is alert. No cranial nerve deficit. Coordination normal.   Vitals reviewed. ASSESSMENT and PLAN    ICD-10-CM ICD-9-CM    1. Delirium R41.0 780.09 MRI BRAIN WO CONT   2.  Dementia without behavioral disturbance, unspecified dementia type F03.90 294.20 MRI BRAIN WO CONT      REFERRAL TO NEUROLOGY   3. Onychomycosis B35.1 110.1 REFERRAL TO PODIATRY   4. Encounter for immunization Z23 V03.89 INFLUENZA VACCINE INACTIVATED (IIV), SUBUNIT, ADJUVANTED, IM      ADMIN INFLUENZA VIRUS VAC       He needs supervision. Agree with arrangement to stay with daughter. Cont PT. Order Neuro consult, MRI. FLU vaccine given.

## 2018-10-15 NOTE — PATIENT INSTRUCTIONS
Influenza (Flu) Vaccine: Care Instructions  Your Care Instructions    Influenza (flu) is an infection in the lungs and breathing passages. It is caused by the influenza virus. There are different strains, or types, of the flu virus every year. The flu comes on quickly. It can cause a cough, stuffy nose, fever, chills, tiredness, and aches and pains. These symptoms may last up to 10 days. The flu can make you feel very sick, but most of the time it doesn't lead to other problems. But it can cause serious problems in people who are older or who have a long-term illness, such as heart disease or diabetes. You can help prevent the flu by getting a flu vaccine every year, as soon as it is available. You cannot get the flu from the vaccine. The vaccine prevents most cases of the flu. But even when the vaccine doesn't prevent the flu, it can make symptoms less severe and reduce the chance of problems from the flu. Sometimes, young children and people who have an immune system problem may have a slight fever or muscle aches or pains 6 to 12 hours after getting the shot. These symptoms usually last 1 or 2 days. Follow-up care is a key part of your treatment and safety. Be sure to make and go to all appointments, and call your doctor if you are having problems. It's also a good idea to know your test results and keep a list of the medicines you take. Who should get the flu vaccine? Everyone age 7 months or older should get a flu vaccine each year. It lowers the chance of getting and spreading the flu. The vaccine is very important for people who are at high risk for getting other health problems from the flu. This includes:  · Anyone 48years of age or older. · People who live in a long-term care center, such as a nursing home. · All children 6 months through 25years of age. · Adults and children 6 months and older who have long-term heart or lung problems, such as asthma.   · Adults and children 6 months and older who needed medical care or were in a hospital during the past year because of diabetes, chronic kidney disease, or a weak immune system (including HIV or AIDS). · Women who will be pregnant during the flu season. · People who have any condition that can make it hard to breathe or swallow (such as a brain injury or muscle disorders). · People who can give the flu to others who are at high risk for problems from the flu. This includes all health care workers and close contacts of people age 72 or older. Who should not get the flu vaccine? The person who gives the vaccine may tell you not to get it if you:  · Have a severe allergy to eggs or any part of the vaccine. · Have had a severe reaction to a flu vaccine in the past.  · Have had Guillain-Barré syndrome (GBS). · Are sick with a fever. (Get the vaccine when symptoms are gone.)  How can you care for yourself at home? · If you or your child has a sore arm or a slight fever after the shot, take an over-the-counter pain medicine, such as acetaminophen (Tylenol) or ibuprofen (Advil, Motrin). Read and follow all instructions on the label. Do not give aspirin to anyone younger than 20. It has been linked to Reye syndrome, a serious illness. · Do not take two or more pain medicines at the same time unless the doctor told you to. Many pain medicines have acetaminophen, which is Tylenol. Too much acetaminophen (Tylenol) can be harmful. When should you call for help? Call 911 anytime you think you may need emergency care. For example, call if after getting the flu vaccine:    · You have symptoms of a severe reaction to the flu vaccine. Symptoms of a severe reaction may include:  ¨ Severe difficulty breathing. ¨ Sudden raised, red areas (hives) all over your body. ¨ Severe lightheadedness.    Call your doctor now or seek immediate medical care if after getting the flu vaccine:    · You think you are having a reaction to the flu vaccine, such as a new fever.  Watch closely for changes in your health, and be sure to contact your doctor if you have any problems. Where can you learn more? Go to http://milton-saud.info/. Enter X241 in the search box to learn more about \"Influenza (Flu) Vaccine: Care Instructions. \"  Current as of: June 18, 2018  Content Version: 11.8  © 3836-8841 [employer]. Care instructions adapted under license by [employer] (which disclaims liability or warranty for this information). If you have questions about a medical condition or this instruction, always ask your healthcare professional. [address] any warranty or liability for your use of this information.

## 2018-10-15 NOTE — MR AVS SNAPSHOT
[address] [place] [address] [address] [facility] 
[phone] Patient: Lis Emery. MRN: [id number] Visit Information Date & Time Provider Department Dept. Phone Encounter #  
 [id number] 32:65 AM Benji Headley Luis [phone] [id number] Upcoming Health Maintenance Date Due Shingrix Vaccine Age 50> (1 of 2) 3/8/1981 Pneumococcal 65+ High/Highest Risk (1 of 2 - PCV13) 3/8/1996 MICROALBUMIN Q1 5/31/2018 Influenza Age 5 to Adult 8/1/2018 FOOT EXAM Q1 3/28/2019 HEMOGLOBIN A1C Q6M 4/3/2019 EYE EXAM RETINAL OR DILATED Q1 7/16/2019 MEDICARE YEARLY EXAM 8/14/2019 LIPID PANEL Q1 10/3/2019 GLAUCOMA SCREENING Q2Y 7/16/2020 DTaP/Tdap/Td series (2 - Td) 1/25/2027 Allergies as of 10/15/2018  Review Complete On: 19/74/6961 By: Nannette Richardson MD  
  
 Severity Noted Reaction Type Reactions Synthroid [Levothyroxine]  08/20/2013   Systemic Other (comments) Confused, hallucinations Current Immunizations  Reviewed on 10/15/2018 Name Date Influenza High Dose Vaccine PF 8/30/2017, 1/25/2017 Influenza Vaccine 9/12/2014, 9/11/2013 Influenza Vaccine (Quad) PF 10/30/2015  9:31 AM  
 Influenza Vaccine (Tri) Adjuvanted  Incomplete Influenza Vaccine Whole 10/16/2012 Zoster 10/16/2012 Reviewed by Nannette Richardson MD on 10/15/2018 at 10:46 AM  
You Were Diagnosed With   
  
 Codes Comments Delirium    -  Primary ICD-10-CM: R41.0 ICD-9-CM: 780.09 Dementia without behavioral disturbance, unspecified dementia type     ICD-10-CM: F03.90 ICD-9-CM: 294.20 Onychomycosis     ICD-10-CM: B35.1 ICD-9-CM: 110.1 Encounter for immunization     ICD-10-CM: A44 ICD-9-CM: V03.89 Vitals BP Pulse Temp Resp Height(growth percentile) Weight(growth percentile)  110/50 (BP 1 Location: Right arm, BP Patient Position: Sitting) (!) 58 97.8 °F (36.6 °C) (Oral) 20 6' (1.829 m) 188 lb 9.6 oz (85.5 kg) SpO2 BMI Smoking Status 97% 25.58 kg/m2 Former Smoker Vitals History BMI and BSA Data Body Mass Index Body Surface Area 25.58 kg/m 2 2.08 m 2 Preferred Pharmacy Pharmacy Name Phone Rika Lucas [address] [place] [phone] [facility] [address] [phone] Your Updated Medication List  
  
   
This list is accurate as of 10/15/18 10:48 AM.  Always use your most recent med list. amLODIPine 10 mg tablet Commonly known as:  Irish Dykes TAKE 1 TABLET EVERY DAY FOR HYPERTENSION  (INCREASE  IN  DOSE  TO  10MG) aspirin delayed-release 81 mg tablet Take 81 mg by mouth daily. CENTRUM SILVER Tab tablet Generic drug:  multivitamins-minerals-lutein Take 1 Tab by mouth daily. cetirizine 10 mg tablet Commonly known as:  ZYRTEC  
TAKE 1 TABLET EVERY DAY AS NEEDED FOR ALLERGIES  
  
 donepezil 10 mg tablet Commonly known as:  ARICEPT  
TAKE 1 TABLET EVERY NIGHT  
  
 ferrous sulfate 325 mg (65 mg iron) tablet Commonly known as:  Iron Take 1 Tab by mouth Daily (before breakfast). furosemide 20 mg tablet Commonly known as:  LASIX TAKE 1 TABLET EVERY DAY AS DIRECTED  
  
 glimepiride 4 mg tablet Commonly known as:  AMARYL  
TAKE 1 TABLET EVERY MORNING  
  
 lansoprazole 30 mg capsule Commonly known as:  PREVACID TAKE 1 CAPSULE EVERY DAY  BEFORE  BREAKFAST  
  
 loperamide 2 mg capsule Commonly known as:  IMODIUM  
TAKE 1 CAPSULE BEFORE BREAKFAST, LUNCH, DINNER AND AT BEDTIME FOR DIARRHEA  
  
 losartan 25 mg tablet Commonly known as:  COZAAR Take 1 Tab by mouth daily. nicotinic acid 500 mg tablet Commonly known as:  NIACIN Take 500 mg by mouth Daily (before breakfast). Thyroid (Pork) 120 mg Tab Commonly known as:  ARMOUR THYROID Take 1 Tab by mouth daily. TRADJENTA 5 mg tablet Generic drug:  linagliptin TAKE 1 TAB BY MOUTH DAILY. INDICATIONS: TYPE 2 DIABETES MELLITUS  
  
 VITAMIN D2 50,000 unit capsule Generic drug:  ergocalciferol TAKE 1 CAPSULE EVERY 7 DAYS We Performed the Following ADMIN INFLUENZA VIRUS VAC [ [facility]] INFLUENZA VACCINE INACTIVATED (IIV), SUBUNIT, ADJUVANTED, IM [id number] CPT(R)] REFERRAL TO NEUROLOGY [MWF85 Custom] REFERRAL TO PODIATRY [REF90 Custom] Comments:  
 Please evaluate patient for diabetic foot care, onychomycosis. To-Do List   
 10/15/2018 Imaging:  MRI BRAIN WO CONT Referral Information Referral ID Referred By Referred To  
  
 [id number] Sandy HIADLGO MD   
   [address] [facility] [address] Phone: [phone] Fax: [phone] Visits Status Start Date End Date 1 New Request 10/15/18 10/15/19 If your referral has a status of pending review or denied, additional information will be sent to support the outcome of this decision. Referral ID Referred By Referred To  
 [id number] ROCKY, [address], [facility] [address] Phone: [phone] Fax: [phone] Visits Status Start Date End Date 1 New Request 10/15/18 10/15/19 If your referral has a status of pending review or denied, additional information will be sent to support the outcome of this decision. Patient Instructions Influenza (Flu) Vaccine: Care Instructions Your Care Instructions Influenza (flu) is an infection in the lungs and breathing passages. It is caused by the influenza virus. There are different strains, or types, of the flu virus every year. The flu comes on quickly. It can cause a cough, stuffy nose, fever, chills, tiredness, and aches and pains. These symptoms may last up to 10 days.  The flu can make you feel very sick, but most of the time it doesn't lead to other problems. But it can cause serious problems in people who are older or who have a long-term illness, such as heart disease or diabetes. You can help prevent the flu by getting a flu vaccine every year, as soon as it is available. You cannot get the flu from the vaccine. The vaccine prevents most cases of the flu. But even when the vaccine doesn't prevent the flu, it can make symptoms less severe and reduce the chance of problems from the flu. Sometimes, young children and people who have an immune system problem may have a slight fever or muscle aches or pains 6 to 12 hours after getting the shot. These symptoms usually last 1 or 2 days. Follow-up care is a key part of your treatment and safety. Be sure to make and go to all appointments, and call your doctor if you are having problems. It's also a good idea to know your test results and keep a list of the medicines you take. Who should get the flu vaccine? Everyone age 7 months or older should get a flu vaccine each year. It lowers the chance of getting and spreading the flu. The vaccine is very important for people who are at high risk for getting other health problems from the flu. This includes: · Anyone 48years of age or older. · People who live in a long-term care center, such as a nursing home. · All children 6 months through 25years of age. · Adults and children 6 months and older who have long-term heart or lung problems, such as asthma. · Adults and children 6 months and older who needed medical care or were in a hospital during the past year because of diabetes, chronic kidney disease, or a weak immune system (including HIV or AIDS). · Women who will be pregnant during the flu season. · People who have any condition that can make it hard to breathe or swallow (such as a brain injury or muscle disorders).  
· People who can give the flu to others who are at high risk for problems from the flu. This includes all health care workers and close contacts of people age 72 or older. Who should not get the flu vaccine? The person who gives the vaccine may tell you not to get it if you: 
· Have a severe allergy to eggs or any part of the vaccine. · Have had a severe reaction to a flu vaccine in the past. 
· Have had Guillain-Barré syndrome (GBS). · Are sick with a fever. (Get the vaccine when symptoms are gone.) How can you care for yourself at home? · If you or your child has a sore arm or a slight fever after the shot, take an over-the-counter pain medicine, such as acetaminophen (Tylenol) or ibuprofen (Advil, Motrin). Read and follow all instructions on the label. Do not give aspirin to anyone younger than 20. It has been linked to Reye syndrome, a serious illness. · Do not take two or more pain medicines at the same time unless the doctor told you to. Many pain medicines have acetaminophen, which is Tylenol. Too much acetaminophen (Tylenol) can be harmful. When should you call for help? Call 911 anytime you think you may need emergency care. For example, call if after getting the flu vaccine: 
  · You have symptoms of a severe reaction to the flu vaccine. Symptoms of a severe reaction may include: ¨ Severe difficulty breathing. ¨ Sudden raised, red areas (hives) all over your body. ¨ Severe lightheadedness.  
 Call your doctor now or seek immediate medical care if after getting the flu vaccine: 
  · You think you are having a reaction to the flu vaccine, such as a new fever.  
 Watch closely for changes in your health, and be sure to contact your doctor if you have any problems. Where can you learn more? Go to http://milton-saud.info/. Enter C603 in the search box to learn more about \"Influenza (Flu) Vaccine: Care Instructions. \" Current as of: June 18, 2018 Content Version: 11.8 © 7739-5034 Healthwise, Incorporated.  Care instructions adapted under license by [address] (which disclaims liability or warranty for this information). If you have questions about a medical condition or this instruction, always ask your healthcare professional. [address] any warranty or liability for your use of this information. Introducing [facility]! [facility] introduces Cloud [employer] patient portal. Now you can access parts of your medical record, email your doctor's office, and request medication refills online. 1. In your internet browser, go to [email or web address] 2. Click on the First Time User? Click Here link in the Sign In box. You will see the New Member Sign Up page. 3. Enter your Cloud 66 Access Code exactly as it appears below. You will not need to use this code after youve completed the sign-up process. If you do not sign up before the expiration date, you must request a new code. · Cloud 66 Access Code: [email or web address] Expires: 11/11/2018  3:45 PM 
 
4. Enter the last four digits of your Social Security Number (xxxx) and Date of Birth (mm/dd/yyyy) as indicated and click Submit. You will be taken to the next sign-up page. 5. Create a Cloud 66 ID. This will be your Cloud 66 login ID and cannot be changed, so think of one that is secure and easy to remember. 6. Create a Cloud 66 password. You can change your password at any time. 7. Enter your Password Reset Question and Answer. This can be used at a later time if you forget your password. 8. Enter your e-mail address. You will receive e-mail notification when new information is available in [address] 9. Click Sign Up. You can now view and download portions of your medical record. 10. Click the Download Summary menu link to download a portable copy of your medical information. If you have questions, please visit the Frequently Asked Questions section of the Cloud 66 website.  Remember, [employer] 66 is NOT to be used for urgent needs. For medical emergencies, dial 911. Now available from your iPhone and Android! Please provide this summary of care documentation to your next provider. Your primary care clinician is listed as Nadia Miller. If you have any questions after today's visit, please call [phone].

## 2018-10-17 LAB
BACTERIA SPEC CULT: NORMAL
BACTERIA SPEC CULT: NORMAL
SERVICE CMNT-IMP: NORMAL
SERVICE CMNT-IMP: NORMAL

## 2018-10-18 ENCOUNTER — HOSPITAL ENCOUNTER (OUTPATIENT)
Dept: MRI IMAGING | Age: 83
Discharge: HOME | End: 2018-10-18
Attending: FAMILY MEDICINE
Payer: MEDICARE

## 2018-10-18 ENCOUNTER — TELEPHONE (OUTPATIENT)
Dept: FAMILY MEDICINE CLINIC | Age: 83
End: 2018-10-18

## 2018-10-18 DIAGNOSIS — F03.90 DEMENTIA WITHOUT BEHAVIORAL DISTURBANCE, UNSPECIFIED DEMENTIA TYPE: ICD-10-CM

## 2018-10-18 DIAGNOSIS — R41.0 DELIRIUM: ICD-10-CM

## 2018-10-18 PROCEDURE — 70551 MRI BRAIN STEM W/O DYE: CPT

## 2018-10-18 NOTE — TELEPHONE ENCOUNTER
Please call his daughter. Brain MRI shows possible recent strokes to explain sudden change in mental status. I gave them referral to Neurology. Please make sure to go to appt. He should not be living alone from now on.

## 2018-10-19 NOTE — TELEPHONE ENCOUNTER
She thinks his appt is 10/31/18 with neurology. She says his memory comes and goes. He is staying with her and her . He is not happy about it.

## 2018-10-31 ENCOUNTER — OFFICE VISIT (OUTPATIENT)
Dept: NEUROLOGY | Age: 83
End: 2018-10-31

## 2018-10-31 VITALS
RESPIRATION RATE: 18 BRPM | OXYGEN SATURATION: 97 % | DIASTOLIC BLOOD PRESSURE: 74 MMHG | SYSTOLIC BLOOD PRESSURE: 130 MMHG | HEIGHT: 72 IN | BODY MASS INDEX: 25.58 KG/M2 | HEART RATE: 56 BPM

## 2018-10-31 DIAGNOSIS — Z86.73 HISTORY OF RECENT STROKE: Primary | ICD-10-CM

## 2018-10-31 DIAGNOSIS — Z86.73 HISTORY OF RECENT STROKE: ICD-10-CM

## 2018-10-31 NOTE — PATIENT INSTRUCTIONS
Information Regarding Testing     If you have physican order for a test or a medication denied by your insurance company, this does not mean the test or medication is not appropriate for you as that is a medical decision, not a decision to be made by an insurance company representative or by an [facility] physician who has not interviewed and examined you. This is a decision to be made between you and your physician. The denial of services is a contractual matter between you and your insurance company, not an issue between your physician and the insurance company. If your test or medication is denied, you can take the following steps to help resolve the issue:    1. File a complaint with the [facility] regarding your insurance company's denial of services ordered for you. You can do this either by calling them directly or by completing an on-line complaint form on the "[employer] This can be found at [email or web address] Systems    2. Also file a formal complaint with your insurance company and ask to have the name of the person denying the service so that you may explore a legal option should you be harmed by this denial of service. Again, the fact the insurance company will not pay for the service does not mean it is not medically necessary and I would encourage you to follow through with the plan that was made with your physician    3. File a written complaint with your employer so your employer and benefit manager is aware of the poor coverage they are providing their employees. If you have medicare/medicaid, complain to your representative in the House and to your Tabby Soto. If we have ordered testing for you, we do not call patients with results and we do not give test results over the phone. We schedule follow up appointments so that your results can be discussed in person and any questions you have regarding them may be addressed.   If something of concern is revealed on your test, we will call you for a sooner follow up appointment. Additionally, results may be found by using the My Chart feature and one of our patient service representatives at the  can give you instructions on how to access this feature of our electronic medical record system. Learning About the Symptoms of Dementia  What is dementia? We all forget things as we get older. Many older people have a slight loss of memory that does not affect their daily lives. But memory loss that gets worse may mean that you have dementia. Dementia is a loss of mental skills that affects your daily life. It can cause problems with memory, problem-solving, and learning. It also can cause problems with thinking and planning. Dementia usually gets worse over time. But how quickly it gets worse is different for each person. Some people stay the same for years. Others lose skills quickly. Your chances of having dementia rise as you get older. But this doesn't mean that everyone will get it. What causes dementia? Dementia is caused by damage to or changes in the brain. Alzheimer's disease is the most common kind of dementia. Alzheimer's causes a steady loss of memory and how well you can speak, think, and do your daily activities. The second most common kind of dementia is caused by a series of strokes. It's called vascular dementia. It often gets worse step by step. With each new stroke, more mental skills are lost.  Serious head injuries in the past can sometimes lead to dementia, too. What are the symptoms? Usually the first symptom of dementia is memory loss. Often the person who has the memory problem doesn't notice it, but family and friends do. People who have dementia may have increasing trouble with:  · Recalling recent events. They may forget appointments or lose objects. · Recognizing people and places. · Keeping up with conversations and activity.   · Finding their way around familiar places, or driving to and from places they know well. · Keeping up personal care such as grooming or bathing. · Planning and carrying out routine tasks. They may have trouble following a recipe or writing a letter or email. What are some next steps? If you are worried about memory loss or changes in your thinking, see your doctor soon. He or she can do a physical exam and ask questions about recent and past illnesses and life events. Think about bringing someone to your doctor's appointment to take notes for you. Your doctor may suggest a series of tests to measure memory changes over time. These tests give the doctor an overall picture of how well your brain is working. The doctor can use the results to decide the best treatment program and help make your life safer and easier. It is important to know that memory loss and changes in thinking can be caused by things other than dementia. These things can include health problems such as depression, a low amount of thyroid hormone, and some infections. When those things are treated, your symptoms can get better. Follow-up care is a key part of your treatment and safety. Be sure to make and go to all appointments, and call your doctor if you are having problems. It's also a good idea to know your test results and keep a list of the medicines you take. Where can you learn more? Go to http://milton-saud.info/. Enter [id number] in the search box to learn more about \"Learning About the Symptoms of Dementia. \"  Current as of: December 7, 2017  Content Version: 11.8  © 1835-4025 Healthwise, Incorporated. Care instructions adapted under license by [employer] (which disclaims liability or warranty for this information).  If you have questions about a medical condition or this instruction, always ask your healthcare professional. [place] [address] any warranty or liability for your use of this information. Patient history reviewed and patient examined. An interesting chronological history where it looks like stroke is a very convenient acute happening that could go to explain the change of mental status in this case. Will get additional testing done, in light of the strokelike picture and recommend perhaps increasing the baby aspirin to 2 per day. I agree with family's plan to keep an eye on this patient and would not recommend leaving him alone at this time.

## 2018-10-31 NOTE — PROGRESS NOTES
Neurology Consult      Subjective: Félix Light is a 80 y.o. male Who comes in with his extended family. Recent history as I understand it, with established dementia and was on Aricept. Family knows his baseline and that includes occasions of more or less confusion mixed with his baseline self. Had lived alone and fended for himself. Has had previous issues with infection such as UTI that are acute cause-and-effect relations with change of mental status but improved once the infection is eliminated. Had recent changes of being more confused than baseline and difficulty with recognition of objects and sometimes people. This was clearly not his baseline and came into [facility] I believe on the 10th of this month. Was evaluated for potential causes including 2 head CTs in succession unrevealing and urine drug screen negative CBC okay slight increase in BUN creatinine and a screen for a UTI which did not materialize by investigation. Was discharged as having an unclear etiology for the altered mental status. Ended up seeing PT and OT and imaging for fractures and such was negative. Has background hypertension and diabetes and has subjective sense of numbness in his lower extremities. Primary care ordered a brain MRI which showed atrophy and chronic white matter changes and 3 small punctate lesions suspicion for acutely acquired infarcts. At this point in time the family notices again he has good and bad days but has not returned to his solid baseline performance. Currently lives with the daughter as they are concerned about his cognitive and physical capabilities unless he is under observation. The daughter has a walker Rollator and I believe a wheelchair at her house? Patient earnestly desires to get back to his own house. Current Outpatient Medications   Medication Sig Dispense Refill    aspirin delayed-release 81 mg tablet Take 81 mg by mouth daily.       donepezil (ARICEPT) 10 mg tablet TAKE 1 TABLET EVERY NIGHT 90 Tab 1    losartan (COZAAR) 25 mg tablet Take 1 Tab by mouth daily. 90 Tab 1    lansoprazole (PREVACID) 30 mg capsule TAKE 1 CAPSULE EVERY DAY  BEFORE  BREAKFAST 90 Cap 1    amLODIPine (NORVASC) 10 mg tablet TAKE 1 TABLET EVERY DAY FOR HYPERTENSION  (INCREASE  IN  DOSE  TO  10MG) 90 Tab 1    Thyroid, Pork, (ARMOUR THYROID) 120 mg tab Take 1 Tab by mouth daily. 90 Tab 1    glimepiride (AMARYL) 4 mg tablet TAKE 1 TABLET EVERY MORNING 90 Tab 1    TRADJENTA 5 mg tablet TAKE 1 TAB BY MOUTH DAILY. INDICATIONS: TYPE 2 DIABETES MELLITUS 90 Tab 1    loperamide (IMODIUM) 2 mg capsule TAKE 1 CAPSULE BEFORE BREAKFAST, LUNCH, DINNER AND AT BEDTIME FOR DIARRHEA (Patient taking differently: TAKE 1 CAPSULE BEFORE BREAKFAST, LUNCH, DINNER AND AT BEDTIME FOR DIARRHEA  - taking twice a day) 360 Cap 0    VITAMIN D2 50,000 unit capsule TAKE 1 CAPSULE EVERY 7 DAYS 13 Cap 3    furosemide (LASIX) 20 mg tablet TAKE 1 TABLET EVERY DAY AS DIRECTED 90 Tab 3    cetirizine (ZYRTEC) 10 mg tablet TAKE 1 TABLET EVERY DAY AS NEEDED FOR ALLERGIES 90 Tab 3    ferrous sulfate (IRON) 325 mg (65 mg iron) tablet Take 1 Tab by mouth Daily (before breakfast). 30 Tab 5    multivitamins-minerals-lutein (CENTRUM SILVER) tab tablet Take 1 Tab by mouth daily.  nicotinic acid (NIACIN) 500 mg tablet Take 500 mg by mouth Daily (before breakfast).           Allergies   Allergen Reactions    Synthroid [Levothyroxine] Other (comments)     Confused, hallucinations     Past Medical History:   Diagnosis Date    [facility]TON [facility]acute [facility]) ([facility] 8/2/2014    Cancer ([facility]     colon    Chronic kidney disease     Diabetes ([facility] 2008    type 2    Dyslipidemia     Esophageal reflux     Esophageal reflux 2/9/2012    Hypertension 2008    Incontinence     Memory loss     Mixed hyperlipidemia 2/9/2012    Other ill-defined conditions(799.89)     broken neck 2012    Pernicious anemia 7/31/2012    Psychiatric disorder     dementia     Snoring     [facility]18[facility]    Unspecified hypothyroidism 2012      Past Surgical History:   Procedure Laterality Date    COLONOSCOPY  12    Rectosigmoid adenocarcinoma    HX CATARACT REMOVAL      HX HERNIA REPAIR      Bilateral inguinal    HX OTHER SURGICAL      colon resection    HX SMALL BOWEL RESECTION  2010    HX TONSIL AND ADENOIDECTOMY      HX TONSILLECTOMY  1936      Social History     Socioeconomic History    Marital status:      Spouse name: Not on file    Number of children: Not on file    Years of education: Not on file    Highest education level: Not on file   Social Needs    Financial resource strain: Not on file    Food insecurity - worry: Not on file    Food insecurity - inability: Not on file   [employer] needs - medical: Not on file   [employer] needs - non-medical: Not on file   Occupational History    Not on file   Tobacco Use    Smoking status: Former Smoker     Packs/day: 1.50     Years: 30.00     Pack years: 45.00     Last attempt to quit: 1982     Years since quittin.2    Smokeless tobacco: Never Used   Substance and Sexual Activity    Alcohol use: No     Alcohol/week: 0.0 oz     Types: 1 - 2 Standard drinks or equivalent per week    Drug use: No    Sexual activity: Not Currently     Partners: Female   Other Topics Concern    Not on file   Social History Narrative    Not on file      Family History   Problem Relation Age of Onset    Heart Disease Mother     Hypertension Mother     Heart Disease Father     Hypertension Father     Cancer Paternal Aunt       Visit Vitals  /74   Pulse (!) 56   Resp 18   Ht 6' (1.829 m)   SpO2 97%   BMI 25.58 kg/m²        Review of Systems:   A comprehensive review of systems was negative except for that written in the HPI. Neuro Exam:     Appearance:   The patient is well developed, well nourished, and unable to provide a coherent history and is in no acute distress. Mental Status: Oriented to name and knew it was close to Halloween, today of course being Halloween Day. Mood and affect appropriate. Cranial Nerves:   Intact visual fields. Fundi are benign. GERONIMO, EOM's full, no nystagmus, no ptosis. Facial sensation is normal. Corneal reflexes are intact. Facial movement is symmetric. Hearing is normal bilaterally. Palate is midline with normal sternocleidomastoid and trapezius muscles are normal. Tongue is midline. Motor:  5/5 strength in upper and lower proximal and distal muscles. Normal bulk and tone. No fasciculations. Reflexes:   Deep tendon reflexes 0-1+/4 and symmetrical.   Sensory:    Diminished distally to touch, pinprick and vibration. Gait:   Transfers and gait are taken slow and cautious without DME or touch contact. Came in a borrowed wheelchair today. Tremor:   No tremor noted. Cerebellar:  No cerebellar signs present. Neurovascular:  Normal heart sounds and regular rhythm, peripheral pulses intact, and no carotid bruits. Assessment:   Recent acute change of mental status coinciding with recent stroke. Initial encounter. Will suggest additional workup that would include carotid Doppler brain MRA and 2D echocardiogram.  Currently on baby aspirin and I would suggest doubling the dose. On blood pressure medicine and diabetic management and should continue to follow-up with primary care regarding risk factor modification. I agree with family that he should not be left alone at this time. Plan:   Revisit in about 5 weeks or so.   Signed by :  Bertha Romeo MD

## 2018-10-31 NOTE — PROGRESS NOTES
Reviewed record in preparation for visit and have necessary documentation  Pt did not bring medication to office visit for review  Advanced Directives, Living Will  Opportunity was given for questions

## 2018-10-31 NOTE — PROGRESS NOTES
This was an elective bilateral carotid Doppler for evaluation of recent stroke picture. Real-time or B-mode imaging showed heterogeneous plaquing in the internal and common carotid distributions of both sides. In the left internal carotid there was calcific plaquing that altered the Doppler shift characteristics. Color flow making allowances for the plaquing just mentioned and Doppler and spectral analysis reflected turbulent flow of mild to moderate means. The right vertebral artery flow antegrade and not visualized on the left. No significant plaquing noted for either external carotid artery distribution. Impression: This is an abnormal study that showed 16-49% stenosis perceived for the right internal and common carotid distributions. The observation of calcific plaquing in the proximal left ICA tended to mute the Doppler shift pattern and in this particular vessel distribution proximally, cannot rule out greater than 50% stenosis as a consequence. Clinical correlation is advised.   ROB HAYES.

## 2018-11-14 ENCOUNTER — HOSPITAL ENCOUNTER (OUTPATIENT)
Dept: MRI IMAGING | Age: 83
Discharge: HOME | End: 2018-11-14
Attending: SPECIALIST
Payer: MEDICARE

## 2018-11-14 ENCOUNTER — HOSPITAL ENCOUNTER (OUTPATIENT)
Dept: NON INVASIVE DIAGNOSTICS | Age: 83
Discharge: HOME | End: 2018-11-14
Attending: SPECIALIST
Payer: MEDICARE

## 2018-11-14 DIAGNOSIS — Z86.73 HISTORY OF RECENT STROKE: ICD-10-CM

## 2018-11-14 PROCEDURE — 70544 MR ANGIOGRAPHY HEAD W/O DYE: CPT

## 2018-11-14 PROCEDURE — 93306 TTE W/DOPPLER COMPLETE: CPT

## 2018-12-05 ENCOUNTER — OFFICE VISIT (OUTPATIENT)
Dept: NEUROLOGY | Age: 83
End: 2018-12-05

## 2018-12-05 VITALS
SYSTOLIC BLOOD PRESSURE: 134 MMHG | BODY MASS INDEX: 24.52 KG/M2 | DIASTOLIC BLOOD PRESSURE: 62 MMHG | RESPIRATION RATE: 18 BRPM | HEART RATE: 63 BPM | OXYGEN SATURATION: 92 % | WEIGHT: 181 LBS | HEIGHT: 72 IN

## 2018-12-05 DIAGNOSIS — Z86.73 HISTORY OF RECENT STROKE: ICD-10-CM

## 2018-12-05 DIAGNOSIS — F03.90 DEMENTIA WITHOUT BEHAVIORAL DISTURBANCE, UNSPECIFIED DEMENTIA TYPE: Primary | ICD-10-CM

## 2018-12-05 NOTE — PROGRESS NOTES
Chief Complaint   Patient presents with    Other    Memory Loss     1. Have you been to the ER, urgent care clinic since your last visit? Hospitalized since your last visit? No    2. Have you seen or consulted any other health care providers outside of the [address] [place] [address] since your last visit? Include any pap smears or colon screening.  No     Visit Vitals  /62   Pulse 63   Resp 18   Ht 6' (1.829 m)   Wt 82.1 kg (181 lb)   SpO2 92%   BMI 24.55 kg/m²

## 2018-12-05 NOTE — PROGRESS NOTES
Neurology Consult      Subjective: Anni Jhaveri is a 80 y.o. male who came in with his family today. Wants to do a stroke workup in light of recent change of mental status beyond baseline and suggestion of left frontal and occipital stroke lesions on MRI. Carotid Doppler showed 16-49% stenosis of the right ICA and calcium plaque in the left ICA and there was an inaccurate read because of the calcium. We will pursue an MRA of the neck. Brain MRA was normal.  Echocardiogram normal except for trivial mitral and aortic and tricuspid regurg. He is on baby aspirin and is on his blood pressure and oral agent for diabetes type 2. He is on niacin. Looked baseline for me today and the patient and family agree getting the MRA of the neck done. Her welcome to call me the day or 2 after the MRI to get final results. I will suggest he follow-up with primary care in [place] as his usual and customary caregiver. Family is working on details of his living situation. Current Outpatient Medications   Medication Sig Dispense Refill    aspirin delayed-release 81 mg tablet Take 81 mg by mouth daily.  donepezil (ARICEPT) 10 mg tablet TAKE 1 TABLET EVERY NIGHT 90 Tab 1    losartan (COZAAR) 25 mg tablet Take 1 Tab by mouth daily. 90 Tab 1    lansoprazole (PREVACID) 30 mg capsule TAKE 1 CAPSULE EVERY DAY  BEFORE  BREAKFAST 90 Cap 1    amLODIPine (NORVASC) 10 mg tablet TAKE 1 TABLET EVERY DAY FOR HYPERTENSION  (INCREASE  IN  DOSE  TO  10MG) 90 Tab 1    Thyroid, Pork, (ARMOUR THYROID) 120 mg tab Take 1 Tab by mouth daily. 90 Tab 1    glimepiride (AMARYL) 4 mg tablet TAKE 1 TABLET EVERY MORNING 90 Tab 1    TRADJENTA 5 mg tablet TAKE 1 TAB BY MOUTH DAILY.  INDICATIONS: TYPE 2 DIABETES MELLITUS 90 Tab 1    loperamide (IMODIUM) 2 mg capsule TAKE 1 CAPSULE BEFORE BREAKFAST, LUNCH, DINNER AND AT BEDTIME FOR DIARRHEA (Patient taking differently: TAKE 1 CAPSULE BEFORE BREAKFAST, LUNCH, DINNER AND AT BEDTIME FOR DIARRHEA  - taking twice a day) 360 Cap 0    VITAMIN D2 50,000 unit capsule TAKE 1 CAPSULE EVERY 7 DAYS 13 Cap 3    furosemide (LASIX) 20 mg tablet TAKE 1 TABLET EVERY DAY AS DIRECTED 90 Tab 3    cetirizine (ZYRTEC) 10 mg tablet TAKE 1 TABLET EVERY DAY AS NEEDED FOR ALLERGIES 90 Tab 3    ferrous sulfate (IRON) 325 mg (65 mg iron) tablet Take 1 Tab by mouth Daily (before breakfast). 30 Tab 5    multivitamins-minerals-lutein (CENTRUM SILVER) tab tablet Take 1 Tab by mouth daily.  nicotinic acid (NIACIN) 500 mg tablet Take 500 mg by mouth Daily (before breakfast).           Allergies   Allergen Reactions    Synthroid [Levothyroxine] Other (comments)     Confused, hallucinations     Past Medical History:   Diagnosis Date    [facility]IL[facility]acute [facility]) ([facility] 8/2/2014    Cancer ([facility]     colon    Chronic kidney disease     Diabetes ([facility] 2008    type 2    Dyslipidemia     Esophageal reflux     Esophageal reflux 2/9/2012    Hypertension 2008    Incontinence     Memory loss     Mixed hyperlipidemia 2/9/2012    Other ill-defined conditions(799.89)     broken neck 2012    Pernicious anemia 7/31/2012    Psychiatric disorder     dementia     Snoring     Stroke ([facility] 10/18/2018    Unspecified hypothyroidism 4/26/2012      Past Surgical History:   Procedure Laterality Date    COLONOSCOPY  5/29/12    Rectosigmoid adenocarcinoma    HX CATARACT REMOVAL      HX HERNIA REPAIR      Bilateral inguinal    HX OTHER SURGICAL      colon resection    HX SMALL BOWEL RESECTION  2010    HX TONSIL AND ADENOIDECTOMY      HX TONSILLECTOMY  1936      Social History     Socioeconomic History    Marital status:      Spouse name: Not on file    Number of children: Not on file    Years of education: Not on file    Highest education level: Not on file   Social Needs    Financial resource strain: Not on file    Food insecurity - worry: Not on file    Food insecurity - inability: Not on file   [employer] needs - medical: Not on file   [employer] needs - non-medical: Not on file   Occupational History    Not on file   Tobacco Use    Smoking status: Former Smoker     Packs/day: 1.50     Years: 30.00     Pack years: 45.00     Last attempt to quit: 1982     Years since quittin.3    Smokeless tobacco: Never Used   Substance and Sexual Activity    Alcohol use: No     Alcohol/week: 0.0 oz     Types: 1 - 2 Standard drinks or equivalent per week    Drug use: No    Sexual activity: Not Currently     Partners: Female   Other Topics Concern    Not on file   Social History Narrative    Not on file      Family History   Problem Relation Age of Onset    Heart Disease Mother     Hypertension Mother     Heart Disease Father     Hypertension Father     Cancer Paternal Aunt       Visit Vitals  /62   Pulse 63   Resp 18   Ht 6' (1.829 m)   Wt 82.1 kg (181 lb)   SpO2 92%   BMI 24.55 kg/m²        Review of Systems:   A comprehensive review of systems was negative except for that written in the HPI. Neuro Exam:     Appearance: The patient is well developed, well nourished, provides a coherent history and is in no acute distress. Mental Status: Oriented to time, place and person. Mood and affect appropriate. Cranial Nerves:   Intact visual fields. Fundi are benign. GERONIMO, EOM's full, no nystagmus, no ptosis. Facial sensation is normal. Corneal reflexes are intact. Facial movement is symmetric. Hearing is normal bilaterally. Palate is midline with normal sternocleidomastoid and trapezius muscles are normal. Tongue is midline. Motor:  5/5 strength in upper and lower proximal and distal muscles. Normal bulk and tone. No fasciculations. Reflexes:   Deep tendon reflexes 0-1+/4 and symmetrical.   Sensory:    Diminished distally to touch, pinprick and vibration. Gait:   Came in a wheelchair. Tremor:   No tremor noted. Cerebellar:  No cerebellar signs present.    Neurovascular: Normal heart sounds and regular rhythm, peripheral pulses intact, and no carotid bruits. Assessment:   History of recent stroke. Will supersede carotid Doppler with MRA of the neck due to heavy calcium buildup in the left neck. Continue aspirin and follow-up with primary care regarding vascular risk factors. Dementia. Continue on the Aricept and family is working with his home situation and his final living environment. Plan:   Revisit here as needed.   Signed by :  Dwaine Chapman MD

## 2018-12-05 NOTE — PATIENT INSTRUCTIONS
[facility]   Statement to Patients  April 1, 2014      In an effort to ensure the large volume of patient prescription refills is processed in the most efficient and expeditious manner, we are asking our patients to assist us by calling your Pharmacy for all prescription refills, this will include also your  Mail Order Pharmacy. The pharmacy will contact our office electronically to continue the refill process. Please do not wait until the last minute to call your pharmacy. We need at least 48 hours (2days) to fill prescriptions. We also encourage you to call your pharmacy before going to  your prescription to make sure it is ready. With regard to controlled substance prescription refill requests (narcotic refills) that need to be picked up at our office, we ask your cooperation by providing us with at least 72 hours (3days) notice that you will need a refill. We will not refill narcotic prescription refill requests after 4:00pm on any weekday, Monday through Thursday, or after 2:00pm on Fridays, or on the weekends. We encourage everyone to explore another way of getting your prescription refill request processed using [employer], our patient web portal through our electronic medical record system. [employer] is an efficient and effective way to communicate your medication request directly to the office and  downloadable as an viviane on your smart phone . [employer] also features a review functionality that allows you to view your medication list as well as leave messages for your physician. Are you ready to get connected? If so please review the attatched instructions or speak to any of our staff to get you set up right away! Thank you so much for your cooperation. Should you have any questions please contact our Practice Administrator.     The Physicians and Staff,  [facility]   Patient Instruction Plan/ Result Policy    If we have ordered testing for you, know that; \"NO NEWS IS GOOD NEWS! \" It is our policy that we know longer call patients with results, nor do we  give test results over the phone. We schedule follow up appointments so that your results can be discussed in person. This allows you to address any questions you have regarding the results. If something of concern is revealed on your test, we will contact you to discuss the matter and if needed schedule a sooner follow up appointment. Additionally, results may be found by using the My Chart feature and one of our patient service representatives at the  can give you instructions on how to access this feature to utilize our electronic medical record system. Thank you for your understanding. Patient history reviewed and patient examined. Additional stroke workup testing looked good except the left neck imaging was poor with Doppler. Will order an additional testing of the neck with MRA. Family is welcome to call a day or 2 after the MRA is done and if there is no controversy is certainly welcome to follow-up with his primary care in [place]. Continue on the Aricept and is welcome to see me as needed.

## 2018-12-22 ENCOUNTER — HOSPITAL ENCOUNTER (OUTPATIENT)
Dept: MRI IMAGING | Age: 83
Discharge: HOME | End: 2018-12-22
Attending: SPECIALIST
Payer: MEDICARE

## 2018-12-22 DIAGNOSIS — Z86.73 HISTORY OF RECENT STROKE: ICD-10-CM

## 2018-12-22 PROCEDURE — 70547 MR ANGIOGRAPHY NECK W/O DYE: CPT

## 2019-01-01 ENCOUNTER — TELEPHONE (OUTPATIENT)
Dept: FAMILY MEDICINE CLINIC | Age: 84
End: 2019-01-01

## 2019-01-01 ENCOUNTER — OFFICE VISIT (OUTPATIENT)
Dept: FAMILY MEDICINE CLINIC | Age: 84
End: 2019-01-01

## 2019-01-01 ENCOUNTER — PATIENT OUTREACH (OUTPATIENT)
Dept: FAMILY MEDICINE CLINIC | Age: 84
End: 2019-01-01

## 2019-01-01 ENCOUNTER — APPOINTMENT (OUTPATIENT)
Dept: NON INVASIVE DIAGNOSTICS | Age: 84
DRG: 689 | End: 2019-01-01
Attending: STUDENT IN AN ORGANIZED HEALTH CARE EDUCATION/TRAINING PROGRAM
Payer: MEDICARE

## 2019-01-01 ENCOUNTER — HOSPITAL ENCOUNTER (INPATIENT)
Age: 84
LOS: 3 days | Discharge: HOME HEALTH | DRG: 689 | End: 2019-12-17
Attending: EMERGENCY MEDICINE | Admitting: FAMILY MEDICINE
Payer: MEDICARE

## 2019-01-01 ENCOUNTER — HOSPITAL ENCOUNTER (OUTPATIENT)
Dept: LAB | Age: 84
Discharge: HOME | End: 2019-11-13

## 2019-01-01 ENCOUNTER — APPOINTMENT (OUTPATIENT)
Dept: MRI IMAGING | Age: 84
DRG: 689 | End: 2019-01-01
Attending: PSYCHIATRY & NEUROLOGY
Payer: MEDICARE

## 2019-01-01 ENCOUNTER — APPOINTMENT (OUTPATIENT)
Dept: VASCULAR SURGERY | Age: 84
DRG: 689 | End: 2019-01-01
Attending: PSYCHIATRY & NEUROLOGY
Payer: MEDICARE

## 2019-01-01 ENCOUNTER — APPOINTMENT (OUTPATIENT)
Dept: MRI IMAGING | Age: 84
DRG: 689 | End: 2019-01-01
Attending: STUDENT IN AN ORGANIZED HEALTH CARE EDUCATION/TRAINING PROGRAM
Payer: MEDICARE

## 2019-01-01 ENCOUNTER — HOSPITAL ENCOUNTER (OUTPATIENT)
Dept: LAB | Age: 84
Discharge: HOME | End: 2019-12-24

## 2019-01-01 ENCOUNTER — APPOINTMENT (OUTPATIENT)
Dept: CT IMAGING | Age: 84
DRG: 689 | End: 2019-01-01
Attending: EMERGENCY MEDICINE
Payer: MEDICARE

## 2019-01-01 ENCOUNTER — HOSPITAL ENCOUNTER (OUTPATIENT)
Dept: LAB | Age: 84
Discharge: HOME | End: 2019-12-19

## 2019-01-01 VITALS
DIASTOLIC BLOOD PRESSURE: 62 MMHG | HEART RATE: 60 BPM | TEMPERATURE: 98 F | BODY MASS INDEX: 26.33 KG/M2 | OXYGEN SATURATION: 95 % | RESPIRATION RATE: 18 BRPM | WEIGHT: 194.4 LBS | SYSTOLIC BLOOD PRESSURE: 140 MMHG | HEIGHT: 72 IN

## 2019-01-01 VITALS
SYSTOLIC BLOOD PRESSURE: 132 MMHG | OXYGEN SATURATION: 95 % | HEART RATE: 55 BPM | HEIGHT: 72 IN | WEIGHT: 194.4 LBS | TEMPERATURE: 98.3 F | DIASTOLIC BLOOD PRESSURE: 64 MMHG | RESPIRATION RATE: 18 BRPM | BODY MASS INDEX: 26.33 KG/M2

## 2019-01-01 VITALS
HEART RATE: 61 BPM | RESPIRATION RATE: 20 BRPM | HEIGHT: 72 IN | BODY MASS INDEX: 26.55 KG/M2 | SYSTOLIC BLOOD PRESSURE: 136 MMHG | DIASTOLIC BLOOD PRESSURE: 68 MMHG | WEIGHT: 196 LBS | OXYGEN SATURATION: 98 % | TEMPERATURE: 97.6 F

## 2019-01-01 VITALS
BODY MASS INDEX: 25.87 KG/M2 | RESPIRATION RATE: 18 BRPM | HEIGHT: 72 IN | DIASTOLIC BLOOD PRESSURE: 70 MMHG | TEMPERATURE: 97.6 F | OXYGEN SATURATION: 97 % | HEART RATE: 58 BPM | WEIGHT: 191 LBS | SYSTOLIC BLOOD PRESSURE: 152 MMHG

## 2019-01-01 VITALS
SYSTOLIC BLOOD PRESSURE: 160 MMHG | HEART RATE: 59 BPM | BODY MASS INDEX: 26.28 KG/M2 | RESPIRATION RATE: 16 BRPM | HEIGHT: 72 IN | OXYGEN SATURATION: 96 % | WEIGHT: 194 LBS | DIASTOLIC BLOOD PRESSURE: 72 MMHG | TEMPERATURE: 98.1 F

## 2019-01-01 DIAGNOSIS — Z23 ENCOUNTER FOR IMMUNIZATION: ICD-10-CM

## 2019-01-01 DIAGNOSIS — N30.00 ACUTE CYSTITIS WITHOUT HEMATURIA: ICD-10-CM

## 2019-01-01 DIAGNOSIS — K21.9 GASTROESOPHAGEAL REFLUX DISEASE, ESOPHAGITIS PRESENCE NOT SPECIFIED: ICD-10-CM

## 2019-01-01 DIAGNOSIS — I10 ESSENTIAL HYPERTENSION, BENIGN: ICD-10-CM

## 2019-01-01 DIAGNOSIS — G93.40 ENCEPHALOPATHY ACUTE: Primary | ICD-10-CM

## 2019-01-01 DIAGNOSIS — E11.9 CONTROLLED TYPE 2 DIABETES MELLITUS WITHOUT COMPLICATION, WITHOUT LONG-TERM CURRENT USE OF INSULIN (HCC): ICD-10-CM

## 2019-01-01 DIAGNOSIS — F03.90 DEMENTIA WITHOUT BEHAVIORAL DISTURBANCE, UNSPECIFIED DEMENTIA TYPE: ICD-10-CM

## 2019-01-01 DIAGNOSIS — R32 URINARY INCONTINENCE, UNSPECIFIED TYPE: ICD-10-CM

## 2019-01-01 DIAGNOSIS — R31.9 URINARY TRACT INFECTION WITH HEMATURIA, SITE UNSPECIFIED: ICD-10-CM

## 2019-01-01 DIAGNOSIS — Z00.00 MEDICARE ANNUAL WELLNESS VISIT, SUBSEQUENT: Primary | ICD-10-CM

## 2019-01-01 DIAGNOSIS — Z79.899 ENCOUNTER FOR LONG-TERM (CURRENT) USE OF MEDICATIONS: ICD-10-CM

## 2019-01-01 DIAGNOSIS — N17.9 AKI (ACUTE KIDNEY INJURY) (HCC): ICD-10-CM

## 2019-01-01 DIAGNOSIS — R41.82 ALTERED MENTAL STATUS, UNSPECIFIED ALTERED MENTAL STATUS TYPE: ICD-10-CM

## 2019-01-01 DIAGNOSIS — N18.4 CKD (CHRONIC KIDNEY DISEASE) STAGE 4, GFR 15-29 ML/MIN (HCC): ICD-10-CM

## 2019-01-01 DIAGNOSIS — N30.00 ACUTE CYSTITIS WITHOUT HEMATURIA: Primary | ICD-10-CM

## 2019-01-01 DIAGNOSIS — N39.0 URINARY TRACT INFECTION WITHOUT HEMATURIA, SITE UNSPECIFIED: Primary | ICD-10-CM

## 2019-01-01 DIAGNOSIS — E03.9 ACQUIRED HYPOTHYROIDISM: ICD-10-CM

## 2019-01-01 DIAGNOSIS — Z09 HOSPITAL DISCHARGE FOLLOW-UP: Primary | ICD-10-CM

## 2019-01-01 DIAGNOSIS — E11.21 TYPE 2 DIABETES WITH NEPHROPATHY (HCC): ICD-10-CM

## 2019-01-01 DIAGNOSIS — N39.0 URINARY TRACT INFECTION WITHOUT HEMATURIA, SITE UNSPECIFIED: ICD-10-CM

## 2019-01-01 DIAGNOSIS — E78.2 MIXED HYPERLIPIDEMIA: ICD-10-CM

## 2019-01-01 DIAGNOSIS — Z79.899 ENCOUNTER FOR LONG-TERM CURRENT USE OF MEDICATION: ICD-10-CM

## 2019-01-01 DIAGNOSIS — N39.0 URINARY TRACT INFECTION WITH HEMATURIA, SITE UNSPECIFIED: ICD-10-CM

## 2019-01-01 DIAGNOSIS — E11.9 CONTROLLED TYPE 2 DIABETES MELLITUS WITHOUT COMPLICATION, WITHOUT LONG-TERM CURRENT USE OF INSULIN (HCC): Primary | ICD-10-CM

## 2019-01-01 DIAGNOSIS — Z13.29 SCREENING FOR THYROID DISORDER: ICD-10-CM

## 2019-01-01 LAB
ALBUMIN SERPL-MCNC: 2.4 G/DL (ref 3.5–5)
ALBUMIN SERPL-MCNC: 2.6 G/DL (ref 3.5–5)
ALBUMIN SERPL-MCNC: 2.6 G/DL (ref 3.5–5)
ALBUMIN SERPL-MCNC: 3.2 G/DL (ref 3.5–5)
ALBUMIN SERPL-MCNC: 3.4 G/DL (ref 3.5–4.7)
ALBUMIN SERPL-MCNC: 3.4 G/DL (ref 3.5–5)
ALBUMIN/GLOB SERPL: 0.7 {RATIO} (ref 1.1–2.2)
ALBUMIN/GLOB SERPL: 0.8 {RATIO} (ref 1.1–2.2)
ALBUMIN/GLOB SERPL: 1 {RATIO} (ref 1.1–2.2)
ALBUMIN/GLOB SERPL: 1.3 {RATIO} (ref 1.2–2.2)
ALP SERPL-CCNC: 100 U/L (ref 45–117)
ALP SERPL-CCNC: 101 U/L (ref 45–117)
ALP SERPL-CCNC: 77 U/L (ref 45–117)
ALP SERPL-CCNC: 81 U/L (ref 45–117)
ALP SERPL-CCNC: 83 IU/L (ref 39–117)
ALP SERPL-CCNC: 88 U/L (ref 45–117)
ALT SERPL-CCNC: 11 U/L (ref 12–78)
ALT SERPL-CCNC: 13 U/L (ref 12–78)
ALT SERPL-CCNC: 14 U/L (ref 12–78)
ALT SERPL-CCNC: 14 U/L (ref 12–78)
ALT SERPL-CCNC: 15 U/L (ref 12–78)
ALT SERPL-CCNC: 9 IU/L (ref 0–44)
AMPHET UR QL SCN: NEGATIVE
ANION GAP SERPL CALC-SCNC: 3 MMOL/L (ref 5–15)
ANION GAP SERPL CALC-SCNC: 4 MMOL/L (ref 5–15)
ANION GAP SERPL CALC-SCNC: 4 MMOL/L (ref 5–15)
ANION GAP SERPL CALC-SCNC: 5 MMOL/L (ref 5–15)
ANION GAP SERPL CALC-SCNC: 6 MMOL/L (ref 5–15)
APPEARANCE UR: ABNORMAL
AST SERPL-CCNC: 18 IU/L (ref 0–40)
AST SERPL-CCNC: 18 U/L (ref 15–37)
AST SERPL-CCNC: 21 U/L (ref 15–37)
AST SERPL-CCNC: 22 U/L (ref 15–37)
AST SERPL-CCNC: 24 U/L (ref 15–37)
AST SERPL-CCNC: 26 U/L (ref 15–37)
ATRIAL RATE: 71 BPM
AV VELOCITY RATIO: 0.58
BACTERIA SPEC CULT: ABNORMAL
BACTERIA SPEC CULT: ABNORMAL
BACTERIA SPEC CULT: NORMAL
BACTERIA SPEC CULT: NORMAL
BACTERIA URNS QL MICRO: ABNORMAL /HPF
BARBITURATES UR QL SCN: NEGATIVE
BASOPHILS # BLD AUTO: 0 X10E3/UL (ref 0–0.2)
BASOPHILS # BLD: 0 K/UL (ref 0–0.1)
BASOPHILS # BLD: 0.1 K/UL (ref 0–0.1)
BASOPHILS NFR BLD AUTO: 0 %
BASOPHILS NFR BLD: 0 % (ref 0–1)
BASOPHILS NFR BLD: 1 % (ref 0–1)
BENZODIAZ UR QL: NEGATIVE
BILIRUB SERPL-MCNC: 0.3 MG/DL (ref 0.2–1)
BILIRUB SERPL-MCNC: 0.4 MG/DL (ref 0.2–1)
BILIRUB SERPL-MCNC: 0.5 MG/DL (ref 0–1.2)
BILIRUB SERPL-MCNC: 0.7 MG/DL (ref 0.2–1)
BILIRUB SERPL-MCNC: 0.7 MG/DL (ref 0.2–1)
BILIRUB SERPL-MCNC: 0.8 MG/DL (ref 0.2–1)
BILIRUB UR QL: NEGATIVE
BUN SERPL-MCNC: 21 MG/DL (ref 6–20)
BUN SERPL-MCNC: 23 MG/DL (ref 6–20)
BUN SERPL-MCNC: 25 MG/DL (ref 6–20)
BUN SERPL-MCNC: 26 MG/DL (ref 6–20)
BUN SERPL-MCNC: 27 MG/DL (ref 8–27)
BUN SERPL-MCNC: 30 MG/DL (ref 6–20)
BUN/CREAT SERPL: 12 (ref 10–24)
BUN/CREAT SERPL: 12 (ref 12–20)
BUN/CREAT SERPL: 8 (ref 12–20)
BUN/CREAT SERPL: 8 (ref 12–20)
BUN/CREAT SERPL: 9 (ref 12–20)
BUN/CREAT SERPL: 9 (ref 12–20)
CALCIUM SERPL-MCNC: 7.9 MG/DL (ref 8.5–10.1)
CALCIUM SERPL-MCNC: 8 MG/DL (ref 8.5–10.1)
CALCIUM SERPL-MCNC: 8.1 MG/DL (ref 8.5–10.1)
CALCIUM SERPL-MCNC: 8.8 MG/DL (ref 8.5–10.1)
CALCIUM SERPL-MCNC: 8.8 MG/DL (ref 8.6–10.2)
CALCIUM SERPL-MCNC: 8.9 MG/DL (ref 8.5–10.1)
CALCULATED P AXIS, ECG09: 118 DEGREES
CALCULATED R AXIS, ECG10: 22 DEGREES
CALCULATED T AXIS, ECG11: 43 DEGREES
CANNABINOIDS UR QL SCN: NEGATIVE
CC UR VC: ABNORMAL
CC UR VC: ABNORMAL
CC UR VC: NORMAL
CHLORIDE SERPL-SCNC: 103 MMOL/L (ref 96–106)
CHLORIDE SERPL-SCNC: 108 MMOL/L (ref 97–108)
CHLORIDE SERPL-SCNC: 111 MMOL/L (ref 97–108)
CHLORIDE SERPL-SCNC: 113 MMOL/L (ref 97–108)
CHLORIDE SERPL-SCNC: 114 MMOL/L (ref 97–108)
CHLORIDE SERPL-SCNC: 115 MMOL/L (ref 97–108)
CHOLEST SERPL-MCNC: 185 MG/DL
CHOLEST SERPL-MCNC: 198 MG/DL
CK SERPL-CCNC: 228 U/L (ref 39–308)
CO2 SERPL-SCNC: 22 MMOL/L (ref 20–29)
CO2 SERPL-SCNC: 25 MMOL/L (ref 21–32)
CO2 SERPL-SCNC: 25 MMOL/L (ref 21–32)
CO2 SERPL-SCNC: 26 MMOL/L (ref 21–32)
CO2 SERPL-SCNC: 26 MMOL/L (ref 21–32)
CO2 SERPL-SCNC: 27 MMOL/L (ref 21–32)
COCAINE UR QL SCN: NEGATIVE
COLOR UR: ABNORMAL
COMMENT, HOLDF: NORMAL
CREAT SERPL-MCNC: 2.22 MG/DL (ref 0.76–1.27)
CREAT SERPL-MCNC: 2.5 MG/DL (ref 0.7–1.3)
CREAT SERPL-MCNC: 2.56 MG/DL (ref 0.7–1.3)
CREAT SERPL-MCNC: 2.61 MG/DL (ref 0.7–1.3)
CREAT SERPL-MCNC: 2.76 MG/DL (ref 0.7–1.3)
CREAT SERPL-MCNC: 3.08 MG/DL (ref 0.7–1.3)
DIAGNOSIS, 93000: NORMAL
DIFFERENTIAL METHOD BLD: ABNORMAL
DRUG SCRN COMMENT,DRGCM: NORMAL
ECHO AO ROOT DIAM: 2.99 CM
ECHO AV AREA PEAK VELOCITY: 1.9 CM2
ECHO AV AREA/BSA PEAK VELOCITY: 0.9 CM2/M2
ECHO AV PEAK GRADIENT: 9.5 MMHG
ECHO AV PEAK VELOCITY: 153.97 CM/S
ECHO EST RA PRESSURE: 8 MMHG
ECHO LA MAJOR AXIS: 4.34 CM
ECHO LA TO AORTIC ROOT RATIO: 1.45
ECHO LA VOL 2C: 61.73 ML (ref 18–58)
ECHO LA VOL 4C: 41.07 ML (ref 18–58)
ECHO LA VOL BP: 54.69 ML (ref 18–58)
ECHO LA VOL/BSA BIPLANE: 26.01 ML/M2 (ref 16–28)
ECHO LA VOLUME INDEX A2C: 29.35 ML/M2 (ref 16–28)
ECHO LA VOLUME INDEX A4C: 19.53 ML/M2 (ref 16–28)
ECHO LV EDV TEICHHOLZ: 0.69 ML
ECHO LV ESV TEICHHOLZ: 0.26 ML
ECHO LV INTERNAL DIMENSION DIASTOLIC: 5.13 CM (ref 4.2–5.9)
ECHO LV INTERNAL DIMENSION SYSTOLIC: 3.39 CM
ECHO LV IVSD: 1.05 CM (ref 0.6–1)
ECHO LV MASS 2D: 242.2 G (ref 88–224)
ECHO LV MASS INDEX 2D: 115.2 G/M2 (ref 49–115)
ECHO LV POSTERIOR WALL DIASTOLIC: 1.07 CM (ref 0.6–1)
ECHO LVOT DIAM: 2.06 CM
ECHO LVOT PEAK GRADIENT: 3.1 MMHG
ECHO LVOT PEAK VELOCITY: 88.55 CM/S
ECHO MV A VELOCITY: 79.43 CM/S
ECHO MV E DECELERATION TIME (DT): 485.2 MS
ECHO MV E VELOCITY: 62.61 CM/S
ECHO MV E/A RATIO: 0.79
ECHO MV REGURGITANT PEAK GRADIENT: 43.5 MMHG
ECHO MV REGURGITANT PEAK VELOCITY: 329.81 CM/S
ECHO PULMONARY ARTERY SYSTOLIC PRESSURE (PASP): 71.1 MMHG
ECHO PV MAX VELOCITY: 95.73 CM/S
ECHO PV PEAK GRADIENT: 3.7 MMHG
ECHO RIGHT VENTRICULAR SYSTOLIC PRESSURE (RVSP): 71.1 MMHG
ECHO RV INTERNAL DIMENSION: 4.49 CM
ECHO TV REGURGITANT MAX VELOCITY: 397.19 CM/S
ECHO TV REGURGITANT PEAK GRADIENT: 63.1 MMHG
EOSINOPHIL # BLD AUTO: 0.1 X10E3/UL (ref 0–0.4)
EOSINOPHIL # BLD: 0.1 K/UL (ref 0–0.4)
EOSINOPHIL # BLD: 0.2 K/UL (ref 0–0.4)
EOSINOPHIL # BLD: 0.2 K/UL (ref 0–0.4)
EOSINOPHIL # BLD: 0.3 K/UL (ref 0–0.4)
EOSINOPHIL NFR BLD AUTO: 2 %
EOSINOPHIL NFR BLD: 0 % (ref 0–7)
EOSINOPHIL NFR BLD: 2 % (ref 0–7)
EOSINOPHIL NFR BLD: 3 % (ref 0–7)
EOSINOPHIL NFR BLD: 4 % (ref 0–7)
EPITH CASTS URNS QL MICRO: ABNORMAL /LPF
ERYTHROCYTE [DISTWIDTH] IN BLOOD BY AUTOMATED COUNT: 11.9 % (ref 11.5–14.5)
ERYTHROCYTE [DISTWIDTH] IN BLOOD BY AUTOMATED COUNT: 12 % (ref 11.5–14.5)
ERYTHROCYTE [DISTWIDTH] IN BLOOD BY AUTOMATED COUNT: 12.1 % (ref 11.5–14.5)
ERYTHROCYTE [DISTWIDTH] IN BLOOD BY AUTOMATED COUNT: 12.2 % (ref 11.5–14.5)
ERYTHROCYTE [DISTWIDTH] IN BLOOD BY AUTOMATED COUNT: 12.3 % (ref 11.5–14.5)
ERYTHROCYTE [DISTWIDTH] IN BLOOD BY AUTOMATED COUNT: 13.6 % (ref 12.3–15.4)
EST. AVERAGE GLUCOSE BLD GHB EST-MCNC: 137 MG/DL
EST. AVERAGE GLUCOSE BLD GHB EST-MCNC: 140 MG/DL
EST. AVERAGE GLUCOSE BLD GHB EST-MCNC: 140 MG/DL
GLOBULIN SER CALC-MCNC: 2.7 G/DL (ref 1.5–4.5)
GLOBULIN SER CALC-MCNC: 3.3 G/DL (ref 2–4)
GLOBULIN SER CALC-MCNC: 3.3 G/DL (ref 2–4)
GLOBULIN SER CALC-MCNC: 3.4 G/DL (ref 2–4)
GLOBULIN SER CALC-MCNC: 3.5 G/DL (ref 2–4)
GLOBULIN SER CALC-MCNC: 4 G/DL (ref 2–4)
GLUCOSE BLD STRIP.AUTO-MCNC: 100 MG/DL (ref 65–100)
GLUCOSE BLD STRIP.AUTO-MCNC: 109 MG/DL (ref 65–100)
GLUCOSE BLD STRIP.AUTO-MCNC: 117 MG/DL (ref 65–100)
GLUCOSE BLD STRIP.AUTO-MCNC: 136 MG/DL (ref 65–100)
GLUCOSE BLD STRIP.AUTO-MCNC: 149 MG/DL (ref 65–100)
GLUCOSE BLD STRIP.AUTO-MCNC: 153 MG/DL (ref 65–100)
GLUCOSE BLD STRIP.AUTO-MCNC: 161 MG/DL (ref 65–100)
GLUCOSE BLD STRIP.AUTO-MCNC: 178 MG/DL (ref 65–100)
GLUCOSE BLD STRIP.AUTO-MCNC: 79 MG/DL (ref 65–100)
GLUCOSE BLD STRIP.AUTO-MCNC: 81 MG/DL (ref 65–100)
GLUCOSE BLD STRIP.AUTO-MCNC: 84 MG/DL (ref 65–100)
GLUCOSE BLD STRIP.AUTO-MCNC: 87 MG/DL (ref 65–100)
GLUCOSE BLD STRIP.AUTO-MCNC: 92 MG/DL (ref 65–100)
GLUCOSE SERPL-MCNC: 100 MG/DL (ref 65–100)
GLUCOSE SERPL-MCNC: 134 MG/DL (ref 65–100)
GLUCOSE SERPL-MCNC: 150 MG/DL (ref 65–100)
GLUCOSE SERPL-MCNC: 222 MG/DL (ref 65–99)
GLUCOSE SERPL-MCNC: 85 MG/DL (ref 65–100)
GLUCOSE SERPL-MCNC: 92 MG/DL (ref 65–100)
GLUCOSE UR STRIP.AUTO-MCNC: 100 MG/DL
HBA1C MFR BLD: 6.4 % (ref 4.2–6.3)
HBA1C MFR BLD: 6.5 % (ref 4.8–5.6)
HBA1C MFR BLD: 6.5 % (ref 4–5.6)
HCT VFR BLD AUTO: 33 % (ref 36.6–50.3)
HCT VFR BLD AUTO: 33.8 % (ref 36.6–50.3)
HCT VFR BLD AUTO: 34.9 % (ref 36.6–50.3)
HCT VFR BLD AUTO: 38.7 % (ref 37.5–51)
HCT VFR BLD AUTO: 39.4 % (ref 36.6–50.3)
HCT VFR BLD AUTO: 41.5 % (ref 36.6–50.3)
HDLC SERPL-MCNC: 33 MG/DL
HDLC SERPL-MCNC: 35 MG/DL
HDLC SERPL: 5.6 {RATIO} (ref 0–5)
HDLC SERPL: 5.7 {RATIO} (ref 0–5)
HGB BLD-MCNC: 10.8 G/DL (ref 12.1–17)
HGB BLD-MCNC: 11.2 G/DL (ref 12.1–17)
HGB BLD-MCNC: 11.6 G/DL (ref 12.1–17)
HGB BLD-MCNC: 12.8 G/DL (ref 13–17.7)
HGB BLD-MCNC: 13 G/DL (ref 12.1–17)
HGB BLD-MCNC: 13.4 G/DL (ref 12.1–17)
HGB UR QL STRIP: ABNORMAL
IMM GRANULOCYTES # BLD AUTO: 0 K/UL (ref 0–0.04)
IMM GRANULOCYTES # BLD AUTO: 0 X10E3/UL (ref 0–0.1)
IMM GRANULOCYTES NFR BLD AUTO: 0 %
IMM GRANULOCYTES NFR BLD AUTO: 0 % (ref 0–0.5)
INR PPP: 1 (ref 0.9–1.1)
INTERPRETATION: NORMAL
INTERPRETIVE COMMENT, 330017: NORMAL
KETONES UR QL STRIP.AUTO: NEGATIVE MG/DL
LDLC SERPL CALC-MCNC: 115.8 MG/DL (ref 0–100)
LDLC SERPL CALC-MCNC: 118.2 MG/DL (ref 0–100)
LEFT CCA DIST DIAS: 9.5 CM/S
LEFT CCA DIST SYS: 69 CM/S
LEFT CCA PROX DIAS: 6.5 CM/S
LEFT CCA PROX SYS: 77.5 CM/S
LEFT ECA DIAS: 8.48 CM/S
LEFT ECA SYS: 152.4 CM/S
LEFT ICA MID DIAS: 16.6 CM/S
LEFT ICA MID SYS: 129.7 CM/S
LEFT ICA PROX DIAS: 39.8 CM/S
LEFT ICA PROX SYS: 220.9 CM/S
LEFT ICA/CCA SYS: 3.2
LEFT SUBCLAVIAN DIAS: 11.4 CM/S
LEFT SUBCLAVIAN SYS: 139.9 CM/S
LEFT VERTEBRAL DIAS: 13.4 CM/S
LEFT VERTEBRAL SYS: 60.2 CM/S
LEUKOCYTE ESTERASE UR QL STRIP.AUTO: ABNORMAL
LIPID PROFILE,FLP: ABNORMAL
LIPID PROFILE,FLP: ABNORMAL
LVFS 2D: 33.97 %
LVSV (TEICH): 37.07 ML
LYMPHOCYTES # BLD AUTO: 2 X10E3/UL (ref 0.7–3.1)
LYMPHOCYTES # BLD: 1.7 K/UL (ref 0.8–3.5)
LYMPHOCYTES # BLD: 1.7 K/UL (ref 0.8–3.5)
LYMPHOCYTES # BLD: 1.9 K/UL (ref 0.8–3.5)
LYMPHOCYTES # BLD: 2.1 K/UL (ref 0.8–3.5)
LYMPHOCYTES NFR BLD AUTO: 25 %
LYMPHOCYTES NFR BLD: 18 % (ref 12–49)
LYMPHOCYTES NFR BLD: 20 % (ref 12–49)
LYMPHOCYTES NFR BLD: 22 % (ref 12–49)
LYMPHOCYTES NFR BLD: 27 % (ref 12–49)
Lab: NORMAL
MAGNESIUM SERPL-MCNC: 1.8 MG/DL (ref 1.6–2.4)
MCH RBC QN AUTO: 32.2 PG (ref 26.6–33)
MCH RBC QN AUTO: 32.3 PG (ref 26–34)
MCH RBC QN AUTO: 32.4 PG (ref 26–34)
MCH RBC QN AUTO: 32.9 PG (ref 26–34)
MCH RBC QN AUTO: 33 PG (ref 26–34)
MCH RBC QN AUTO: 33 PG (ref 26–34)
MCHC RBC AUTO-ENTMCNC: 32.3 G/DL (ref 30–36.5)
MCHC RBC AUTO-ENTMCNC: 32.7 G/DL (ref 30–36.5)
MCHC RBC AUTO-ENTMCNC: 33 G/DL (ref 30–36.5)
MCHC RBC AUTO-ENTMCNC: 33.1 G/DL (ref 30–36.5)
MCHC RBC AUTO-ENTMCNC: 33.1 G/DL (ref 31.5–35.7)
MCHC RBC AUTO-ENTMCNC: 33.2 G/DL (ref 30–36.5)
MCV RBC AUTO: 100.2 FL (ref 80–99)
MCV RBC AUTO: 100.6 FL (ref 80–99)
MCV RBC AUTO: 98 FL (ref 79–97)
MCV RBC AUTO: 98 FL (ref 80–99)
MCV RBC AUTO: 99.1 FL (ref 80–99)
MCV RBC AUTO: 99.7 FL (ref 80–99)
METHADONE UR QL: NEGATIVE
MONOCYTES # BLD AUTO: 0.7 X10E3/UL (ref 0.1–0.9)
MONOCYTES # BLD: 0.7 K/UL (ref 0–1)
MONOCYTES # BLD: 0.9 K/UL (ref 0–1)
MONOCYTES # BLD: 0.9 K/UL (ref 0–1)
MONOCYTES # BLD: 1 K/UL (ref 0–1)
MONOCYTES NFR BLD AUTO: 8 %
MONOCYTES NFR BLD: 11 % (ref 5–13)
MONOCYTES NFR BLD: 9 % (ref 5–13)
MV DEC SLOPE: 1.29
NEUTROPHILS # BLD AUTO: 5.3 X10E3/UL (ref 1.4–7)
NEUTROPHILS NFR BLD AUTO: 65 %
NEUTS SEG # BLD: 4 K/UL (ref 1.8–8)
NEUTS SEG # BLD: 4.8 K/UL (ref 1.8–8)
NEUTS SEG # BLD: 5.9 K/UL (ref 1.8–8)
NEUTS SEG # BLD: 8.4 K/UL (ref 1.8–8)
NEUTS SEG NFR BLD: 58 % (ref 32–75)
NEUTS SEG NFR BLD: 62 % (ref 32–75)
NEUTS SEG NFR BLD: 66 % (ref 32–75)
NEUTS SEG NFR BLD: 73 % (ref 32–75)
NITRITE UR QL STRIP.AUTO: NEGATIVE
NRBC # BLD: 0 K/UL (ref 0–0.01)
NRBC BLD-RTO: 0 PER 100 WBC
OPIATES UR QL: NEGATIVE
P-R INTERVAL, ECG05: 148 MS
PCP UR QL: NEGATIVE
PH UR STRIP: 6 [PH] (ref 5–8)
PHOSPHATE SERPL-MCNC: 2.8 MG/DL (ref 2.6–4.7)
PLATELET # BLD AUTO: 195 K/UL (ref 150–400)
PLATELET # BLD AUTO: 200 K/UL (ref 150–400)
PLATELET # BLD AUTO: 202 K/UL (ref 150–400)
PLATELET # BLD AUTO: 218 X10E3/UL (ref 150–379)
PLATELET # BLD AUTO: 236 K/UL (ref 150–400)
PLATELET # BLD AUTO: 250 K/UL (ref 150–400)
PMV BLD AUTO: 9.3 FL (ref 8.9–12.9)
PMV BLD AUTO: 9.5 FL (ref 8.9–12.9)
PMV BLD AUTO: 9.5 FL (ref 8.9–12.9)
PMV BLD AUTO: 9.7 FL (ref 8.9–12.9)
PMV BLD AUTO: 9.9 FL (ref 8.9–12.9)
POTASSIUM SERPL-SCNC: 4.2 MMOL/L (ref 3.5–5.1)
POTASSIUM SERPL-SCNC: 4.3 MMOL/L (ref 3.5–5.1)
POTASSIUM SERPL-SCNC: 4.4 MMOL/L (ref 3.5–5.1)
POTASSIUM SERPL-SCNC: 4.6 MMOL/L (ref 3.5–5.1)
POTASSIUM SERPL-SCNC: 5.1 MMOL/L (ref 3.5–5.1)
POTASSIUM SERPL-SCNC: 5.2 MMOL/L (ref 3.5–5.2)
PROT SERPL-MCNC: 5.7 G/DL (ref 6.4–8.2)
PROT SERPL-MCNC: 5.9 G/DL (ref 6.4–8.2)
PROT SERPL-MCNC: 6 G/DL (ref 6.4–8.2)
PROT SERPL-MCNC: 6.1 G/DL (ref 6–8.5)
PROT SERPL-MCNC: 6.9 G/DL (ref 6.4–8.2)
PROT SERPL-MCNC: 7.2 G/DL (ref 6.4–8.2)
PROT UR STRIP-MCNC: 300 MG/DL
PROTHROMBIN TIME: 10.6 SEC (ref 9–11.1)
Q-T INTERVAL, ECG07: 396 MS
QRS DURATION, ECG06: 86 MS
QTC CALCULATION (BEZET), ECG08: 430 MS
RBC # BLD AUTO: 3.28 M/UL (ref 4.1–5.7)
RBC # BLD AUTO: 3.39 M/UL (ref 4.1–5.7)
RBC # BLD AUTO: 3.52 M/UL (ref 4.1–5.7)
RBC # BLD AUTO: 3.97 X10E6/UL (ref 4.14–5.8)
RBC # BLD AUTO: 4.02 M/UL (ref 4.1–5.7)
RBC # BLD AUTO: 4.14 M/UL (ref 4.1–5.7)
RBC #/AREA URNS HPF: ABNORMAL /HPF (ref 0–5)
RIGHT CCA DIST DIAS: 11.8 CM/S
RIGHT CCA DIST SYS: 81.2 CM/S
RIGHT CCA PROX DIAS: 13.4 CM/S
RIGHT CCA PROX SYS: 99 CM/S
RIGHT ECA DIAS: 11.78 CM/S
RIGHT ECA SYS: 115.1 CM/S
RIGHT ICA DIST DIAS: 15.3 CM/S
RIGHT ICA DIST SYS: 96.8 CM/S
RIGHT ICA MID DIAS: 15.3 CM/S
RIGHT ICA MID SYS: 90.8 CM/S
RIGHT ICA PROX DIAS: 16.6 CM/S
RIGHT ICA PROX SYS: 97.3 CM/S
RIGHT ICA/CCA SYS: 1.2
RIGHT SUBCLAVIAN DIAS: 10.18 CM/S
RIGHT SUBCLAVIAN SYS: 149.1 CM/S
RIGHT VERTEBRAL DIAS: 7.36 CM/S
RIGHT VERTEBRAL SYS: 37.6 CM/S
SAMPLES BEING HELD,HOLD: NORMAL
SERVICE CMNT-IMP: ABNORMAL
SERVICE CMNT-IMP: NORMAL
SODIUM SERPL-SCNC: 140 MMOL/L (ref 134–144)
SODIUM SERPL-SCNC: 140 MMOL/L (ref 136–145)
SODIUM SERPL-SCNC: 141 MMOL/L (ref 136–145)
SODIUM SERPL-SCNC: 143 MMOL/L (ref 136–145)
SODIUM SERPL-SCNC: 144 MMOL/L (ref 136–145)
SODIUM SERPL-SCNC: 144 MMOL/L (ref 136–145)
SP GR UR REFRACTOMETRY: 1.01 (ref 1–1.03)
T4 FREE SERPL-MCNC: 0.7 NG/DL (ref 0.8–1.5)
T4 FREE SERPL-MCNC: 0.73 NG/DL (ref 0.82–1.77)
T4 FREE SERPL-MCNC: 0.9 NG/DL (ref 0.8–1.5)
T4 FREE SERPL-MCNC: 0.92 NG/DL (ref 0.82–1.77)
THYROID PEROXIDASE (TPO) AB, 006677: 7 IU/ML (ref 0–34)
TRIGL SERPL-MCNC: 181 MG/DL (ref ?–150)
TRIGL SERPL-MCNC: 224 MG/DL (ref ?–150)
TROPONIN I SERPL-MCNC: <0.05 NG/ML
TROPONIN I SERPL-MCNC: <0.05 NG/ML
TSH SERPL DL<=0.005 MIU/L-ACNC: 4.65 UIU/ML (ref 0.45–4.5)
TSH SERPL DL<=0.05 MIU/L-ACNC: 0.65 UIU/ML (ref 0.36–3.74)
TSH SERPL DL<=0.05 MIU/L-ACNC: 3.82 UIU/ML (ref 0.36–3.74)
TSH SERPL-ACNC: 11.75 UIU/ML (ref 0.45–4.5)
UROBILINOGEN UR QL STRIP.AUTO: 0.2 EU/DL (ref 0.2–1)
VENTRICULAR RATE, ECG03: 71 BPM
VLDLC SERPL CALC-MCNC: 36.2 MG/DL
VLDLC SERPL CALC-MCNC: 44.8 MG/DL
WBC # BLD AUTO: 11.6 K/UL (ref 4.1–11.1)
WBC # BLD AUTO: 7 K/UL (ref 4.1–11.1)
WBC # BLD AUTO: 7.8 K/UL (ref 4.1–11.1)
WBC # BLD AUTO: 8.1 X10E3/UL (ref 3.4–10.8)
WBC # BLD AUTO: 8.8 K/UL (ref 4.1–11.1)
WBC # BLD AUTO: 8.9 K/UL (ref 4.1–11.1)
WBC URNS QL MICRO: >100 /HPF (ref 0–4)

## 2019-01-01 PROCEDURE — 74011250636 HC RX REV CODE- 250/636: Performed by: STUDENT IN AN ORGANIZED HEALTH CARE EDUCATION/TRAINING PROGRAM

## 2019-01-01 PROCEDURE — 84100 ASSAY OF PHOSPHORUS: CPT

## 2019-01-01 PROCEDURE — 93306 TTE W/DOPPLER COMPLETE: CPT

## 2019-01-01 PROCEDURE — 83036 HEMOGLOBIN GLYCOSYLATED A1C: CPT

## 2019-01-01 PROCEDURE — 65270000029 HC RM PRIVATE

## 2019-01-01 PROCEDURE — 99285 EMERGENCY DEPT VISIT HI MDM: CPT

## 2019-01-01 PROCEDURE — 74011000258 HC RX REV CODE- 258: Performed by: STUDENT IN AN ORGANIZED HEALTH CARE EDUCATION/TRAINING PROGRAM

## 2019-01-01 PROCEDURE — 80053 COMPREHEN METABOLIC PANEL: CPT

## 2019-01-01 PROCEDURE — 94761 N-INVAS EAR/PLS OXIMETRY MLT: CPT

## 2019-01-01 PROCEDURE — 96374 THER/PROPH/DIAG INJ IV PUSH: CPT

## 2019-01-01 PROCEDURE — 97116 GAIT TRAINING THERAPY: CPT

## 2019-01-01 PROCEDURE — 97530 THERAPEUTIC ACTIVITIES: CPT

## 2019-01-01 PROCEDURE — 74011250637 HC RX REV CODE- 250/637: Performed by: FAMILY MEDICINE

## 2019-01-01 PROCEDURE — 65660000000 HC RM CCU STEPDOWN

## 2019-01-01 PROCEDURE — 81001 URINALYSIS AUTO W/SCOPE: CPT

## 2019-01-01 PROCEDURE — 85610 PROTHROMBIN TIME: CPT

## 2019-01-01 PROCEDURE — 36415 COLL VENOUS BLD VENIPUNCTURE: CPT

## 2019-01-01 PROCEDURE — 85025 COMPLETE CBC W/AUTO DIFF WBC: CPT

## 2019-01-01 PROCEDURE — 93880 EXTRACRANIAL BILAT STUDY: CPT

## 2019-01-01 PROCEDURE — 97161 PT EVAL LOW COMPLEX 20 MIN: CPT

## 2019-01-01 PROCEDURE — 70551 MRI BRAIN STEM W/O DYE: CPT

## 2019-01-01 PROCEDURE — 82962 GLUCOSE BLOOD TEST: CPT

## 2019-01-01 PROCEDURE — 83735 ASSAY OF MAGNESIUM: CPT

## 2019-01-01 PROCEDURE — 87086 URINE CULTURE/COLONY COUNT: CPT

## 2019-01-01 PROCEDURE — 97535 SELF CARE MNGMENT TRAINING: CPT

## 2019-01-01 PROCEDURE — 80307 DRUG TEST PRSMV CHEM ANLYZR: CPT

## 2019-01-01 PROCEDURE — 84484 ASSAY OF TROPONIN QUANT: CPT

## 2019-01-01 PROCEDURE — 97165 OT EVAL LOW COMPLEX 30 MIN: CPT

## 2019-01-01 PROCEDURE — 94760 N-INVAS EAR/PLS OXIMETRY 1: CPT

## 2019-01-01 PROCEDURE — 70544 MR ANGIOGRAPHY HEAD W/O DYE: CPT

## 2019-01-01 PROCEDURE — 74011250637 HC RX REV CODE- 250/637: Performed by: STUDENT IN AN ORGANIZED HEALTH CARE EDUCATION/TRAINING PROGRAM

## 2019-01-01 PROCEDURE — 82550 ASSAY OF CK (CPK): CPT

## 2019-01-01 PROCEDURE — 74011636637 HC RX REV CODE- 636/637: Performed by: STUDENT IN AN ORGANIZED HEALTH CARE EDUCATION/TRAINING PROGRAM

## 2019-01-01 PROCEDURE — 93005 ELECTROCARDIOGRAM TRACING: CPT

## 2019-01-01 PROCEDURE — 74011250636 HC RX REV CODE- 250/636: Performed by: EMERGENCY MEDICINE

## 2019-01-01 PROCEDURE — 74011000250 HC RX REV CODE- 250: Performed by: EMERGENCY MEDICINE

## 2019-01-01 PROCEDURE — 84443 ASSAY THYROID STIM HORMONE: CPT

## 2019-01-01 PROCEDURE — 80061 LIPID PANEL: CPT

## 2019-01-01 PROCEDURE — 92610 EVALUATE SWALLOWING FUNCTION: CPT | Performed by: SPEECH-LANGUAGE PATHOLOGIST

## 2019-01-01 PROCEDURE — 70450 CT HEAD/BRAIN W/O DYE: CPT

## 2019-01-01 PROCEDURE — 87040 BLOOD CULTURE FOR BACTERIA: CPT

## 2019-01-01 PROCEDURE — 84439 ASSAY OF FREE THYROXINE: CPT

## 2019-01-01 RX ORDER — LINAGLIPTIN 5 MG/1
TABLET, FILM COATED ORAL
Qty: 90 TAB | Refills: 1 | Status: SHIPPED | OUTPATIENT
Start: 2019-01-01

## 2019-01-01 RX ORDER — HEPARIN SODIUM 5000 [USP'U]/ML
5000 INJECTION, SOLUTION INTRAVENOUS; SUBCUTANEOUS EVERY 8 HOURS
Status: DISCONTINUED | OUTPATIENT
Start: 2019-01-01 | End: 2019-01-01 | Stop reason: HOSPADM

## 2019-01-01 RX ORDER — DONEPEZIL HYDROCHLORIDE 5 MG/1
10 TABLET, FILM COATED ORAL
Status: DISCONTINUED | OUTPATIENT
Start: 2019-01-01 | End: 2019-01-01 | Stop reason: HOSPADM

## 2019-01-01 RX ORDER — MAGNESIUM SULFATE 100 %
4 CRYSTALS MISCELLANEOUS AS NEEDED
Status: DISCONTINUED | OUTPATIENT
Start: 2019-01-01 | End: 2019-01-01 | Stop reason: HOSPADM

## 2019-01-01 RX ORDER — LANSOPRAZOLE 30 MG/1
30 CAPSULE, DELAYED RELEASE ORAL
Qty: 90 CAP | Refills: 1 | Status: SHIPPED | OUTPATIENT
Start: 2019-01-01

## 2019-01-01 RX ORDER — SODIUM CHLORIDE 0.9 % (FLUSH) 0.9 %
5-40 SYRINGE (ML) INJECTION EVERY 8 HOURS
Status: DISCONTINUED | OUTPATIENT
Start: 2019-01-01 | End: 2019-01-01 | Stop reason: HOSPADM

## 2019-01-01 RX ORDER — LEVOTHYROXINE AND LIOTHYRONINE 19; 4.5 UG/1; UG/1
120 TABLET ORAL DAILY
Status: DISCONTINUED | OUTPATIENT
Start: 2019-01-01 | End: 2019-01-01 | Stop reason: HOSPADM

## 2019-01-01 RX ORDER — FUROSEMIDE 20 MG/1
TABLET ORAL
Qty: 90 TAB | Refills: 0 | Status: SHIPPED | OUTPATIENT
Start: 2019-01-01 | End: 2019-01-01 | Stop reason: SDUPTHER

## 2019-01-01 RX ORDER — LOSARTAN POTASSIUM 25 MG/1
TABLET ORAL
Qty: 90 TAB | Refills: 1 | Status: SHIPPED | OUTPATIENT
Start: 2019-01-01

## 2019-01-01 RX ORDER — CLOPIDOGREL BISULFATE 75 MG/1
75 TABLET ORAL DAILY
Qty: 30 TAB | Refills: 0 | Status: SHIPPED | OUTPATIENT
Start: 2019-01-01 | End: 2020-01-01 | Stop reason: SDUPTHER

## 2019-01-01 RX ORDER — INSULIN LISPRO 100 [IU]/ML
INJECTION, SOLUTION INTRAVENOUS; SUBCUTANEOUS
Status: DISCONTINUED | OUTPATIENT
Start: 2019-01-01 | End: 2019-01-01 | Stop reason: HOSPADM

## 2019-01-01 RX ORDER — LEVOTHYROXINE AND LIOTHYRONINE 76; 18 UG/1; UG/1
TABLET ORAL
Qty: 90 TAB | Refills: 1 | Status: CANCELLED | OUTPATIENT
Start: 2019-01-01

## 2019-01-01 RX ORDER — HYDRALAZINE HYDROCHLORIDE 20 MG/ML
20 INJECTION INTRAMUSCULAR; INTRAVENOUS
Status: DISCONTINUED | OUTPATIENT
Start: 2019-01-01 | End: 2019-01-01 | Stop reason: HOSPADM

## 2019-01-01 RX ORDER — CEPHALEXIN 500 MG/1
500 CAPSULE ORAL 2 TIMES DAILY
Qty: 7 CAP | Refills: 0 | Status: SHIPPED | OUTPATIENT
Start: 2019-01-01 | End: 2019-01-01

## 2019-01-01 RX ORDER — ATORVASTATIN CALCIUM 20 MG/1
40 TABLET, FILM COATED ORAL DAILY
Status: DISCONTINUED | OUTPATIENT
Start: 2019-01-01 | End: 2019-01-01 | Stop reason: HOSPADM

## 2019-01-01 RX ORDER — FUROSEMIDE 20 MG/1
20 TABLET ORAL DAILY
Status: DISCONTINUED | OUTPATIENT
Start: 2019-01-01 | End: 2019-01-01 | Stop reason: HOSPADM

## 2019-01-01 RX ORDER — DONEPEZIL HYDROCHLORIDE 10 MG/1
10 TABLET, FILM COATED ORAL
Qty: 90 TAB | Refills: 1 | Status: SHIPPED | OUTPATIENT
Start: 2019-01-01

## 2019-01-01 RX ORDER — NITROFURANTOIN 25; 75 MG/1; MG/1
100 CAPSULE ORAL 2 TIMES DAILY
Qty: 10 CAP | Refills: 0 | Status: SHIPPED | OUTPATIENT
Start: 2019-01-01 | End: 2019-01-01

## 2019-01-01 RX ORDER — SODIUM CHLORIDE 0.9 % (FLUSH) 0.9 %
5-40 SYRINGE (ML) INJECTION AS NEEDED
Status: DISCONTINUED | OUTPATIENT
Start: 2019-01-01 | End: 2019-01-01 | Stop reason: HOSPADM

## 2019-01-01 RX ORDER — LOSARTAN POTASSIUM 25 MG/1
25 TABLET ORAL DAILY
Status: DISCONTINUED | OUTPATIENT
Start: 2019-01-01 | End: 2019-01-01

## 2019-01-01 RX ORDER — LEVOTHYROXINE AND LIOTHYRONINE 76; 18 UG/1; UG/1
120 TABLET ORAL DAILY
Qty: 90 TAB | Refills: 1 | Status: SHIPPED | OUTPATIENT
Start: 2019-01-01 | End: 2019-01-01 | Stop reason: SDUPTHER

## 2019-01-01 RX ORDER — ATORVASTATIN CALCIUM 40 MG/1
40 TABLET, FILM COATED ORAL DAILY
Qty: 30 TAB | Refills: 0 | Status: SHIPPED | OUTPATIENT
Start: 2019-01-01 | End: 2020-01-01 | Stop reason: SDUPTHER

## 2019-01-01 RX ORDER — FUROSEMIDE 10 MG/ML
20 INJECTION INTRAMUSCULAR; INTRAVENOUS DAILY
Status: DISCONTINUED | OUTPATIENT
Start: 2019-01-01 | End: 2019-01-01

## 2019-01-01 RX ORDER — AMLODIPINE BESYLATE 10 MG/1
10 TABLET ORAL DAILY
Qty: 30 TAB | Refills: 0 | Status: SHIPPED | OUTPATIENT
Start: 2019-01-01 | End: 2019-01-01

## 2019-01-01 RX ORDER — AMLODIPINE BESYLATE 10 MG/1
10 TABLET ORAL DAILY
Qty: 30 TAB | Refills: 0 | Status: SHIPPED | OUTPATIENT
Start: 2019-01-01

## 2019-01-01 RX ORDER — AMLODIPINE BESYLATE 5 MG/1
10 TABLET ORAL DAILY
Status: DISCONTINUED | OUTPATIENT
Start: 2019-01-01 | End: 2019-01-01

## 2019-01-01 RX ORDER — MULTIVIT WITH IRON,MINERALS
500 TABLET ORAL
Status: DISCONTINUED | OUTPATIENT
Start: 2019-01-01 | End: 2019-01-01 | Stop reason: HOSPADM

## 2019-01-01 RX ORDER — CLOPIDOGREL BISULFATE 75 MG/1
75 TABLET ORAL DAILY
Status: DISCONTINUED | OUTPATIENT
Start: 2019-01-01 | End: 2019-01-01 | Stop reason: HOSPADM

## 2019-01-01 RX ORDER — AMLODIPINE BESYLATE 10 MG/1
10 TABLET ORAL DAILY
Qty: 90 TAB | Refills: 1 | Status: SHIPPED | OUTPATIENT
Start: 2019-01-01 | End: 2019-01-01

## 2019-01-01 RX ORDER — LOSARTAN POTASSIUM 25 MG/1
TABLET ORAL
Qty: 90 TAB | Refills: 1 | Status: SHIPPED | OUTPATIENT
Start: 2019-01-01 | End: 2019-01-01 | Stop reason: SDUPTHER

## 2019-01-01 RX ORDER — LANSOPRAZOLE 30 MG/1
CAPSULE, DELAYED RELEASE ORAL
Qty: 90 CAP | Refills: 1 | Status: SHIPPED | OUTPATIENT
Start: 2019-01-01 | End: 2019-01-01 | Stop reason: SDUPTHER

## 2019-01-01 RX ORDER — PANTOPRAZOLE SODIUM 40 MG/1
40 TABLET, DELAYED RELEASE ORAL
Status: DISCONTINUED | OUTPATIENT
Start: 2019-01-01 | End: 2019-01-01 | Stop reason: HOSPADM

## 2019-01-01 RX ORDER — GLIMEPIRIDE 4 MG/1
TABLET ORAL
Qty: 90 TAB | Refills: 1 | Status: SHIPPED | OUTPATIENT
Start: 2019-01-01 | End: 2020-01-01

## 2019-01-01 RX ORDER — DONEPEZIL HYDROCHLORIDE 10 MG/1
TABLET, FILM COATED ORAL
Qty: 90 TAB | Refills: 1 | Status: SHIPPED | OUTPATIENT
Start: 2019-01-01 | End: 2019-01-01 | Stop reason: SDUPTHER

## 2019-01-01 RX ORDER — CLOPIDOGREL BISULFATE 75 MG/1
75 TABLET ORAL
Status: ACTIVE | OUTPATIENT
Start: 2019-01-01 | End: 2019-01-01

## 2019-01-01 RX ORDER — LEVOTHYROXINE AND LIOTHYRONINE 76; 18 UG/1; UG/1
120 TABLET ORAL DAILY
Qty: 90 TAB | Refills: 1 | Status: SHIPPED | OUTPATIENT
Start: 2019-01-01

## 2019-01-01 RX ORDER — CEPHALEXIN 250 MG/1
500 CAPSULE ORAL 2 TIMES DAILY
Status: DISCONTINUED | OUTPATIENT
Start: 2019-01-01 | End: 2019-01-01 | Stop reason: HOSPADM

## 2019-01-01 RX ORDER — LOSARTAN POTASSIUM 50 MG/1
50 TABLET ORAL DAILY
Status: DISCONTINUED | OUTPATIENT
Start: 2019-01-01 | End: 2019-01-01

## 2019-01-01 RX ORDER — AMLODIPINE BESYLATE 10 MG/1
TABLET ORAL
Qty: 90 TAB | Refills: 1 | Status: SHIPPED | OUTPATIENT
Start: 2019-01-01 | End: 2019-01-01 | Stop reason: SDUPTHER

## 2019-01-01 RX ORDER — DEXTROSE MONOHYDRATE 100 MG/ML
0-250 INJECTION, SOLUTION INTRAVENOUS AS NEEDED
Status: DISCONTINUED | OUTPATIENT
Start: 2019-01-01 | End: 2019-01-01 | Stop reason: HOSPADM

## 2019-01-01 RX ORDER — GLIMEPIRIDE 4 MG/1
TABLET ORAL
Qty: 90 TAB | Refills: 1 | Status: SHIPPED | OUTPATIENT
Start: 2019-01-01 | End: 2019-01-01 | Stop reason: SDUPTHER

## 2019-01-01 RX ORDER — LANOLIN ALCOHOL/MO/W.PET/CERES
325 CREAM (GRAM) TOPICAL
Status: DISCONTINUED | OUTPATIENT
Start: 2019-01-01 | End: 2019-01-01 | Stop reason: HOSPADM

## 2019-01-01 RX ORDER — AMLODIPINE BESYLATE 5 MG/1
10 TABLET ORAL DAILY
Status: DISCONTINUED | OUTPATIENT
Start: 2019-01-01 | End: 2019-01-01 | Stop reason: HOSPADM

## 2019-01-01 RX ORDER — SODIUM CHLORIDE 9 MG/ML
125 INJECTION, SOLUTION INTRAVENOUS CONTINUOUS
Status: DISCONTINUED | OUTPATIENT
Start: 2019-01-01 | End: 2019-01-01

## 2019-01-01 RX ORDER — FUROSEMIDE 20 MG/1
TABLET ORAL
Qty: 90 TAB | Refills: 3 | Status: SHIPPED | OUTPATIENT
Start: 2019-01-01

## 2019-01-01 RX ORDER — LOSARTAN POTASSIUM 25 MG/1
25 TABLET ORAL DAILY
Status: DISCONTINUED | OUTPATIENT
Start: 2019-01-01 | End: 2019-01-01 | Stop reason: HOSPADM

## 2019-01-01 RX ADMIN — LEVOTHYROXINE, LIOTHYRONINE 120 MG: 19; 4.5 TABLET ORAL at 09:30

## 2019-01-01 RX ADMIN — Medication 500 MG: at 06:51

## 2019-01-01 RX ADMIN — INSULIN LISPRO 2 UNITS: 100 INJECTION, SOLUTION INTRAVENOUS; SUBCUTANEOUS at 12:15

## 2019-01-01 RX ADMIN — AMLODIPINE BESYLATE 10 MG: 5 TABLET ORAL at 09:30

## 2019-01-01 RX ADMIN — LOSARTAN POTASSIUM 25 MG: 25 TABLET, FILM COATED ORAL at 09:35

## 2019-01-01 RX ADMIN — Medication 10 ML: at 05:00

## 2019-01-01 RX ADMIN — SODIUM CHLORIDE 125 ML/HR: 900 INJECTION, SOLUTION INTRAVENOUS at 10:51

## 2019-01-01 RX ADMIN — CLOPIDOGREL BISULFATE 75 MG: 75 TABLET ORAL at 09:35

## 2019-01-01 RX ADMIN — Medication 10 ML: at 04:52

## 2019-01-01 RX ADMIN — Medication 10 ML: at 06:00

## 2019-01-01 RX ADMIN — DONEPEZIL HYDROCHLORIDE 10 MG: 5 TABLET, FILM COATED ORAL at 21:18

## 2019-01-01 RX ADMIN — HEPARIN SODIUM 5000 UNITS: 5000 INJECTION INTRAVENOUS; SUBCUTANEOUS at 21:43

## 2019-01-01 RX ADMIN — CEPHALEXIN 500 MG: 250 CAPSULE ORAL at 14:19

## 2019-01-01 RX ADMIN — LOSARTAN POTASSIUM 25 MG: 25 TABLET, FILM COATED ORAL at 10:41

## 2019-01-01 RX ADMIN — ATORVASTATIN CALCIUM 40 MG: 20 TABLET, FILM COATED ORAL at 09:29

## 2019-01-01 RX ADMIN — Medication 500 MG: at 05:55

## 2019-01-01 RX ADMIN — DONEPEZIL HYDROCHLORIDE 10 MG: 5 TABLET, FILM COATED ORAL at 21:43

## 2019-01-01 RX ADMIN — CEFTRIAXONE SODIUM 1 G: 1 INJECTION, POWDER, FOR SOLUTION INTRAVENOUS at 16:52

## 2019-01-01 RX ADMIN — SODIUM CHLORIDE 125 ML/HR: 900 INJECTION, SOLUTION INTRAVENOUS at 21:16

## 2019-01-01 RX ADMIN — AMLODIPINE BESYLATE 10 MG: 5 TABLET ORAL at 09:35

## 2019-01-01 RX ADMIN — HEPARIN SODIUM 5000 UNITS: 5000 INJECTION INTRAVENOUS; SUBCUTANEOUS at 05:56

## 2019-01-01 RX ADMIN — ATORVASTATIN CALCIUM 40 MG: 20 TABLET, FILM COATED ORAL at 09:35

## 2019-01-01 RX ADMIN — SODIUM CHLORIDE 125 ML/HR: 900 INJECTION, SOLUTION INTRAVENOUS at 21:18

## 2019-01-01 RX ADMIN — Medication 10 ML: at 13:38

## 2019-01-01 RX ADMIN — HEPARIN SODIUM 5000 UNITS: 5000 INJECTION INTRAVENOUS; SUBCUTANEOUS at 21:16

## 2019-01-01 RX ADMIN — HEPARIN SODIUM 5000 UNITS: 5000 INJECTION INTRAVENOUS; SUBCUTANEOUS at 14:13

## 2019-01-01 RX ADMIN — SODIUM CHLORIDE 125 ML/HR: 900 INJECTION, SOLUTION INTRAVENOUS at 14:13

## 2019-01-01 RX ADMIN — HEPARIN SODIUM 5000 UNITS: 5000 INJECTION INTRAVENOUS; SUBCUTANEOUS at 12:15

## 2019-01-01 RX ADMIN — CLOPIDOGREL BISULFATE 75 MG: 75 TABLET ORAL at 16:52

## 2019-01-01 RX ADMIN — SODIUM CHLORIDE 125 ML/HR: 900 INJECTION, SOLUTION INTRAVENOUS at 03:52

## 2019-01-01 RX ADMIN — FERROUS SULFATE TAB 325 MG (65 MG ELEMENTAL FE) 325 MG: 325 (65 FE) TAB at 05:55

## 2019-01-01 RX ADMIN — Medication 10 ML: at 14:20

## 2019-01-01 RX ADMIN — FUROSEMIDE 20 MG: 10 INJECTION, SOLUTION INTRAMUSCULAR; INTRAVENOUS at 10:41

## 2019-01-01 RX ADMIN — FERROUS SULFATE TAB 325 MG (65 MG ELEMENTAL FE) 325 MG: 325 (65 FE) TAB at 06:52

## 2019-01-01 RX ADMIN — CEFTRIAXONE SODIUM 1 G: 1 INJECTION, POWDER, FOR SOLUTION INTRAMUSCULAR; INTRAVENOUS at 17:26

## 2019-01-01 RX ADMIN — CLOPIDOGREL BISULFATE 75 MG: 75 TABLET ORAL at 09:30

## 2019-01-01 RX ADMIN — Medication 10 ML: at 05:58

## 2019-01-01 RX ADMIN — HEPARIN SODIUM 5000 UNITS: 5000 INJECTION INTRAVENOUS; SUBCUTANEOUS at 13:38

## 2019-01-01 RX ADMIN — PANTOPRAZOLE SODIUM 40 MG: 40 TABLET, DELAYED RELEASE ORAL at 05:56

## 2019-01-01 RX ADMIN — HEPARIN SODIUM 5000 UNITS: 5000 INJECTION INTRAVENOUS; SUBCUTANEOUS at 05:30

## 2019-01-01 RX ADMIN — HEPARIN SODIUM 5000 UNITS: 5000 INJECTION INTRAVENOUS; SUBCUTANEOUS at 21:18

## 2019-01-01 RX ADMIN — HEPARIN SODIUM 5000 UNITS: 5000 INJECTION INTRAVENOUS; SUBCUTANEOUS at 05:23

## 2019-01-01 RX ADMIN — PANTOPRAZOLE SODIUM 40 MG: 40 TABLET, DELAYED RELEASE ORAL at 06:52

## 2019-01-01 RX ADMIN — Medication 10 ML: at 21:43

## 2019-01-01 RX ADMIN — SODIUM CHLORIDE 1000 ML: 900 INJECTION, SOLUTION INTRAVENOUS at 17:26

## 2019-01-01 RX ADMIN — INSULIN LISPRO 2 UNITS: 100 INJECTION, SOLUTION INTRAVENOUS; SUBCUTANEOUS at 16:52

## 2019-01-01 RX ADMIN — Medication 10 ML: at 21:16

## 2019-01-02 ENCOUNTER — TELEPHONE (OUTPATIENT)
Dept: NEUROLOGY | Age: 84
End: 2019-01-02

## 2019-01-02 NOTE — TELEPHONE ENCOUNTER
MRA of brain normal. MRA of neck with some plaquing of left internal carotid artery of mild to moderate quality.  jjhmd

## 2019-01-03 NOTE — TELEPHONE ENCOUNTER
Patient's daughter r/t nurses call to receive results. Informed her of Dr. Jose D Gutierrez note. She states understanding and will follow up with PCP.

## 2019-01-07 ENCOUNTER — TELEPHONE (OUTPATIENT)
Dept: FAMILY MEDICINE CLINIC | Age: 84
End: 2019-01-07

## 2019-01-07 NOTE — TELEPHONE ENCOUNTER
The pt's daughter is calling because the pt has some test done at the neurologist and they told him to follow up with PCP for results.      The daughter's number is [phone]

## 2019-01-07 NOTE — TELEPHONE ENCOUNTER
Yes, please schedule FU appt with me to discuss plaque in carotid arteries. Also he is due for thyroid lab recheck and A1C.

## 2019-01-16 ENCOUNTER — OFFICE VISIT (OUTPATIENT)
Dept: FAMILY MEDICINE CLINIC | Age: 84
End: 2019-01-16

## 2019-01-16 VITALS
OXYGEN SATURATION: 97 % | RESPIRATION RATE: 20 BRPM | HEART RATE: 55 BPM | HEIGHT: 72 IN | BODY MASS INDEX: 25.47 KG/M2 | SYSTOLIC BLOOD PRESSURE: 104 MMHG | WEIGHT: 188 LBS | TEMPERATURE: 97.7 F | DIASTOLIC BLOOD PRESSURE: 60 MMHG

## 2019-01-16 DIAGNOSIS — E03.9 ACQUIRED HYPOTHYROIDISM: ICD-10-CM

## 2019-01-16 DIAGNOSIS — I65.22 STENOSIS OF LEFT CAROTID ARTERY: ICD-10-CM

## 2019-01-16 DIAGNOSIS — E11.9 CONTROLLED TYPE 2 DIABETES MELLITUS WITHOUT COMPLICATION, WITHOUT LONG-TERM CURRENT USE OF INSULIN (HCC): Primary | ICD-10-CM

## 2019-01-16 DIAGNOSIS — Z79.899 ENCOUNTER FOR LONG-TERM CURRENT USE OF MEDICATION: ICD-10-CM

## 2019-01-16 DIAGNOSIS — N18.30 STAGE 3 CHRONIC KIDNEY DISEASE (HCC): ICD-10-CM

## 2019-01-16 DIAGNOSIS — N39.0 URINARY TRACT INFECTION WITHOUT HEMATURIA, SITE UNSPECIFIED: ICD-10-CM

## 2019-01-16 DIAGNOSIS — I63.9 CEREBROVASCULAR ACCIDENT (CVA), UNSPECIFIED MECHANISM (HCC): ICD-10-CM

## 2019-01-16 LAB
ALBUMIN UR QL STRIP: 150 MG/L
BILIRUB UR QL STRIP: NEGATIVE
CREATININE, URINE POC: 200 MG/DL
GLUCOSE UR-MCNC: NEGATIVE MG/DL
KETONES P FAST UR STRIP-MCNC: NEGATIVE MG/DL
MICROALBUMIN/CREAT RATIO POC: >300 MG/G
PH UR STRIP: 5.5 [PH] (ref 4.6–8)
PROT UR QL STRIP: NORMAL
SP GR UR STRIP: 1.02 (ref 1–1.03)
UA UROBILINOGEN AMB POC: NORMAL (ref 0.2–1)
URINALYSIS CLARITY POC: NORMAL
URINALYSIS COLOR POC: YELLOW
URINE BLOOD POC: NORMAL
URINE LEUKOCYTES POC: NORMAL
URINE NITRITES POC: NEGATIVE

## 2019-01-16 NOTE — PROGRESS NOTES
Identified pt with two pt identifiers(name and ). Chief Complaint Patient presents with  Neurologic Problem  
  he is alert and oriented  Other  
  need handicap tag Health Maintenance Due Topic  Shingrix Vaccine Age 50> (1 of 2)  Pneumococcal 65+ Low/Medium Risk (1 of 2 - PCV13)  MICROALBUMIN Q1 Wt Readings from Last 3 Encounters:  
19 188 lb (85.3 kg) 18 181 lb (82.1 kg) 10/15/18 188 lb 9.6 oz (85.5 kg) Temp Readings from Last 3 Encounters:  
19 97.7 °F (36.5 °C) (Oral) 10/15/18 97.8 °F (36.6 °C) (Oral) 10/12/18 99 °F (37.2 °C) BP Readings from Last 3 Encounters:  
19 104/60  
18 134/62  
10/31/18 130/74 Pulse Readings from Last 3 Encounters:  
19 (!) 55  
18 63  
10/31/18 (!) 56 Learning Assessment: 
:  
 
Learning Assessment 3/19/2014 PRIMARY LEARNER Patient HIGHEST LEVEL OF EDUCATION - PRIMARY LEARNER  GRADUATED HIGH SCHOOL OR GED  
BARRIERS PRIMARY LEARNER NONE  
CO-LEARNER CAREGIVER No  
PRIMARY LANGUAGE ENGLISH  
LEARNER PREFERENCE PRIMARY LISTENING  
  DEMONSTRATION  
ANSWERED BY patient RELATIONSHIP SELF Depression Screening: 
:  
 
PHQ over the last two weeks 2019 Little interest or pleasure in doing things Not at all Feeling down, depressed, irritable, or hopeless Not at all Total Score PHQ 2 0 Fall Risk Assessment: 
:  
 
Fall Risk Assessment, last 12 mths 2019 Able to walk? Yes Fall in past 12 months? No  
Fall with injury? -  
Number of falls in past 12 months -  
 
 
Abuse Screening: 
:  
 
Abuse Screening Questionnaire 2019 2018 3/28/2018 2017 8/15/2016 2015 3/19/2014 Do you ever feel afraid of your partner? N N N N N N N Are you in a relationship with someone who physically or mentally threatens you? N N N N N N N Is it safe for you to go home? Yumiko Trujillo Coordination of Care Questionnaire: 
:  
 
 1) Have you been to an emergency room, urgent care clinic since your last visit? no 
Hospitalized since your last visit? no          
 
2) Have you seen or consulted any other health care providers outside of [address] [place] [address] since your last visit? Yes Dr Dolores Lazo  (Include any pap smears or colon screenings in this section.) 3) Do you have an Advance Directive on file? yes Are you interested in receiving information about Advance Directives? no 
 
Patient is accompanied by daughter I have received verbal consent from Michele Hogue. to discuss any/all medical information while they are present in the room. Reviewed record in preparation for visit and have obtained necessary documentation. Medication reconciliation up to date and corrected with patient at this time.

## 2019-01-16 NOTE — PROGRESS NOTES
HISTORY OF PRESENT ILLNESS Christina Tanner is a 80 y.o. male. HPI 
FU discuss MRA results. 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 Pt's MRI brain shows prior acute strokes. Patient Active Problem List  
Diagnosis Code  Diabetes mellitus type 2, controlled ([facility] E11.9  
 Essential hypertension, benign I10  
 Mixed hyperlipidemia E78.2  Allergic rhinitis due to other allergen J30.89  Esophageal reflux K21.9  Microalbuminuria R80.9  Acquired hypothyroidism E03.9  Pernicious anemia D51.0  Colon cancer ([facility] C18.9  
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  
 Delirium R41.0  
 Urinary tract infection N39.0 Current Outpatient Medications Medication Sig Dispense Refill  TRADJENTA 5 mg tablet TAKE 1 TAB BY MOUTH DAILY. INDICATIONS: TYPE 2 DIABETES MELLITUS 90 Tab 0  
 furosemide (LASIX) 20 mg tablet TAKE 1 TABLET EVERY DAY AS DIRECTED 90 Tab 0  
 aspirin delayed-release 81 mg tablet Take 81 mg by mouth daily.  donepezil (ARICEPT) 10 mg tablet TAKE 1 TABLET EVERY NIGHT 90 Tab 1  
 losartan (COZAAR) 25 mg tablet Take 1 Tab by mouth daily. 90 Tab 1  
 lansoprazole (PREVACID) 30 mg capsule TAKE 1 CAPSULE EVERY DAY  BEFORE  BREAKFAST 90 Cap 1  
 amLODIPine (NORVASC) 10 mg tablet TAKE 1 TABLET EVERY DAY FOR HYPERTENSION  (INCREASE  IN  DOSE  TO  10MG) 90 Tab 1  Thyroid, Pork, (ARMOUR THYROID) 120 mg tab Take 1 Tab by mouth daily. 90 Tab 1  
 glimepiride (AMARYL) 4 mg tablet TAKE 1 TABLET EVERY MORNING 90 Tab 1  VITAMIN D2 50,000 unit capsule TAKE 1 CAPSULE EVERY 7 DAYS 13 Cap 3  cetirizine (ZYRTEC) 10 mg tablet TAKE 1 TABLET EVERY DAY AS NEEDED FOR ALLERGIES 90 Tab 3  
 ferrous sulfate (IRON) 325 mg (65 mg iron) tablet Take 1 Tab by mouth Daily (before breakfast). 30 Tab 5  
 multivitamins-minerals-lutein (CENTRUM SILVER) tab tablet Take 1 Tab by mouth daily.  nicotinic acid (NIACIN) 500 mg tablet Take 500 mg by mouth Daily (before breakfast).  loperamide (IMODIUM) 2 mg capsule TAKE 1 CAPSULE BEFORE BREAKFAST, LUNCH, DINNER AND AT BEDTIME FOR DIARRHEA (Patient taking differently: TAKE 1 CAPSULE BEFORE BREAKFAST, LUNCH, DINNER AND AT BEDTIME FOR DIARRHEA  - taking twice a day) 360 Cap 0  
 
 
ROS Visit Vitals /60 (BP 1 Location: Right arm, BP Patient Position: Sitting) Comment: manual  
Pulse (!) 55 Temp 97.7 °F (36.5 °C) (Oral) Resp 20 Ht 6' (1.829 m) Wt 188 lb (85.3 kg) SpO2 97% BMI 25.50 kg/m² Physical Exam  
Constitutional: He appears well-developed and well-nourished. No distress. Cardiovascular: Normal rate, regular rhythm and normal heart sounds. Pulmonary/Chest: Effort normal and breath sounds normal.  
Vitals reviewed. ASSESSMENT and PLAN 
  ICD-10-CM ICD-9-CM 1. Controlled type 2 diabetes mellitus without complication, without long-term current use of insulin (HCC) E11.9 250.00 AMB POC URINE, MICROALBUMIN, SEMIQUANT (3 RESULTS) HEMOGLOBIN A1C WITH EAG 2. Stenosis of left carotid artery I65.22 433.10 REFERRAL TO VASCULAR SURGERY 3. Cerebrovascular accident (CVA), unspecified mechanism ([facility] I63.9 434.91 REFERRAL TO VASCULAR SURGERY 4. Acquired hypothyroidism E03.9 244.9 TSH 3RD GENERATION  
   T4, FREE 5. Encounter for long-term current use of medication V49.140 C76.60 METABOLIC PANEL, BASIC  
   CBC WITH AUTOMATED DIFF 6. Stage 3 chronic kidney disease ([facility] N18.3 585. 3 Refer to Vascular Surgery to discuss if candidate for carotid surgery. Check labs. DMV form completed.

## 2019-01-17 LAB
BASOPHILS # BLD AUTO: 0 X10E3/UL (ref 0–0.2)
BASOPHILS NFR BLD AUTO: 0 %
BUN SERPL-MCNC: 30 MG/DL (ref 8–27)
BUN/CREAT SERPL: 14 (ref 10–24)
CALCIUM SERPL-MCNC: 8.8 MG/DL (ref 8.6–10.2)
CHLORIDE SERPL-SCNC: 107 MMOL/L (ref 96–106)
CO2 SERPL-SCNC: 21 MMOL/L (ref 20–29)
CREAT SERPL-MCNC: 2.15 MG/DL (ref 0.76–1.27)
EOSINOPHIL # BLD AUTO: 0.1 X10E3/UL (ref 0–0.4)
EOSINOPHIL NFR BLD AUTO: 2 %
ERYTHROCYTE [DISTWIDTH] IN BLOOD BY AUTOMATED COUNT: 13.5 % (ref 12.3–15.4)
EST. AVERAGE GLUCOSE BLD GHB EST-MCNC: 143 MG/DL
GLUCOSE SERPL-MCNC: 196 MG/DL (ref 65–99)
HBA1C MFR BLD: 6.6 % (ref 4.8–5.6)
HCT VFR BLD AUTO: 37.3 % (ref 37.5–51)
HGB BLD-MCNC: 12.5 G/DL (ref 13–17.7)
IMM GRANULOCYTES # BLD AUTO: 0 X10E3/UL (ref 0–0.1)
IMM GRANULOCYTES NFR BLD AUTO: 0 %
INTERPRETATION: NORMAL
LYMPHOCYTES # BLD AUTO: 2.4 X10E3/UL (ref 0.7–3.1)
LYMPHOCYTES NFR BLD AUTO: 26 %
Lab: NORMAL
MCH RBC QN AUTO: 31.8 PG (ref 26.6–33)
MCHC RBC AUTO-ENTMCNC: 33.5 G/DL (ref 31.5–35.7)
MCV RBC AUTO: 95 FL (ref 79–97)
MONOCYTES # BLD AUTO: 0.8 X10E3/UL (ref 0.1–0.9)
MONOCYTES NFR BLD AUTO: 9 %
NEUTROPHILS # BLD AUTO: 6 X10E3/UL (ref 1.4–7)
NEUTROPHILS NFR BLD AUTO: 63 %
PLATELET # BLD AUTO: 237 X10E3/UL (ref 150–379)
POTASSIUM SERPL-SCNC: 5 MMOL/L (ref 3.5–5.2)
RBC # BLD AUTO: 3.93 X10E6/UL (ref 4.14–5.8)
SODIUM SERPL-SCNC: 144 MMOL/L (ref 134–144)
T4 FREE SERPL-MCNC: 0.83 NG/DL (ref 0.82–1.77)
TSH SERPL DL<=0.005 MIU/L-ACNC: 0.2 UIU/ML (ref 0.45–4.5)
WBC # BLD AUTO: 9.4 X10E3/UL (ref 3.4–10.8)

## 2019-01-18 ENCOUNTER — TELEPHONE (OUTPATIENT)
Dept: FAMILY MEDICINE CLINIC | Age: 84
End: 2019-01-18

## 2019-01-18 DIAGNOSIS — E03.9 ACQUIRED HYPOTHYROIDISM: ICD-10-CM

## 2019-01-18 RX ORDER — LEVOTHYROXINE AND LIOTHYRONINE 76; 18 UG/1; UG/1
TABLET ORAL
Qty: 90 TAB | Refills: 1 | Status: SHIPPED | OUTPATIENT
Start: 2019-01-18 | End: 2019-01-01 | Stop reason: SDUPTHER

## 2019-01-18 NOTE — TELEPHONE ENCOUNTER
I spoke with Kelly Lee (daughter) to discuss labs. Progressive increase in creatinine. Abn TSH. Plan to decrease [place] thyroid. He does not want to go to Nephrology. Will cont to monitor kidney function. He has appt with Vascular Surgeon Feb 6.

## 2019-01-18 NOTE — PROGRESS NOTES
Labs show good sugar control. Kidney function continues to be abnormal with high creatinine and protein in urine. I suggest follow up with Nephrology specialist.  Also thyroid test TSH shows need to decrease dose of [place]. Take 1 tab only 6 days a week out of 7 days (skip one day). Follow up recheck in 3 months.

## 2019-02-27 RX ORDER — ASPIRIN 325 MG
TABLET, DELAYED RELEASE (ENTERIC COATED) ORAL
Qty: 13 CAP | Refills: 3 | Status: SHIPPED | OUTPATIENT
Start: 2019-02-27

## 2019-03-12 RX ORDER — LOPERAMIDE HYDROCHLORIDE 2 MG/1
CAPSULE ORAL
Qty: 360 CAP | Refills: 1 | Status: SHIPPED | OUTPATIENT
Start: 2019-03-12 | End: 2020-01-01

## 2019-03-13 RX ORDER — LINAGLIPTIN 5 MG/1
TABLET, FILM COATED ORAL
Qty: 90 TAB | Refills: 1 | Status: SHIPPED | OUTPATIENT
Start: 2019-03-13 | End: 2019-01-01 | Stop reason: SDUPTHER

## 2019-04-09 NOTE — TELEPHONE ENCOUNTER
Pt's mother called and stated pt needs to have blood work rechecked in 3 months but no orders in the system. Does pt need ov or just labs?    Contact Jessica Jain at [phone]

## 2019-04-09 NOTE — TELEPHONE ENCOUNTER
Pt needs OV. We will draw labs at visit. He does not need to be fasting. Please call his daughter Severa Boron to schedule appt.

## 2019-04-22 PROBLEM — N18.4 CKD (CHRONIC KIDNEY DISEASE) STAGE 4, GFR 15-29 ML/MIN (HCC): Status: ACTIVE | Noted: 2019-01-01

## 2019-04-22 NOTE — PROGRESS NOTES
Identified pt with two pt identifiers(name and ). Chief Complaint Patient presents with  Thyroid Problem Health Maintenance Due Topic  Shingrix Vaccine Age 50> (1 of 2)  Pneumococcal 65+ years (1 of 2 - PCV13) Wt Readings from Last 3 Encounters:  
19 194 lb 6.4 oz (88.2 kg) 19 188 lb (85.3 kg) 18 181 lb (82.1 kg) Temp Readings from Last 3 Encounters:  
19 98 °F (36.7 °C) (Oral) 19 97.7 °F (36.5 °C) (Oral) 10/15/18 97.8 °F (36.6 °C) (Oral) BP Readings from Last 3 Encounters:  
19 140/62  
19 104/60  
18 134/62 Pulse Readings from Last 3 Encounters:  
19 60  
19 (!) 55  
18 63 Learning Assessment: 
:  
 
Learning Assessment 3/19/2014 PRIMARY LEARNER Patient HIGHEST LEVEL OF EDUCATION - PRIMARY LEARNER  GRADUATED HIGH SCHOOL OR GED  
BARRIERS PRIMARY LEARNER NONE  
CO-LEARNER CAREGIVER No  
PRIMARY LANGUAGE ENGLISH  
LEARNER PREFERENCE PRIMARY LISTENING  
  DEMONSTRATION  
ANSWERED BY patient RELATIONSHIP SELF Depression Screening: 
:  
 
3 most recent PHQ Screens 2019 Little interest or pleasure in doing things Not at all Feeling down, depressed, irritable, or hopeless Not at all Total Score PHQ 2 0 Fall Risk Assessment: 
:  
 
Fall Risk Assessment, last 12 mths 2019 Able to walk? Yes Fall in past 12 months? No  
Fall with injury? -  
Number of falls in past 12 months -  
 
 
Abuse Screening: 
:  
 
Abuse Screening Questionnaire 2019 2018 3/28/2018 2017 8/15/2016 2015 3/19/2014 Do you ever feel afraid of your partner? N N N N N N N Are you in a relationship with someone who physically or mentally threatens you? N N N N N N N Is it safe for you to go home? [place] Coordination of Care Questionnaire: 
:  
 
1) Have you been to an emergency room, urgent care clinic since your last visit? no  
 Hospitalized since your last visit? no          
 
2) Have you seen or consulted any other health care providers outside of [address] [place] [address] since your last visit? no  (Include any pap smears or colon screenings in this section.) 3) Do you have an Advance Directive on file? yes Are you interested in receiving information about Advance Directives? no 
 
Patient is accompanied by daughter I have received verbal consent from Mechelle Hills to discuss any/all medical information while they are present in the room. Reviewed record in preparation for visit and have obtained necessary documentation. Medication reconciliation up to date and corrected with patient at this time.

## 2019-04-22 NOTE — PROGRESS NOTES
Discussed the patient's BMI with him. The BMI follow up plan is as follows:  
 
dietary management education, guidance, and counseling 
encourage exercise 
monitor weight 
prescribed dietary intake An After Visit Summary was printed and given to the patient. Subjective: Fadumo Menendez is a 80 y.o. male seen for follow up of diabetes. He also has hypertension, hyperlipidemia, obesity and CKD. Diabetic Review of Systems - medication compliance: compliant most of the time, diabetic diet compliance: noncompliant some of the time, home glucose monitoring: is not performed. Other symptoms and concerns: due for labs. Patient Active Problem List  
 Diagnosis Date Noted  CKD (chronic kidney disease) stage 4, GFR 15-29 ml/min ([facility]) 04/22/2019  Delirium 10/11/2018  Urinary tract infection 10/11/2018  Type 2 diabetes with nephropathy ([facility] 03/28/2018  Dementia without behavioral disturbance 08/16/2016  Unspecified vitamin D deficiency 09/11/2013  Colon cancer ([facility] 08/23/2012  Pernicious anemia 07/31/2012  Acquired hypothyroidism 04/26/2012  Microalbuminuria 03/06/2012  Diabetes mellitus type 2, controlled ([facility] 02/09/2012  Essential hypertension, benign 02/09/2012  Mixed hyperlipidemia 02/09/2012  Allergic rhinitis due to other allergen 02/09/2012  Esophageal reflux 02/09/2012 Current Outpatient Medications Medication Sig Dispense Refill  furosemide (LASIX) 20 mg tablet TAKE 1 TABLET EVERY DAY AS DIRECTED 90 Tab 0  TRADJENTA 5 mg tablet TAKE 1 TABLET BY MOUTH DAILY. INDICATIONS: TYPE 2 DIABETES MELLITUS 90 Tab 1  
 loperamide (IMODIUM) 2 mg capsule TAKE 1 CAPSULE BEFORE BREAKFAST, LUNCH, DINNER AND AT BEDTIME FOR DIARRHEA 360 Cap 1  cholecalciferol (VITAMIN D3) 50,000 unit capsule TAKE 1 CAPSULE EVERY 7 DAYS 13 Cap 3  Thyroid, Pork, (ARMOUR THYROID) 120 mg tab Take 1 tab six days a week only (skip one day).  90 Tab 1  
  aspirin delayed-release 81 mg tablet Take 81 mg by mouth daily.  donepezil (ARICEPT) 10 mg tablet TAKE 1 TABLET EVERY NIGHT 90 Tab 1  
 losartan (COZAAR) 25 mg tablet Take 1 Tab by mouth daily. 90 Tab 1  
 lansoprazole (PREVACID) 30 mg capsule TAKE 1 CAPSULE EVERY DAY  BEFORE  BREAKFAST 90 Cap 1  
 amLODIPine (NORVASC) 10 mg tablet TAKE 1 TABLET EVERY DAY FOR HYPERTENSION  (INCREASE  IN  DOSE  TO  10MG) 90 Tab 1  
 glimepiride (AMARYL) 4 mg tablet TAKE 1 TABLET EVERY MORNING 90 Tab 1  cetirizine (ZYRTEC) 10 mg tablet TAKE 1 TABLET EVERY DAY AS NEEDED FOR ALLERGIES 90 Tab 3  
 ferrous sulfate (IRON) 325 mg (65 mg iron) tablet Take 1 Tab by mouth Daily (before breakfast). 30 Tab 5  
 multivitamins-minerals-lutein (CENTRUM SILVER) tab tablet Take 1 Tab by mouth daily.  nicotinic acid (NIACIN) 500 mg tablet Take 500 mg by mouth Daily (before breakfast). Allergies Allergen Reactions  Synthroid [Levothyroxine] Other (comments) Confused, hallucinations Past Medical History:  
Diagnosis Date  WILTON (acute kidney injury) ([facility] 8/2/2014  Cancer [facility])   
 colon  Chronic kidney disease  Diabetes ([facility] 2008  
 type 2  Dyslipidemia  Esophageal reflux  Esophageal reflux 2/9/2012  Hypertension 2008  Incontinence  Memory loss  Mixed hyperlipidemia 2/9/2012  Other ill-defined conditions(799.89) broken neck 2012  Pernicious anemia 7/31/2012  Psychiatric disorder   
 dementia  Snoring  Stroke ([facility] 10/18/2018  Unspecified hypothyroidism 4/26/2012 Past Surgical History:  
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    
 HX HERNIA REPAIR Bilateral inguinal  
 HX OTHER SURGICAL    
 colon resection  HX SMALL BOWEL RESECTION  2010  HX TONSIL AND ADENOIDECTOMY  HX TONSILLECTOMY  1936 Family History Problem Relation Age of Onset  Heart Disease Mother  Hypertension Mother  Heart Disease Father  Hypertension Father  Cancer Paternal Aunt Social History Tobacco Use  Smoking status: Former Smoker Packs/day: 1.50 Years: 30.00 Pack years: 45.00 Last attempt to quit: 1982 Years since quittin.7  Smokeless tobacco: Never Used Substance Use Topics  Alcohol use: No  
  Alcohol/week: 0.0 oz Types: 1 - 2 Standard drinks or equivalent per week Lab Results Component Value Date/Time Hemoglobin A1c 6.6 (H) 2019 03:29 PM  
 Hemoglobin A1c 7.4 (H) 10/03/2018 11:46 AM  
 Hemoglobin A1c 7.9 (H) 2018 09:47 AM  
 Hemoglobin A1c, External 7.6 2016 Glucose 196 (H) 2019 03:29 PM  
 Glucose (POC) 169 (H) 10/12/2018 11:18 AM  
 Microalb/Creat ratio (ug/mg creat.) 39.2 (H) 2012 12:00 AM  
 LDL, calculated 88 10/03/2018 11:46 AM  
 Creatinine (POC) 1.9 (H) 10/11/2018 02:38 PM  
 Creatinine 2.15 (H) 2019 03:29 PM  
  
Lab Results Component Value Date/Time Cholesterol, total 182 10/03/2018 11:46 AM  
 HDL Cholesterol 28 (L) 10/03/2018 11:46 AM  
 LDL, calculated 88 10/03/2018 11:46 AM  
 Triglyceride 332 (H) 10/03/2018 11:46 AM  
 
Lab Results Component Value Date/Time  GFR est non-AA 27 (L) 2019 03:29 PM  
 GFRNA, POC 34 (L) 10/11/2018 02:38 PM  
 GFR est AA 31 (L) 2019 03:29 PM  
 GFRAA, POC 41 (L) 10/11/2018 02:38 PM  
 Creatinine 2.15 (H) 2019 03:29 PM  
 Creatinine (POC) 1.9 (H) 10/11/2018 02:38 PM  
 BUN 30 (H) 2019 03:29 PM  
 BUN (POC) 27 (H) 10/11/2018 02:38 PM  
 Sodium 144 2019 03:29 PM  
 Sodium (POC) 139 10/11/2018 02:38 PM  
 Potassium 5.0 2019 03:29 PM  
 Potassium (POC) 4.4 10/11/2018 02:38 PM  
 Chloride 107 (H) 2019 03:29 PM  
 Chloride (POC) 107 10/11/2018 02:38 PM  
 CO2 21 2019 03:29 PM  
 Magnesium 2.0 10/12/2018 05:40 AM  
 Phosphorus 3.3 10/12/2018 05:40 AM  
  
 
 Review of Systems Pertinent items are noted in HPI. Objective:  
 
Visit Vitals /62 (BP 1 Location: Left arm, BP Patient Position: Sitting) Comment: manual  
Pulse 60 Temp 98 °F (36.7 °C) (Oral) Resp 18 Ht 6' (1.829 m) Wt 194 lb 6.4 oz (88.2 kg) SpO2 95% BMI 26.37 kg/m² Appearance: alert, well appearing, and in no distress and overweight. Exam: heart sounds normal rate and regular rhythm, S1 and S2 normal, systolic murmur 3/6 , chest clear Lab review: orders written for new lab studies as appropriate; see orders. Assessment/Plan:  
 
diabetes , hypertension , hyperlipidemia . Diabetic issues reviewed with him: low cholesterol diet, weight control and daily exercise discussed. ICD-10-CM ICD-9-CM 1. Controlled type 2 diabetes mellitus without complication, without long-term current use of insulin ([facility]) E11.9 250.00 HEMOGLOBIN A1C WITH EAG 2. Acquired hypothyroidism E03.9 244.9 TSH 3RD GENERATION  
   T4, FREE 3. Encounter for long-term (current) use of medications Z79.899 V58.69 CBC WITH AUTOMATED DIFF  
   METABOLIC PANEL, COMPREHENSIVE 4. Type 2 diabetes with nephropathy ([facility]) E11.21 250.40   
  583.81 5. CKD (chronic kidney disease) stage 4, GFR 15-29 ml/min ([facility]) N18.4 585.4 Labs drawn. Pt is not interested to FU with Nephrology or Vascular Surgery. He does not want any intervention. He understands the risk of renal failure and risk of stroke. Discussed Shingrix. He is not interested at this time.

## 2019-04-22 NOTE — PATIENT INSTRUCTIONS
Body Mass Index: Care Instructions Your Care Instructions Body mass index (BMI) can help you see if your weight is raising your risk for health problems. It uses a formula to compare how much you weigh with how tall you are. · A BMI lower than 18.5 is considered underweight. · A BMI between 18.5 and 24.9 is considered healthy. · A BMI between 25 and 29.9 is considered overweight. A BMI of 30 or higher is considered obese. If your BMI is in the normal range, it means that you have a lower risk for weight-related health problems. If your BMI is in the overweight or obese range, you may be at increased risk for weight-related health problems, such as high blood pressure, heart disease, stroke, arthritis or joint pain, and diabetes. If your BMI is in the underweight range, you may be at increased risk for health problems such as fatigue, lower protection (immunity) against illness, muscle loss, bone loss, hair loss, and hormone problems. BMI is just one measure of your risk for weight-related health problems. You may be at higher risk for health problems if you are not active, you eat an unhealthy diet, or you drink too much alcohol or use tobacco products. Follow-up care is a key part of your treatment and safety. Be sure to make and go to all appointments, and call your doctor if you are having problems. It's also a good idea to know your test results and keep a list of the medicines you take. How can you care for yourself at home? · Practice healthy eating habits. This includes eating plenty of fruits, vegetables, whole grains, lean protein, and low-fat dairy. · If your doctor recommends it, get more exercise. Walking is a good choice. Bit by bit, increase the amount you walk every day. Try for at least 30 minutes on most days of the week. · Do not smoke. Smoking can increase your risk for health problems.  If you need help quitting, talk to your doctor about stop-smoking programs and medicines. These can increase your chances of quitting for good. · Limit alcohol to 2 drinks a day for men and 1 drink a day for women. Too much alcohol can cause health problems. If you have a BMI higher than 25 · Your doctor may do other tests to check your risk for weight-related health problems. This may include measuring the distance around your waist. A waist measurement of more than 40 inches in men or 35 inches in women can increase the risk of weight-related health problems. · Talk with your doctor about steps you can take to stay healthy or improve your health. You may need to make lifestyle changes to lose weight and stay healthy, such as changing your diet and getting regular exercise. If you have a BMI lower than 18.5 · Your doctor may do other tests to check your risk for health problems. · Talk with your doctor about steps you can take to stay healthy or improve your health. You may need to make lifestyle changes to gain or maintain weight and stay healthy, such as getting more healthy foods in your diet and doing exercises to build muscle. Where can you learn more? Go to http://milton-saud.info/. Enter S176 in the search box to learn more about \"Body Mass Index: Care Instructions. \" Current as of: October 13, 2016 Content Version: 11.4 © 9424-3465 [employer]. Care instructions adapted under license by [employer] (which disclaims liability or warranty for this information). If you have questions about a medical condition or this instruction, always ask your healthcare professional. [address] any warranty or liability for your use of this information.

## 2019-04-24 NOTE — TELEPHONE ENCOUNTER
Please call pt's daughter. Labs show low thyroid. Increase [place] thyroid to take 1 tab daily before breakfast.  FU in 3-4 months. His A1C is good. Creatinine continues to be elevated. I still feel he needs to be seen by Nephrology to see if anything else can be done to preserve renal function. He does not want to go see any more specialists. Recheck in 3-4 months.

## 2019-11-07 NOTE — PROGRESS NOTES
Identified pt with two pt identifiers(name and ). Chief Complaint   Patient presents with    Bladder Infection    Sleep Problem     he is sleeping a lot    Altered mental status     he has started seeing people a couple nights ago        Health Maintenance Due   Topic    Shingrix Vaccine Age 50> (1 of 2)    Pneumococcal 65+ years (1 of 1 - PPSV23)    Influenza Age 5 to Adult     MEDICARE YEARLY EXAM     LIPID PANEL Q1     HEMOGLOBIN A1C Q6M     FOOT EXAM Q1        Wt Readings from Last 3 Encounters:   19 191 lb (86.6 kg)   19 194 lb 6.4 oz (88.2 kg)   19 188 lb (85.3 kg)     Temp Readings from Last 3 Encounters:   19 97.6 °F (36.4 °C) (Oral)   19 98 °F (36.7 °C) (Oral)   19 97.7 °F (36.5 °C) (Oral)     BP Readings from Last 3 Encounters:   19 152/70   19 140/62   19 104/60     Pulse Readings from Last 3 Encounters:   19 (!) 58   19 60   19 (!) 55         Learning Assessment:  :     Learning Assessment 3/19/2014   PRIMARY LEARNER Patient   HIGHEST LEVEL OF EDUCATION - PRIMARY LEARNER  GRADUATED HIGH SCHOOL OR GED   BARRIERS PRIMARY LEARNER NONE   CO-LEARNER CAREGIVER No   PRIMARY LANGUAGE ENGLISH   LEARNER PREFERENCE PRIMARY LISTENING     DEMONSTRATION   ANSWERED BY patient   RELATIONSHIP SELF       Depression Screening:  :     3 most recent PHQ Screens 2019   Little interest or pleasure in doing things Not at all   Feeling down, depressed, irritable, or hopeless Not at all   Total Score PHQ 2 0       Fall Risk Assessment:  :     Fall Risk Assessment, last 12 mths 2019   Able to walk? Yes   Fall in past 12 months? No   Fall with injury? -   Number of falls in past 12 months -       Abuse Screening:  :     Abuse Screening Questionnaire 2019 2018 3/28/2018 2017 8/15/2016 2015 3/19/2014   Do you ever feel afraid of your partner?  N N N N N N N   Are you in a relationship with someone who physically or mentally threatens you? N N N N N N N   Is it safe for you to go home? Y Y Y Y Y Y Y       Coordination of Care Questionnaire:  :     1) Have you been to an emergency room, urgent care clinic since your last visit? no   Hospitalized since your last visit? no             2) Have you seen or consulted any other health care providers outside of [address] [place] [address] since your last visit? no  (Include any pap smears or colon screenings in this section.)    3) Do you have an Advance Directive on file? yes  Are you interested in receiving information about Advance Directives? no    Patient is accompanied by daughter I have received verbal consent from Ignacio Scott. to discuss any/all medical information while they are present in the room. Reviewed record in preparation for visit and have obtained necessary documentation. Medication reconciliation up to date and corrected with patient at this time.

## 2019-11-07 NOTE — PROGRESS NOTES
Chief Complaint:  Chief Complaint   Patient presents with    Bladder Infection    Sleep Problem     he is sleeping a lot    Altered mental status     he has started seeing people a couple nights ago       History of Present Illness:  88M who presents with several concerns. First, he thinks he may have a bladder infection. He has been somnolent and sleeping a lot and felt like he started seeing things (people) a few nights ago. The patient is here with his daughter, who provides the history. This patient has a h/o recurrent UTI's in the past and presentation with confusion and delirium has been present in the past.     No fever. Unfortunately, even after several cups of water in the office, he was unable to void. Cup was given with instructions to bring it in as soon as he can go. The patient lives 5-minutes away from the office. Reviewed PmHx, RxHx, FmHx, SocHx, AllgHx and updated and dated in the chart.     Patient Active Problem List    Diagnosis    CKD (chronic kidney disease) stage 4, GFR 15-29 ml/min ([facility])    Delirium    Urinary tract infection    Type 2 diabetes with nephropathy ([address]    Dementia without behavioral disturbance ([address]    Unspecified vitamin D deficiency    Colon cancer ([address]    Pernicious anemia    Acquired hypothyroidism    Microalbuminuria    Diabetes mellitus type 2, controlled ([address]    Essential hypertension, benign    Mixed hyperlipidemia    Allergic rhinitis due to other allergen    Esophageal reflux       Review of Systems - negative except as listed above in the HPI    Objective:     Vitals:    11/07/19 1146   BP: 152/70   Pulse: (!) 58   Resp: 18   Temp: 97.6 °F (36.4 °C)   TempSrc: Oral   SpO2: 97%   Weight: 191 lb (86.6 kg)   Height: 6' (1.829 m)     Physical Examination:   General appearance - chronically ill appearing  Mental status - inappropriate safety awareness  Chest - clear to auscultation, no wheezes, rales or rhonchi, symmetric air entry  Heart - normal rate, regular rhythm, normal S1, S2, no murmurs, rubs, clicks or gallops  Neurological - alert, oriented, normal speech, no focal findings or movement disorder noted  Musculoskeletal - no joint tenderness, deformity or swelling    Assessment/ Plan:   Diagnoses and all orders for this visit:    1. Urinary tract infection without hematuria, site unspecified  -     AMB POC URINALYSIS DIP STICK AUTO W/O MICRO   Possibly, the patient may have a UTI, but unable to void. Cup given and will bring the sample back later today or tomorrow. Daughter verbalized understanding the plan. I have discussed the diagnosis with the patient's daughter and the intended treatment plan as seen in the above orders. The patient has received an after-visit summary and questions were answered concerning future plans. Asked to return should symptoms worsen or not improve with treatment. Any pending labs and studies will be relayed to patient when they become available. Pt verbalizes understanding of plan of care and denies further questions or concerns at this time. Follow-up and Dispositions    · Return if symptoms worsen or fail to improve. Hina Araujo MD    Patient Instructions          Urinary Tract Infections in Men: Care Instructions  Your Care Instructions    A urinary tract infection, or UTI, is a general term for an infection anywhere between the kidneys and the tip of the penis. UTIs can also be a result of a prostate problem. Most cause pain or burning when you urinate. Most UTIs are caused by bacteria and can be cured with antibiotics. It is important to complete your treatment so that the infection does not get worse. Follow-up care is a key part of your treatment and safety. Be sure to make and go to all appointments, and call your doctor if you are having problems. It's also a good idea to know your test results and keep a list of the medicines you take.   How can you care for yourself at home?  · Take your antibiotics as prescribed. Do not stop taking them just because you feel better. You need to take the full course of antibiotics. · Take your medicines exactly as prescribed. Your doctor may have prescribed a medicine, such as phenazopyridine (Pyridium), to help relieve pain when you urinate. This turns your urine orange. You may stop taking it when your symptoms get better. But be sure to take all of your antibiotics, which treat the infection. · Drink extra water for the next day or two. This will help make the urine less concentrated and help wash out the bacteria causing the infection. (If you have kidney, heart, or liver disease and have to limit your fluids, talk with your doctor before you increase your fluid intake.)  · Avoid drinks that are carbonated or have caffeine. They can irritate the bladder. · Urinate often. Try to empty your bladder each time. · To relieve pain, take a hot bath or lay a heating pad (set on low) over your lower belly or genital area. Never go to sleep with a heating pad in place. To help prevent UTIs  · Drink plenty of fluids, enough so that your urine is light yellow or clear like water. If you have kidney, heart, or liver disease and have to limit fluids, talk with your doctor before you increase the amount of fluids you drink. · Urinate when you have the urge. Do not hold your urine for a long time. Urinate before you go to sleep. · Keep your penis clean. Catheter care  If you have a drainage tube (catheter) in place, the following steps will help you care for it. · Always wash your hands before and after touching your catheter. · Check the area around the urethra for inflammation or signs of infection. Signs of infection include irritated, swollen, red, or tender skin, or pus around the catheter. · Clean the area around the catheter with soap and water two times a day. Dry with a clean towel afterward.   · Do not apply powder or lotion to the skin around the catheter. To empty the urine collection bag  · Wash your hands with soap and water. · Without touching the drain spout, remove the spout from its sleeve at the bottom of the collection bag. Open the valve on the spout. · Let the urine flow out of the bag and into the toilet or a container. Do not let the tubing or drain spout touch anything. · After you empty the bag, clean the end of the drain spout with tissue and water. Close the valve and put the drain spout back into its sleeve at the bottom of the collection bag. · Wash your hands with soap and water. When should you call for help? Call your doctor now or seek immediate medical care if:    · Symptoms such as a fever, chills, nausea, or vomiting get worse or happen for the first time.     · You have new pain in your back just below your rib cage. This is called flank pain.     · There is new blood or pus in your urine.     · You are not able to take or keep down your antibiotics.    Watch closely for changes in your health, and be sure to contact your doctor if:    · You are not getting better after taking an antibiotic for 2 days.     · Your symptoms go away but then come back. Where can you learn more? Go to http://[facility].info/. Enter U450 in the search box to learn more about \"Urinary Tract Infections in Men: Care Instructions. \"  Current as of: December 19, 2018  Content Version: 12.2  © 8274-0397 [employer]. Care instructions adapted under license by [employer] (which disclaims liability or warranty for this information). If you have questions about a medical condition or this instruction, always ask your healthcare professional. [place] [address] any warranty or liability for your use of this information.

## 2019-11-07 NOTE — PATIENT INSTRUCTIONS

## 2019-11-13 NOTE — PROGRESS NOTES
Subjective: Zachary Sorenson is a 80 y.o. male seen for follow up of diabetes. He also has hypertension and hyperlipidemia. Diabetic Review of Systems - medication compliance: compliant most of the time, diabetic diet compliance: compliant most of the time, home glucose monitoring: is not performed. Other symptoms and concerns: due for labs. He has had increased urine and stool incontinence in last few weeks per daughter. Patient Active Problem List  
 Diagnosis Date Noted  CKD (chronic kidney disease) stage 4, GFR 15-29 ml/min ([facility]) 04/22/2019  Delirium 10/11/2018  Urinary tract infection 10/11/2018  Type 2 diabetes with nephropathy ([facility] 03/28/2018  Dementia without behavioral disturbance ([facility] 08/16/2016  Unspecified vitamin D deficiency 09/11/2013  Colon cancer ([facility] 08/23/2012  Pernicious anemia 07/31/2012  Acquired hypothyroidism 04/26/2012  Microalbuminuria 03/06/2012  Diabetes mellitus type 2, controlled ([facility] 02/09/2012  Essential hypertension, benign 02/09/2012  Mixed hyperlipidemia 02/09/2012  Allergic rhinitis due to other allergen 02/09/2012  Esophageal reflux 02/09/2012 Current Outpatient Medications Medication Sig Dispense Refill  furosemide (LASIX) 20 mg tablet TAKE 1 TABLET EVERY DAY AS DIRECTED 90 Tab 3  Thyroid, Pork, (ARMOUR THYROID) 120 mg tab Take 1 Tab by mouth daily. TAKE 1 TABLET BY MOUTH 6 DAYS A WEEK 90 Tab 1  
 amLODIPine (NORVASC) 10 mg tablet Take 1 Tab by mouth daily. 90 Tab 1  
 lansoprazole (PREVACID) 30 mg capsule Take 1 Cap by mouth Daily (before breakfast). 90 Cap 1  
 glimepiride (AMARYL) 4 mg tablet TAKE 1 TABLET EVERY MORNING 90 Tab 1  
 losartan (COZAAR) 25 mg tablet TAKE 1 TABLET EVERY DAY 90 Tab 1  
 donepezil (ARICEPT) 10 mg tablet Take 1 Tab by mouth nightly. 90 Tab 1  TRADJENTA 5 mg tablet TAKE 1 TABLET EVERY DAY FOR DIABETES 90 Tab 1  
  cholecalciferol (VITAMIN D3) 50,000 unit capsule TAKE 1 CAPSULE EVERY 7 DAYS 13 Cap 3  
 aspirin delayed-release 81 mg tablet Take 81 mg by mouth daily.  cetirizine (ZYRTEC) 10 mg tablet TAKE 1 TABLET EVERY DAY AS NEEDED FOR ALLERGIES 90 Tab 3  
 ferrous sulfate (IRON) 325 mg (65 mg iron) tablet Take 1 Tab by mouth Daily (before breakfast). 30 Tab 5  
 multivitamins-minerals-lutein (CENTRUM SILVER) tab tablet Take 1 Tab by mouth daily.  nicotinic acid (NIACIN) 500 mg tablet Take 500 mg by mouth Daily (before breakfast).  loperamide (IMODIUM) 2 mg capsule TAKE 1 CAPSULE BEFORE BREAKFAST, LUNCH, DINNER AND AT BEDTIME FOR DIARRHEA 360 Cap 1 Allergies Allergen Reactions  Synthroid [Levothyroxine] Other (comments) Confused, hallucinations Past Medical History:  
Diagnosis Date  WILTON [facility]acute kidney injury) ([facility] 8/2/2014  Cancer [facility])   
 colon  Chronic kidney disease  Diabetes ([facility] 2008  
 type 2  Dyslipidemia  Esophageal reflux  Esophageal reflux 2/9/2012  Hypertension 2008  Incontinence  Memory loss  Mixed hyperlipidemia 2/9/2012  Other ill-defined conditions(799.89) broken neck 2012  Pernicious anemia 7/31/2012  Psychiatric disorder   
 dementia  Snoring  Stroke ([facility] 10/18/2018  Unspecified hypothyroidism 4/26/2012 Past Surgical History:  
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    
 HX HERNIA REPAIR Bilateral inguinal  
 HX OTHER SURGICAL    
 colon resection  HX SMALL BOWEL RESECTION  2010  HX TONSIL AND ADENOIDECTOMY  HX TONSILLECTOMY  1936 Family History Problem Relation Age of Onset  Heart Disease Mother  Hypertension Mother  Heart Disease Father  Hypertension Father  Cancer Paternal Aunt Social History Tobacco Use  Smoking status: Former Smoker Packs/day: 1.50 Years: 30.00 Pack years: 45.00 Last attempt to quit: 1982 Years since quittin.3  Smokeless tobacco: Never Used Substance Use Topics  Alcohol use: No  
  Alcohol/week: 0.0 standard drinks Types: 1 - 2 Standard drinks or equivalent per week Review of Systems Pertinent items are noted in HPI. Objective:  
 
Visit Vitals /64 (BP 1 Location: Right arm, BP Patient Position: Sitting) Comment: manual  
Pulse (!) 55 Temp 98.3 °F (36.8 °C) (Oral) Resp 18 Ht 6' (1.829 m) Wt 194 lb 6.4 oz (88.2 kg) SpO2 95% BMI 26.37 kg/m² Appearance: alert, well appearing, and in no distress. Exam: heart sounds normal rate, regular rhythm, normal S1, S2, no murmurs, rubs, clicks or gallops, chest clear Lab review: orders written for new lab studies as appropriate; see orders. Assessment/Plan:  
 
. Diabetic issues reviewed with him: glycohemoglobin and other lab monitoring discussed. ICD-10-CM ICD-9-CM 1. Medicare annual wellness visit, subsequent Z00.00 V70.0 2. Urinary tract infection without hematuria, site unspecified N39.0 599.0 AMB POC URINALYSIS DIP STICK AUTO W/O MICRO 3. Encounter for immunization Z23 V03.89 ADMIN INFLUENZA VIRUS VAC INFLUENZA VACCINE INACTIVATED (IIV), SUBUNIT, ADJUVANTED, IM  
   ADMIN PNEUMOCOCCAL VACCINE  
   PNEUMOCOCCAL POLYSACCHARIDE VACCINE, 23-VALENT, ADULT OR IMMUNOSUPPRESSED PT DOSE,  
4. Urinary incontinence, unspecified type R32 788.30   
5. Controlled type 2 diabetes mellitus without complication, without long-term current use of insulin ([facility]) E11.9 250.00 LIPID PANEL  
   HEMOGLOBIN A1C WITH EAG  
   glimepiride (AMARYL) 4 mg tablet 6. Dementia without behavioral disturbance, unspecified dementia type ([facility]) F03.90 294.20 donepezil (ARICEPT) 10 mg tablet 7. Type 2 diabetes with nephropathy ([facility]) E11.21 250.40   
  583.81   
8. Essential hypertension, benign I10 401.1 furosemide (LASIX) 20 mg tablet amLODIPine (NORVASC) 10 mg tablet  
   losartan (COZAAR) 25 mg tablet 9. Mixed hyperlipidemia E78.2 272.2 10. Acquired hypothyroidism E03.9 244.9 TSH 3RD GENERATION  
   T4, FREE Thyroid, Pork, (ARMOUR THYROID) 120 mg tab 11. Encounter for long-term current use of medication R87.760 O99.98 METABOLIC PANEL, COMPREHENSIVE  
   CBC W/O DIFF 12. Gastroesophageal reflux disease, esophagitis presence not specified K21.9 530.81 lansoprazole (PREVACID) 30 mg capsule This is the Subsequent Medicare Annual Wellness Exam, performed 12 months or more after the Initial AWV or the last Subsequent AWV I have reviewed the patient's medical history in detail and updated the computerized patient record. History Patient Active Problem List  
Diagnosis Code  Diabetes mellitus type 2, controlled ([facility] E11.9  
 Essential hypertension, benign I10  
 Mixed hyperlipidemia E78.2  Allergic rhinitis due to other allergen J30.89  Esophageal reflux K21.9  Microalbuminuria R80.9  Acquired hypothyroidism E03.9  Pernicious anemia D51.0  Colon cancer ([facility] C18.9  
 Unspecified vitamin D deficiency E55.9  Dementia without behavioral disturbance ([facility]) F03.90  Type 2 diabetes with nephropathy ([facility]) E11.21  
 Delirium R41.0  
 Urinary tract infection N39.0  CKD (chronic kidney disease) stage 4, GFR 15-29 ml/min ([facility]) N18.4 Past Medical History:  
Diagnosis Date  WILTON (acute kidney injury) ([facility] 8/2/2014  Cancer [facility])   
 colon  Chronic kidney disease  Diabetes ([facility] 2008  
 type 2  Dyslipidemia  Esophageal reflux  Esophageal reflux 2/9/2012  Hypertension 2008  Incontinence  Memory loss  Mixed hyperlipidemia 2/9/2012  Other ill-defined conditions(799.89) broken neck 2012  Pernicious anemia 7/31/2012  Psychiatric disorder   
 dementia  Snoring  Stroke ([facility] 10/18/2018  Unspecified hypothyroidism 4/26/2012 Past Surgical History:  
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    
 HX HERNIA REPAIR Bilateral inguinal  
 HX OTHER SURGICAL    
 colon resection  HX SMALL BOWEL RESECTION  2010  HX TONSIL AND ADENOIDECTOMY  HX TONSILLECTOMY  1936 Current Outpatient Medications Medication Sig Dispense Refill  furosemide (LASIX) 20 mg tablet TAKE 1 TABLET EVERY DAY AS DIRECTED 90 Tab 3  Thyroid, Pork, (ARMOUR THYROID) 120 mg tab Take 1 Tab by mouth daily. TAKE 1 TABLET BY MOUTH 6 DAYS A WEEK 90 Tab 1  
 amLODIPine (NORVASC) 10 mg tablet Take 1 Tab by mouth daily. 90 Tab 1  
 lansoprazole (PREVACID) 30 mg capsule Take 1 Cap by mouth Daily (before breakfast). 90 Cap 1  
 glimepiride (AMARYL) 4 mg tablet TAKE 1 TABLET EVERY MORNING 90 Tab 1  
 losartan (COZAAR) 25 mg tablet TAKE 1 TABLET EVERY DAY 90 Tab 1  
 donepezil (ARICEPT) 10 mg tablet Take 1 Tab by mouth nightly. 90 Tab 1  TRADJENTA 5 mg tablet TAKE 1 TABLET EVERY DAY FOR DIABETES 90 Tab 1  cholecalciferol (VITAMIN D3) 50,000 unit capsule TAKE 1 CAPSULE EVERY 7 DAYS 13 Cap 3  
 aspirin delayed-release 81 mg tablet Take 81 mg by mouth daily.  cetirizine (ZYRTEC) 10 mg tablet TAKE 1 TABLET EVERY DAY AS NEEDED FOR ALLERGIES 90 Tab 3  
 ferrous sulfate (IRON) 325 mg (65 mg iron) tablet Take 1 Tab by mouth Daily (before breakfast). 30 Tab 5  
 multivitamins-minerals-lutein (CENTRUM SILVER) tab tablet Take 1 Tab by mouth daily.  nicotinic acid (NIACIN) 500 mg tablet Take 500 mg by mouth Daily (before breakfast).  loperamide (IMODIUM) 2 mg capsule TAKE 1 CAPSULE BEFORE BREAKFAST, LUNCH, DINNER AND AT BEDTIME FOR DIARRHEA 360 Cap 1 Allergies Allergen Reactions  Synthroid [Levothyroxine] Other (comments) Confused, hallucinations Family History Problem Relation Age of Onset  Heart Disease Mother  Hypertension Mother  Heart Disease Father  Hypertension Father  Cancer Paternal Aunt Social History Tobacco Use  Smoking status: Former Smoker Packs/day: 1.50 Years: 30.00 Pack years: 45.00 Last attempt to quit: 1982 Years since quittin.3  Smokeless tobacco: Never Used Substance Use Topics  Alcohol use: No  
  Alcohol/week: 0.0 standard drinks Types: 1 - 2 Standard drinks or equivalent per week Depression Risk Factor Screening:  
 
3 most recent PHQ Screens 2019 Little interest or pleasure in doing things Not at all Feeling down, depressed, irritable, or hopeless Not at all Total Score PHQ 2 0 Alcohol Risk Factor Screening (MALE > 65): Do you average more 1 drink per night or more than 7 drinks a week: No 
 
In the past three months have you have had more than 4 drinks containing alcohol on one occasion: No 
 
 
Functional Ability and Level of Safety:  
Hearing: Hearing is good. Activities of Daily Living: The home contains: no safety equipment. Patient needs help with:  transportation, housework, managing medications, dressing, bathing, hygiene and bathroom needs Ambulation: with mild difficulty Fall Risk: 
Fall Risk Assessment, last 12 mths 2019 Able to walk? Yes Fall in past 12 months? No  
Fall with injury? -  
Number of falls in past 12 months -  
 
 
Abuse Screen: 
Patient is not abused Cognitive Screening Has your family/caregiver stated any concerns about your memory: yes - has dementia. Patient Care Team  
Patient Care Team: 
Anna Rice MD as PCP - [facility]) [facility] Provider Assessment/Plan Education and counseling provided: 
Are appropriate based on today's review and evaluation End-of-Life planning (with patient's consent) Pneumococcal Vaccine Influenza Vaccine Screening for glaucoma Diabetes screening test 
 
 Diagnoses and all orders for this visit: 
 
1. Medicare annual wellness visit, subsequent 2. Urinary tract infection without hematuria, site unspecified -     AMB POC URINALYSIS DIP STICK AUTO W/O MICRO 3. Encounter for immunization -     ADMIN INFLUENZA VIRUS VAC 
-     INFLUENZA VACCINE INACTIVATED (IIV), SUBUNIT, ADJUVANTED, IM 
-     ADMIN PNEUMOCOCCAL VACCINE 
-     PNEUMOCOCCAL POLYSACCHARIDE VACCINE, 23-VALENT, ADULT OR IMMUNOSUPPRESSED PT DOSE, 
 
4. Urinary incontinence, unspecified type 5. Controlled type 2 diabetes mellitus without complication, without long-term current use of insulin ([facility] -     LIPID PANEL; Future 
-     HEMOGLOBIN A1C WITH EAG; Future 
-     glimepiride (AMARYL) 4 mg tablet; TAKE 1 TABLET EVERY MORNING 6. Dementia without behavioral disturbance, unspecified dementia type (HCC) 
-     donepezil (ARICEPT) 10 mg tablet; Take 1 Tab by mouth nightly. 7. Type 2 diabetes with nephropathy ([facility] 8. Essential hypertension, benign 
-     furosemide (LASIX) 20 mg tablet; TAKE 1 TABLET EVERY DAY AS DIRECTED 
-     amLODIPine (NORVASC) 10 mg tablet; Take 1 Tab by mouth daily. -     losartan (COZAAR) 25 mg tablet; TAKE 1 TABLET EVERY DAY 
 
9. Mixed hyperlipidemia 10. Acquired hypothyroidism 
-     TSH 3RD GENERATION; Future -     T4, FREE; Future -     Thyroid, Pork, (ARMOUR THYROID) 120 mg tab; Take 1 Tab by mouth daily. TAKE 1 TABLET BY MOUTH 6 DAYS A WEEK 11. Encounter for long-term current use of medication -     METABOLIC PANEL, COMPREHENSIVE; Future -     CBC W/O DIFF; Future 12. Gastroesophageal reflux disease, esophagitis presence not specified 
-     lansoprazole (PREVACID) 30 mg capsule; Take 1 Cap by mouth Daily (before breakfast). Health Maintenance Due Topic Date Due  Shingrix Vaccine Age 50> (1 of 2) 03/08/1981  LIPID PANEL Q1  10/03/2019  
 HEMOGLOBIN A1C Q6M  10/22/2019  
 FOOT EXAM Q1  10/23/2019

## 2019-11-13 NOTE — PATIENT INSTRUCTIONS
Medicare Wellness Visit, Male The best way to live healthy is to have a lifestyle where you eat a well-balanced diet, exercise regularly, limit alcohol use, and quit all forms of tobacco/nicotine, if applicable. Regular preventive services are another way to keep healthy. Preventive services (vaccines, screening tests, monitoring & exams) can help personalize your care plan, which helps you manage your own care. Screening tests can find health problems at the earliest stages, when they are easiest to treat. [facility] follows the current, evidence-based guidelines published by the [facility] ([facility]) when recommending preventive services for our patients. Because we follow these guidelines, sometimes recommendations change over time as research supports it. (For example, a prostate screening blood test is no longer routinely recommended for men with no symptoms). Of course, you and your doctor may decide to screen more often for some diseases, based on your risk and co-morbidities (chronic disease you are already diagnosed with). Preventive services for you include: - Medicare offers their members a free annual wellness visit, which is time for you and your primary care provider to discuss and plan for your preventive service needs. Take advantage of this benefit every year! 
-All adults over age 72 should receive the recommended pneumonia vaccines. Current USPSTF guidelines recommend a series of two vaccines for the best pneumonia protection.  
-All adults should have a flu vaccine yearly and tetanus vaccine every 10 years. 
-All adults age 48 and older should receive the shingles vaccines (series of two vaccines).       
-All adults age 38-68 who are overweight should have a diabetes screening test once every three years.  
-Other screening tests & preventive services for persons with diabetes include: an eye exam to screen for diabetic retinopathy, a kidney function test, a foot exam, and stricter control over your cholesterol.  
-Cardiovascular screening for adults with routine risk involves an electrocardiogram (ECG) at intervals determined by the provider.  
-Colorectal cancer screening should be done for adults age 54-65 with no increased risk factors for colorectal cancer. There are a number of acceptable methods of screening for this type of cancer. Each test has its own benefits and drawbacks. Discuss with your provider what is most appropriate for you during your annual wellness visit. The different tests include: colonoscopy (considered the best screening method), a fecal occult blood test, a fecal DNA test, and sigmoidoscopy. 
-All adults born between [facility] should be screened once for Hepatitis C. 
-An Abdominal Aortic Aneurysm (AAA) Screening is recommended for men age 73-68 who has ever smoked in their lifetime. Here is a list of your current Health Maintenance items (your personalized list of preventive services) with a due date: 
Health Maintenance Due Topic Date Due  Shingles Vaccine (1 of 2) 03/08/1981  Pneumococcal Vaccine (1 of 1 - PPSV23) 03/08/1996  Flu Vaccine  08/01/2019 [facility] Annual Well Visit  08/14/2019  Cholesterol Test   10/03/2019  Hemoglobin A1C    10/22/2019 [facility] Diabetic Foot Care  10/23/2019 Dementia: Care Instructions Your Care Instructions Dementia is a loss of mental skills that affects your daily life. It is different than the occasional trouble with memory that is part of aging. You may find it hard to remember things that you feel you should be able to remember. Or you may feel that your mind is just not working as well as usual. 
Finding out that you have dementia is a shock. You may be afraid and worried about how the condition will change your life.  Although there is no cure at this time, medicine may slow memory loss and improve thinking for a while. Other medicines may be able to help you sleep or cope with depression and behavior changes. Dementia often gets worse slowly. But it can get worse quickly. As dementia gets worse, it may become harder to do common things that take planning, like making a list and going shopping. Over time, the disease may make it hard for you to take care of yourself. Some people with dementia need others to help care for them. Dementia is different for everyone. You may be able to function well for a long time. In the early stage of the condition, you can do things at home to make life easier and safer. You also can keep doing your hobbies and other activities. Many people find comfort in planning now for their future needs. Follow-up care is a key part of your treatment and safety. Be sure to make and go to all appointments, and call your doctor if you are having problems. It's also a good idea to know your test results and keep a list of the medicines you take. How can you care for yourself at home? · Take your medicines exactly as prescribed. Call your doctor if you think you are having a problem with your medicine. · Eat healthy foods. Eat lots of whole grains, fruits, and vegetables every day. If you are not hungry, try snacks or nutritional drinks such as Boost, Ensure, or Sustacal. 
· If you have problems sleeping: ? Try not to nap too close to your bedtime. ? Exercise regularly. Walking is a good choice. ? Try a glass of warm milk or caffeine-free herbal tea before bed. · Do tasks and activities during the time of day when you feel your best. It may help to develop a daily routine. · Post labels, lists, and sticky notes to help you remember things. Write your activities on a calendar you can easily find. Put your clock where you can easily see it. · Stay active.  Take walks in familiar places, or with friends or loved ones. Try to stay active mentally too. Read and work crossword puzzles if you enjoy these activities. · Do not drive unless you can pass an on-road driving test. If you are not sure if you are safe to drive, your state 's license bureau can test you. · Keep a cordless phone and a flashlight with new batteries by your bed. If possible, put a phone in each of the main rooms of your house, or carry a cell phone in case you fall and cannot reach a phone. Or, you can wear a device around your neck or wrist. You push a button that sends a signal for help. Acknowledge your emotions and plan for the future · Talk openly and honestly with your doctor. · Let yourself grieve. It is common to feel angry, scared, frustrated, anxious, or depressed. · Get emotional support from family, friends, a support group, or a counselor experienced in working with people who have dementia. · Ask for help if you need it. · Tell your doctor how you feel. You may feel upset, angry, or worried at times. Many things can cause this, including poor sleep, medicine side effects, confusion, and pain. Your doctor may be able to help you. · Plan for the future. ? Talk to your family and doctor about preparing a living will and other important papers while you can make decisions. These papers tell your doctors how to care for you at the end of your life. ? Consider naming a person to make decisions about your care if you are not able to. When should you call for help? Call 911 anytime you think you may need emergency care. For example, call if: 
  · You are lost and do not know whom to call.  
  · You are injured and do not know whom to call.  
 Call your doctor now or seek immediate medical care if: 
  · You are more confused or upset than usual.  
  · You feel like you could hurt yourself because your mind is not working well.  
Elder Nap closely for changes in your health, and be sure to contact your doctor if you have any problems. Where can you learn more? Go to http://milton-saud.info/. Enter Q224 in the search box to learn more about \"Dementia: Care Instructions. \" Current as of: May 28, 2019 Content Version: 12.2 © 3066-8733 [employer]. Care instructions adapted under license by [employer] (which disclaims liability or warranty for this information). If you have questions about a medical condition or this instruction, always ask your healthcare professional. [place] [address] any warranty or liability for your use of this information.

## 2019-11-13 NOTE — PROGRESS NOTES
Identified pt with two pt identifiers(name and ). Chief Complaint Patient presents with  Bladder Infection  Diabetes Health Maintenance Due Topic  Shingrix Vaccine Age 50> (1 of 2)  Pneumococcal 65+ years (1 of 1 - PPSV23)  Influenza Age 5 to Adult  MEDICARE YEARLY EXAM   
 LIPID PANEL Q1   
 HEMOGLOBIN A1C Q6M   
 FOOT EXAM Q1 Wt Readings from Last 3 Encounters:  
19 194 lb 6.4 oz (88.2 kg) 19 191 lb (86.6 kg) 19 194 lb 6.4 oz (88.2 kg) Temp Readings from Last 3 Encounters:  
19 98.3 °F (36.8 °C) (Oral) 19 97.6 °F (36.4 °C) (Oral) 19 98 °F (36.7 °C) (Oral) BP Readings from Last 3 Encounters:  
19 132/64  
19 152/70  
19 140/62 Pulse Readings from Last 3 Encounters:  
19 (!) 55  
19 (!) 58  
19 60 Learning Assessment: 
:  
 
Learning Assessment 3/19/2014 PRIMARY LEARNER Patient HIGHEST LEVEL OF EDUCATION - PRIMARY LEARNER  GRADUATED HIGH SCHOOL OR GED  
BARRIERS PRIMARY LEARNER NONE  
CO-LEARNER CAREGIVER No  
PRIMARY LANGUAGE ENGLISH  
LEARNER PREFERENCE PRIMARY LISTENING  
  DEMONSTRATION  
ANSWERED BY patient RELATIONSHIP SELF Depression Screening: 
:  
 
3 most recent PHQ Screens 2019 Little interest or pleasure in doing things Not at all Feeling down, depressed, irritable, or hopeless Not at all Total Score PHQ 2 0 Fall Risk Assessment: 
:  
 
Fall Risk Assessment, last 12 mths 2019 Able to walk? Yes Fall in past 12 months? No  
Fall with injury? -  
Number of falls in past 12 months -  
 
 
Abuse Screening: 
:  
 
Abuse Screening Questionnaire 2019 2018 3/28/2018 2017 8/15/2016 2015 3/19/2014 Do you ever feel afraid of your partner? N N N N N N N Are you in a relationship with someone who physically or mentally threatens you? N N N N N N N Is it safe for you to go home?  Lorida Cure  
 
 
 Coordination of Care Questionnaire: 
:  
 
1) Have you been to an emergency room, urgent care clinic since your last visit? no  
Hospitalized since your last visit? no          
 
2) Have you seen or consulted any other health care providers outside of [address] [place] [address] since your last visit? no  (Include any pap smears or colon screenings in this section.) 3) Do you have an Advance Directive on file? yes Are you interested in receiving information about Advance Directives? no 
 
Patient is accompanied by daughter I have received verbal consent from Rosio Posada to discuss any/all medical information while they are present in the room. Reviewed record in preparation for visit and have obtained necessary documentation. Medication reconciliation up to date and corrected with patient at this time. Rosio Posada is a 80 y.o. male who presents for routine immunizations. He denies any symptoms , reactions or allergies that would exclude them from being immunized today. Risks and adverse reactions were discussed and the VIS was given to them. All questions were addressed. He was observed for 10 min post injection. There were no reactions observed.  
 
Holli Rodriguez LPN

## 2019-12-14 PROBLEM — R41.82 AMS (ALTERED MENTAL STATUS): Status: ACTIVE | Noted: 2019-01-01

## 2019-12-14 PROBLEM — N39.0 UTI (URINARY TRACT INFECTION): Status: ACTIVE | Noted: 2019-01-01

## 2019-12-14 NOTE — ED NOTES
TRANSFER - OUT REPORT: 
 
Verbal report given to Brisa(name) on Najma Rivera.  being transferred to [facility](unit) for routine progression of care Report consisted of patients Situation, Background, Assessment and  
Recommendations(SBAR). Information from the following report(s) SBAR, OR Summary, MAR, Recent Results and Cardiac Rhythm NSR was reviewed with the receiving nurse. Lines:  
Peripheral IV 12/14/19 Right Forearm (Active) Site Assessment Clean, dry, & intact 12/14/2019  5:26 PM  
Phlebitis Assessment 0 12/14/2019  5:26 PM  
Infiltration Assessment 0 12/14/2019  5:26 PM  
Dressing Status Clean, dry, & intact 12/14/2019  5:26 PM  
Dressing Type Tape;Transparent 12/14/2019  5:26 PM  
Hub Color/Line Status Pink;Flushed;Patent 12/14/2019  5:26 PM  
Alcohol Cap Used Yes 12/14/2019  5:26 PM  
  
 
Opportunity for questions and clarification was provided. Patient transported with: 
 Monitor Registered Nurse

## 2019-12-14 NOTE — ED PROVIDER NOTES
80 y.o. male with past medical history significant for esophageal reflux, dyslipidemia, DM type 2, dementia, hypertension, colon cancer, WILTON, stroke, and anemia who presents from EMS with chief complaint of dysarthria. Pt was found on the floor by his granddaughter at approximately [phone] today. Pt's granddaughter called her mother stating that Pt had fell to the ground and noted she was unable to understand what Pt was saying at the time. Pt's daughter reports Pt had slurred speech and \"word salad\". Pt's daughter notes a transient episode of difficulty speaking and difficulty using his right arm approximately 2 weeks ago, which resolved within 15 minutes. Pt did not seek medical attention at that time. Per daughter, Pt is taking daily 81 mg aspirin. Pt's daughter denies LOC. There are no other acute medical concerns at this time. PCP: Eugenio Gu MD 
 
Note written by Denver Generous, Scribe, as dictated by Dianna Jean-Baptiste DO 2:52 PM 
 
 
 
 
The history is provided by the patient. No  was used. Past Medical History:  
Diagnosis Date  WILTON (acute kidney injury) ([facility] [address] 8/2/2014  Cancer [facility])   
 colon  Chronic kidney disease  Diabetes ([facility] [address] 2008  
 type 2  Dyslipidemia  Esophageal reflux  Esophageal reflux 2/9/2012  Hypertension 2008  Incontinence  Memory loss  Mixed hyperlipidemia 2/9/2012  Other ill-defined conditions(799.89) broken neck 2012  Pernicious anemia 7/31/2012  Psychiatric disorder   
 dementia  Snoring  Stroke ([facility] [address] 10/18/2018  Unspecified hypothyroidism 4/26/2012 Past Surgical History:  
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    
 HX HERNIA REPAIR Bilateral inguinal  
 HX OTHER SURGICAL    
 colon resection  HX SMALL BOWEL RESECTION  2010  HX TONSIL AND ADENOIDECTOMY  HX TONSILLECTOMY  1936 Family History: Problem Relation Age of Onset  Heart Disease Mother  Hypertension Mother  Heart Disease Father  Hypertension Father  Cancer Paternal Aunt Social History Socioeconomic History  Marital status:  Spouse name: Not on file  Number of children: Not on file  Years of education: Not on file  Highest education level: Not on file Occupational History  Not on file Social Needs  Financial resource strain: Not on file  Food insecurity:  
  Worry: Not on file Inability: Not on file  Transportation needs:  
  Medical: Not on file Non-medical: Not on file Tobacco Use  Smoking status: Former Smoker Packs/day: 1.50 Years: 30.00 Pack years: 45.00 Last attempt to quit: 1982 Years since quittin.4  Smokeless tobacco: Never Used Substance and Sexual Activity  Alcohol use: No  
  Alcohol/week: 0.0 standard drinks Types: 1 - 2 Standard drinks or equivalent per week  Drug use: No  
 Sexual activity: Not Currently Partners: Female Lifestyle  Physical activity:  
  Days per week: Not on file Minutes per session: Not on file  Stress: Not on file Relationships  Social connections:  
  Talks on phone: Not on file Gets together: Not on file Attends [employer] service: Not on file Active member of club or organization: Not on file Attends meetings of clubs or organizations: Not on file Relationship status: Not on file  Intimate partner violence:  
  Fear of current or ex partner: Not on file Emotionally abused: Not on file Physically abused: Not on file Forced sexual activity: Not on file Other Topics Concern  Not on file Social History Narrative  Not on file ALLERGIES: Synthroid [levothyroxine] Review of Systems Constitutional: Negative for activity change, appetite change, chills and fever. HENT: Negative for congestion, rhinorrhea, sinus pain, sneezing and sore throat. Eyes: Negative for photophobia and visual disturbance. Respiratory: Negative for cough and shortness of breath. Cardiovascular: Negative for chest pain. Gastrointestinal: Negative for abdominal pain, blood in stool, constipation, diarrhea, nausea and vomiting. Genitourinary: Negative for difficulty urinating, dysuria, flank pain, hematuria, penile pain and testicular pain. Musculoskeletal: Negative for arthralgias, back pain, myalgias and neck pain. Skin: Negative for rash and wound. Neurological: Positive for speech difficulty. Negative for syncope, weakness, light-headedness, numbness and headaches. Psychiatric/Behavioral: Negative for self-injury and suicidal ideas. All other systems reviewed and are negative. Vitals:  
 12/14/19 1445 BP: 158/69 Pulse: 70 Resp: 20 Temp: 98.2 °F (36.8 °C) SpO2: 98% Weight: 88 kg (194 lb) Height: 6' (1.829 m) Physical Exam 
Vitals signs and nursing note reviewed. Constitutional:   
   General: He is not in acute distress. Appearance: He is well-developed. He is not diaphoretic. Comments: Dishelved appearance. HENT:  
   Head: Normocephalic and atraumatic. Mouth/Throat:  
   Mouth: Mucous membranes are dry. Comments: Dry mucous membranes. Eyes:  
   Conjunctiva/sclera: Conjunctivae normal.  
   Pupils: Pupils are equal, round, and reactive to light. Neck: Musculoskeletal: Neck supple. Cardiovascular:  
   Rate and Rhythm: Normal rate and regular rhythm. Heart sounds: Normal heart sounds. Comments: Regular. Pulmonary:  
   Effort: Pulmonary effort is normal.  
   Breath sounds: Normal breath sounds. Comments: Lungs clear. Abdominal:  
   General: There is no distension. Palpations: Abdomen is soft. Tenderness: There is no tenderness. Comments: Abdomen soft, non tender, and non distended. Musculoskeletal:     
   General: No tenderness. Skin: 
   General: Skin is warm and dry. Neurological:  
   Mental Status: He is alert. GCS: GCS eye subscore is 4. GCS verbal subscore is 4. GCS motor subscore is 6. Cranial Nerves: Dysarthria present. Sensory: Sensation is intact. Motor: No pronator drift. Comments: Confused. Oriented to person but not to place, time, or situation. 5/5 strength in all four extremities. Intact sensation. Mildly dysarthric speech. No facial droop. Intact finger to nose. Negative pronator drift. GCS 14. Note written by Lazara Pollack, as dictated by Frantz Mckeon DO 2:52 PM 
  
 
MDM 
 80-year-old male presents with gradually worsening altered mental status. Initial history was concerning for more of an acute process so Code S was called. CT scan negative for any acute intracranial abnormality. Labs significant for acute on chronic kidney disease with creatinine of 3.08, BUN 25, given IV fluid bolus. WBC 11.6, normal hemoglobin. UA returned concerning for urinary tract infection with moderate blood, moderate leuks, greater than 100 WBCs and 1+ bacteria. Sent for culture. Given dose of IV Rocephin in the ED. Patient is afebrile with vital signs stable with no signs of sepsis. When discussed with the family they state that he has a history of several prior urinary tract infections and has acted like this previously with other infections. Will admit to the [facility] practice service for further evaluation and care. Procedures ED EKG interpretation: 
Rhythm: normal sinus rhythm; and regular . Rate (approx.): 71 BPM; ST/T wave: Non specfic ST depressions laterally but no ST elevation. Note written by Lazara Pollack, as dictated by Frantz Mckeon DO 2:45 PM 
 
CONSULT NOTE: 
3:01 PM Frantz Mckeon DO spoke with Dr. Violet Manning, Consult for Tele-neurology.   Discussed available diagnostic tests and clinical findings. Dr. Kavin Peraza will evaluate Pt. CONSULT NOTE: 
3:30 PM Mayi Ham DO spoke with Dr. Kavin Peraza, Consult for Tele-neurology. Discussed available diagnostic tests and clinical findings. Dr. Kavin Peraza said that his exam was difficult because Pt appears to be more encephalopathic than a clear obvious new neurological deficit. He's uncertain if Pt has had a recent stroke, acute stroke, or acute metabolic or infectious etiology precipitating delirium. Recommends starting plavix and admitting for further evaluation with MRI and EEG. Hospitalist Noah Serve for Admission 4:53 PM 
 
ED Room Number: HV99/90 Patient Name and age:  Jm Menjivar. 80 y.o.  male Working Diagnosis: 1. Encephalopathy acute 2. Urinary tract infection with hematuria, site unspecified 3. WILTON (acute kidney injury) ([facility] [address] Readmission: no 
Isolation Requirements:  no 
Recommended Level of Care:  step down Code Status:  Do Not Resuscitate Department:[facility] ED - [phone] Other:  Confused, UTI, getting rocephin. History of prior UTI's like this too. Ct neg

## 2019-12-14 NOTE — ED TRIAGE NOTES
Family states last know well was ~ 1230 today. Pt was found on floor by granddaughter and she called her mom stating that she could not understand pt.

## 2019-12-14 NOTE — H&P
Jake Herrera [address]  Office [phone] Fax [phone] Admission H&P Name: Donnie Jeong MRN: [id number]  Sex: Male YOB: 1931  Age: 80 y.o. PCP: Marj Jaimes MD  
 
Source of Information: patient, medical records Chief complaint: altered mental status History of Present Illness Donnie Jenog is a 80 y.o. male with known dementia, hypertension, DM2, dyslipidemia, TIA, hypothyroidism, and colon cancer who presents to the ER complaining of altered mental status. He lives with his granddaughter who found him on the floor near his bed at 1230 pm this afternoon. He seemed confused and had slurred speech. Unknown if he fell or hit his head, but patient said he \"slipped. \" She did not note any LOC, incontinence, or seizure like activity. His granddaughter then called his daughter who arrived an hour later and he was still on the floor. He has not complained of any fevers, chest pain, sob, dysuria, or abdominal pain recently. They did not notice any facial droop or asymmetrical weakness. Family states that when he gets confused he usually has an infection/UTI, his last UTI was five years ago. He was seen by PCP a few weeks ago because daughter stated he began acting more confused, which typically means he has a UTI, but they could not obtain a urine sample. 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 His daughter is usually in charge of his medications. In the ED his vitals were within normal limits. Labs significant for a WBC 11.6, Cr 3.08 (BL ~2.1) and UA with mod blood, mod leuk esterase, and +1 bacteria. CT head was negative for acute process but showed white matter disease.  He was treated with 1L NS bolus, Rocephin 1gm IV, and Plavix 75 mg. Lina Vital was consulted and recommended TIA/stroke workup with EEG though this seems more consistent with acute metabolic encephalopathy. They recommended to start on Plavix. Past Medical History:  
Diagnosis Date  WILTON (acute kidney injury) ([facility] 8/2/2014  Cancer [facility])   
 colon  Chronic kidney disease  Diabetes ([facility] 2008  
 type 2  Dyslipidemia  Esophageal reflux  Esophageal reflux 2/9/2012  Hypertension 2008  Incontinence  Memory loss  Mixed hyperlipidemia 2/9/2012  Other ill-defined conditions(799.89) broken neck 2012  Pernicious anemia 7/31/2012  Psychiatric disorder   
 dementia  Snoring  Stroke ([facility] 10/18/2018  Unspecified hypothyroidism 4/26/2012 Patient Vitals for the past 12 hrs: 
 Temp Pulse Resp BP SpO2  
12/14/19 1615  61 18  99 % 12/14/19 1445 98.2 °F (36.8 °C) 70 20 158/69 98 % Home Medications Prior to Admission medications Medication Sig Start Date End Date Taking? Authorizing Provider Thyroid, Pork, (ARMOUR THYROID) 120 mg tab Take 1 Tab by mouth daily. 65/03/03   Taylor Finney MD  
furosemide (LASIX) 20 mg tablet TAKE 1 TABLET EVERY DAY AS DIRECTED 10/31/86   Taylor Finney MD  
amLODIPine (NORVASC) 10 mg tablet Take 1 Tab by mouth daily. 56/72/75   Taylor Finney MD  
lansoprazole (PREVACID) 30 mg capsule Take 1 Cap by mouth Daily (before breakfast). 71/13/58   Taylor Finney MD  
glimepiride (AMARYL) 4 mg tablet TAKE 1 TABLET EVERY MORNING 79/78/57   Taylor Finney MD  
losartan (COZAAR) 25 mg tablet TAKE 1 TABLET EVERY DAY 82/62/85   Taylor Finney MD  
donepezil (ARICEPT) 10 mg tablet Take 1 Tab by mouth nightly.  79/92/16   Taylor Finney MD  
TRADJENTA 5 mg tablet TAKE 1 TABLET EVERY DAY FOR DIABETES 6/8/27   Taylor Finney MD  
loperamide (IMODIUM) 2 mg capsule TAKE 1 CAPSULE BEFORE BREAKFAST, LUNCH, DINNER AND AT BEDTIME FOR DIARRHEA 9/37/00   Taylor Finney MD  
 cholecalciferol (VITAMIN D3) 50,000 unit capsule TAKE 1 CAPSULE EVERY 7 DAYS 8/19/99   Larisa Machado MD  
aspirin delayed-release 81 mg tablet Take 81 mg by mouth daily. Provider, Historical  
cetirizine (ZYRTEC) 10 mg tablet TAKE 1 TABLET EVERY DAY AS NEEDED FOR ALLERGIES 37/99/37   Larisa Machado MD  
ferrous sulfate (IRON) 325 mg (65 mg iron) tablet Take 1 Tab by mouth Daily (before breakfast). 9/47/47   Larisa Machado MD  
multivitamins-minerals-lutein (CENTRUM SILVER) tab tablet Take 1 Tab by mouth daily. Provider, Historical  
nicotinic acid (NIACIN) 500 mg tablet Take 500 mg by mouth Daily (before breakfast). Provider, Historical  
 
 
Allergies Allergies Allergen Reactions  Synthroid [Levothyroxine] Other (comments) Confused, hallucinations Past Medical History:  
Diagnosis Date  WILTON (acute kidney injury) ([facility] 8/2/2014  Cancer [facility])   
 colon  Chronic kidney disease  Diabetes ([facility] 2008  
 type 2  Dyslipidemia  Esophageal reflux  Esophageal reflux 2/9/2012  Hypertension 2008  Incontinence  Memory loss  Mixed hyperlipidemia 2/9/2012  Other ill-defined conditions(799.89) broken neck 2012  Pernicious anemia 7/31/2012  Psychiatric disorder   
 dementia  Snoring  Stroke ([facility] 10/18/2018  Unspecified hypothyroidism 4/26/2012 Previous Hospitalization(s) 2018-Delirium Past Surgical History:  
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    
 HX HERNIA REPAIR Bilateral inguinal  
 HX OTHER SURGICAL    
 colon resection  HX SMALL BOWEL RESECTION  2010  HX TONSIL AND ADENOIDECTOMY  HX TONSILLECTOMY  1936 Family History Problem Relation Age of Onset  Heart Disease Mother  Hypertension Mother  Heart Disease Father  Hypertension Father  Cancer Paternal Aunt Social History Social History Socioeconomic History  Marital status:  Spouse name: Not on file  Number of children: Not on file  Years of education: Not on file  Highest education level: Not on file Occupational History  Not on file Social Needs  Financial resource strain: Not on file  Food insecurity:  
  Worry: Not on file Inability: Not on file  Transportation needs:  
  Medical: Not on file Non-medical: Not on file Tobacco Use  Smoking status: Former Smoker Packs/day: 1.50 Years: 30.00 Pack years: 45.00 Last attempt to quit: 1982 Years since quittin.4  Smokeless tobacco: Never Used Substance and Sexual Activity  Alcohol use: No  
  Alcohol/week: 0.0 standard drinks Types: 1 - 2 Standard drinks or equivalent per week  Drug use: No  
 Sexual activity: Not Currently Partners: Female Lifestyle  Physical activity:  
  Days per week: Not on file Minutes per session: Not on file  Stress: Not on file Relationships  Social connections:  
  Talks on phone: Not on file Gets together: Not on file Attends Evangelical service: Not on file Active member of club or organization: Not on file Attends meetings of clubs or organizations: Not on file Relationship status: Not on file  Intimate partner violence:  
  Fear of current or ex partner: Not on file Emotionally abused: Not on file Physically abused: Not on file Forced sexual activity: Not on file Other Topics Concern  Not on file Social History Narrative  Not on file Alcohol history: years ago, 12 pack of beer per week Smoking history: Former smoker, smoked 1.5 ppd x 30 years, quit 40 years ago Illicit drug history: Not at all Living arrangement: patient lives with their granddaughter. Ambulates: Independently Review of Systems Review of Systems Unable to perform ROS: Mental status change Physical Exam 
Objective: General Appearance:  Comfortable and in no acute distress. Vital signs: (most recent): Blood pressure 158/69, pulse 61, temperature 98.2 °F (36.8 °C), resp. rate 18, height 6' (1.829 m), weight 194 lb (88 kg), SpO2 99 %. Lungs:  Normal effort. He is not in respiratory distress. No rales or wheezes. Heart: Normal rate. Regular rhythm. No murmur. Abdomen: Abdomen is soft and non-distended. There are no signs of ascites. Bowel sounds are normal.   There is no abdominal tenderness. There is no mass. Extremities: Normal range of motion. There is no dependent edema. Pulses: Distal pulses are intact. Neurological: (A&O x 0. CNII-XII intact. No facial droop or slurred speech. Strength 5/5 BL upper and lower ext. DTR +2/4 biceps, patella. Sensation intact B/L upper/lower ext. ). Skin:  Warm and dry. There is ecchymosis. (Ecchymosis on right thigh 
) 
 
  O2 Device: Room air Laboratory Data Recent Results (from the past 8 hour(s)) CBC WITH AUTOMATED DIFF Collection Time: 12/14/19  3:25 PM  
Result Value Ref Range WBC 11.6 (H) 4.1 - 11.1 K/uL  
 RBC 4.02 (L) 4.10 - 5.70 M/uL  
 HGB 13.0 12.1 - 17.0 g/dL HCT 39.4 36.6 - 50.3 % MCV 98.0 80.0 - 99.0 FL  
 MCH 32.3 26.0 - 34.0 PG  
 MCHC 33.0 30.0 - 36.5 g/dL  
 RDW 12.2 11.5 - 14.5 % PLATELET 516 825 - 873 K/uL MPV 9.5 8.9 - 12.9 FL  
 NRBC 0.0 0  WBC ABSOLUTE NRBC 0.00 0.00 - 0.01 K/uL NEUTROPHILS 73 32 - 75 % LYMPHOCYTES 18 12 - 49 % MONOCYTES 9 5 - 13 % EOSINOPHILS 0 0 - 7 % BASOPHILS 0 0 - 1 % IMMATURE GRANULOCYTES 0 0.0 - 0.5 % ABS. NEUTROPHILS 8.4 (H) 1.8 - 8.0 K/UL  
 ABS. LYMPHOCYTES 2.1 0.8 - 3.5 K/UL  
 ABS. MONOCYTES 1.0 0.0 - 1.0 K/UL  
 ABS. EOSINOPHILS 0.1 0.0 - 0.4 K/UL  
 ABS. BASOPHILS 0.1 0.0 - 0.1 K/UL  
 ABS. IMM. GRANS. 0.0 0.00 - 0.04 K/UL  
 DF AUTOMATED METABOLIC PANEL, COMPREHENSIVE Collection Time: 12/14/19  3:25 PM  
Result Value Ref Range Sodium 141 136 - 145 mmol/L Potassium 4.6 3.5 - 5.1 mmol/L Chloride 111 (H) 97 - 108 mmol/L  
 CO2 26 21 - 32 mmol/L Anion gap 4 (L) 5 - 15 mmol/L Glucose 150 (H) 65 - 100 mg/dL BUN 25 (H) 6 - 20 MG/DL Creatinine 3.08 (H) 0.70 - 1.30 MG/DL  
 BUN/Creatinine ratio 8 (L) 12 - 20 GFR est AA 23 (L) >60 ml/min/1.73m2 GFR est non-AA 19 (L) >60 ml/min/1.73m2 Calcium 8.8 8.5 - 10.1 MG/DL Bilirubin, total 0.8 0.2 - 1.0 MG/DL  
 ALT (SGPT) 15 12 - 78 U/L  
 AST (SGOT) 26 15 - 37 U/L Alk. phosphatase 101 45 - 117 U/L Protein, total 7.2 6.4 - 8.2 g/dL Albumin 3.2 (L) 3.5 - 5.0 g/dL Globulin 4.0 2.0 - 4.0 g/dL A-G Ratio 0.8 (L) 1.1 - 2.2 PROTHROMBIN TIME + INR Collection Time: 12/14/19  3:25 PM  
Result Value Ref Range INR 1.0 0.9 - 1.1 Prothrombin time 10.6 9.0 - 11.1 sec SAMPLES BEING HELD Collection Time: 12/14/19  3:25 PM  
Result Value Ref Range SAMPLES BEING HELD 1RED,1SST   
 COMMENT Add-on orders for these samples will be processed based on acceptable specimen integrity and analyte stability, which may vary by analyte. GLUCOSE, POC Collection Time: 12/14/19  3:31 PM  
Result Value Ref Range Glucose (POC) 136 (H) 65 - 100 mg/dL Performed by Tracie Aase (PCT) URINALYSIS W/ RFLX MICROSCOPIC Collection Time: 12/14/19  4:15 PM  
Result Value Ref Range Color YELLOW/STRAW Appearance TURBID (A) CLEAR Specific gravity 1.015 1.003 - 1.030    
 pH (UA) 6.0 5.0 - 8.0 Protein 300 (A) NEG mg/dL Glucose 100 (A) NEG mg/dL Ketone NEGATIVE  NEG mg/dL Bilirubin NEGATIVE  NEG Blood MODERATE (A) NEG Urobilinogen 0.2 0.2 - 1.0 EU/dL Nitrites NEGATIVE  NEG Leukocyte Esterase MODERATE (A) NEG    
 WBC >100 (H) 0 - 4 /hpf  
 RBC 0-5 0 - 5 /hpf Epithelial cells FEW FEW /lpf Bacteria 1+ (A) NEG /hpf Imaging CXR Results  (Last 48 hours) None CT Results  (Last 48 hours) 12/14/19 1501  CT CODE NEURO HEAD WO CONTRAST Final result Impression:  IMPRESSION:  
   
Volume loss and white matter disease, no interval change or acute abnormality Narrative:  EXAM: CT CODE NEURO HEAD WO CONTRAST INDICATION: slurred speech COMPARISON: 10/18/2018. CONTRAST: None. TECHNIQUE: Unenhanced CT of the head was performed using 5 mm images. Brain and  
bone windows were generated. CT dose reduction was achieved through use of a  
standardized protocol tailored for this examination and automatic exposure  
control for dose modulation. FINDINGS:  
Generalized volume loss with enlargement of the ventricles. Moderate low density  
within the periventricular white matter. There is no intracranial hemorrhage,  
extra-axial collection, mass, mass effect or midline shift. The basilar  
cisterns are open. No acute infarct is identified. The bone windows demonstrate  
no abnormalities. The visualized portions of the paranasal sinuses and mastoid  
air cells are clear. EKG: NSR rate 71. Nonspecific ST depression. Assessment and Plan Priscaclay Pradhan. is a 80 y.o. male who is admitted for altered mental status and UTI. AMS (altered mental status), 2/2 acute metabolic encephalopathy vs. TIA/stroke Teleneuro consulted in ED, recommend stroke workup and EEG and begin Plavix 75 mg. Patient had stroke workup October 2018: MRI brain no infarct, MRA neck showed mild to moderate stenosis of left ICA but family decided not to pursue treatment. TTE with EF 55-60 and no other abnormalities.  
-Continue Plavix per teleneuro 
-CT head negative  
-Bedside swallow pending 
-MRI brain and EEG pending 
-TSH, A1C, lipid panel, CK, troponin, Mg, Phos pending  
-Neuro consult 
-PT/OT consult 
-Neuro checks q 4 
-Fall precautions WILTON on  CKD (chronic kidney disease) stage 4, GFR 15-29 ml/min: Cr 3.08 with baseline ~2.1. Given 1L NS bolus in ED. -Continue MIVF at 125 ml/hr 
-CMP in AM 
-Hold home Lasix 20 mg 
 
  UTI (urinary tract infection): UA showed moderate blood, moderate leuk esterase, >100 WBC and +1 bacteria. Started on Rocephin 1 gm IV in ED. 
-Continue Rocephin 1gm IV q 24 hrs 
-Follow up urine culture 
-Follow up blood culture Dementia: usually A&Ox3 per family. Has sundowning per family. 
-Continue home Aricept 10 mg nightly once bedside swallow is passed Diabetes mellitus type 2, controlled: last A1C 6.4 on 11/13. 
-Hold home Tradjenta 5mg and Glimepiride 4mg until passed bedside swallow 
-SSI with hypoglycemia protocol Essential hypertension, benign  
-Hold home Norvasc 10 mg and Cozaar 25 mg for permissive HTN Mixed hyperlipidemia : last lipid panel 11/13 with triglycerides 224 and  
-lipid panel pending 
-Not on statin at home Hypothyroidsim: Last TSH 0.65 on 11/13 
-Continue home [place] thyroid 120 mg daily once bedside swallow passed -TSH pending Pernicious anemia: Hgb 13 
-Continue home ferrous sulfate 325 mg 
-CBC daily GERD 
-Continue home Prevacid 30 mg once bedside swallow passed FEN/GI - NPO. NS at 125 mL/hr. Activity - fall precautions DVT prophylaxis - Sub Q Heparin GI prophylaxis - Not indicated at this time Fall prophylaxis - Fall precautions ordered. Disposition - Admit to Telemetry. Plan to d/c to Home. Consulting PT/OT Code Status - DNR, discussed with patient / caregivers. Daughter is POA Patient Mechelle Muñoz. will be discussed Dr. Isha Grijalva. 6:46 PM, 12/14/19 Radha Ko DO Family Medicine Resident For Billing Chief Complaint Patient presents with  Dysarthria Hospital Problems  Date Reviewed: 11/13/2019 Codes Class Noted POA  
 UTI (urinary tract infection) ICD-10-CM: N39.0 ICD-9-CM: 599.0  12/14/2019 Unknown  * (Principal) AMS (altered mental status) ICD-10-CM: R41.82 
 ICD-9-CM: 780.97  12/14/2019 Unknown  
   
 CKD (chronic kidney disease) stage 4, GFR 15-29 ml/min ([facility]) ICD-10-CM: N18.4 ICD-9-CM: 585.4  4/22/2019 Yes WILTON (acute kidney injury) ([facility] ICD-10-CM: N17.9 ICD-9-CM: 584.9  8/2/2014 Unknown Diabetes mellitus type 2, controlled ([facility] ICD-10-CM: E11.9 ICD-9-CM: 250.00  2/9/2012 Yes Essential hypertension, benign ICD-10-CM: I10 
ICD-9-CM: 401.1  2/9/2012 Yes Mixed hyperlipidemia ICD-10-CM: E78.2 ICD-9-CM: 272.2  2/9/2012 Yes

## 2019-12-14 NOTE — PROGRESS NOTES
Spiritual Care Assessment/Progress Note [address] 
 
 
NAME: Guilherme Robles. MRN: [id number] AGE: 80 y.o. SEX: male Restoration Affiliation: Religion  
Language: English  
 
12/14/2019     Total Time (in minutes): 15 Spiritual Assessment begun in [facility] EMERGENCY DEPT through conversation with: 
  
    []Patient        [x] Family    [] Friend(s) Reason for Consult: Spiritual care volunteer(Code S) Spiritual beliefs: (Please include comment if needed) [x] Identifies with a linda tradition: Religion    
   [] Supported by a linda community:        
   [] Claims no spiritual orientation:       
   [] Seeking spiritual identity:            
   [] Adheres to an individual form of spirituality:       
   [] Not able to assess:                   
 
    
Identified resources for coping:  
   [] Prayer                           
   [] Music                  [] Guided Imagery [x] Family/friends                 [] Pet visits [] Devotional reading                         [] Unknown 
   [] Other:                                          
 
 
Interventions offered during this visit: (See comments for more details) Patient Interventions: Spiritual care volunteer support Plan of Care: 
 
 [] Support spiritual and/or cultural needs  
 [] Support AMD and/or advance care planning process    
 [] Support grieving process 
 [] Coordinate Rites and/or Rituals  
 [] Coordination with community clergy [] No spiritual needs identified at this time 
 [] Detailed Plan of Care below (See Comments)  [] Make referral to Music Therapy 
[] Make referral to Pet Therapy    
[] Make referral to Addiction services 
[] Make referral to [facility] 
[] Make referral to Spiritual Care Partner 
[] No future visits requested       
[x] Follow up visits as needed Comments:  responded to Code Stroke in Emergency Room (ER) room 21.  Patient already taken for CT Scan. Doctor speaking to patient's family in exam room 21. Afterward, provided spiritual presence and listening as they spoke about their father's health and the events leading to this admission. Say that they have been down this road before and, therefore, are concerned, but not overwhelmed or fearful and hopeful concerning his health. Said they are comfortable and not in need of anything at this time. Informed them of availability pf  and pasotral care services. All appeared comforted and encouraged as a result of this visit. Visited by Rev. Milind Marquez MDiv, [facility], [address]  paging service: [phone])

## 2019-12-14 NOTE — PROGRESS NOTES
[facility]  
Senior Resident Admission Note CC: Acute mental status change HPI: 
Rabia Steele. is a 80 y.o. male who presents to the ER via EMS with c/o fall , also with some difficulty speaking. and AMS per daughter. In the ED, he is found to have UTI and at risk WILTON. Teleneuro was consulted. CT head was without any acute abnormality. See HPI per . Chart was reviewed. ASSESSMENTand Plan Acute metabolic encephalopathy: POA,  Likely 2/2 UTI, vs stroke vs seizure vs at risk WILTON 
- Admit to telemetry TIA/ Stroke rule out: POA, Ct head was negative.  
-Teleneuro recommended  MRI head and starting plavix. Will hold ASA 
- Neuro Recs. - Will hold Hold Carotid Dopplers. MRA neck in December 18 with moderate stenosis of left ICA. Patient did not want any procedure; and still  family doesn't want any procedure -  Recent Echocardiogram 11/14/2018 with EF 55-60%without RMWA. No bubble study was performed. Neuro recs on repeat TTE 
- TSH,  Lipid panel HgbA1C, trop x2, UDS, BMP, Mg, Phos, CK 
- Permissive HTN 
-  PT/OT for rehab eval, CM for disposition planning and Speech for swallow evaluation 
- Neuro check q4h  
- EEG 
- fall precautions UTI: POA 1/4 SIRS 
- Ceftriaxone 1 g qd 
- Follow up cx  
- Fall precautions, Ambulate with assistance At risk WILTON; POA Cr3.06  Bl ~ 2 
- MIVF Patient seen, examined, and discussed with Dr. Fritz Rivers (PGY-1). For the remaining assessment and plan of other medical problems please refer to Dr. Stefano Davey H&P for more details. Pt discussed with on-call attending physician Vianey Dominguez MD 
Family Medicine Resident

## 2019-12-14 NOTE — ED TRIAGE NOTES
Pt here with slurred speech x 2 days. EMS states pt did not have slurred speech on their arrival or during transport.

## 2019-12-15 NOTE — PROGRESS NOTES
1920- Bedside and Verbal shift change report given to Tommy Abad RN (oncoming nurse) by Melvin Perdomo RN (offgoing nurse). Report included the following information SBAR, Kardex, ED Summary, Intake/Output and MAR.  
 
2100- MRI was attempted, patient was too restless and agitated. Patient \"refused\" per MRI tech. Dr. Val Atwood notifed of above. Patient transferred back to room and resting with sitter bedside. 0400- Patient restless throughout the night. Sitter remains bedside for safety. 0730-Bedside and Verbal shift change report given to ELLEN Burdick (oncoming nurse) by Tommy Abad RN (offgoing nurse). Report included the following information SBAR and Kardex.

## 2019-12-15 NOTE — PROGRESS NOTES
Stroke Education provided to patient and the following topics were discussed 1. Patients personal risk factors for stroke are hypertension, hyperlipidemia and diabetes mellitus 2. Warning signs of Stroke: * Sudden numbness or weakness of the face, arm or leg, especially on one side of The body * Sudden confusion, trouble speaking or understanding * Sudden trouble seeing in one or both eyes * Sudden trouble walking, dizziness, loss of balance or coordination * Sudden severe headache with no known cause 3. Importance of activation Emergency Medical Services ( 9-1-1 ) immediately if experience any warning signs of stroke. 4. Be sure and schedule a follow-up appointment with your primary care doctor or any specialists as instructed. 5. You must take medicine every day to treat your risk factors for stroke. Be sure to take your medicines exactly as your doctor tells you: no more, no less. Know what your medicines are for , what they do. Anti-thrombotics /anticoagulants can help prevent strokes. You are taking the following medicine(s)  Plavix 6. Smoking and second-hand smoke greatly increase your risk of stroke, cardiovascular disease and death. Smoking history ended year 18 
 
7. Information provided was BS Stroke Education [employer] 8. Documentation of teaching completed in Patient Education Activity and on Care Plan with teaching response noted? yes

## 2019-12-15 NOTE — PROGRESS NOTES
Problem: Mobility Impaired (Adult and Pediatric) Goal: *Acute Goals and Plan of Care (Insert Text) Description FUNCTIONAL STATUS PRIOR TO ADMISSION: Patient was independent and active without use of DME. 
 
HOME SUPPORT PRIOR TO ADMISSION: The patient lived with family but did not require assist. 
 
Physical Therapy Goals Initiated 12/15/2019 1. Patient will move from supine to sit and sit to supine , scoot up and down and roll side to side in bed with independence within 7 day(s). 2.  Patient will transfer from bed to chair and chair to bed with modified independence using the least restrictive device within 7 day(s). 3.  Patient will perform sit to stand with modified independence within 7 day(s). 4.  Patient will ambulate with modified independence for 200 feet with the least restrictive device within 7 day(s). 5.  Patient will ascend/descend 4 stairs with  handrail(s) with modified independence within 7 day(s). Outcome: Progressing Towards Goal 
 PHYSICAL THERAPY EVALUATION Patient: Lore Lopez (80 y.o. male) Date: 12/15/2019 Primary Diagnosis: AMS (altered mental status) [R41.82] UTI (urinary tract infection) [N39.0] Precautions: fall ASSESSMENT Based on the objective data described below, the patient presents with generalized weakness and debility. Sit on the edge of bed with min assist, ambulate with hand held assist out on the [facility] hallway no loss of balance. OOB to chair as tolerated performed some active range of motion exercise on both LE all planes. Son in the room with the patient during the entire evaluation and stated he sometimes use a single point cane at home but patient has been home bound and just go out when he has an appointment with his MD. Communicated with nurse who agreed to monitor patient. Current Level of Function Impacting Discharge (mobility/balance): min assist with transfers and ambulation Functional Outcome Measure: The patient scored 60/100 on the barthel index outcome measure . Other factors to consider for discharge: fall Patient will benefit from skilled therapy intervention to address the above noted impairments. PLAN : 
Recommendations and Planned Interventions: bed mobility training, transfer training, gait training, therapeutic exercises, and therapeutic activities Frequency/Duration: Patient will be followed by physical therapy:  5 times a week to address goals. Recommendation for discharge: (in order for the patient to meet his/her long term goals) Physical therapy at least 2 days/week in the home This discharge recommendation: 
Has been made in collaboration with the attending provider and/or case management IF patient discharges home will need the following DME: patient owns DME required for discharge SUBJECTIVE:  
Patient stated ok.  OBJECTIVE DATA SUMMARY:  
HISTORY:   
Past Medical History:  
Diagnosis Date WILTON (acute kidney injury) ([facility] [place] [address] 8/2/2014 Cancer [facility])   
 colon Chronic kidney disease Diabetes ([facility] [place] [address] 2008  
 type 2 Dyslipidemia Esophageal reflux Esophageal reflux 2/9/2012 Hypertension 2008 Incontinence Memory loss Mixed hyperlipidemia 2/9/2012 Other ill-defined conditions(799.89) broken neck 2012 Pernicious anemia 7/31/2012 Psychiatric disorder   
 dementia Snoring Stroke ([facility] [place] [address] 10/18/2018 Unspecified hypothyroidism 4/26/2012 Past Surgical History:  
Procedure Laterality Date COLONOSCOPY  5/29/12 Rectosigmoid adenocarcinoma HX CATARACT REMOVAL    
 HX HERNIA REPAIR Bilateral inguinal  
 HX OTHER SURGICAL    
 colon resection HX SMALL BOWEL RESECTION  2010 HX TONSIL AND ADENOIDECTOMY HX TONSILLECTOMY  1936 Personal factors and/or comorbidities impacting plan of care: EXAMINATION/PRESENTATION/DECISION MAKING:  
Critical Behavior: Neurologic State: Eyes open spontaneously Orientation Level: Disoriented X4 Cognition: Decreased command following, Decreased attention/concentration, Impaired decision making, Memory loss, Impulsive, Poor safety awareness Hearing: Auditory Auditory Impairment: None Range Of Motion: 
AROM: Within functional limits PROM: Within functional limits Strength:   
Strength: Generally decreased, functional 
  
  
  
  
  
  
Tone & Sensation:  
  
  
  
  
  
  
  
  
  
   
Coordination: 
Coordination: Within functional limits Vision:  
  
Functional Mobility: 
Bed Mobility: 
Rolling: Minimum assistance Supine to Sit: Minimum assistance Sit to Supine: Minimum assistance Scooting: Minimum assistance Transfers: 
Sit to Stand: Minimum assistance Stand to Sit: Minimum assistance Stand Pivot Transfers: Minimum assistance Bed to Chair: Minimum assistance Balance:  
Sitting: Intact; High guard Standing: Impaired; With support Standing - Static: Fair Standing - Dynamic : Fair Ambulation/Gait Training: 
Distance (ft): 50 Feet (ft) Assistive Device: Gait belt; Other (comment)(hand held assist) Ambulation - Level of Assistance: Minimal assistance Gait Description (WDL): Exceptions to [facility] Gait Abnormalities: Path deviations Base of Support: Widened Speed/Mckenna: Pace decreased (<100 feet/min) Therapeutic Exercises:  
 Instructed patient to continue active range of motion exercise on both legs while up on chair or on bed. Functional Measure: 
Barthel Index: 
 
Bathin Bladder: 5 Bowels: 10 
Groomin Dressin Feeding: 10 Mobility: 10 Stairs: 0 Toilet Use: 5 Transfer (Bed to Chair and Back): 10 Total: 60/100 The Barthel ADL Index: Guidelines 1. The index should be used as a record of what a patient does, not as a record of what a patient could do. 2. The main aim is to establish degree of independence from any help, physical or verbal, however minor and for whatever reason. 3. The need for supervision renders the patient not independent. 4. A patient's performance should be established using the best available evidence. Asking the patient, friends/relatives and nurses are the usual sources, but direct observation and common sense are also important. However direct testing is not needed. 5. Usually the patient's performance over the preceding 24-48 hours is important, but occasionally longer periods will be relevant. 6. Middle categories imply that the patient supplies over 50 per cent of the effort. 7. Use of aids to be independent is allowed. Adolfo Rascon., Barthel, D.W. (8753). Functional evaluation: the Barthel Index. [address] [facility] (14)2. Bert Charles josue TYLER Duque, Humera Carey, Darek Granado., [place], [address] [place] [address] (). Measuring the change indisability after inpatient rehabilitation; comparison of the responsiveness of the Barthel Index and Functional [place] Measure. Journal of Neurology, Neurosurgery, and Psychiatry, 66(4), 533-172. Christel Savage, N.J.A, MANJINDER Ribeiro, & Rashawn Lopez M.A. (2004.) Assessment of post-stroke quality of life in cost-effectiveness studies: The usefulness of the Barthel Index and the EuroQoL-5D. [facility], 13, 107-83 Physical Therapy Evaluation Charge Determination History Examination Presentation Decision-Making LOW Complexity : Zero comorbidities / personal factors that will impact the outcome / POC LOW Complexity : 1-2 Standardized tests and measures addressing body structure, function, activity limitation and / or participation in recreation  LOW Complexity : Stable, uncomplicated  Other outcome measures barthel  LOW Based on the above components, the patient evaluation is determined to be of the following complexity level: LOW Pain Ratin/10 Activity Tolerance:  
Good Please refer to the flowsheet for vital signs taken during this treatment. After treatment patient left in no apparent distress:  
Sitting in chair, Heels elevated for pressure relief, Call bell within reach, Bed / chair alarm activated, and Caregiver / family present COMMUNICATION/EDUCATION:  
The patients plan of care was discussed with: Occupational Therapist and Registered Nurse. Fall prevention education was provided and the patient/caregiver indicated understanding. Thank you for this referral. 
Arlis Snellen, PT,WCC. Time Calculation: 30 mins

## 2019-12-15 NOTE — CONSULTS
[facility] Inpatient Neurology Consult Patient ID: Pascual Euceda 
[id number] 
16 y.o. 
3/8/1931 Admission Date:  12/14/2019 Consult Date:  12/15/19 Referring:  No ref. provider found Chief Complaint:   
Chief Complaint Patient presents with  Dysarthria Source of Hx:   Chart, Family HISTORY OF PRESENT ILLNESS:  
 
This is a 80 y.o. male with PMHx Dementia, CKD, DM, HLD, HTN, Hx stroke (10-18-18), Hypothyroidism, Incontinence, GERD, admitted for altered mental status. Discussed with Family at bedside. Granddaughter heard patient fall at home yesterday morning. Found him sitting on floor near bed, couldn't understand what he was saying. Notified her Mother /Pt Daughter, who called EMS. Pt's Daughter confirms that speech was hard to understand, but denies seeing any facial droop or pt having any significant/ unilateral weakness in any extremity. In ER pt found to have UTI and daughter reported that when pt has had UTIs in past, his speech worsens in addition to confusion. I reviewed CT head images and report done yesterday. Moderate generalized atrophy and at least moderate-severe chronic small vessel ischemic changes, no evidence of acute abnormality. MRI Brain was attempted yesterday but pt couldn't tolerate. Family says he's closer to baseline today and speech is very close if not at his baseline. Labs show WILTON on CKD Daughter reports that around 2 weeks ago pt was trying to eat breakfast and he seemed to have trouble controlling his right arm, getting food to mouth. She says it gradually improved and by 30 minutes he could use it fine. Complete Review of Systems: reviewed on admission H&P Allergies: Allergies Allergen Reactions  Synthroid [Levothyroxine] Other (comments) Confused, hallucinations Meds: 
Current Facility-Administered Medications Medication Dose Route Frequency Provider Last Rate Last Dose  hydrALAZINE (APRESOLINE) 20 mg/mL injection 20 mg  20 mg IntraVENous Q6H PRN Akash England MD      
 sodium chloride (NS) flush 5-40 mL  5-40 mL IntraVENous Q8H [facility], DO   10 mL at 12/15/19 1338  sodium chloride (NS) flush 5-40 mL  5-40 mL IntraVENous PRN [facility], DO   10 mL at 12/15/19 0452  
 0.9% sodium chloride infusion  125 mL/hr IntraVENous CONTINUOUS Noreene Metcalf,  mL/hr at 12/15/19 1051 125 mL/hr at 12/15/19 1051  heparin (porcine) injection 5,000 Units  5,000 Units SubCUTAneous Q8H [facility], DO   5,000 Units at 12/15/19 1338  cefTRIAXone (ROCEPHIN) 1 g in 0.9% sodium chloride (MBP/ADV) 50 mL ADV  1 g IntraVENous Q24H [facility], DO      
 clopidogrel (PLAVIX) tablet 75 mg  75 mg Oral DAILY [facility], [facility]      
 insulin lispro (HUMALOG) injection   SubCUTAneous AC&HS [facility], DO   Stopped at 12/14/19 2200  
 glucose chewable tablet 16 g  4 Tab Oral PRN [facility], DO      
 glucagon (GLUCAGEN) injection 1 mg  1 mg IntraMUSCular PRN [facility], DO      
 dextrose 10% infusion 0-250 mL  0-250 mL IntraVENous PRN [facility], DO      
 donepezil (ARICEPT) tablet 10 mg  10 mg Oral QHS [facility], DO      
 ferrous sulfate tablet 325 mg  325 mg Oral ACB [facility], DO   Stopped at 12/15/19 0730  pantoprazole (PROTONIX) tablet 40 mg  40 mg Oral ACB [facility], DO   Stopped at 12/15/19 0730  losartan (COZAAR) tablet 25 mg  25 mg Oral DAILY [facility], DO   Stopped at 12/15/19 0900  thyroid (Pork) (ARMOUR) tablet 120 mg  120 mg Oral DAILY [facility], DO   Stopped at 12/15/19 0900  
 nicotinic acid (NIACIN) tablet 500 mg  500 mg Oral ACB [facility], DO   Stopped at 12/15/19 0730 PMHx Past Medical History:  
Diagnosis Date  [facility]  Cancer [facility])   
 colon  Chronic kidney disease  Diabetes ([facility] 2008  
 type 2  Dyslipidemia  Esophageal reflux  Esophageal reflux 2/9/2012  Hypertension 2008  Incontinence  Memory loss  Mixed hyperlipidemia 2/9/2012  Other ill-defined conditions(799.89) broken neck 2012  Pernicious anemia 7/31/2012  Psychiatric disorder   
 dementia  Snoring  [facility]18[facility]  Unspecified hypothyroidism 4/26/2012 PSHx Past Surgical History:  
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    
 HX HERNIA REPAIR Bilateral inguinal  
 HX OTHER SURGICAL    
 colon resection  HX SMALL BOWEL RESECTION  2010  HX TONSIL AND ADENOIDECTOMY  HX TONSILLECTOMY  1936 FHx: family history includes Cancer in his paternal aunt; Heart Disease in his father and mother; Hypertension in his father and mother. SocHx:  reports that he quit smoking about 37 years ago. He has a 45.00 pack-year smoking history. He has never used smokeless tobacco. He reports that he does not drink alcohol or use drugs. PHYSICAL EXAM 
Patient Vitals for the past 8 hrs: 
 Temp Pulse Resp BP SpO2  
12/15/19 1405  63     
12/15/19 1340    150/50   
12/15/19 1116 97.3 °F (36.3 °C) (!) 56 18 150/43 95 % 12/15/19 0716 97.6 °F (36.4 °C) 77 20 (!) 158/98 96 % 12/15/19 0644  6171 [facility] 
 
Mental status: Awake, alert, follows commands. Orientation: can identify Son (doesn't call by his name). Does not specifically identify Daughter-in-law by name but motions that she is \"with\" Son. Does not specifically call Daughter by name but calls her \"Hey\" which is what daughter says he calls her at home. Head: normocephalic, atraumatic Eyes: conjunctiva clear Neck: supple Lungs: non labored breathing Heart: not examined Extremities: no edema Skin: No rashes Neurologic Exam 
 
Cranial Nerves:  
CN I (olfactory n): not tested.   CN II (optic n): Normal visual fields bilateral.  Pupils equal round/ reactive to light. Funduscopic exam: not performed. CN III (oculomotor n), CN IV (trochlear n), and CN VI (abducens n):  normal, no nystagmus, no LULY, no ptosis. CN V (trigeminal n): intact LT in V1/ V2/ V3 bilateral.  CN VII (facial n): symmetric facial appearance at rest and on movement. CN VIII (vestibulocochlear n): grossly normal.  CN IX (glossopharyngeal n) and CN X (vagus n): not examined CN XI (spinal accessory n): normal shrug and SCM strength, bilateral.  CN XII (hypoglossal n): tongue protrudes midline No dysarthria, normal repetition, mild reduced fluency (baseline per family) Motor:  
 Tone: normal in all extremities LUE: 5/ 5  RUE: 5/ 5 LLE: 5/ 5  RLE: 5/ 5 Coordination: No rest, postural or intention tremors Sensory: intact to LT in all exts DTRs: 1+ biceps, 2+ patellars, trace achilles (symmetric) Plantar response: neutral bilaterally Gait: not tested Romberg: not tested Assessment: ICD-10-CM ICD-9-CM 1. Encephalopathy acute G93.40 348.30   
2. Urinary tract infection with hematuria, site unspecified N39.0 599.0   
 R31.9 599.70 3. WILTON (acute kidney injury) ([facility] N17.9 584.9   
 
 
 
80 y.o. male with hx of Dementia, Hx of stroke Transient increased confusion and dysarthria in setting of UTI, WILTON on CKD (metabolic encephalopathy) CT head doesn't show any evidence of recent stroke Had an episode 2 weeks ago of right hand weakness or dystaxia, resolving within 30 mins (?TIA v stroke) Will retry MRI Brain w/o contrast, and added MRA Brain w/o contrast, tomorrow after he's had some more time to improve. Added order for Carotid Dopplers. Agree with changing from ASA to Plavix 75 mg/ day. LDL is 115.8. Unclear if current event is TIA or Stroke, but given Hx of prior stroke (2018) and possible TIA/ stroke 2 weeks ago, patient should be on -statin targeting an LDL < 70. Will follow up MRI / MRA Brain and Carotid Dopplers Complete EEG will be done tomorrow AM for AMS concerns Signed By: Salud Thompson MD   
 December 15, 2019

## 2019-12-15 NOTE — PROGRESS NOTES
Jake Iyer Jesus [address] Office [phone] Fax [phone] Assessment and Plan María Kimbrough is a 80 y.o. male admitted for altered mental status. He has spent 1 night(s) in the hospital. 
 
24 Hour Events: No acute events. AMS (altered mental status), 2/2 acute metabolic encephalopathy vs. TIA/stroke Teleneuro consulted in ED, recommend stroke workup and EEG and begin Plavix 75 mg. Patient had stroke workup October 2018: MRI brain no infarct, MRA neck showed mild to moderate stenosis of left ICA but family decided not to pursue treatment. TTE with EF 55-60 and no other abnormalities. Troponin negative. TSH 3.82. CK wnl. Mg/phos wnl. 
-Continue Plavix per teleneuro 
-CT head negative  
-Bedside swallow pending 
-MRI brain and EEG pending 
-A1C, lipid panel pending  
-Neuro consult 
-PT/OT consult 
-Neuro checks q 4 
-Fall precautions 
  
 WILTON on  CKD (chronic kidney disease) stage 4, GFR 15-29 ml/min: Cr 3.08 with baseline ~2.1. Given 1L NS bolus in ED. 
-Continue MIVF at 125 ml/hr 
-CMP in AM 
-Hold home Lasix 20 mg 
  
  UTI (urinary tract infection): UA showed moderate blood, moderate leuk esterase, >100 WBC and +1 bacteria. Started on Rocephin 1 gm IV in ED. 
-Continue Rocephin 1gm IV q 24 hrs 
-Follow up urine culture 
-Follow up blood culture 
  
  Dementia: usually A&Ox3 per family. Has sundowning per family. 
-Continue home Aricept 10 mg nightly once bedside swallow is passed 
  
  Diabetes mellitus type 2, controlled: last A1C 6.4 on 11/13. 
-Hold home Tradjenta 5mg and Glimepiride 4mg until passed bedside swallow 
-SSI with hypoglycemia protocol 
  
  Essential hypertension, benign  
-Hold home Norvasc 10 mg and Cozaar 25 mg for permissive HTN (<220/120) 
  Mixed hyperlipidemia : last lipid panel 11/13 with triglycerides 224 and  
-lipid panel pending 
-Not on statin at home 
  
  Hypothyroidsim: Last TSH 0.65 on 11/13 -Continue home [place] thyroid 120 mg daily once bedside swallow passed -TSH now 3.82 
-Free T4 pending 
  
  Pernicious anemia: Hgb 13 
-Continue home ferrous sulfate 325 mg 
-CBC daily GERD 
-Continue home Prevacid 30 mg once bedside swallow passed 
  
  
  
  
FEN/GI - NPO. NS at 125 mL/hr. Activity - fall precautions DVT prophylaxis - Sub Q Heparin GI prophylaxis - Not indicated at this time Fall prophylaxis - Fall precautions ordered. Disposition - Admit to Telemetry. Plan to d/c to Home. Consulting PT/OT Code Status - DNR, discussed with patient / caregivers. Daughter is POA I appreciate the opportunity to participate in the care of this patient, 
Shanell Newberry DO Family Medicine Resident Subjective / Objective Patient doing well overnight. No complaints. Temp (24hrs), Av.9 °F (36.6 °C), Min:97 °F (36.1 °C), Max:98.4 °F (36.9 °C) General Appearance:  Comfortable and in no acute distress. Vital signs: (most recent): Blood pressure 158/69, pulse 61, temperature 98.2 °F (36.8 °C), resp. rate 18, height 6' (1.829 m), weight 194 lb (88 kg), SpO2 99 %. Lungs:  Normal effort. He is not in respiratory distress. No rales or wheezes. Heart: Normal rate. Regular rhythm. No murmur. Abdomen: Abdomen is soft and non-distended. There are no signs of ascites. Bowel sounds are normal.   There is no abdominal tenderness. There is no mass. Extremities: Normal range of motion. There is no dependent edema. Pulses: Distal pulses are intact. Neurological: (A&O x 0. CNII-XII intact. No facial droop or slurred speech. Strength 5/5 BL upper and lower ext. DTR +2/4 biceps, patella. Sensation intact B/L upper/lower ext. ). Skin:  Warm and dry. There is ecchymosis. (Ecchymosis on right thigh Respiratory:   O2 Device: Room air I/O: 
Date 19 0700 - 12/15/19 7481 12/15/19 0700 - 19 9287 Shift [id number] 1900-0659 24 Hour Total 4721-0252 9286-9324 24 Hour Total  
INTAKE  
P.O.  0 0     
  P. O.  0 0     
I. V.(mL/kg/hr)  329.2 329.2 Volume (0.9% sodium chloride infusion)  329.2 329.2 Shift Total(mL/kg)  329.2(3.7) 329.2(3.7) OUTPUT Urine(mL/kg/hr)  250 250 Urine Voided  250 250 Shift Total(mL/kg)  250(2.8) 250(2.8) NET  79.2 79.2 Weight (kg) 88 88 88 88 88 88 CBC: 
Recent Labs 12/14/19 
1525 WBC 11.6* HGB 13.0 HCT 39.4  Metabolic Panel: 
Recent Labs 12/14/19 2045 12/14/19 
1525 NA  --  141 K  --  4.6 CL  --  111* CO2  --  26 BUN  --  25* CREA  --  3.08* GLU  --  150* CA  --  8.8 MG 1.8  --   
PHOS 2.8  --   
ALB  --  3.2* SGOT  --  26 ALT  --  15 INR  --  1.0 For Billing Chief Complaint Patient presents with  Dysarthria Hospital Problems  Date Reviewed: 11/13/2019 Codes Class Noted POA  
 UTI (urinary tract infection) ICD-10-CM: N39.0 ICD-9-CM: 599.0  12/14/2019 Unknown * (Principal) AMS (altered mental status) ICD-10-CM: N26.31 
ICD-9-CM: 780.97  12/14/2019 Unknown  
   
 CKD (chronic kidney disease) stage 4, GFR 15-29 ml/min ([facility]) ICD-10-CM: N18.4 ICD-9-CM: 585.4  4/22/2019 Yes WILTON (acute kidney injury) ([facility] ICD-10-CM: N17.9 ICD-9-CM: 584.9  8/2/2014 Unknown Diabetes mellitus type 2, controlled ([facility] ICD-10-CM: E11.9 ICD-9-CM: 250.00  2/9/2012 Yes Essential hypertension, benign ICD-10-CM: I10 
ICD-9-CM: 401.1  2/9/2012 Yes Mixed hyperlipidemia ICD-10-CM: E78.2 ICD-9-CM: 272.2  2/9/2012 Yes

## 2019-12-15 NOTE — PROGRESS NOTES
Problem: Self Care Deficits Care Plan (Adult) Goal: *Acute Goals and Plan of Care (Insert Text) Description FUNCTIONAL STATUS PRIOR TO ADMISSION: Patient was independent and active without use of DME.  
 
HOME SUPPORT: The patient lived with multiple family members. Occupational Therapy Goals Initiated 12/15/2019 1. Patient will perform grooming with modified independence within 7 day(s). 2.  Patient will perform upper body dressing and bathing with modified independence within 7 day(s). 3.  Patient will perform lower body dressing and bathing with moderate assistance within 7 day(s). 4.  Patient will perform toilet transfers with contact guard assist within 7 day(s). 5.  Patient will perform all aspects of toileting with contact guard assist within 7 day(s). 6.  Patient will participate in upper extremity therapeutic exercise/activities with modified independence for 10 minutes within 7 day(s). 7.  Patient will utilize energy conservation techniques during functional activities with verbal cues within 7 day(s). Outcome: Progressing Towards Goal 
 OCCUPATIONAL THERAPY EVALUATION Patient: Ghulam Gillespie (80 y.o. male) Date: 12/15/2019 Primary Diagnosis: AMS (altered mental status) [R41.82] UTI (urinary tract infection) [N39.0] Precautions: fall ASSESSMENT Based on the objective data described below, the patient presents with decreased activity tolerance, generalized weakness, confusion + disorientation, slow processing, and impaired balance following admission on 12/14 for AMS + slurred speech after being found down at home. Patient had CT done which was negative for acute process and MRI unable to be completed yesterday d/t patient refusal, movement, and agitation on attempt; Plan is to retry at some point. Patient was abl to respond when name called but unable to state name when asked; He was disoriented x4. Patient was able to perform transfers with min A. He needs constant cues for direction and guidance. Patient engaged in sitting ADLs with fair tolerance. Patient was incontinent of urine with activity and total A required for hygiene. Education provided regarding adaptive ADL techniques however suspect minimal carryover d/t cognitive impairment. Patient would benefit from continued skilled OT to progress towards goals and improve overall independence. Current Level of Function Impacting Discharge (ADLs/self-care): Patient required min A for bed mobility and OOB transfers. Patient required min to mod A for UB ADLs and max to total A for LB ADLs. Functional Outcome Measure: The patient scored Total: 20/100 on the Barthel Index outcome measure. Patient will benefit from skilled therapy intervention to address the above noted impairments. PLAN : 
Recommendations and Planned Interventions: self care training, functional mobility training, therapeutic exercise, balance training, therapeutic activities, cognitive retraining, endurance activities, patient education, home safety training, and family training/education Frequency/Duration: Patient will be followed by occupational therapy 5 times a week to address goals. Recommendation for discharge: (in order for the patient to meet his/her long term goals) To be determined: Currently needs 24/7 supervision/assistance; Home health with 24/7 supervision vs. SNF if family unable to provide assistance needed This discharge recommendation: 
Has been made in collaboration with the attending provider and/or case management IF patient discharges home will need the following DME: to be determined (TBD) SUBJECTIVE:  
Patient stated I don't know.  OBJECTIVE DATA SUMMARY:  
HISTORY:  
Past Medical History:  
Diagnosis Date WILTON (acute kidney injury) ([facility] [address] 8/2/2014 Cancer [facility])   
 colon Chronic kidney disease Diabetes ([facility] [address] 2008  
 type 2 Dyslipidemia Esophageal reflux Esophageal reflux 2/9/2012 Hypertension 2008 Incontinence Memory loss Mixed hyperlipidemia 2/9/2012 Other ill-defined conditions(799.89) broken neck 2012 Pernicious anemia 7/31/2012 Psychiatric disorder   
 dementia Snoring Stroke ([address] 10/18/2018 Unspecified hypothyroidism 4/26/2012 Past Surgical History:  
Procedure Laterality Date COLONOSCOPY  5/29/12 Rectosigmoid adenocarcinoma HX CATARACT REMOVAL    
 HX HERNIA REPAIR Bilateral inguinal  
 HX OTHER SURGICAL    
 colon resection HX SMALL BOWEL RESECTION  2010 HX TONSIL AND ADENOIDECTOMY HX TONSILLECTOMY  1936 Expanded or extensive additional review of patient history:  
 
Home Situation Home Environment: Private residence Living Alone: No 
Support Systems: Family member(s) Patient Expects to be Discharged to[de-identified] Private residence Current DME Used/Available at Home: Cane, straight Tub or Shower Type: Tub/Shower combination Hand dominance: Right EXAMINATION OF PERFORMANCE DEFICITS: 
Cognitive/Behavioral Status: 
Neurologic State: Alert Orientation Level: Disoriented to time;Disoriented to situation;Disoriented to place; Disoriented to person(unable to state name but able to respond when name called) Cognition: Decreased command following;Decreased attention/concentration;Memory loss; Impaired decision making Perception: Appears intact Perseveration: No perseveration noted Safety/Judgement: Decreased awareness of environment;Decreased awareness of need for assistance;Decreased awareness of need for safety Skin: Intact in the uppers Edema: none noted in the uppers Hearing: Auditory Auditory Impairment: None Vision/Perceptual:   
Tracking: Able to track stimulus in all quadrants w/o difficulty Diplopia: No   
Acuity: Within Defined Limits Range of Motion: AROM: Within functional limits in the uppers PROM: Within functional limits in the uppers Strength: 
Strength: Generally decreased, functional in the uppers Coordination: 
Fine Motor Skills-Upper: Left Impaired;Right Impaired Gross Motor Skills-Upper: Left Intact; Right Intact(slowed however intact) Tone & Sensation: 
Tone: normal 
Sensation: intact Balance: 
Sitting: Intact; High guard Standing: Impaired; With support Standing - Static: Fair Standing - Dynamic : Fair Functional Mobility and Transfers for ADLs: 
Bed Mobility: 
Rolling: Minimum assistance Supine to Sit: Minimum assistance Sit to Supine: Minimum assistance Scooting: Minimum assistance Transfers: 
Sit to Stand: Minimum assistance Stand to Sit: Minimum assistance Bed to Chair: Minimum assistance Bathroom Mobility: Minimum assistance Toilet Transfer : Minimum assistance ADL Assessment: 
Feeding: Minimum assistance Oral Facial Hygiene/Grooming: Moderate assistance Bathing: Maximum assistance Upper Body Dressing: Moderate assistance Lower Body Dressing: Total assistance Toileting: Total assistance ADL Intervention and task modifications: 
  
 
  
 
  
 
  
 
  
 
  
 
  
 
Cognitive Retraining Safety/Judgement: Decreased awareness of environment;Decreased awareness of need for assistance;Decreased awareness of need for safety Functional Measure: 
Barthel Index: 
 
Bathin Bladder: 0 Bowels: 5 Groomin Dressin Feedin Mobility: 0 Stairs: 0 Toilet Use: 0 Transfer (Bed to Chair and Back): 10 Total: 20/100 The Barthel ADL Index: Guidelines 1. The index should be used as a record of what a patient does, not as a record of what a patient could do. 2. The main aim is to establish degree of independence from any help, physical or verbal, however minor and for whatever reason. 3. The need for supervision renders the patient not independent. 4. A patient's performance should be established using the best available evidence. Asking the patient, friends/relatives and nurses are the usual sources, but direct observation and common sense are also important. However direct testing is not needed. 5. Usually the patient's performance over the preceding 24-48 hours is important, but occasionally longer periods will be relevant. 6. Middle categories imply that the patient supplies over 50 per cent of the effort. 7. Use of aids to be independent is allowed. Linnette Ovalle., Barthel, D.W. (5343). Functional evaluation: the Barthel Index. [address] [facility] (14)2. Roseanna Ban josue [place], J.J.M.F, Adolfo Aguila., Tico Soto., [place], [facility] (1999). Measuring the change indisability after inpatient rehabilitation; comparison of the responsiveness of the Barthel Index and Functional Diagonal Measure. Journal of Neurology, Neurosurgery, and Psychiatry, 66(4), 146-176. Con Mast, N.J.A, MANJINDER Ribeiro, & Temi Waldron MDurgaA. (2004.) Assessment of post-stroke quality of life in cost-effectiveness studies: The usefulness of the Barthel Index and the EuroQoL-5D. [facility], 13, 192-33 Occupational Therapy Evaluation Charge Determination History Examination Decision-Making LOW Complexity : Brief history review  LOW Complexity : 1-3 performance deficits relating to physical, cognitive , or psychosocial skils that result in activity limitations and / or participation restrictions  LOW Complexity : No comorbidities that affect functional and no verbal or physical assistance needed to complete eval tasks Based on the above components, the patient evaluation is determined to be of the following complexity level: LOW Activity Tolerance:  
Good Please refer to the flowsheet for vital signs taken during this treatment. After treatment patient left in no apparent distress: Sitting in chair, Call bell within reach, and Caregiver / family present COMMUNICATION/EDUCATION:  
The patients plan of care was discussed with: Physical Therapist, Registered Nurse, and patient. Home safety education was provided and the patient/caregiver indicated understanding., Patient/family have participated as able in goal setting and plan of care. , and Patient/family agree to work toward stated goals and plan of care. This patients plan of care is appropriate for delegation to [facility]. Thank you for this referral. 
Kristen Mc OTR/L Time Calculation: 43 mins

## 2019-12-15 NOTE — PROGRESS NOTES
Problem: Pressure Injury - Risk of 
Goal: *Prevention of pressure injury Description Document Carroll Scale and appropriate interventions in the flowsheet. Outcome: Progressing Towards Goal 
Note: Pressure Injury Interventions: 
Sensory Interventions: Assess changes in LOC, Check visual cues for pain, Discuss PT/OT consult with provider, Float heels, Keep linens dry and wrinkle-free, Maintain/enhance activity level, Minimize linen layers, Monitor skin under medical devices, Pad between skin to skin, Pressure redistribution bed/mattress (bed type) Moisture Interventions: Absorbent underpads, Limit adult briefs, Check for incontinence Q2 hours and as needed, Minimize layers, Maintain skin hydration (lotion/cream), Moisture barrier Activity Interventions: Increase time out of bed, Pressure redistribution bed/mattress(bed type) Mobility Interventions: HOB 30 degrees or less, Pressure redistribution bed/mattress (bed type), PT/OT evaluation, Float heels Nutrition Interventions: Document food/fluid/supplement intake, Discuss nutritional consult with provider, Offer support with meals,snacks and hydration Friction and Shear Interventions: Apply protective barrier, creams and emollients, HOB 30 degrees or less, Lift sheet, Lift team/patient mobility team, Minimize layers Problem: Patient Education: Go to Patient Education Activity Goal: Patient/Family Education Outcome: Progressing Towards Goal

## 2019-12-16 NOTE — PROGRESS NOTES
Nutrition: MST received for unsure weight loss. Weight hx per EMR indicates weight has been stable over last year with a 13lb weight gain. PO intakes documented as 75% since admission. No nutrition concerns noted at this time. Please re-consult as needed. Aline Euceda 107

## 2019-12-16 NOTE — PROGRESS NOTES
Bedside and Verbal shift change report given to Sue Murrell (oncoming nurse) by Castillo Anand (offgoing nurse). Report included the following information SBAR, Kardex and Recent Results.

## 2019-12-16 NOTE — PROGRESS NOTES
Jake Herrera Wayne Nixon [address] Office [phone] Fax [phone] Assessment and Plan Eulalio Wiley is a 80 y.o. male admitted for altered mental status. He has spent 2 night(s) in the hospital. 
 
24 Hour Events: No acute events. AMS (altered mental status): 2/2 acute metabolic encephalopathy vs. TIA/stroke Teleneuro consulted in ED, recommend stroke workup and EEG and begin Plavix 75 mg. Patient had stroke workup October 2018: MRA neck with mild to moderate stenosis of left ICA but no treatment was pursued. TTE with EF 55-60. 12/15 CT head- neg 
-Continue Plavix 
-MRI brain and EEG pending 
-Carotid dopplers 
-Neuro consult 
-Start lipitor 40mg  
-PT/OT consult 
-Neuro checks q 4 
-Fall precautions 
  
 WILTON on  CKD (chronic kidney disease) stage 4, GFR 15-29 ml/min: Cr 3.08 with baseline ~2.1.  
-Continue MIVF at 125 ml/hr 
-Hold home Lasix 20 mg 
  
  UTI (urinary tract infection): UA showed moderate blood, moderate leuk esterase, >100 WBC and +1 bacteria. 
-Continue Rocephin 1gm IV q 24 hrs (day 2) -Follow up urine culture 
-Follow up blood culture, no growth at 10 hours 
  
  Dementia: usually A&Ox3 per family. Has sundowning per family. 
-Continue home Aricept 10 mg nightly 
  
  Diabetes mellitus type 2, controlled: A1C 6.5 12/14 
-Hold home Tradjenta 5mg and Glimepiride 4mg  
-SSI with hypoglycemia protocol 
  
  Essential hypertension, benign  
- Home Norvasc 10 mg 
- Home Cozaar 25 mg 
  
  Mixed hyperlipidemia: 12/14 lipid panel , HDL 33, .8,  
-Start lipitor 40mg daily 
  
  Hypothyroidsim: TSH 12/15 3.82, Free T4 0.7 
-Continue home [place] thyroid 120 mg daily 
-Follow up outpatient 
  
  Pernicious anemia: Hgb stable 
-Continue home ferrous sulfate 325 mg 
-CBC daily GERD 
-Protonix 40mg daily   
  
  
  
FEN/GI - Diabetic diet. NS at 125 mL/hr. Activity - fall precautions DVT prophylaxis - Sub Q Heparin GI prophylaxis - Not indicated at this time Fall prophylaxis - Fall precautions ordered. Disposition - Admit to Telemetry. Plan to d/c to Home. Consulting PT/OT Code Status - DNR, discussed with patient / caregivers. Daughter is POA I appreciate the opportunity to participate in the care of this patient, Eun Mason DO Family Medicine Resident Subjective / Objective Patient doing well overnight. No complaints. Able to tell me he is in the hospital, unsure why he came in. Temp (24hrs), Av °F (36.7 °C), Min:97.3 °F (36.3 °C), Max:99.3 °F (37.4 °C) General Appearance:  Comfortable and in no acute distress. Lungs:  Normal effort. He is not in respiratory distress. No rales or wheezes. Heart: Normal rate. Regular rhythm. No murmur. Abdomen: Abdomen is soft and non-distended. There are no signs of ascites. Bowel sounds are normal.   There is no abdominal tenderness. There is no mass. Extremities: Normal range of motion. There is no dependent edema. Pulses: Distal pulses are intact. Neurological: (A&O x 1 ([facility]. CNII-XII intact. No facial droop or slurred speech. Strength 5/5 BL upper and lower ext. DTR +2/4 biceps, patella. Sensation intact B/L upper/lower ext. ). Skin:  Warm and dry. There is ecchymosis. (Ecchymosis on right thigh) Respiratory:   O2 Device: Room air I/O: 
Date 12/15/19 0700 - 19 9509 19 - 19 6437 Shift [id number] 0751-5485 24 Hour Total 2979-2680 2008-0289 24 Hour Total  
INTAKE Shift Total(mL/kg) OUTPUT Urine(mL/kg/hr)  225 225 Urine Voided  225 225 Urine Occurrence(s) 3 x 1 x 4 x Stool Stool Occurrence(s)  1 x 1 x Shift Total(mL/kg)  225(2.6) 225(2.6) NET  -225 -225 Weight (kg) 88 88 88 88 88 88 CBC: 
Recent Labs 19 
0153 12/15/19 
0157 19 
1525 WBC 7.8 8.8 11.6* HGB 10.8* 11.2* 13.0 HCT 33.0* 33.8* 39.4  202 250 Metabolic Panel: 
Recent Labs 12/16/19 
0153 12/15/19 
0157 12/14/19 2045 12/14/19 
1525  144  --  141  
K 4.3 4.2  --  4.6 * 114*  --  111* CO2 27 25  --  26 BUN 23* 26*  --  25* CREA 2.61* 2.76*  --  3.08* GLU 92 85  --  150* CA 8.0* 8.1*  --  8.8 MG  --   --  1.8  --   
PHOS  --   --  2.8  --   
ALB 2.4* 2.6*  --  3.2* SGOT 21 24  --  26 ALT 11* 14  --  15 INR  --   --   --  1.0 For Billing Chief Complaint Patient presents with  [facility] Problems  Date Reviewed: 11/13/2019 Codes Class Noted POA  
 UTI (urinary tract infection) ICD-10-CM: N39.0 ICD-9-CM: 599.0  12/14/2019 Unknown * (Principal) AMS (altered mental status) ICD-10-CM: W31.80 
ICD-9-CM: 780.97  12/14/2019 Unknown  
   
 CKD (chronic kidney disease) stage 4, GFR 15-29 ml/min ([facility]) ICD-10-CM: N18.4 ICD-9-CM: 585.4  4/22/2019 Yes WILTON (acute kidney injury) ([facility] ICD-10-CM: N17.9 ICD-9-CM: 584.9  8/2/2014 Unknown Diabetes mellitus type 2, controlled ([facility] ICD-10-CM: E11.9 ICD-9-CM: 250.00  2/9/2012 Yes Essential hypertension, benign ICD-10-CM: I10 
ICD-9-CM: 401.1  2/9/2012 Yes Mixed hyperlipidemia ICD-10-CM: E78.2 ICD-9-CM: 272.2  2/9/2012 Yes

## 2019-12-16 NOTE — PROGRESS NOTES
Problem: Mobility Impaired (Adult and Pediatric) Goal: *Acute Goals and Plan of Care (Insert Text) Description FUNCTIONAL STATUS PRIOR TO ADMISSION: Patient was independent and active without use of DME. 
 
HOME SUPPORT PRIOR TO ADMISSION: The patient lived with family but did not require assist. 
 
Physical Therapy Goals Initiated 12/15/2019 1. Patient will move from supine to sit and sit to supine , scoot up and down and roll side to side in bed with independence within 7 day(s). 2.  Patient will transfer from bed to chair and chair to bed with modified independence using the least restrictive device within 7 day(s). 3.  Patient will perform sit to stand with modified independence within 7 day(s). 4.  Patient will ambulate with modified independence for 200 feet with the least restrictive device within 7 day(s). 5.  Patient will ascend/descend 4 stairs with  handrail(s) with modified independence within 7 day(s). Outcome: Progressing Towards Goal 
 PHYSICAL THERAPY TREATMENT Patient: Lucía Vila (80 y.o. male) Date: 12/16/2019 Diagnosis: AMS (altered mental status) [R41.82] UTI (urinary tract infection) [N39.0] AMS (altered mental status) Precautions: Fall Chart, physical therapy assessment, plan of care and goals were reviewed. ASSESSMENT Patient continues with skilled PT services and is progressing towards goals. He ambulates increased distance and performs multiple transfers though continues to require up to minimal assistance for ambulation. Pt demonstrating improved gait stability using RW vs. No device, thus recommend RW use for home ambulation. Also recommend 24-hour assistance for safety. Current Level of Function Impacting Discharge (mobility/balance): up to minimal assistance for ambulation. Other factors to consider for discharge: requires 24-hour supervision for safety though previously alone during days.  
    
 
PLAN : 
 Patient continues to benefit from skilled intervention to address the above impairments. Continue treatment per established plan of care. to address goals. Recommendation for discharge: (in order for the patient to meet his/her long term goals) Physical therapy at least 2 days/week in the home AND ensure assist and/or supervision for safety with all transfers and ambulation vs. SNF rehab This discharge recommendation: A follow-up discussion with the attending provider and/or case management is planned IF patient discharges home will need the following DME: rolling walker SUBJECTIVE:  
Patient stated ok.  Pt offering minimal verbalizations and often doesn't respond to questions. Pt received supine, agreeable to PT and cleared by RN. OBJECTIVE DATA SUMMARY:  
Critical Behavior: 
Neurologic State: Alert, Confused Orientation Level: Oriented to person Cognition: Follows commands Safety/Judgement: Decreased awareness of environment, Decreased awareness of need for assistance, Decreased awareness of need for safety Functional Mobility Training: 
Bed Mobility: 
  
Supine to Sit: Additional time;Stand-by assistance Scooting: Additional time;Supervision Transfers: 
Sit to Stand: Contact guard assistance Stand to Sit: Contact guard assistance Balance: 
Sitting: Intact; High guard(for safety) Standing: Impaired; Without support Ambulation/Gait Training: 
Distance (ft): (80' + 60') Assistive Device: Gait belt;Walker, rolling Ambulation - Level of Assistance: Minimal assistance(min assist without device; CGA using RW) Gait Abnormalities: Path deviations;Decreased step clearance Base of Support: Widened Speed/Mckenna: Pace decreased (<100 feet/min) Without device pt demonstrating loss of balance x 5. With device pt demonstrating improved paths, no loss of balance. Pain Rating: 
Denies pain Activity Tolerance:  
Good Please refer to the flowsheet for vital signs taken during this treatment. After treatment patient left in no apparent distress:  
Sitting in chair, Call bell within reach, Bed / chair alarm activated, and Caregiver / family present COMMUNICATION/COLLABORATION:  
The patients plan of care was discussed with: Certified Occupational Therapy Assistant and Registered Nurse Zoltan Jones, PT, DPT Time Calculation: 28 mins

## 2019-12-16 NOTE — PROGRESS NOTES
Reason for Admission:   AMS 
            
RRAT Score:   38 Resources/supports as identified by patient/family:   Supportive family Top Challenges facing patient (as identified by patient/family and CM): Finances/Medication cost?      Pt has [employer] and gets his medications primarily by mail order. He also uses [facility] pharmacy in [place]. Pt's daughter, Juwan Wilson, mentioned that his Tradjenta medication is expensive. Transportation? Pt relies on family Support system or lack thereof? Pt has a good support system, however, he is home alone during the day. Pt's daughter, Tana Ferrer, lives 45 minutes away from pt but checks on him regularly. Pt's granddaughter and her two friends live with pt but work during the day. Living arrangements? Lives in a one story house with 5-6 steps to enter. Self-care/ADLs/Cognition? Pt's daughter fixes his pill box and granddaughter puts out pt's medications for him daily. Pt dresses himself but needs prompting. Pt does not cook but is able to fix his own snacks; granddaughter fixes most of his meals. Pt owns a walker, cane, and bsc. He has a seat in his shower. Current Advanced Directive/Advance Care Plan: On file Plan for utilizing home health:    Yes. Choice letter signed for [facility]. Transition of Care Plan:     
Initial assessment information was provided by pt's daughter, Juwan Wilson, while pt slept at bedside. Pt reportedly has had [place] in the past with [employer] and [facility]. Pt stayed at his daughter's home in [facility] d/c last year, but pt's daughter reported that he was not happy until he was back home. CM provided pt's daughter with a list of private duty aides and information about [facility]. 1. CM following 2. Home when medically stable with HH and family assistance. 3. Family will transport home. 4. Outpatient follow-up Care Management Interventions PCP Verified by CM: Yes(Dr. Sierra Ramos; nurse navigator notified) Transition of Care Consult (CM Consult): Home Health [address] [place] [address] Road: No 
Reason Outside [place]: Patient already serviced by other home care/hospice agency Discharge Durable Medical Equipment: No 
Physical Therapy Consult: Yes Occupational Therapy Consult: Yes Speech Therapy Consult: Yes Current Support Network: Relative's Home Confirm Follow Up Transport: Family Freedom of Choice Offered: (S) Yes Discharge Location Discharge Placement: Home with home health Juliet Crespo LCSW

## 2019-12-16 NOTE — PROCEDURES
Name:   Najma Rivera. YOB: 1931 MRN:   [id number] Procedure:   EEG Location:   [facility] Date of Recordin19 Date of Interpretation:    19 Interpreting physician: Tim Petersen MD 
Requesting provider:   Yee Dupont DO Indication: 80 y.o. male with complaints of altered mental status. Has UTI, hx of Dementia, prior hx of TIA vs CVA. Current medications: has a current medication list which includes the following prescription(s): thyroid (pork), furosemide, amlodipine, lansoprazole, glimepiride, losartan, donepezil, tradjenta, loperamide, cholecalciferol, aspirin delayed-release, cetirizine, ferrous sulfate, multivitamins-minerals-lutein, and nicotinic acid, and the following Facility-Administered Medications: atorvastatin, hydralazine, amlodipine, sodium chloride, sodium chloride, 0.9% sodium chloride, heparin (porcine), cefTRIAXone (ROCEPHIN) 1 g in 0.9% sodium chloride (MBP/ADV) 50 mL ADV, clopidogrel, insulin lispro, glucose, glucagon, dextrose 10%, donepezil, ferrous sulfate, pantoprazole, losartan, thyroid (pork), and nicotinic acid. Technical:  
Digital EEG recorded in 10-20 international placement system, multiple montages Interpretation:  
Patient level of alertness: awake, resting  Background pattern: symmetric. Posterior dominant rhythm: 6-7 Hz, symmetric. Photic stimulation: no clear driving response. Hyperventilation: produced physiological slowing without post-HV abnormalities. Drowsiness recorded: No.  Sleep recorded: No.  Single lead EKG: no abnormalities. Areas of focal slowing: None. Epileptiform discharges: None . Electrographic seizures: None. Impression: This is mildly abnormal awake EEG recording. There is mild generalized slowing of the background rhythm consistent with an encephalopathic process.   This may be due to toxic, metabolic, infectious, or degenerative processes. This could be seen in the setting of UTI as well as in a patient with a history of dementia. No seizure discharges or subclinical seizures were seen. Clinical correlation is necessary. Christina Perez MD 
[facility]

## 2019-12-16 NOTE — PROGRESS NOTES
Problem: Self Care Deficits Care Plan (Adult) Goal: *Acute Goals and Plan of Care (Insert Text) Description FUNCTIONAL STATUS PRIOR TO ADMISSION: Patient was independent and active without use of DME.  
 
HOME SUPPORT: The patient lived with multiple family members. Occupational Therapy Goals Initiated 12/15/2019 1. Patient will perform grooming with modified independence within 7 day(s). 2.  Patient will perform upper body dressing and bathing with modified independence within 7 day(s). 3.  Patient will perform lower body dressing and bathing with moderate assistance within 7 day(s). 4.  Patient will perform toilet transfers with contact guard assist within 7 day(s). 5.  Patient will perform all aspects of toileting with contact guard assist within 7 day(s). 6.  Patient will participate in upper extremity therapeutic exercise/activities with modified independence for 10 minutes within 7 day(s). 7.  Patient will utilize energy conservation techniques during functional activities with verbal cues within 7 day(s). Outcome: Progressing Towards Goal 
 OCCUPATIONAL THERAPY TREATMENT Patient: Guilherme Vallejo (80 y.o. male) Date: 12/16/2019 Diagnosis: AMS (altered mental status) [R41.82] UTI (urinary tract infection) [N39.0] AMS (altered mental status) Precautions:   
Chart, occupational therapy assessment, plan of care, and goals were reviewed. ASSESSMENT Patient continues with skilled OT services and is progressing towards goals. Pt confused but co-operative. Supine to sit and transfers to [facility] with assist x 1, improved balance with use of RW. Pt able to don/doff socks cross legged, daughter present for treatment and states pt can don underwear at home but needs assist with depends due to confusion as to not being an opening in depends to void. Pt completes simple face washing seated in chair.  
 
Current Level of Function Impacting Discharge (ADLs): Set up simple face washing, min assist BSC transfers Other factors to consider for discharge: Pt needs someone with him 24/7 due to decreased cognitive status PLAN : 
Patient continues to benefit from skilled intervention to address the above impairments. Continue treatment per established plan of care. to address goals. Recommend with staff: Out of bed for meals and activity Recommend next OT session: Cont towards goals Recommendation for discharge: (in order for the patient to meet his/her long term goals) Therapy up to 5 days/week in SNF setting or an intensive home health therapy program, 24/7 supervision for safety This discharge recommendation: 
Has not yet been discussed the attending provider and/or case management IF patient discharges home will need the following DME:rolling walker SUBJECTIVE:  
Patient stated *Ok OBJECTIVE DATA SUMMARY:  
Cognitive/Behavioral Status: 
Neurologic State: Alert;Confused Orientation Level: Oriented to person Cognition: Follows commands Functional Mobility and Transfers for ADLs: 
Bed Mobility: 
 Min assist supine to sit Transfers: 
  
Functional Transfers Toilet Transfer : Minimum assistance Adaptive Equipment: Bedside commode Balance: 
Sitting: Intact; High guard(for safety) Standing: Impaired; Without support ADL Intervention: 
  
 
Grooming Washing Face: Stand-by assistance(multiple cues for thoroughness) Lower Body Dressing Assistance Socks: Moderate assistance Activity Tolerance:  
Good Please refer to the flowsheet for vital signs taken during this treatment. After treatment patient left in no apparent distress:  
Sitting in chair COMMUNICATION/COLLABORATION:  
The patients plan of care was discussed with: Physical Therapist and Occupational Therapist, Registered  Nurse NERIS Hein Time Calculation: 14 mins

## 2019-12-16 NOTE — PROGRESS NOTES
Shift Change: 
 
Bedside and Verbal shift change report given to Sallie Archer RN (oncoming nurse) by Godwin Tomlin RN (offgoing nurse). Report included the following information SBAR, Kardex, MAR and Recent Results.

## 2019-12-16 NOTE — PROGRESS NOTES
Order for rw noted. Referral sent to [facility] and rw has been delivered to pt's room. Raz Davenport LCSW 
 
4:48pm:  SNF list provided to pt's son, Karina Avila (ph: [phone]). He will talk over home safety plan with his sister vs. SNF. CM will follow-up with pt and family in the a.m. Raz Davenport LCSW

## 2019-12-16 NOTE — PROGRESS NOTES
Have attempted to evaluate several times today. He is on regular diet with no family or RN c/o dysphagia. Will f/u tomorrow if needed.

## 2019-12-16 NOTE — PROGRESS NOTES
Physical Therapy Note: PT treatment deferred. Pt off the unit and unavailable to participate with PT. Will continue to follow per POC. Charissa Wright, PT, DPT, Katlyn Snider

## 2019-12-16 NOTE — PROGRESS NOTES
Patient downstairs for doppler. Will give thyroid medication upon return. Unable to give prior to transfer to 5th floor. Patient still in EEG. Awaiting transfer to 5th floor. Family at bedside and updated.

## 2019-12-16 NOTE — PROGRESS NOTES
Inpatient Neurology Progress Note Patient ID: Wilfredo Wallace 
[id number] 
71 y.o. 
3/8/1931 Follow up: altered mental status, UTI, possible TIA 2 weeks ago Subjective:  
 
Pt seen in EEG lab, getting set up for EEG study Awake, alert, conversant Can tell me he's in hospital/ [facility], it's December, upcoming holiday is Cristina Moves all extremities to command Lipitor started due to elevated LDL (115) MRI Brain and MRA Brain is pending Carotid Dopplers show no significant stenosis of either ICA and vertebral artery flow is antegrade. Comment is made that left proximal ICA  Velocity is high-normal range. MRA Neck in  showed mild-moderate stenosis of proximal left ICA. Discussed with patient's Daughter-in-Law. She says pt's Son and Daughter were aware of this findings in 2018 and after some consideration, they decided against pursuing CEA, due to patient age and comorbidities. EEG showed mild generalized slowing consistent with an encephalopathic process Objective: Interval progress notes in connect care were reviewed Brief ROS: as noted above Current Facility-Administered Medications:  
  atorvastatin (LIPITOR) tablet 40 mg, 40 mg, Oral, DAILY, Ann-Marie Barrera, DO, 40 mg at 12/16/19 6122   hydrALAZINE (APRESOLINE) 20 mg/mL injection 20 mg, 20 mg, IntraVENous, Q6H PRN, Tanvi England MD 
  amLODIPine (NORVASC) tablet 10 mg, 10 mg, Oral, DAILY, Sowmya Anaya MD, 10 mg at 12/16/19 4440   sodium chloride (NS) flush 5-40 mL, 5-40 mL, IntraVENous, Q8H, MarillaVinisia, DO, 10 mL at 12/16/19 8359   sodium chloride (NS) flush 5-40 mL, 5-40 mL, IntraVENous, PRN, Gómez, Vinisia, DO, 10 mL at 12/15/19 0452 
  0.9% sodium chloride infusion, 125 mL/hr, IntraVENous, CONTINUOUS, Marilla, Vinisia, DO, Last Rate: 125 mL/hr at 12/15/19 1051, 125 mL/hr at 12/15/19 1051   heparin (porcine) injection 5,000 Units, 5,000 Units, SubCUTAneous, Flavio [address], [facility], DO, 5,000 Units at 12/16/19 9033   cefTRIAXone (ROCEPHIN) 1 g in 0.9% sodium chloride (MBP/ADV) 50 mL ADV, 1 g, IntraVENous, Q24H, [facility], DO, Last Rate: 100 mL/hr at 12/15/19 1652, 1 g at 12/15/19 1652   clopidogrel (PLAVIX) tablet 75 mg, 75 mg, Oral, DAILY, Tripp, [facility], DO, 75 mg at 12/16/19 3905   insulin lispro (HUMALOG) injection, , SubCUTAneous, AC&HS, [facility], , Stopped at 12/14/19 2200 
  glucose chewable tablet 16 g, 4 Tab, Oral, PRN, [facility],  
  glucagon (GLUCAGEN) injection 1 mg, 1 mg, IntraMUSCular, PRN, [facility],  
  dextrose 10% infusion 0-250 mL, 0-250 mL, IntraVENous, PRN, [facility],  
  donepezil (ARICEPT) tablet 10 mg, 10 mg, Oral, QHS, Tripp, [facility], DO, 10 mg at 12/15/19 2143   ferrous sulfate tablet 325 mg, 325 mg, Oral, ACB, Tripp, [facility], DO, 325 mg at 12/16/19 2094   pantoprazole (PROTONIX) tablet 40 mg, 40 mg, Oral, ACB, Tripp, [facility], DO, 40 mg at 12/16/19 8482   losartan (COZAAR) tablet 25 mg, 25 mg, Oral, DAILY, Tripp, [facility], DO, 25 mg at 12/16/19 6695   thyroid (Pork) (ARMOUR) tablet 120 mg, 120 mg, Oral, DAILY, [facility], DO, Stopped at 12/15/19 0900 
  nicotinic acid (NIACIN) tablet 500 mg, 500 mg, Oral, ACB, Tripp, [facility], DO, 500 mg at 12/16/19 3850 Neurologic Exam 
 
Patient Vitals for the past 8 hrs: 
 Temp Pulse Resp BP SpO2  
12/16/19 1255  83     
12/16/19 0800     97 % 12/16/19 0713 97.4 °F (36.3 °C) (!) 52 23 169/57 97 % 12/16/19 0700  (!) 46    Assessment: ICD-10-CM ICD-9-CM 1. Encephalopathy acute G93.40 348.30   
2. Urinary tract infection with hematuria, site unspecified N39.0 599.0   
 R31.9 599.70 3.  WILTON (acute kidney injury) ([facility] [address] N17.9 584.9   
 
80 y.o. male with hx of Dementia, Hx of stroke 
  
Transient increased confusion and dysarthria in setting of UTI, WILTON on CKD (metabolic encephalopathy). CT head didn't show any evidence of recent stroke. Had an episode 2 weeks ago of right hand weakness or dystaxia, resolving within 30 mins (?TIA v stroke). EEG consistent with mild encephalopathy. No e/o seizure. Family aware of mild-moderate left ICA stenosis as initially noted in Dec 2018 and decided not to pursue surgery for it. Continue on Plavix 75 mg/ day, Statin. MRI Brain, MRA Brain pending Will f/u tomorrow

## 2019-12-16 NOTE — PROGRESS NOTES
Jake Herrera [address] Office [phone] Fax [phone] Assessment and Plan Jm Menjivar. is a 80 y.o. male admitted for altered mental status. He has spent 3 night(s) in the hospital. 
 
24 Hour Events: No acute events. AMS (altered mental status): 2/2 acute metabolic encephalopathy vs. TIA/stroke. CT head- neg. Carotid dopplers with mild-mod L ICA stenoses already known to family, no surgery was pursued. MRI and MRA brain with no acute infarction with chronic white matter disease and chronic superficial siderosis R frontal lobe. EEG with mild generalized slowing c/w an encephalopathic process 
-Plavix 
-Lipitor 40mg  
-Neuro consult 
-PT/OT consult 
-Fall precautions 
  
 WILTON on  CKD (chronic kidney disease) stage 4, GFR 15-29 ml/min: Cr today 2.56, improved from POA 3.08 with baseline ~2.1.  
-Discontinue MIVF 
-Home Lasix 20 mg 
  
  UTI (urinary tract infection): UA showed moderate blood, moderate leuk esterase, >100 WBC and +1 bacteria. Ur cx with alpha strep 50,000 CFU 
-Continue Rocephin 1gm IV q 24 hrs (day 4) -Follow up blood culture, no growth at 3 days 
  Dementia: usually A&Ox3 per family. Has sundowning per family. 
-Continue home Aricept 10 mg nightly 
  
  Diabetes mellitus type 2, controlled: A1C 6.5 12/14 
-Hold home Tradjenta 5mg and Glimepiride 4mg  
-SSI with hypoglycemia protocol 
  
  Essential hypertension, benign  
- Home Norvasc 10 mg 
- Home Cozaar from 25 mg daily - Restart home lasix 20mg daily 
  
  Mixed hyperlipidemia: 12/14 lipid panel , HDL 33, .8,  
- Lipitor 40mg daily 
  
  Hypothyroidsim: TSH 12/15 3.82, Free T4 0.7 
-Continue home [place] thyroid 120 mg daily 
-Follow up outpatient 
  
  Pernicious anemia: Hgb stable 
-Continue home ferrous sulfate 325 mg 
-CBC daily GERD 
-Protonix 40mg daily   
  
  
FEN/GI - Diabetic diet. Activity - fall precautions DVT prophylaxis - Sub Q Heparin GI prophylaxis - Not indicated at this time Fall prophylaxis - Fall precautions ordered. Disposition - Admit to Telemetry. Plan to d/c to Home. Consulting PT/OT Code Status - DNR, discussed with patient / caregivers. Daughter is POA I appreciate the opportunity to participate in the care of this patient, Thyra Dance, DO Family Medicine Resident Subjective / Objective Patient doing well overnight. No complaints. Able to tell me he is in the hospital, unsure why he came in. Temp (24hrs), Av °F (36.7 °C), Min:97.5 °F (36.4 °C), Max:98.9 °F (37.2 °C) General Appearance:  Comfortable and in no acute distress. Lungs:  Normal effort. He is not in respiratory distress. No rales or wheezes. Heart: Normal rate. Regular rhythm. No murmur. Abdomen: Abdomen is soft and non-distended. There are no signs of ascites. Bowel sounds are normal.   There is no abdominal tenderness. There is no mass. Extremities: Normal range of motion. There is no dependent edema. Pulses: Distal pulses are intact. Neurological: A&O x 1 (knows name only). CNII-XII intact. No facial droop or slurred speech. Strength 5/5 BL upper and lower ext. DTR +2/4 biceps, patella. Sensation intact B/L upper/lower ext. Skin:  Warm and dry. There is ecchymosis. (Ecchymosis on right thigh) Respiratory:   O2 Device: Room air I/O: 
Date 19 - 19 8026 19 - 19 8302 Shift [id number] 24 Hour Total 6516-8857 3223-4592 24 Hour Total  
INTAKE  
P.O. 0  0     
  P. O. 0  0 Shift Total(mL/kg) 0(0)  0(0) OUTPUT Urine(mL/kg/hr)  350(0.3) 350(0.2) Urine Voided  350 350 Shift Total(mL/kg)  350(4) 350(4) NET 0 -350 -350 Weight (kg) 88 88 88 88 88 88 CBC: 
Recent Labs 19 
0040 19 
0153 12/15/19 
0157 WBC 7.0 7.8 8.8 HGB 11.6* 10.8* 11.2* HCT 34.9* 33.0* 33.8*  
 195 202 Metabolic Panel: Recent Labs 12/17/19 
0040 12/16/19 
0153 12/15/19 
0157 12/14/19 
2045 12/14/19 
1525  144 144  --  141  
K 4.4 4.3 4.2  --  4.6 * 113* 114*  --  111* CO2 25 27 25  --  26 BUN 21* 23* 26*  --  25* CREA 2.56* 2.61* 2.76*  --  3.08* * 92 85  --  150* CA 7.9* 8.0* 8.1*  --  8.8 MG  --   --   --  1.8  --   
PHOS  --   --   --  2.8  --   
ALB 2.6* 2.4* 2.6*  --  3.2* SGOT 22 21 24  --  26 ALT 13 11* 14  --  15 INR  --   --   --   --  1.0 For Billing Chief Complaint Patient presents with  Dysarthria Hospital Problems  Date Reviewed: 11/13/2019 Codes Class Noted POA  
 UTI (urinary tract infection) ICD-10-CM: N39.0 ICD-9-CM: 599.0  12/14/2019 Unknown * (Principal) AMS (altered mental status) ICD-10-CM: G85.35 
ICD-9-CM: 780.97  12/14/2019 Unknown  
   
 CKD (chronic kidney disease) stage 4, GFR 15-29 ml/min (HCC) ICD-10-CM: N18.4 ICD-9-CM: 585.4  4/22/2019 Yes Dementia without behavioral disturbance (HCC) ICD-10-CM: F03.90 ICD-9-CM: 294.20  8/16/2016 Yes WILTON (acute kidney injury) ([facility] ICD-10-CM: N17.9 ICD-9-CM: 584.9  8/2/2014 Unknown Pernicious anemia ICD-10-CM: D51.0 ICD-9-CM: 281.0  7/31/2012 Yes Acquired hypothyroidism ICD-10-CM: E03.9 ICD-9-CM: 244.9  4/26/2012 Yes Diabetes mellitus type 2, controlled ([facility] ICD-10-CM: E11.9 ICD-9-CM: 250.00  2/9/2012 Yes Essential hypertension, benign ICD-10-CM: I10 
ICD-9-CM: 401.1  2/9/2012 Yes Mixed hyperlipidemia ICD-10-CM: E78.2 ICD-9-CM: 272.2  2/9/2012 Yes

## 2019-12-17 NOTE — DISCHARGE SUMMARY
Jake Herrera [address]  Office [phone] Fax [phone] Discharge / Transfer / Off-Service Note Name: Wilfredo Wallace. MRN: [id number]  Sex: Male YOB: 1931  Age: 80 y.o. PCP: Heber Carney MD  
 
Date of admission: 12/14/2019 Date of discharge/transfer: 12/17/2019 Attending physician at admission: Alondra Romero MD 
Attending physician at discharge/transfer: Isabela Garcia Resident physician at discharge/transfer: Benedicto Mcrae DO Consultants during hospitalization IP CONSULT TO FAMILY PRACTICE 
IP CONSULT TO NEUROLOGY Admission diagnoses AMS (altered mental status) [R41.82] UTI (urinary tract infection) [N39.0] Recommended follow-up after discharge 1. PCP 2. Neurology, Dr. Gloria Posada Things to follow up on with PCP:  
- Blood pressure - Thyroid (repeat TSH and possibly free T4)  
- Urinary Tract Infection Medication Changes: 1. NEW MEDICATIONS: 
- Increase Norvasc to 10mg daily from 5mg daily - Plavix 75mg one tab daily - Lipitor 40mg one tab daily - Keflex 500mg bid for 4 more days (end date 12/20) 2. DISCONTINUED: Stop taking Aspirin 81mg daily History of Present Illness As per admitting provider, Dr. Vinie Ganser. is a 80 y.o. male with known dementia, hypertension, DM2, dyslipidemia, TIA, hypothyroidism, and colon cancer who presents to the ER complaining of altered mental status. He lives with his granddaughter who found him on the floor near his bed at 1230 pm this afternoon. He seemed confused and had slurred speech. Unknown if he fell or hit his head, but patient said he \"slipped. \" She did not note any LOC, incontinence, or seizure like activity. His granddaughter then called his daughter who arrived an hour later and he was still on the floor. He has not complained of any fevers, chest pain, sob, dysuria, or abdominal pain recently.  They did not notice any facial droop or asymmetrical weakness. Family states that when he gets confused he usually has an infection/UTI, his last UTI was five years ago. He was seen by PCP a few weeks ago because daughter stated he began acting more confused, which typically means he has a UTI, but they could not obtain a urine sample. 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 His daughter is usually in charge of his medications.  
  
In the ED his vitals were within normal limits. Labs significant for a WBC 11.6, Cr 3.08 (BL ~2.1) and UA with mod blood, mod leuk esterase, and +1 bacteria. CT head was negative for acute process but showed white matter disease. He was treated with 1L NS bolus, Rocephin 1gm IV, and Plavix 75 mg. Katrina Pereira was consulted and recommended TIA/stroke workup with EEG though this seems more consistent with acute metabolic encephalopathy. They recommended to start on Plavix. [facility] course Rabia Steele. was admitted into the Family Medicine Service from 12/14/2019 to 12/17/2019  for AMS (altered mental status) [R41.82] UTI (urinary tract infection) [N39.0]. During the course of this admission, the following conditions were addressed/managed; 
 
AMS (altered mental status): likely 2/2 acute metabolic encephalopathy from a UTI. Neurology Consulted. EEG with mild generalized slowing c/w an encephalopathic process. TIA/Stroke workup was done and was neg. CT head- neg. MRI and MRA brain with no acute infarction with only chronic white matter disease and chronic superficial siderosis R frontal lobe. Carotid dopplers with mild-mod L ICA stenoses already known to family, no surgery was pursued in 2018. - Start Plavix 75mg daily - Stop ASA 81mg daily 
- Start Lipitor 40mg daily - Follow up with Neurology outpatient in 2 weeks 
  
 WILTON on  CKD (chronic kidney disease) stage 4: Cr POA 3.08, improved to 2.56 (baseline ~2.1) - Continue home Lasix 20 mg 
  
UTI (urinary tract infection): UA showed moderate blood, moderate leuk esterase, >100 WBC and +1 bacteria. Ur cx with alpha strep 50,000 CFU. S/p 3 days IV Rocephin, transitioned to Keflex at discharge - Keflex 500mg bid x 4 days (end date 12/20) to complete a 7 day treatment - Follow up final blood culture 
  
Dementia: usually A&Ox3 per family. Has sundowning per family. - Continue home Aricept 10 mg nightly 
  
Diabetes mellitus type 2, controlled: A1C 6.5 12/14/2019 
- Continue home Tradjenta 5mg and Glimepiride 4mg  
  
Essential hypertension, benign: Blood pressure elevated to 170s/60-70s during admission. Norvasc increased from 5 to 10mg and BP at discharge 160/73. Patient has PCP follow up in 2 days. - Increase Norvasc from 5mg to 10 mg daily 
- Continue home Cozaar from 25 mg daily 
- Continue home lasix 20mg daily - Follow up with PCP for blood pressure medication adjustment if needed 
  
Mixed hyperlipidemia: 12/14 lipid panel , HDL 33, .8,  
- Continue home Lipitor 40mg daily 
  
Hypothyroidsim: TSH 12/15 3.82, Free T4 0.7 
- Continue home [place] thyroid 120 mg daily - Repeat TSH as an outpatient - Follow up with your PCP 
  
Pernicious anemia: Hgb stable during admission 
- Continue home ferrous sulfate 325 mg 
  
GERD 
- Continue home Prevacid 30mg daily 
  
 
 
Physical exam at discharge: 
 
Vitals Reviewed. Patient Vitals for the past 12 hrs: 
 Temp Pulse Resp BP SpO2  
12/17/19 1519  (!) 59     
12/17/19 1512 98.1 °F (36.7 °C) 62 16 160/72 96 % 12/17/19 1035 98 °F (36.7 °C) (!) 59 17 163/67 95 % 12/17/19 0731 98.2 °F (36.8 °C) (!) 58 18 170/69 95 % 12/17/19 0700  65     
12/17/19 0458 98 °F (36.7 °C) (!) 57 18 172/68 98 %  
  
  
General Appearance:  Comfortable and in no acute distress.   
 Lungs:  Normal effort. Debi Soliman is not in respiratory distress.  No rales or wheezes.   
Heart: Normal rate.  Regular rhythm.  No murmur. Abdomen: Abdomen is soft and non-distended.  There are no signs of ascites.  Bowel sounds are normal.   There is no abdominal tenderness.   There is no mass. Extremities: Normal range of motion.  There is no dependent edema.   
Pulses: Distal pulses are intact.   
Neurological: (A&O x 1 (knows name only today, CNII-XII intact. No facial droop or slurred speech. Strength 5/5 BL upper and lower ext. DTR +2/4 biceps, patella. Sensation intact B/L upper/lower ext. ).   
Skin:  Warm and dry.  There is ecchymosis.  (Ecchymosis on right thigh) Condition at discharge: Stable. Labs Recent Labs 12/17/19 
0040 12/16/19 
0153 12/15/19 
0157 WBC 7.0 7.8 8.8 HGB 11.6* 10.8* 11.2* HCT 34.9* 33.0* 33.8*  
 195 202 Recent Labs 12/17/19 
0040 12/16/19 
0153 12/15/19 
0157 12/14/19 
2045  144 144  --   
K 4.4 4.3 4.2  --   
* 113* 114*  --   
CO2 25 27 25  --   
BUN 21* 23* 26*  --   
CREA 2.56* 2.61* 2.76*  --   
* 92 85  --   
CA 7.9* 8.0* 8.1*  --   
MG  --   --   --  1.8 PHOS  --   --   --  2.8 Recent Labs 12/17/19 
0040 12/16/19 
0153 12/15/19 
0157 SGOT 22 21 24 ALT 13 11* 14  
AP 88 77 81 TBILI 0.3 0.4 0.7 TP 6.0* 5.7* 5.9* ALB 2.6* 2.4* 2.6*  
GLOB 3.4 3.3 3.3 Recent Labs 12/17/19 
1146 12/17/19 
0700 12/16/19 
2130 12/16/19 
1526 12/16/19 
0820  12/15/19 
0157 12/14/19 
2045 TROIQ  --   --   --   --   --   --  <0.05 <0.05 GLUCPOC 153* 109* 178* 149* 117*   < >  --   --   
 < > = values in this interval not displayed. Cultures · Blood culture- no growth 3 days · Urine culture- alpha streptococcus 50,000CFU Procedures / Diagnostic Studies · None Imaging Results from Hospital Encounter encounter on 12/14/19 CT CODE NEURO HEAD WO CONTRAST Narrative EXAM: CT CODE NEURO HEAD WO CONTRAST INDICATION: slurred speech COMPARISON: 10/18/2018. CONTRAST: None. TECHNIQUE: Unenhanced CT of the head was performed using 5 mm images. Brain and 
bone windows were generated. CT dose reduction was achieved through use of a 
standardized protocol tailored for this examination and automatic exposure 
control for dose modulation. FINDINGS: 
Generalized volume loss with enlargement of the ventricles. Moderate low density 
within the periventricular white matter. There is no intracranial hemorrhage, 
extra-axial collection, mass, mass effect or midline shift. The basilar 
cisterns are open. No acute infarct is identified. The bone windows demonstrate 
no abnormalities. The visualized portions of the paranasal sinuses and mastoid 
air cells are clear. Impression IMPRESSION: 
 
Volume loss and white matter disease, no interval change or acute abnormality No procedure found. Chronic diagnoses Problem List as of 12/17/2019 Date Reviewed: 11/13/2019 Codes Class Noted - Resolved UTI (urinary tract infection) ICD-10-CM: N39.0 ICD-9-CM: 599.0  12/14/2019 - Present * (Principal) AMS (altered mental status) ICD-10-CM: Q02.24 
ICD-9-CM: 780.97  12/14/2019 - Present CKD (chronic kidney disease) stage 4, GFR 15-29 ml/min (HCC) ICD-10-CM: N18.4 ICD-9-CM: 585.4  4/22/2019 - Present Delirium ICD-10-CM: R41.0 ICD-9-CM: 780.09  10/11/2018 - Present Urinary tract infection ICD-10-CM: N39.0 ICD-9-CM: 599.0  10/11/2018 - Present Type 2 diabetes with nephropathy ([facility] ICD-10-CM: E11.21 
ICD-9-CM: 250.40, 583.81  3/28/2018 - Present Dementia without behavioral disturbance (HCC) ICD-10-CM: F03.90 ICD-9-CM: 294.20  8/16/2016 - Present WILTON (acute kidney injury) ([facility] ICD-10-CM: N17.9 ICD-9-CM: 584.9  8/2/2014 - Present Unspecified vitamin D deficiency ICD-10-CM: E55.9 ICD-9-CM: 268.9  9/11/2013 - Present Colon cancer [facility]) ICD-10-CM: C18.9 ICD-9-CM: 153.9  8/23/2012 - Present Pernicious anemia ICD-10-CM: D51.0 ICD-9-CM: 281.0  7/31/2012 - Present Acquired hypothyroidism ICD-10-CM: E03.9 ICD-9-CM: 244.9  4/26/2012 - Present Microalbuminuria ICD-10-CM: R80.9 ICD-9-CM: 791.0  3/6/2012 - Present Diabetes mellitus type 2, controlled ([facility] ICD-10-CM: E11.9 ICD-9-CM: 250.00  2/9/2012 - Present Essential hypertension, benign ICD-10-CM: I10 
ICD-9-CM: 401.1  2/9/2012 - Present Mixed hyperlipidemia ICD-10-CM: E78.2 ICD-9-CM: 272.2  2/9/2012 - Present Allergic rhinitis due to other allergen ICD-10-CM: J30.89 ICD-9-CM: 477.8  2/9/2012 - Present Esophageal reflux ICD-10-CM: K21.9 ICD-9-CM: 530.81  2/9/2012 - Present RESOLVED: UTI (lower urinary tract infection) ICD-10-CM: N39.0 ICD-9-CM: 599.0  4/1/2015 - 3/28/2018 RESOLVED: Altered mental status ICD-10-CM: R41.82 
ICD-9-CM: 780.97  8/28/2014 - 3/28/2018 RESOLVED: Urosepsis ICD-10-CM: A41.9, N39.0 ICD-9-CM: 038.9, 995.91, 599.0  7/29/2014 - 3/28/2018 RESOLVED: C2 cervical fracture (HCC) ICD-10-CM: S12.100A ICD-9-CM: 805.02  9/24/2012 - 9/24/2012 RESOLVED: Odontoid fracture ([facility] ICD-10-CM: S48.010G 
ICD-9-CM: 805.02  9/10/2012 - 3/28/2018 RESOLVED: Altered mental status ICD-10-CM: R41.82 
ICD-9-CM: 780.97  6/20/2012 - 6/20/2012 Overview Signed 6/20/2012  4:20 PM by Sherif Roberts MD  
  Patient admitted for altered mental status changes. Normal neurological imaging, no significant finding on biochemical, and hematologic labs  to support a definitive diagnoses. Patient regained his base level of functionality prior to discharge. Patient Vit. B12 deficiency treated. Discharge/Transfer Medications Current Discharge Medication List  
  
START taking these medications Details  
atorvastatin (LIPITOR) 40 mg tablet Take 1 Tab by mouth daily. Qty: 30 Tab, Refills: 0  
  
clopidogrel (PLAVIX) 75 mg tab Take 1 Tab by mouth daily. Qty: 30 Tab, Refills: 0  
  
cephALEXin (KEFLEX) 500 mg capsule Take 1 Cap by mouth two (2) times a day for 7 doses. Take one tab tonight (12/17). Then take one tab two times per day on 12/18, 12/19, and 12/20 Qty: 7 Cap, Refills: 0 CONTINUE these medications which have CHANGED Details  
amLODIPine (NORVASC) 10 mg tablet Take 1 Tab by mouth daily. Qty: 30 Tab, Refills: 0 CONTINUE these medications which have NOT CHANGED Details Thyroid, Pork, (ARMOUR THYROID) 120 mg tab Take 1 Tab by mouth daily. Qty: 90 Tab, Refills: 1 Associated Diagnoses: Acquired hypothyroidism  
  
furosemide (LASIX) 20 mg tablet TAKE 1 TABLET EVERY DAY AS DIRECTED Qty: 90 Tab, Refills: 3 Associated Diagnoses: Essential hypertension, benign  
  
lansoprazole (PREVACID) 30 mg capsule Take 1 Cap by mouth Daily (before breakfast). Qty: 90 Cap, Refills: 1 Associated Diagnoses: Gastroesophageal reflux disease, esophagitis presence not specified  
  
glimepiride (AMARYL) 4 mg tablet TAKE 1 TABLET EVERY MORNING Qty: 90 Tab, Refills: 1 Associated Diagnoses: Controlled type 2 diabetes mellitus without complication, without long-term current use of insulin (HCC)  
  
losartan (COZAAR) 25 mg tablet TAKE 1 TABLET EVERY DAY Qty: 90 Tab, Refills: 1 Associated Diagnoses: Essential hypertension, benign  
  
donepezil (ARICEPT) 10 mg tablet Take 1 Tab by mouth nightly. Qty: 90 Tab, Refills: 1 Associated Diagnoses: Dementia without behavioral disturbance, unspecified dementia type ([facility] TRADJENTA 5 mg tablet TAKE 1 TABLET EVERY DAY FOR DIABETES Qty: 90 Tab, Refills: 1 Associated Diagnoses: Uncontrolled type 2 diabetes mellitus without complication, without long-term current use of insulin ([facility] loperamide (IMODIUM) 2 mg capsule TAKE 1 CAPSULE BEFORE BREAKFAST, LUNCH, DINNER AND AT BEDTIME FOR DIARRHEA 
 Qty: 360 Cap, Refills: 1 cholecalciferol (VITAMIN D3) 50,000 unit capsule TAKE 1 CAPSULE EVERY 7 DAYS Qty: 13 Cap, Refills: 3  
  
cetirizine (ZYRTEC) 10 mg tablet TAKE 1 TABLET EVERY DAY AS NEEDED FOR ALLERGIES Qty: 90 Tab, Refills: 3  
  
ferrous sulfate (IRON) 325 mg (65 mg iron) tablet Take 1 Tab by mouth Daily (before breakfast). Qty: 30 Tab, Refills: 5  
  
multivitamins-minerals-lutein (CENTRUM SILVER) tab tablet Take 1 Tab by mouth daily. nicotinic acid (NIACIN) 500 mg tablet Take 500 mg by mouth Daily (before breakfast). STOP taking these medications  
  
 aspirin delayed-release 81 mg tablet Comments:  
Reason for Stopping:   
   
  
 
  
Diet:  Diabetic diet. Activity:  As tolerated Disposition: Home with  and 24 hour supervision Discharge instructions to patient/family Please seek medical attention for any new or worsening symptoms particularly fever, chest pain, shortness of breath, abdominal pain, nausea, vomiting Follow up plans/appointments Follow-up Information Follow up With Specialties Details Why Contact [facility], [address], [address] Close Justice Garnett MD Neurology In 2 weeks Hospital Follow up [address] [place] [id number] [phone] Amie Vazquez NP  On 12/19/2019 Please attend your follow up at 10:30am with Amie Vazquez (NP at [facility]) [address] [place] [address] [address] [facility] 
[phone] Marley Rojas MD [address] [facility] 
[phone] Brayton Schwab, DO Family Medicine Resident For Billing Chief Complaint Patient presents with  Dysarthria Hospital Problems  Date Reviewed: 11/13/2019 Codes Class Noted POA  
 UTI (urinary tract infection) ICD-10-CM: N39.0 ICD-9-CM: 599.0  12/14/2019 Unknown * (Principal) AMS (altered mental status) ICD-10-CM: G18.07 
ICD-9-CM: 780.97  12/14/2019 Unknown  
   
 CKD (chronic kidney disease) stage 4, GFR 15-29 ml/min (HCC) ICD-10-CM: N18.4 ICD-9-CM: 585.4  4/22/2019 Yes Dementia without behavioral disturbance (HCC) ICD-10-CM: F03.90 ICD-9-CM: 294.20  8/16/2016 Yes WILTON (acute kidney injury) ([facility] ICD-10-CM: N17.9 ICD-9-CM: 584.9  8/2/2014 Unknown Pernicious anemia ICD-10-CM: D51.0 ICD-9-CM: 281.0  7/31/2012 Yes Acquired hypothyroidism ICD-10-CM: E03.9 ICD-9-CM: 244.9  4/26/2012 Yes Diabetes mellitus type 2, controlled ([facility] ICD-10-CM: E11.9 ICD-9-CM: 250.00  2/9/2012 Yes Essential hypertension, benign ICD-10-CM: I10 
ICD-9-CM: 401.1  2/9/2012 Yes Mixed hyperlipidemia ICD-10-CM: E78.2 ICD-9-CM: 272.2  2/9/2012 Yes

## 2019-12-17 NOTE — PROGRESS NOTES
I have reviewed discharge instructions with the patient and daughter. The patient and daughter  verbalized understanding.

## 2019-12-17 NOTE — PROGRESS NOTES
Bedside and Verbal shift change report given to Angeles Barreto (oncoming nurse) by Emanuel Griffin RN  (offgoing nurse). Report included the following information SBAR, Kardex, Intake/Output, MAR and Recent Results.

## 2019-12-17 NOTE — PROGRESS NOTES
12/17/2019 
2:39 PM 
The Plan for Transition of Care is related to the following treatment goals: [place] [address] The Patient and/or patient representative Carmina Kocher was provided with a choice of provider and agrees  
with the discharge plan. [] Yes [] No 
 
Freedom of choice list was provided with basic dialogue that supports the patient's individualized plan of care/goals, treatment preferences and shares the quality data associated with the providers. [x] Yes [] No 
 
Pt discussed in IDR rounds, is stable for d/c to home w/ 24/7 supervision, pt's Dtr Carmina Kocher will stay w/ pt and is here to transport home. AVS sent to [facility] in [employer]. NN notified of d/c Pt is ready for d/c from CM standpoint. Isabel Yo

## 2019-12-17 NOTE — PROGRESS NOTES
CMS Note 12/17/2019 Patient received and signed their 1st IM letter. Patient was given a copy for their record. Roxanne Amato CMS

## 2019-12-17 NOTE — PROGRESS NOTES
Inpatient Neurology Progress Note Patient ID: Jaswinder Santos 
[id number] 
79 y.o. 
3/8/1931 Follow up: altered mental status, UTI, possible TIA 2 weeks ago Subjective: MRA Brain: no flow limiting stenosis or occlusion; no aneurysm MRI Brain: moderate to severe global atrophy, severe chronic small vessel ischemic changes Pt resting in bed, awake, alert, looking at newspaper Daughter at bedside Reviewed above findings; no recent stroke Objective: Interval progress notes in [facility] care were reviewed Brief ROS: as noted above Current Facility-Administered Medications:  
  losartan (COZAAR) tablet 25 mg, 25 mg, Oral, DAILY, [facility],  
  furosemide (LASIX) injection 20 mg, 20 mg, IntraVENous, DAILY, [facility] 
  atorvastatin (LIPITOR) tablet 40 mg, 40 mg, Oral, DAILY, [facility], 40 mg at 12/17/19 5919   hydrALAZINE (APRESOLINE) 20 mg/mL injection 20 mg, 20 mg, IntraVENous, Q6H PRN, Sherif England MD 
  amLODIPine (NORVASC) tablet 10 mg, 10 mg, Oral, DAILY, Sowmya Anaya MD, 10 mg at 12/17/19 0930 
  sodium chloride (NS) flush 5-40 mL, 5-40 mL, IntraVENous, Q8H, [facility], 10 mL at 12/17/19 0600 
  sodium chloride (NS) flush 5-40 mL, 5-40 mL, IntraVENous, PRN, [facility], [facility], DO, 10 mL at 12/15/19 3768   heparin (porcine) injection 5,000 Units, 5,000 Units, SubCUTAneous, Q8H, GómezVinia, DO, 5,000 Units at 12/17/19 0530   cefTRIAXone (ROCEPHIN) 1 g in 0.9% sodium chloride (MBP/ADV) 50 mL ADV, 1 g, IntraVENous, Q24H, Awa Magaña DO, Last Rate: 100 mL/hr at 12/16/19 1652, 1 g at 12/16/19 1652   clopidogrel (PLAVIX) tablet 75 mg, 75 mg, Oral, DAILY, Claudia Magaña, , 75 mg at 12/17/19 0930 
  insulin lispro (HUMALOG) injection, , SubCUTAneous, AC&HS, Connor Peters, [facility], DO, Stopped at 12/16/19 2200 
  glucose chewable tablet 16 g, 4 Tab, Oral, PRN, Elizabeth Sous, DO 
   glucagon (GLUCAGEN) injection 1 mg, 1 mg, IntraMUSCular, PRN, Awa Boles, DO 
  dextrose 10% infusion 0-250 mL, 0-250 mL, IntraVENous, PRN, Gómez, Awa, DO 
  donepezil (ARICEPT) tablet 10 mg, 10 mg, Oral, QHS, Gómez, [place], DO, 10 mg at 12/16/19 2118   ferrous sulfate tablet 325 mg, 325 mg, Oral, ACB, Gómez, [place], DO, 325 mg at 12/17/19 4466   pantoprazole (PROTONIX) tablet 40 mg, 40 mg, Oral, ACB, Gómez, [place], DO, 40 mg at 12/17/19 2191   thyroid (Pork) (ARMOUR) tablet 120 mg, 120 mg, Oral, DAILY, [place], [place], DO, 120 mg at 12/17/19 0930 
  nicotinic acid (NIACIN) tablet 500 mg, 500 mg, Oral, ACB, Gómez, [place], DO, 500 mg at 12/17/19 5014 Neurologic Exam 
 
Awake, alert, conversant EOMI, face symmetric Moving both arms freely Patient Vitals for the past 8 hrs: 
 Temp Pulse Resp BP SpO2  
12/17/19 0731 98.2 °F (36.8 °C) (!) 58 18 170/69 95 % 12/17/19 0700  65     
12/17/19 0458 98 °F (36.7 °C) (!) 57 18 172/68 98 % Assessment: ICD-10-CM ICD-9-CM 1. Encephalopathy acute G93.40 348.30   
2. Urinary tract infection with hematuria, site unspecified N39.0 599.0   
 R31.9 599.70 3. WILTON (acute kidney injury) ([facility] N17.9 584.9   
 
80 y.o. male with hx of Dementia, Hx of stroke Metabolic encephalopathy secondary to UTI and WILTON on CKD Resolved at this point Hx of Left ICA stenosis, family has opted against any surgical procedure No additional neurology workup F/u with Dr Philip Doe as outpatient

## 2019-12-17 NOTE — PROGRESS NOTES
SPEECH PATHOLOGY BEDSIDE SWALLOW EVALUATION/DISCHARGE Patient: Wilfredo Darnell (80 y.o. male) Date: 12/17/2019 Primary Diagnosis: AMS (altered mental status) [R41.82] UTI (urinary tract infection) [N39.0] Precautions:   Fall ASSESSMENT : 
Based on the objective data described below, the patient presents with mild oral dysphagia characterized by prolonged mastication of solids. Suspect oral dysphagia characterized by edentulous status. Patient tolerated all PO without overt s/s aspiration. Risk of aspiration is low. Feel patient is safe to continue current diet and allow patient to self titrate to softer foods as needed. Skilled acute therapy provided by a speech-language pathologist is not indicated at this time. PLAN : 
Recommendations: 
-Regular / Thin liquid diet.  
-Upright 90 degrees. 
-No further SLP treatment warranted Discharge Recommendations: None SUBJECTIVE:  
Patient stated What teeth. Daughter at bedside reports patient has upper and lower dentures however patient does not wear to eat. Daughter reports no difficulty with eating or swallowing and good appetite. OBJECTIVE:  
 
Past Medical History:  
Diagnosis Date  WILTON (acute kidney injury) ([facility] [address] [facility] 8/2/2014  Cancer [facility])   
 colon  Chronic kidney disease  Diabetes ([facility] [address] [facility] 2008  
 type 2  Dyslipidemia  Esophageal reflux  Esophageal reflux 2/9/2012  Hypertension 2008  Incontinence  Memory loss  Mixed hyperlipidemia 2/9/2012  Other ill-defined conditions(799.89) broken neck 2012  Pernicious anemia 7/31/2012  Psychiatric disorder   
 dementia  Snoring  Stroke ([facility] [address] [facility] 10/18/2018  Unspecified hypothyroidism 4/26/2012 Past Surgical History:  
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    
 HX HERNIA REPAIR Bilateral inguinal  
 HX OTHER SURGICAL    
 colon resection  HX SMALL BOWEL RESECTION  2010  HX TONSIL AND ADENOIDECTOMY  HX TONSILLECTOMY  1936 Prior Level of Function/Home Situation:  
Home Situation Home Environment: Private residence Living Alone: No 
Support Systems: Family member(s) Patient Expects to be Discharged to[de-identified] Private residence Current DME Used/Available at Home: Cane, straight Tub or Shower Type: Tub/Shower combination Diet prior to admission: Regular Current Diet:  Regular Cognitive and Communication Status: 
Neurologic State: Alert, Confused Orientation Level: Disoriented to place, Disoriented to situation, Disoriented to time, Oriented to person Cognition: Decreased command following, Memory loss, Poor safety awareness Perception: Appears intact Perseveration: No perseveration noted Safety/Judgement: Decreased awareness of environment, Decreased awareness of need for assistance, Decreased awareness of need for safety Oral Assessment: 
Oral Assessment Labial: No impairment Dentition: Edentulous Oral Hygiene: WNL Lingual: No impairment Velum: Unable to visualize Mandible: No impairment P.O. Trials: 
Patient Position: Upright in bed Vocal quality prior to P.O.: No impairment Consistency Presented: Solid; Thin liquid How Presented: Self-fed/presented;Straw;Successive swallows Bolus Acceptance: No impairment Bolus Formation/Control: Impaired Type of Impairment: (Prolonged mastication) Propulsion: No impairment Oral Residue: None Initiation of Swallow: No impairment Aspiration Signs/Symptoms: None Pharyngeal Phase Characteristics: No impairment, issues, or problems Oral Phase Severity: Mild Pharyngeal Phase Severity : No impairment NOMS:  
The NOMS functional outcome measure was used to quantify this patient's level of swallowing impairment. Based on the NOMS, the patient was determined to be at level 6 for swallow function NOMS Swallowing Levels: 
Level 1 (CN): NPO 
 Level 2 (CM): NPO but takes consistency in therapy Level 3 (CL): Takes less than 50% of nutrition p.o. and continues with nonoral feedings; and/or safe with mod cues; and/or max diet restriction Level 4 (CK): Safe swallow but needs mod cues; and/or mod diet restriction; and/or still requires some nonoral feeding/supplements Level 5 (CJ): Safe swallow with min diet restriction; and/or needs min cues Level 6 (CI): Independent with p.o.; rare cues; usually self cues; may need to avoid some foods or needs extra time Level 7 (CH): Independent for all p.o. ALEXA. (2003). National Outcomes Measurement System (NOMS): Adult Speech-Language Pathology User's Guide. After treatment:  
Patient left in no apparent distress in bed and Caregiver / family present COMMUNICATION/EDUCATION:  
Patient was educated regarding role of SLP and POC. The patient's plan of care including recommendations, planned interventions, and recommended diet changes were discussed with: patient and family Thank you for this referral. 
SOLOMON Jackson Time Calculation: 14 mins

## 2019-12-17 NOTE — ROUTINE PROCESS
Bedside shift change report given to Elvia Angeles RN (oncoming nurse) by Shiela Henriquez RN (offgoing nurse). Report included the following information SBAR, Kardex and MAR.

## 2019-12-17 NOTE — DISCHARGE INSTRUCTIONS
HOME DISCHARGE INSTRUCTIONS    Ghulam Waller / [id number] : 3/8/1931    Admission date: 2019 Discharge date: 2019 12:35 PM     Please bring this form with you to show your care provider at your follow-up appointment. Primary care provider:  Amanda Castillo MD    Discharging provider:  Latisha Jim MD  - Family Medicine Resident  Jason Escalante MD - Family Medicine Attending      You have been admitted to the hospital with the following diagnoses:    ACUTE DIAGNOSES:  AMS (altered mental status) [R41.82]  · UTI (urinary tract infection) [N39.0]   · TIA rule-out  . . . . . . . . . . . . . . . . . . . . . . . . . . . . . . . . . . . . . . . . . . . . . . . . . . . . . . . . . . . . . . . . . . . . . . . . MEDICATION CHANGES:    1. START taking Norvasc 10mg one tab daily for your Blood pressure. This is an increase from the Norvasc 5mg you were previously on.  2. START taking Plavix 75mg one tab daily. 3. START taking Keflex 500mg for your urinary tract infection. Take one tab tonight (). Then take one tab two times per day on , , and   4. START taking Lipitor 40mg daily for your high cholesterol   5. STOP taking Aspirin 81mg one tab daily      FOLLOW-UP CARE RECOMMENDATIONS:    Please follow up with your PCP and Neurology. Appointments  Follow-up Information     Follow up With Specialties Details Why [facility]   [address] [place] [address], [address] [facility] [address] [facility]    Vishal Craven MD Neurology In 2 weeks [facility] [address]      Kwadwo Rolle NP  On 2019 Please attend your follow up at 10:30am with Kwadwo Rolle (NP at [facility]) [address] [place] [address]  [phone]             Follow-up tests needed: TSH and consider rechecking free T4    Pending test results:    At the time of your discharge the following test results are still pending: Final Blood culture results. Please make sure you review these results with your outpatient follow-up provider(s). Specific symptoms to watch for: chest pain, shortness of breath, fever, chills, nausea, vomiting, diarrhea, change in mentation, falling, weakness, bleeding. DIET/what to eat:  Diabetic Diet    ACTIVITY:  Activity as tolerated    Wound care: None    Equipment needed:  None    What to do if new or unexpected symptoms occur? If you experience any of the above symptoms (or should other concerns or questions arise after discharge) please call your primary care physician. Return to the emergency room if you cannot get hold of your doctor. · It is very important that you keep your follow-up appointment(s). · Please bring discharge papers, medication list (and/or medication bottles) to your follow-up appointments for review by your outpatient provider(s). · Please check the list of medications and be sure it includes every medication (even non-prescription medications) that your provider wants you to take. · It is important that you take the medication exactly as they are prescribed. · Keep your medication in the bottles provided by the pharmacist and keep a list of the medication names, dosages, and times to be taken in your wallet. · Do not take other medications without consulting your doctor. · If you have any questions about your medications or other instructions, please talk to your nurse or care provider before you leave the hospital.     Information obtained by:     I understand that if any problems occur once I am at home I am to contact my physician. These instructions were explained to me and I had the opportunity to ask questions. I understand and acknowledge receipt of the instructions indicated above. Physician's or R.N.'s Signature                                                                  Date/Time                                                                                                                                              Patient or Representative Signature                                                          Date/Time      Patient Education        Altered Mental Status: Care Instructions  Your Care Instructions    Altered mental status is a change in how well your brain is working. As a result, you may be confused, be less alert than usual, or act in odd ways. This may include seeing or hearing things that aren't really there (hallucinations). A mental status change has many possible causes. For example, it may be the result of an infection, an imbalance of chemicals in the body, or a chronic disease such as diabetes or COPD. It can also be caused by things such as a head injury, taking certain medicines, or using alcohol or drugs. The doctor may do tests to look for the cause. These tests may include urine tests, blood tests, and imaging tests such as a CT scan. Sometimes a clear cause isn't found. But tests can help the doctor rule out a serious cause of your symptoms. A change in mental status can be scary. But mental status will often return to normal when the cause is treated. So it is important to get any follow-up testing or treatment the doctor has suggested. The doctor has checked you carefully, but problems can develop later. If you notice any problems or new symptoms, get medical treatment right away. Follow-up care is a key part of your treatment and safety. Be sure to make and go to all appointments, and call your doctor if you are having problems. It's also a good idea to know your test results and keep a list of the medicines you take. How can you care for yourself at home? · Be safe with medicines.  Take your medicines exactly as prescribed. Call your doctor if you think you are having a problem with your medicine. · Have another adult stay with you until you are better. This can help keep you safe. Ask that person to watch for signs that your mental status is getting worse. When should you call for help? Call 911 anytime you think you may need emergency care. For example, call if:    · You passed out (lost consciousness).    Call your doctor now or seek immediate medical care if:    · Your mental status is getting worse.     · You have new symptoms, such as a fever, chills, or shortness of breath.     · You do not feel safe.    Watch closely for changes in your health, and be sure to contact your doctor if:    · You do not get better as expected. Where can you learn more? Go to http://milton-saud.info/. Enter N082 in the search box to learn more about \"Altered Mental Status: Care Instructions. \"  Current as of: March 28, 2019  Content Version: 12.2  © 9347-2223 [employer]. Care instructions adapted under license by [employer] (which disclaims liability or warranty for this information). If you have questions about a medical condition or this instruction, always ask your healthcare professional. [address] any warranty or liability for your use of this information.

## 2019-12-17 NOTE — PROGRESS NOTES
Bedside shift change report given to Bubba Renteria RN (oncoming nurse) by Flora Timmons (offgoing nurse). Report included the following information SBAR, Accordion and Recent Results.

## 2019-12-18 NOTE — PROGRESS NOTES
Hospital Discharge Follow-Up Date/Time:  2019 10:19 AM 
 
Patient was admitted to [facility] on 19 and discharged on 19 for AMS. The physician discharge summary was available at the time of outreach. Patient was contacted within 1 business days of discharge. Top Challenges reviewed with the provider Per discharge summary PCP f/u 
 
BP- per pt's daughter pt has been taking 10 mg novasc not 5mg Repeat TSH and possible free T4 
UTI Per daughter pt does not have to f/u with neurology. Per Dr. Nick Sorensen last note states f/u with Dr. Cipriano Leonardo as outpatient. Advance Care Planning:  
Does patient have an Advance Directive:  reviewed and current Method of communication with provider :none Inpatient RRAT score: 38 Was this a readmission? no  
Patient stated reason for the readmission: N/A Care Transition Nurse (CTN) contacted the family (daughter listed on HIPAA) by telephone to perform post hospital discharge assessment. Verified name and  with family as identifiers. Provided introduction to self, and explanation of the CTN role. Family received hospital discharge instructions. CTN reviewed discharge instructions and red flags with family who verbalized understanding. Family given an opportunity to ask questions and does not have any further questions or concerns at this time. The family agrees to contact the PCP office for questions related to their healthcare. CTN provided contact information for future reference. Disease Specific:   N/A Patients top risk factors for readmission:  medical condition Home Health orders at discharge: SN ,PT, OT [facility]: [facility] Date of initial visit: 19 Durable Medical Equipment ordered at discharge: [address]: [place] [facility] Durable Medical Equipment received: yes Medication(s):  
New Medications at Discharge: Lipitor, Plavix, Keflex Changed Medications at Discharge: norvasc --per daughter pt already takes 10 mg Discontinued Medications at Discharge: aspirin Medication reconciliation was performed with family, who verbalizes understanding of administration of home medications. There were no barriers to obtaining medications identified at this time. Referral to Pharm D needed: no  
 
Current Outpatient Medications Medication Sig  
 atorvastatin (LIPITOR) 40 mg tablet Take 1 Tab by mouth daily.  clopidogrel (PLAVIX) 75 mg tab Take 1 Tab by mouth daily.  cephALEXin (KEFLEX) 500 mg capsule Take 1 Cap by mouth two (2) times a day for 7 doses. Take one tab tonight (12/17). Then take one tab two times per day on 12/18, 12/19, and 12/20  amLODIPine (NORVASC) 10 mg tablet Take 1 Tab by mouth daily.  Thyroid, Pork, (ARMOUR THYROID) 120 mg tab Take 1 Tab by mouth daily.  furosemide (LASIX) 20 mg tablet TAKE 1 TABLET EVERY DAY AS DIRECTED  lansoprazole (PREVACID) 30 mg capsule Take 1 Cap by mouth Daily (before breakfast).  glimepiride (AMARYL) 4 mg tablet TAKE 1 TABLET EVERY MORNING  
 losartan (COZAAR) 25 mg tablet TAKE 1 TABLET EVERY DAY  donepezil (ARICEPT) 10 mg tablet Take 1 Tab by mouth nightly.  TRADJENTA 5 mg tablet TAKE 1 TABLET EVERY DAY FOR DIABETES  
 loperamide (IMODIUM) 2 mg capsule TAKE 1 CAPSULE BEFORE BREAKFAST, LUNCH, DINNER AND AT BEDTIME FOR DIARRHEA  cholecalciferol (VITAMIN D3) 50,000 unit capsule TAKE 1 CAPSULE EVERY 7 DAYS  cetirizine (ZYRTEC) 10 mg tablet TAKE 1 TABLET EVERY DAY AS NEEDED FOR ALLERGIES  ferrous sulfate (IRON) 325 mg (65 mg iron) tablet Take 1 Tab by mouth Daily (before breakfast).  multivitamins-minerals-lutein (CENTRUM SILVER) tab tablet Take 1 Tab by mouth daily.  nicotinic acid (NIACIN) 500 mg tablet Take 500 mg by mouth Daily (before breakfast). No current facility-administered medications for this visit. There are no discontinued medications. [facility] follow up appointment(s):  
Future Appointments Date Time Provider Gayle Franklini 12/19/2019 10:30 AM Medhat Gilmore, NP [address] [facility] follow up appointment(s): none [facility]:  out of service area Goals  Understands red flags post discharge. 12/18/19-Pt received discharge summary and is aware of red flags after discharge. Per pt's daughter pt is doing well now and back to baseline mental status. Pt has scheduled f/u with PCP tomorrow. Family will advise PCP if any red flags arise.   CW

## 2019-12-19 NOTE — PROGRESS NOTES
Identified pt with two pt identifiers(name and ). Chief Complaint Patient presents with  Labs  
  recheck thyroid [facility] Follow Up  
  was hospitalized from  -  for altered mental status.  Hypertension  
  discharge papers show he was increased from 5 mg to 10 mg of amlodipine, however, daughter with him today notes that he was already on 10 mg of amlodipine daily Health Maintenance Due Topic  Shingrix Vaccine Age 50> (1 of 2)  FOOT EXAM Q1   
 MICROALBUMIN Q1 Wt Readings from Last 3 Encounters:  
19 196 lb (88.9 kg) 12/15/19 194 lb (88 kg)  
19 194 lb 6.4 oz (88.2 kg) Temp Readings from Last 3 Encounters:  
19 97.6 °F (36.4 °C) (Oral) 19 98.1 °F (36.7 °C)  
19 98.3 °F (36.8 °C) (Oral) BP Readings from Last 3 Encounters:  
19 168/69  
19 160/72  
19 132/64 Pulse Readings from Last 3 Encounters:  
19 61  
19 (!) 59  
19 (!) 55 Learning Assessment: 
:  
 
Learning Assessment 3/19/2014 PRIMARY LEARNER Patient HIGHEST LEVEL OF EDUCATION - PRIMARY LEARNER  GRADUATED HIGH SCHOOL OR GED  
BARRIERS PRIMARY LEARNER NONE  
CO-LEARNER CAREGIVER No  
PRIMARY LANGUAGE ENGLISH  
LEARNER PREFERENCE PRIMARY LISTENING  
  DEMONSTRATION  
ANSWERED BY patient RELATIONSHIP SELF Depression Screening: 
:  
 
3 most recent PHQ Screens 2019 Little interest or pleasure in doing things Not at all Feeling down, depressed, irritable, or hopeless Not at all Total Score PHQ 2 0 Fall Risk Assessment: 
:  
 
Fall Risk Assessment, last 12 mths 2019 Able to walk? Yes Fall in past 12 months? No  
Fall with injury? -  
Number of falls in past 12 months -  
 
 
Abuse Screening: 
:  
 
Abuse Screening Questionnaire 2019 2018 3/28/2018 2017 8/15/2016 2015 3/19/2014 Do you ever feel afraid of your partner?  N N N N N N N  
 Are you in a relationship with someone who physically or mentally threatens you? N N N N N N N Is it safe for you to go home? Jonathan Ulises Coordination of Care Questionnaire: 
:  
 
1) Have you been to an emergency room, urgent care clinic since your last visit? no  
Hospitalized since your last visit? no          
 
2) Have you seen or consulted any other health care providers outside of [address] [place] [address] since your last visit? no  (Include any pap smears or colon screenings in this section.) 3) Do you have an Advance Directive on file? no 
Are you interested in receiving information about Advance Directives? no 
 
Patient is accompanied by family. I have received verbal consent from Chiquita Osorio. to discuss any/all medical information while they are present in the room. Reviewed record in preparation for visit and have obtained necessary documentation. Medication reconciliation up to date and corrected with patient at this time.

## 2019-12-19 NOTE — PROGRESS NOTES
HISTORY OF PRESENT ILLNESS Taty Lindsey. is a 80 y.o. male. HPI Pt presents with \"hospital discharge follow up\" Pt was admitted to [facility] from 12/14-12/17, due to UTI and AMS. AMS:  Likely from UTI Neurology consulted, TIA/stroke work up was done and was negative. Pt was started on Plavix daily, lipitor daily, and is to follow up with neurology in 2 weeks. UTI:  Urine culture returned posiitve for alpha strep. Blood culture was negative. Pt was placed on Keflex, and continued on this at discharge. HTN:  
Continue medications. Pt's daughter states that he has been doing well since being home. He seems more himself, and she feels as though his symptoms were in relation to his UTI. Pt's daughter needs [employer] paper work signed for her job, as the  and hospital doctors did not want him to be alone for 7-10, to ensure infection was cured, etc. 
 
Pt and daughter have no concerns or complaints at this time. Review of Systems Constitutional: Negative for fever. HENT: Negative for congestion. Gastrointestinal: Negative for diarrhea and vomiting. Physical Exam 
Constitutional:   
   Appearance: Normal appearance. HENT:  
   Head: Normocephalic and atraumatic. Cardiovascular:  
   Rate and Rhythm: Normal rate and regular rhythm. Heart sounds: Normal heart sounds. Pulmonary:  
   Effort: Pulmonary effort is normal.  
   Breath sounds: Normal breath sounds. Neurological:  
   Mental Status: He is alert. ASSESSMENT and PLAN 
  ICD-10-CM ICD-9-CM 1. Hospital discharge follow-up Z09 V67.59   
2. Acute cystitis without hematuria N30.00 595.0 CULTURE, URINE 3. Altered mental status, unspecified altered mental status type R41.82 780.97 CULTURE, URINE 4. Screening for thyroid disorder Z13.29 V77.0 THYROID CASCADE PROFILE Will notify when labs return Educated about continuing medications as prescribed Educated about notifying office should symptoms return, or continue, for further assessment by neurology. Pt informed to return to office with worsening of symptoms, or PRN with any questions or concerns. Pt verbalizes understanding of plan of care and denies further questions or concerns at this time.

## 2019-12-19 NOTE — PATIENT INSTRUCTIONS

## 2019-12-23 NOTE — PROGRESS NOTES
Please call patients daughter and let her know that his urine culture returned. Bacteria is still present. He was still on Keflex when he was in the office, but I want to ensure that all the bacteria is killed. Are they able to return today for urine collection so that we can ensure no bacteria is still present? Lab only visit.   Thanks

## 2019-12-23 NOTE — PROGRESS NOTES
Pt's daughter, Elissa Soulier (on HIPPA) was advised and verbalized understanding. She states she is unable to bring him this afternoon but will bring him up in the morning for the lab visit. I have added him to the schedule at 145p to help ensure he has this done.

## 2019-12-26 NOTE — PROGRESS NOTES
Please call patients daughter and let her know that urine culture returned and is negative, no UTI  Thanks

## 2019-12-26 NOTE — TELEPHONE ENCOUNTER
----- Message from Sangeeta Garcia NP sent at 12/26/2019 11:23 AM EST -----  Please call patients daughter and let her know that urine culture returned and is negative, no UTI  Thanks

## 2020-01-01 ENCOUNTER — APPOINTMENT (OUTPATIENT)
Dept: ULTRASOUND IMAGING | Age: 85
DRG: 871 | End: 2020-01-01
Attending: STUDENT IN AN ORGANIZED HEALTH CARE EDUCATION/TRAINING PROGRAM
Payer: MEDICARE

## 2020-01-01 ENCOUNTER — APPOINTMENT (OUTPATIENT)
Dept: CT IMAGING | Age: 85
DRG: 871 | End: 2020-01-01
Attending: EMERGENCY MEDICINE
Payer: MEDICARE

## 2020-01-01 ENCOUNTER — HOSPITAL ENCOUNTER (EMERGENCY)
Dept: GENERAL RADIOLOGY | Age: 85
Discharge: HOME | DRG: 871 | End: 2020-01-25
Attending: EMERGENCY MEDICINE
Payer: MEDICARE

## 2020-01-01 ENCOUNTER — APPOINTMENT (OUTPATIENT)
Dept: CT IMAGING | Age: 85
DRG: 871 | End: 2020-01-01
Attending: STUDENT IN AN ORGANIZED HEALTH CARE EDUCATION/TRAINING PROGRAM
Payer: MEDICARE

## 2020-01-01 ENCOUNTER — HOSPITAL ENCOUNTER (INPATIENT)
Age: 85
LOS: 11 days | Discharge: HOSPICE | DRG: 871 | End: 2020-02-05
Attending: EMERGENCY MEDICINE | Admitting: FAMILY MEDICINE
Payer: MEDICARE

## 2020-01-01 ENCOUNTER — APPOINTMENT (OUTPATIENT)
Dept: GENERAL RADIOLOGY | Age: 85
DRG: 871 | End: 2020-01-01
Attending: STUDENT IN AN ORGANIZED HEALTH CARE EDUCATION/TRAINING PROGRAM
Payer: MEDICARE

## 2020-01-01 ENCOUNTER — APPOINTMENT (OUTPATIENT)
Dept: NON INVASIVE DIAGNOSTICS | Age: 85
DRG: 871 | End: 2020-01-01
Attending: STUDENT IN AN ORGANIZED HEALTH CARE EDUCATION/TRAINING PROGRAM
Payer: MEDICARE

## 2020-01-01 ENCOUNTER — PATIENT OUTREACH (OUTPATIENT)
Dept: FAMILY MEDICINE CLINIC | Age: 85
End: 2020-01-01

## 2020-01-01 ENCOUNTER — APPOINTMENT (OUTPATIENT)
Dept: GENERAL RADIOLOGY | Age: 85
DRG: 871 | End: 2020-01-01
Attending: FAMILY MEDICINE
Payer: MEDICARE

## 2020-01-01 ENCOUNTER — TELEPHONE (OUTPATIENT)
Dept: FAMILY MEDICINE CLINIC | Age: 85
End: 2020-01-01

## 2020-01-01 ENCOUNTER — APPOINTMENT (OUTPATIENT)
Dept: NUCLEAR MEDICINE | Age: 85
DRG: 871 | End: 2020-01-01
Attending: STUDENT IN AN ORGANIZED HEALTH CARE EDUCATION/TRAINING PROGRAM
Payer: MEDICARE

## 2020-01-01 VITALS
DIASTOLIC BLOOD PRESSURE: 63 MMHG | RESPIRATION RATE: 18 BRPM | HEART RATE: 66 BPM | HEIGHT: 72 IN | TEMPERATURE: 97.2 F | SYSTOLIC BLOOD PRESSURE: 152 MMHG | OXYGEN SATURATION: 98 % | BODY MASS INDEX: 26.14 KG/M2 | WEIGHT: 193 LBS

## 2020-01-01 DIAGNOSIS — R53.81 DEBILITY: ICD-10-CM

## 2020-01-01 DIAGNOSIS — N18.4 CKD (CHRONIC KIDNEY DISEASE) STAGE 4, GFR 15-29 ML/MIN (HCC): ICD-10-CM

## 2020-01-01 DIAGNOSIS — Z71.89 GOALS OF CARE, COUNSELING/DISCUSSION: ICD-10-CM

## 2020-01-01 DIAGNOSIS — J18.9 PNEUMONIA OF RIGHT UPPER LOBE DUE TO INFECTIOUS ORGANISM: ICD-10-CM

## 2020-01-01 DIAGNOSIS — R53.1 WEAKNESS: Primary | ICD-10-CM

## 2020-01-01 DIAGNOSIS — Z71.89 ADVANCED CARE PLANNING/COUNSELING DISCUSSION: ICD-10-CM

## 2020-01-01 DIAGNOSIS — S61.511A TEAR OF SKIN OF RIGHT WRIST, INITIAL ENCOUNTER: ICD-10-CM

## 2020-01-01 DIAGNOSIS — R41.82 ALTERED MENTAL STATUS, UNSPECIFIED ALTERED MENTAL STATUS TYPE: ICD-10-CM

## 2020-01-01 DIAGNOSIS — E46 PROTEIN-CALORIE MALNUTRITION, UNSPECIFIED SEVERITY (HCC): ICD-10-CM

## 2020-01-01 DIAGNOSIS — R77.8 ELEVATED TROPONIN: ICD-10-CM

## 2020-01-01 DIAGNOSIS — Z71.89 DNR (DO NOT RESUSCITATE) DISCUSSION: ICD-10-CM

## 2020-01-01 DIAGNOSIS — F03.90 DEMENTIA WITHOUT BEHAVIORAL DISTURBANCE, UNSPECIFIED DEMENTIA TYPE: ICD-10-CM

## 2020-01-01 LAB
ABO + RH BLD: NORMAL
ALBUMIN SERPL-MCNC: 2.2 G/DL (ref 3.5–5)
ALBUMIN SERPL-MCNC: 2.3 G/DL (ref 3.5–5)
ALBUMIN SERPL-MCNC: 2.4 G/DL (ref 3.5–5)
ALBUMIN SERPL-MCNC: 2.5 G/DL (ref 3.5–5)
ALBUMIN SERPL-MCNC: 2.5 G/DL (ref 3.5–5)
ALBUMIN SERPL-MCNC: 3 G/DL (ref 3.5–5)
ALBUMIN/GLOB SERPL: 0.6 {RATIO} (ref 1.1–2.2)
ALBUMIN/GLOB SERPL: 0.7 {RATIO} (ref 1.1–2.2)
ALP SERPL-CCNC: 60 U/L (ref 45–117)
ALP SERPL-CCNC: 60 U/L (ref 45–117)
ALP SERPL-CCNC: 62 U/L (ref 45–117)
ALP SERPL-CCNC: 64 U/L (ref 45–117)
ALP SERPL-CCNC: 65 U/L (ref 45–117)
ALP SERPL-CCNC: 65 U/L (ref 45–117)
ALP SERPL-CCNC: 68 U/L (ref 45–117)
ALP SERPL-CCNC: 68 U/L (ref 45–117)
ALP SERPL-CCNC: 72 U/L (ref 45–117)
ALP SERPL-CCNC: 93 U/L (ref 45–117)
ALT SERPL-CCNC: 23 U/L (ref 12–78)
ALT SERPL-CCNC: 30 U/L (ref 12–78)
ALT SERPL-CCNC: 32 U/L (ref 12–78)
ALT SERPL-CCNC: 32 U/L (ref 12–78)
ALT SERPL-CCNC: 34 U/L (ref 12–78)
ALT SERPL-CCNC: 36 U/L (ref 12–78)
ALT SERPL-CCNC: 37 U/L (ref 12–78)
ALT SERPL-CCNC: 41 U/L (ref 12–78)
ALT SERPL-CCNC: 41 U/L (ref 12–78)
ALT SERPL-CCNC: 43 U/L (ref 12–78)
ALT SERPL-CCNC: 45 U/L (ref 12–78)
ALT SERPL-CCNC: 49 U/L (ref 12–78)
AMORPH CRY URNS QL MICRO: ABNORMAL
ANION GAP SERPL CALC-SCNC: 6 MMOL/L (ref 5–15)
ANION GAP SERPL CALC-SCNC: 7 MMOL/L (ref 5–15)
ANION GAP SERPL CALC-SCNC: 8 MMOL/L (ref 5–15)
ANION GAP SERPL CALC-SCNC: 9 MMOL/L (ref 5–15)
APPEARANCE UR: ABNORMAL
APPEARANCE UR: CLEAR
APTT PPP: 24.5 SEC (ref 22.1–32)
APTT PPP: 53.3 SEC (ref 22.1–32)
APTT PPP: 56.3 SEC (ref 22.1–32)
APTT PPP: 68.9 SEC (ref 22.1–32)
APTT PPP: 81.9 SEC (ref 22.1–32)
AST SERPL-CCNC: 125 U/L (ref 15–37)
AST SERPL-CCNC: 138 U/L (ref 15–37)
AST SERPL-CCNC: 32 U/L (ref 15–37)
AST SERPL-CCNC: 35 U/L (ref 15–37)
AST SERPL-CCNC: 35 U/L (ref 15–37)
AST SERPL-CCNC: 36 U/L (ref 15–37)
AST SERPL-CCNC: 40 U/L (ref 15–37)
AST SERPL-CCNC: 45 U/L (ref 15–37)
AST SERPL-CCNC: 58 U/L (ref 15–37)
AST SERPL-CCNC: 59 U/L (ref 15–37)
AST SERPL-CCNC: 79 U/L (ref 15–37)
AST SERPL-CCNC: 91 U/L (ref 15–37)
ATRIAL RATE: 163 BPM
ATRIAL RATE: 93 BPM
B PERT DNA SPEC QL NAA+PROBE: NOT DETECTED
BACTERIA SPEC CULT: NORMAL
BACTERIA SPEC CULT: NORMAL
BACTERIA URNS QL MICRO: NEGATIVE /HPF
BACTERIA URNS QL MICRO: NEGATIVE /HPF
BASOPHILS # BLD: 0 K/UL (ref 0–0.1)
BASOPHILS # BLD: 0.1 K/UL (ref 0–0.1)
BASOPHILS NFR BLD: 0 % (ref 0–1)
BASOPHILS NFR BLD: 1 % (ref 0–1)
BASOPHILS NFR BLD: 1 % (ref 0–1)
BILIRUB SERPL-MCNC: 0.5 MG/DL (ref 0.2–1)
BILIRUB SERPL-MCNC: 0.6 MG/DL (ref 0.2–1)
BILIRUB SERPL-MCNC: 0.7 MG/DL (ref 0.2–1)
BILIRUB SERPL-MCNC: 0.7 MG/DL (ref 0.2–1)
BILIRUB SERPL-MCNC: 0.9 MG/DL (ref 0.2–1)
BILIRUB SERPL-MCNC: 0.9 MG/DL (ref 0.2–1)
BILIRUB SERPL-MCNC: 1 MG/DL (ref 0.2–1)
BILIRUB SERPL-MCNC: 1.2 MG/DL (ref 0.2–1)
BILIRUB UR QL: NEGATIVE
BILIRUB UR QL: NEGATIVE
BLOOD GROUP ANTIBODIES SERPL: NORMAL
BNP SERPL-MCNC: ABNORMAL PG/ML
BORDETELLA PARAPERTUSSIS PCR, BORPAR: NOT DETECTED
BUN SERPL-MCNC: 38 MG/DL (ref 6–20)
BUN SERPL-MCNC: 40 MG/DL (ref 6–20)
BUN SERPL-MCNC: 49 MG/DL (ref 6–20)
BUN SERPL-MCNC: 50 MG/DL (ref 6–20)
BUN SERPL-MCNC: 54 MG/DL (ref 6–20)
BUN SERPL-MCNC: 61 MG/DL (ref 6–20)
BUN SERPL-MCNC: 64 MG/DL (ref 6–20)
BUN SERPL-MCNC: 68 MG/DL (ref 6–20)
BUN SERPL-MCNC: 69 MG/DL (ref 6–20)
BUN SERPL-MCNC: 71 MG/DL (ref 6–20)
BUN SERPL-MCNC: 71 MG/DL (ref 6–20)
BUN SERPL-MCNC: 72 MG/DL (ref 6–20)
BUN SERPL-MCNC: 73 MG/DL (ref 6–20)
BUN/CREAT SERPL: 12 (ref 12–20)
BUN/CREAT SERPL: 15 (ref 12–20)
BUN/CREAT SERPL: 16 (ref 12–20)
BUN/CREAT SERPL: 17 (ref 12–20)
BUN/CREAT SERPL: 18 (ref 12–20)
BUN/CREAT SERPL: 21 (ref 12–20)
BUN/CREAT SERPL: 21 (ref 12–20)
BUN/CREAT SERPL: 22 (ref 12–20)
BUN/CREAT SERPL: 23 (ref 12–20)
BUN/CREAT SERPL: 24 (ref 12–20)
BUN/CREAT SERPL: 25 (ref 12–20)
C PNEUM DNA SPEC QL NAA+PROBE: NOT DETECTED
CALCIUM SERPL-MCNC: 7.7 MG/DL (ref 8.5–10.1)
CALCIUM SERPL-MCNC: 7.8 MG/DL (ref 8.5–10.1)
CALCIUM SERPL-MCNC: 7.9 MG/DL (ref 8.5–10.1)
CALCIUM SERPL-MCNC: 8.2 MG/DL (ref 8.5–10.1)
CALCIUM SERPL-MCNC: 8.3 MG/DL (ref 8.5–10.1)
CALCIUM SERPL-MCNC: 8.5 MG/DL (ref 8.5–10.1)
CALCIUM SERPL-MCNC: 8.7 MG/DL (ref 8.5–10.1)
CALCULATED P AXIS, ECG09: 84 DEGREES
CALCULATED R AXIS, ECG10: 42 DEGREES
CALCULATED R AXIS, ECG10: 52 DEGREES
CALCULATED T AXIS, ECG11: -84 DEGREES
CALCULATED T AXIS, ECG11: 34 DEGREES
CHLORIDE SERPL-SCNC: 107 MMOL/L (ref 97–108)
CHLORIDE SERPL-SCNC: 111 MMOL/L (ref 97–108)
CHLORIDE SERPL-SCNC: 111 MMOL/L (ref 97–108)
CHLORIDE SERPL-SCNC: 112 MMOL/L (ref 97–108)
CHLORIDE SERPL-SCNC: 114 MMOL/L (ref 97–108)
CHLORIDE SERPL-SCNC: 114 MMOL/L (ref 97–108)
CHLORIDE SERPL-SCNC: 115 MMOL/L (ref 97–108)
CHLORIDE SERPL-SCNC: 117 MMOL/L (ref 97–108)
CHLORIDE SERPL-SCNC: 118 MMOL/L (ref 97–108)
CK MB CFR SERPL CALC: 1 % (ref 0–2.5)
CK MB SERPL-MCNC: 42.3 NG/ML (ref 5–25)
CK SERPL-CCNC: 1211 U/L (ref 39–308)
CK SERPL-CCNC: 1939 U/L (ref 39–308)
CK SERPL-CCNC: 2422 U/L (ref 39–308)
CK SERPL-CCNC: 263 U/L (ref 39–308)
CK SERPL-CCNC: 288 U/L (ref 39–308)
CK SERPL-CCNC: 352 U/L (ref 39–308)
CK SERPL-CCNC: 4051 U/L (ref 39–308)
CK SERPL-CCNC: 529 U/L (ref 39–308)
CO2 SERPL-SCNC: 20 MMOL/L (ref 21–32)
CO2 SERPL-SCNC: 21 MMOL/L (ref 21–32)
CO2 SERPL-SCNC: 21 MMOL/L (ref 21–32)
CO2 SERPL-SCNC: 22 MMOL/L (ref 21–32)
CO2 SERPL-SCNC: 23 MMOL/L (ref 21–32)
CO2 SERPL-SCNC: 23 MMOL/L (ref 21–32)
CO2 SERPL-SCNC: 24 MMOL/L (ref 21–32)
CO2 SERPL-SCNC: 24 MMOL/L (ref 21–32)
CO2 SERPL-SCNC: 25 MMOL/L (ref 21–32)
CO2 SERPL-SCNC: 25 MMOL/L (ref 21–32)
CO2 SERPL-SCNC: 26 MMOL/L (ref 21–32)
CO2 SERPL-SCNC: 26 MMOL/L (ref 21–32)
CO2 SERPL-SCNC: 27 MMOL/L (ref 21–32)
COLOR UR: ABNORMAL
COLOR UR: ABNORMAL
COMMENT, HOLDF: NORMAL
COMMENT, HOLDF: NORMAL
CREAT SERPL-MCNC: 2.66 MG/DL (ref 0.7–1.3)
CREAT SERPL-MCNC: 2.7 MG/DL (ref 0.7–1.3)
CREAT SERPL-MCNC: 2.81 MG/DL (ref 0.7–1.3)
CREAT SERPL-MCNC: 2.89 MG/DL (ref 0.7–1.3)
CREAT SERPL-MCNC: 2.95 MG/DL (ref 0.7–1.3)
CREAT SERPL-MCNC: 2.96 MG/DL (ref 0.7–1.3)
CREAT SERPL-MCNC: 2.98 MG/DL (ref 0.7–1.3)
CREAT SERPL-MCNC: 3 MG/DL (ref 0.7–1.3)
CREAT SERPL-MCNC: 3.02 MG/DL (ref 0.7–1.3)
CREAT SERPL-MCNC: 3.1 MG/DL (ref 0.7–1.3)
CREAT SERPL-MCNC: 3.1 MG/DL (ref 0.7–1.3)
CREAT SERPL-MCNC: 3.11 MG/DL (ref 0.7–1.3)
CREAT SERPL-MCNC: 3.13 MG/DL (ref 0.7–1.3)
CRP SERPL HS-MCNC: >9.5 MG/L
DIAGNOSIS, 93000: NORMAL
DIAGNOSIS, 93000: NORMAL
DIFFERENTIAL METHOD BLD: ABNORMAL
ECHO AO ASC DIAM: 3.48 CM
ECHO AO ROOT DIAM: 3.39 CM
ECHO LA AREA 4C: 20.9 CM2
ECHO LA MAJOR AXIS: 4.25 CM
ECHO LA TO AORTIC ROOT RATIO: 1.26
ECHO LA VOL 2C: 74.56 ML (ref 18–58)
ECHO LA VOL 4C: 62.07 ML (ref 18–58)
ECHO LA VOL BP: 70.1 ML (ref 18–58)
ECHO LA VOL/BSA BIPLANE: 33.17 ML/M2 (ref 16–28)
ECHO LA VOLUME INDEX A2C: 35.28 ML/M2 (ref 16–28)
ECHO LA VOLUME INDEX A4C: 29.37 ML/M2 (ref 16–28)
ECHO LV EDV A2C: 104.9 ML
ECHO LV EDV A4C: 111.6 ML
ECHO LV EDV BP: 107.5 ML (ref 67–155)
ECHO LV EDV INDEX A4C: 52.8 ML/M2
ECHO LV EDV INDEX BP: 50.9 ML/M2
ECHO LV EDV NDEX A2C: 49.6 ML/M2
ECHO LV EDV TEICHHOLZ: 0.61 ML
ECHO LV EJECTION FRACTION A2C: 34 %
ECHO LV EJECTION FRACTION A4C: 33 %
ECHO LV EJECTION FRACTION BIPLANE: 31.1 % (ref 55–100)
ECHO LV ESV A2C: 69.3 ML
ECHO LV ESV A4C: 75.1 ML
ECHO LV ESV BP: 74.1 ML (ref 22–58)
ECHO LV ESV INDEX A2C: 32.8 ML/M2
ECHO LV ESV INDEX A4C: 35.5 ML/M2
ECHO LV ESV INDEX BP: 35.1 ML/M2
ECHO LV ESV TEICHHOLZ: 0.43 ML
ECHO LV INTERNAL DIMENSION DIASTOLIC: 4.89 CM (ref 4.2–5.9)
ECHO LV INTERNAL DIMENSION SYSTOLIC: 4.21 CM
ECHO LV IVSD: 1.28 CM (ref 0.6–1)
ECHO LV MASS 2D: 274.7 G (ref 88–224)
ECHO LV MASS INDEX 2D: 130 G/M2 (ref 49–115)
ECHO LV POSTERIOR WALL DIASTOLIC: 1.16 CM (ref 0.6–1)
ECHO LVOT DIAM: 2.23 CM
ECHO MV A VELOCITY: 73.15 CM/S
ECHO MV AREA PHT: 4.8 CM2
ECHO MV E DECELERATION TIME (DT): 159.6 MS
ECHO MV E VELOCITY: 93.78 CM/S
ECHO MV E/A RATIO: 1.28
ECHO MV PRESSURE HALF TIME (PHT): 46.3 MS
ECHO PULMONARY ARTERY SYSTOLIC PRESSURE (PASP): 55 MMHG
ECHO RA AREA 4C: 19.07 CM2
ECHO RV INTERNAL DIMENSION: 4.79 CM
ECHO RV TAPSE: 1.99 CM (ref 1.5–2)
ECHO TV REGURGITANT MAX VELOCITY: 363.89 CM/S
ECHO TV REGURGITANT PEAK GRADIENT: 53 MMHG
EOSINOPHIL # BLD: 0 K/UL (ref 0–0.4)
EOSINOPHIL # BLD: 0.1 K/UL (ref 0–0.4)
EOSINOPHIL # BLD: 0.3 K/UL (ref 0–0.4)
EOSINOPHIL # BLD: 0.4 K/UL (ref 0–0.4)
EOSINOPHIL NFR BLD: 0 % (ref 0–7)
EOSINOPHIL NFR BLD: 1 % (ref 0–7)
EOSINOPHIL NFR BLD: 2 % (ref 0–7)
EOSINOPHIL NFR BLD: 3 % (ref 0–7)
EOSINOPHIL NFR BLD: 3 % (ref 0–7)
EOSINOPHIL NFR BLD: 4 % (ref 0–7)
EOSINOPHIL NFR BLD: 4 % (ref 0–7)
EPITH CASTS URNS QL MICRO: ABNORMAL /LPF
EPITH CASTS URNS QL MICRO: ABNORMAL /LPF
ERYTHROCYTE [DISTWIDTH] IN BLOOD BY AUTOMATED COUNT: 12.3 % (ref 11.5–14.5)
ERYTHROCYTE [DISTWIDTH] IN BLOOD BY AUTOMATED COUNT: 12.5 % (ref 11.5–14.5)
ERYTHROCYTE [DISTWIDTH] IN BLOOD BY AUTOMATED COUNT: 12.5 % (ref 11.5–14.5)
ERYTHROCYTE [DISTWIDTH] IN BLOOD BY AUTOMATED COUNT: 12.6 % (ref 11.5–14.5)
ERYTHROCYTE [DISTWIDTH] IN BLOOD BY AUTOMATED COUNT: 12.7 % (ref 11.5–14.5)
ERYTHROCYTE [DISTWIDTH] IN BLOOD BY AUTOMATED COUNT: 12.8 % (ref 11.5–14.5)
ERYTHROCYTE [DISTWIDTH] IN BLOOD BY AUTOMATED COUNT: 13 % (ref 11.5–14.5)
ERYTHROCYTE [SEDIMENTATION RATE] IN BLOOD: 60 MM/HR (ref 0–20)
FERRITIN SERPL-MCNC: 597 NG/ML (ref 26–388)
FLUAV H1 2009 PAND RNA SPEC QL NAA+PROBE: NOT DETECTED
FLUAV H1 RNA SPEC QL NAA+PROBE: NOT DETECTED
FLUAV H3 RNA SPEC QL NAA+PROBE: NOT DETECTED
FLUAV SUBTYP SPEC NAA+PROBE: NOT DETECTED
FLUBV RNA SPEC QL NAA+PROBE: NOT DETECTED
FOLATE SERPL-MCNC: 27.4 NG/ML (ref 5–21)
GLOBULIN SER CALC-MCNC: 3.3 G/DL (ref 2–4)
GLOBULIN SER CALC-MCNC: 3.3 G/DL (ref 2–4)
GLOBULIN SER CALC-MCNC: 3.4 G/DL (ref 2–4)
GLOBULIN SER CALC-MCNC: 3.4 G/DL (ref 2–4)
GLOBULIN SER CALC-MCNC: 3.5 G/DL (ref 2–4)
GLOBULIN SER CALC-MCNC: 3.6 G/DL (ref 2–4)
GLOBULIN SER CALC-MCNC: 3.7 G/DL (ref 2–4)
GLOBULIN SER CALC-MCNC: 3.8 G/DL (ref 2–4)
GLOBULIN SER CALC-MCNC: 3.8 G/DL (ref 2–4)
GLOBULIN SER CALC-MCNC: 3.9 G/DL (ref 2–4)
GLOBULIN SER CALC-MCNC: 4.1 G/DL (ref 2–4)
GLOBULIN SER CALC-MCNC: 4.2 G/DL (ref 2–4)
GLUCOSE BLD STRIP.AUTO-MCNC: 111 MG/DL (ref 65–100)
GLUCOSE BLD STRIP.AUTO-MCNC: 128 MG/DL (ref 65–100)
GLUCOSE BLD STRIP.AUTO-MCNC: 145 MG/DL (ref 65–100)
GLUCOSE BLD STRIP.AUTO-MCNC: 146 MG/DL (ref 65–100)
GLUCOSE BLD STRIP.AUTO-MCNC: 146 MG/DL (ref 65–100)
GLUCOSE BLD STRIP.AUTO-MCNC: 159 MG/DL (ref 65–100)
GLUCOSE BLD STRIP.AUTO-MCNC: 160 MG/DL (ref 65–100)
GLUCOSE BLD STRIP.AUTO-MCNC: 168 MG/DL (ref 65–100)
GLUCOSE BLD STRIP.AUTO-MCNC: 170 MG/DL (ref 65–100)
GLUCOSE BLD STRIP.AUTO-MCNC: 170 MG/DL (ref 65–100)
GLUCOSE BLD STRIP.AUTO-MCNC: 171 MG/DL (ref 65–100)
GLUCOSE BLD STRIP.AUTO-MCNC: 175 MG/DL (ref 65–100)
GLUCOSE BLD STRIP.AUTO-MCNC: 176 MG/DL (ref 65–100)
GLUCOSE BLD STRIP.AUTO-MCNC: 177 MG/DL (ref 65–100)
GLUCOSE BLD STRIP.AUTO-MCNC: 178 MG/DL (ref 65–100)
GLUCOSE BLD STRIP.AUTO-MCNC: 182 MG/DL (ref 65–100)
GLUCOSE BLD STRIP.AUTO-MCNC: 183 MG/DL (ref 65–100)
GLUCOSE BLD STRIP.AUTO-MCNC: 184 MG/DL (ref 65–100)
GLUCOSE BLD STRIP.AUTO-MCNC: 186 MG/DL (ref 65–100)
GLUCOSE BLD STRIP.AUTO-MCNC: 186 MG/DL (ref 65–100)
GLUCOSE BLD STRIP.AUTO-MCNC: 198 MG/DL (ref 65–100)
GLUCOSE BLD STRIP.AUTO-MCNC: 200 MG/DL (ref 65–100)
GLUCOSE BLD STRIP.AUTO-MCNC: 202 MG/DL (ref 65–100)
GLUCOSE BLD STRIP.AUTO-MCNC: 203 MG/DL (ref 65–100)
GLUCOSE BLD STRIP.AUTO-MCNC: 207 MG/DL (ref 65–100)
GLUCOSE BLD STRIP.AUTO-MCNC: 208 MG/DL (ref 65–100)
GLUCOSE BLD STRIP.AUTO-MCNC: 209 MG/DL (ref 65–100)
GLUCOSE BLD STRIP.AUTO-MCNC: 213 MG/DL (ref 65–100)
GLUCOSE BLD STRIP.AUTO-MCNC: 214 MG/DL (ref 65–100)
GLUCOSE BLD STRIP.AUTO-MCNC: 218 MG/DL (ref 65–100)
GLUCOSE BLD STRIP.AUTO-MCNC: 222 MG/DL (ref 65–100)
GLUCOSE BLD STRIP.AUTO-MCNC: 227 MG/DL (ref 65–100)
GLUCOSE BLD STRIP.AUTO-MCNC: 227 MG/DL (ref 65–100)
GLUCOSE BLD STRIP.AUTO-MCNC: 230 MG/DL (ref 65–100)
GLUCOSE BLD STRIP.AUTO-MCNC: 232 MG/DL (ref 65–100)
GLUCOSE BLD STRIP.AUTO-MCNC: 239 MG/DL (ref 65–100)
GLUCOSE BLD STRIP.AUTO-MCNC: 250 MG/DL (ref 65–100)
GLUCOSE BLD STRIP.AUTO-MCNC: 251 MG/DL (ref 65–100)
GLUCOSE BLD STRIP.AUTO-MCNC: 252 MG/DL (ref 65–100)
GLUCOSE BLD STRIP.AUTO-MCNC: 257 MG/DL (ref 65–100)
GLUCOSE BLD STRIP.AUTO-MCNC: 263 MG/DL (ref 65–100)
GLUCOSE SERPL-MCNC: 129 MG/DL (ref 65–100)
GLUCOSE SERPL-MCNC: 137 MG/DL (ref 65–100)
GLUCOSE SERPL-MCNC: 140 MG/DL (ref 65–100)
GLUCOSE SERPL-MCNC: 157 MG/DL (ref 65–100)
GLUCOSE SERPL-MCNC: 168 MG/DL (ref 65–100)
GLUCOSE SERPL-MCNC: 177 MG/DL (ref 65–100)
GLUCOSE SERPL-MCNC: 178 MG/DL (ref 65–100)
GLUCOSE SERPL-MCNC: 195 MG/DL (ref 65–100)
GLUCOSE SERPL-MCNC: 206 MG/DL (ref 65–100)
GLUCOSE SERPL-MCNC: 207 MG/DL (ref 65–100)
GLUCOSE SERPL-MCNC: 220 MG/DL (ref 65–100)
GLUCOSE SERPL-MCNC: 254 MG/DL (ref 65–100)
GLUCOSE SERPL-MCNC: 293 MG/DL (ref 65–100)
GLUCOSE UR STRIP.AUTO-MCNC: 100 MG/DL
GLUCOSE UR STRIP.AUTO-MCNC: NEGATIVE MG/DL
HADV DNA SPEC QL NAA+PROBE: NOT DETECTED
HAPTOGLOB SERPL-MCNC: 370 MG/DL (ref 30–200)
HAV IGM SER QL: NONREACTIVE
HBV CORE IGM SER QL: NONREACTIVE
HBV SURFACE AG SER QL: <0.1 INDEX
HBV SURFACE AG SER QL: NEGATIVE
HCOV 229E RNA SPEC QL NAA+PROBE: NOT DETECTED
HCOV HKU1 RNA SPEC QL NAA+PROBE: NOT DETECTED
HCOV NL63 RNA SPEC QL NAA+PROBE: NOT DETECTED
HCOV OC43 RNA SPEC QL NAA+PROBE: NOT DETECTED
HCT VFR BLD AUTO: 25 % (ref 36.6–50.3)
HCT VFR BLD AUTO: 26 % (ref 36.6–50.3)
HCT VFR BLD AUTO: 26.1 % (ref 36.6–50.3)
HCT VFR BLD AUTO: 26.5 % (ref 36.6–50.3)
HCT VFR BLD AUTO: 26.8 % (ref 36.6–50.3)
HCT VFR BLD AUTO: 26.9 % (ref 36.6–50.3)
HCT VFR BLD AUTO: 27 % (ref 36.6–50.3)
HCT VFR BLD AUTO: 27.8 % (ref 36.6–50.3)
HCT VFR BLD AUTO: 29.9 % (ref 36.6–50.3)
HCT VFR BLD AUTO: 30 % (ref 36.6–50.3)
HCT VFR BLD AUTO: 32.2 % (ref 36.6–50.3)
HCT VFR BLD AUTO: 32.7 % (ref 36.6–50.3)
HCT VFR BLD AUTO: 33.1 % (ref 36.6–50.3)
HCT VFR BLD AUTO: 37 % (ref 36.6–50.3)
HCV AB SERPL QL IA: NONREACTIVE
HCV COMMENT,HCGAC: NORMAL
HEMOCCULT STL QL: POSITIVE
HGB BLD-MCNC: 10.7 G/DL (ref 12.1–17)
HGB BLD-MCNC: 10.7 G/DL (ref 12.1–17)
HGB BLD-MCNC: 10.9 G/DL (ref 12.1–17)
HGB BLD-MCNC: 12.5 G/DL (ref 12.1–17)
HGB BLD-MCNC: 7.8 G/DL (ref 12.1–17)
HGB BLD-MCNC: 8.1 G/DL (ref 12.1–17)
HGB BLD-MCNC: 8.2 G/DL (ref 12.1–17)
HGB BLD-MCNC: 8.4 G/DL (ref 12.1–17)
HGB BLD-MCNC: 8.5 G/DL (ref 12.1–17)
HGB BLD-MCNC: 8.9 G/DL (ref 12.1–17)
HGB BLD-MCNC: 9.6 G/DL (ref 12.1–17)
HGB BLD-MCNC: 9.7 G/DL (ref 12.1–17)
HGB UR QL STRIP: ABNORMAL
HGB UR QL STRIP: ABNORMAL
HMPV RNA SPEC QL NAA+PROBE: NOT DETECTED
HPIV1 RNA SPEC QL NAA+PROBE: NOT DETECTED
HPIV2 RNA SPEC QL NAA+PROBE: NOT DETECTED
HPIV3 RNA SPEC QL NAA+PROBE: NOT DETECTED
HPIV4 RNA SPEC QL NAA+PROBE: NOT DETECTED
HYALINE CASTS URNS QL MICRO: ABNORMAL /LPF (ref 0–5)
IMM GRANULOCYTES # BLD AUTO: 0 K/UL (ref 0–0.04)
IMM GRANULOCYTES # BLD AUTO: 0.1 K/UL (ref 0–0.04)
IMM GRANULOCYTES # BLD AUTO: 0.2 K/UL (ref 0–0.04)
IMM GRANULOCYTES NFR BLD AUTO: 0 % (ref 0–0.5)
IMM GRANULOCYTES NFR BLD AUTO: 1 % (ref 0–0.5)
IMM GRANULOCYTES NFR BLD AUTO: 2 % (ref 0–0.5)
INR PPP: 1.1 (ref 0.9–1.1)
IRON SATN MFR SERPL: 28 % (ref 20–50)
IRON SERPL-MCNC: 48 UG/DL (ref 35–150)
KETONES UR QL STRIP.AUTO: NEGATIVE MG/DL
KETONES UR QL STRIP.AUTO: NEGATIVE MG/DL
L PNEUMO1 AG UR QL IA: NEGATIVE
LACTATE SERPL-SCNC: 0.8 MMOL/L (ref 0.4–2)
LACTATE SERPL-SCNC: 0.9 MMOL/L (ref 0.4–2)
LACTATE SERPL-SCNC: 1.2 MMOL/L (ref 0.4–2)
LACTATE SERPL-SCNC: 1.2 MMOL/L (ref 0.4–2)
LACTATE SERPL-SCNC: 1.3 MMOL/L (ref 0.4–2)
LACTATE SERPL-SCNC: 1.4 MMOL/L (ref 0.4–2)
LACTATE SERPL-SCNC: 1.5 MMOL/L (ref 0.4–2)
LACTATE SERPL-SCNC: 1.8 MMOL/L (ref 0.4–2)
LACTATE SERPL-SCNC: 1.9 MMOL/L (ref 0.4–2)
LACTATE SERPL-SCNC: 2 MMOL/L (ref 0.4–2)
LEUKOCYTE ESTERASE UR QL STRIP.AUTO: ABNORMAL
LEUKOCYTE ESTERASE UR QL STRIP.AUTO: NEGATIVE
LVFS 2D: 13.8 %
LVSV (MOD BI): 15.74 ML
LVSV (MOD SINGLE 4C): 17.17 ML
LVSV (MOD SINGLE): 16.78 ML
LVSV (TEICH): 15.51 ML
LYMPHOCYTES # BLD: 0.8 K/UL (ref 0.8–3.5)
LYMPHOCYTES # BLD: 0.8 K/UL (ref 0.8–3.5)
LYMPHOCYTES # BLD: 0.9 K/UL (ref 0.8–3.5)
LYMPHOCYTES # BLD: 1.2 K/UL (ref 0.8–3.5)
LYMPHOCYTES # BLD: 1.2 K/UL (ref 0.8–3.5)
LYMPHOCYTES # BLD: 1.3 K/UL (ref 0.8–3.5)
LYMPHOCYTES # BLD: 1.4 K/UL (ref 0.8–3.5)
LYMPHOCYTES # BLD: 1.5 K/UL (ref 0.8–3.5)
LYMPHOCYTES # BLD: 1.5 K/UL (ref 0.8–3.5)
LYMPHOCYTES # BLD: 1.7 K/UL (ref 0.8–3.5)
LYMPHOCYTES # BLD: 1.8 K/UL (ref 0.8–3.5)
LYMPHOCYTES # BLD: 2 K/UL (ref 0.8–3.5)
LYMPHOCYTES NFR BLD: 10 % (ref 12–49)
LYMPHOCYTES NFR BLD: 10 % (ref 12–49)
LYMPHOCYTES NFR BLD: 12 % (ref 12–49)
LYMPHOCYTES NFR BLD: 15 % (ref 12–49)
LYMPHOCYTES NFR BLD: 16 % (ref 12–49)
LYMPHOCYTES NFR BLD: 16 % (ref 12–49)
LYMPHOCYTES NFR BLD: 20 % (ref 12–49)
LYMPHOCYTES NFR BLD: 5 % (ref 12–49)
LYMPHOCYTES NFR BLD: 5 % (ref 12–49)
LYMPHOCYTES NFR BLD: 7 % (ref 12–49)
LYMPHOCYTES NFR BLD: 7 % (ref 12–49)
LYMPHOCYTES NFR BLD: 8 % (ref 12–49)
M PNEUMO DNA SPEC QL NAA+PROBE: NOT DETECTED
M PNEUMO IGG SER IA-ACNC: <100 U/ML (ref 0–99)
M PNEUMO IGM SER IA-ACNC: <770 U/ML (ref 0–769)
MAGNESIUM SERPL-MCNC: 1.7 MG/DL (ref 1.6–2.4)
MAGNESIUM SERPL-MCNC: 1.9 MG/DL (ref 1.6–2.4)
MAGNESIUM SERPL-MCNC: 2 MG/DL (ref 1.6–2.4)
MAGNESIUM SERPL-MCNC: 2.1 MG/DL (ref 1.6–2.4)
MAGNESIUM SERPL-MCNC: 2.2 MG/DL (ref 1.6–2.4)
MAGNESIUM SERPL-MCNC: 2.3 MG/DL (ref 1.6–2.4)
MAGNESIUM SERPL-MCNC: 2.3 MG/DL (ref 1.6–2.4)
MAGNESIUM SERPL-MCNC: 2.4 MG/DL (ref 1.6–2.4)
MCH RBC QN AUTO: 32 PG (ref 26–34)
MCH RBC QN AUTO: 32.2 PG (ref 26–34)
MCH RBC QN AUTO: 32.4 PG (ref 26–34)
MCH RBC QN AUTO: 32.5 PG (ref 26–34)
MCH RBC QN AUTO: 32.5 PG (ref 26–34)
MCH RBC QN AUTO: 32.6 PG (ref 26–34)
MCH RBC QN AUTO: 32.7 PG (ref 26–34)
MCH RBC QN AUTO: 32.9 PG (ref 26–34)
MCH RBC QN AUTO: 32.9 PG (ref 26–34)
MCHC RBC AUTO-ENTMCNC: 31.2 G/DL (ref 30–36.5)
MCHC RBC AUTO-ENTMCNC: 31.3 G/DL (ref 30–36.5)
MCHC RBC AUTO-ENTMCNC: 31.4 G/DL (ref 30–36.5)
MCHC RBC AUTO-ENTMCNC: 31.5 G/DL (ref 30–36.5)
MCHC RBC AUTO-ENTMCNC: 31.7 G/DL (ref 30–36.5)
MCHC RBC AUTO-ENTMCNC: 32 G/DL (ref 30–36.5)
MCHC RBC AUTO-ENTMCNC: 32.1 G/DL (ref 30–36.5)
MCHC RBC AUTO-ENTMCNC: 32.3 G/DL (ref 30–36.5)
MCHC RBC AUTO-ENTMCNC: 32.7 G/DL (ref 30–36.5)
MCHC RBC AUTO-ENTMCNC: 32.9 G/DL (ref 30–36.5)
MCHC RBC AUTO-ENTMCNC: 33.2 G/DL (ref 30–36.5)
MCHC RBC AUTO-ENTMCNC: 33.8 G/DL (ref 30–36.5)
MCV RBC AUTO: 101 FL (ref 80–99)
MCV RBC AUTO: 101.5 FL (ref 80–99)
MCV RBC AUTO: 101.5 FL (ref 80–99)
MCV RBC AUTO: 101.7 FL (ref 80–99)
MCV RBC AUTO: 102.3 FL (ref 80–99)
MCV RBC AUTO: 102.4 FL (ref 80–99)
MCV RBC AUTO: 103.7 FL (ref 80–99)
MCV RBC AUTO: 103.9 FL (ref 80–99)
MCV RBC AUTO: 104 FL (ref 80–99)
MCV RBC AUTO: 104.7 FL (ref 80–99)
MCV RBC AUTO: 96.6 FL (ref 80–99)
MCV RBC AUTO: 99.1 FL (ref 80–99)
MCV RBC AUTO: 99.1 FL (ref 80–99)
MCV RBC AUTO: 99.4 FL (ref 80–99)
MONOCYTES # BLD: 0.7 K/UL (ref 0–1)
MONOCYTES # BLD: 0.8 K/UL (ref 0–1)
MONOCYTES # BLD: 1 K/UL (ref 0–1)
MONOCYTES # BLD: 1 K/UL (ref 0–1)
MONOCYTES # BLD: 1.1 K/UL (ref 0–1)
MONOCYTES # BLD: 1.2 K/UL (ref 0–1)
MONOCYTES # BLD: 1.5 K/UL (ref 0–1)
MONOCYTES # BLD: 1.6 K/UL (ref 0–1)
MONOCYTES NFR BLD: 5 % (ref 5–13)
MONOCYTES NFR BLD: 7 % (ref 5–13)
MONOCYTES NFR BLD: 7 % (ref 5–13)
MONOCYTES NFR BLD: 8 % (ref 5–13)
MONOCYTES NFR BLD: 9 % (ref 5–13)
MUCOUS THREADS URNS QL MICRO: ABNORMAL /LPF
MV DEC SLOPE: 5.88
NEUTS SEG # BLD: 10.2 K/UL (ref 1.8–8)
NEUTS SEG # BLD: 11 K/UL (ref 1.8–8)
NEUTS SEG # BLD: 11.3 K/UL (ref 1.8–8)
NEUTS SEG # BLD: 13.2 K/UL (ref 1.8–8)
NEUTS SEG # BLD: 13.3 K/UL (ref 1.8–8)
NEUTS SEG # BLD: 13.5 K/UL (ref 1.8–8)
NEUTS SEG # BLD: 14.5 K/UL (ref 1.8–8)
NEUTS SEG # BLD: 14.7 K/UL (ref 1.8–8)
NEUTS SEG # BLD: 6 K/UL (ref 1.8–8)
NEUTS SEG # BLD: 6.3 K/UL (ref 1.8–8)
NEUTS SEG # BLD: 7 K/UL (ref 1.8–8)
NEUTS SEG # BLD: 7.5 K/UL (ref 1.8–8)
NEUTS SEG NFR BLD: 65 % (ref 32–75)
NEUTS SEG NFR BLD: 70 % (ref 32–75)
NEUTS SEG NFR BLD: 71 % (ref 32–75)
NEUTS SEG NFR BLD: 72 % (ref 32–75)
NEUTS SEG NFR BLD: 78 % (ref 32–75)
NEUTS SEG NFR BLD: 79 % (ref 32–75)
NEUTS SEG NFR BLD: 80 % (ref 32–75)
NEUTS SEG NFR BLD: 83 % (ref 32–75)
NEUTS SEG NFR BLD: 87 % (ref 32–75)
NEUTS SEG NFR BLD: 90 % (ref 32–75)
NITRITE UR QL STRIP.AUTO: NEGATIVE
NITRITE UR QL STRIP.AUTO: NEGATIVE
NRBC # BLD: 0 K/UL (ref 0–0.01)
NRBC BLD-RTO: 0 PER 100 WBC
P-R INTERVAL, ECG05: 178 MS
PH UR STRIP: 5 [PH] (ref 5–8)
PH UR STRIP: 5 [PH] (ref 5–8)
PHOSPHATE SERPL-MCNC: 2.3 MG/DL (ref 2.6–4.7)
PHOSPHATE SERPL-MCNC: 2.9 MG/DL (ref 2.6–4.7)
PHOSPHATE SERPL-MCNC: 3.1 MG/DL (ref 2.6–4.7)
PHOSPHATE SERPL-MCNC: 3.5 MG/DL (ref 2.6–4.7)
PHOSPHATE SERPL-MCNC: 3.6 MG/DL (ref 2.6–4.7)
PHOSPHATE SERPL-MCNC: 3.7 MG/DL (ref 2.6–4.7)
PHOSPHATE SERPL-MCNC: 3.7 MG/DL (ref 2.6–4.7)
PHOSPHATE SERPL-MCNC: 3.9 MG/DL (ref 2.6–4.7)
PHOSPHATE SERPL-MCNC: 3.9 MG/DL (ref 2.6–4.7)
PHOSPHATE SERPL-MCNC: 4 MG/DL (ref 2.6–4.7)
PHOSPHATE SERPL-MCNC: 4.1 MG/DL (ref 2.6–4.7)
PLATELET # BLD AUTO: 157 K/UL (ref 150–400)
PLATELET # BLD AUTO: 174 K/UL (ref 150–400)
PLATELET # BLD AUTO: 177 K/UL (ref 150–400)
PLATELET # BLD AUTO: 184 K/UL (ref 150–400)
PLATELET # BLD AUTO: 200 K/UL (ref 150–400)
PLATELET # BLD AUTO: 210 K/UL (ref 150–400)
PLATELET # BLD AUTO: 217 K/UL (ref 150–400)
PLATELET # BLD AUTO: 217 K/UL (ref 150–400)
PLATELET # BLD AUTO: 229 K/UL (ref 150–400)
PLATELET # BLD AUTO: 234 K/UL (ref 150–400)
PLATELET # BLD AUTO: 242 K/UL (ref 150–400)
PLATELET # BLD AUTO: 245 K/UL (ref 150–400)
PLATELET # BLD AUTO: 247 K/UL (ref 150–400)
PLATELET # BLD AUTO: 263 K/UL (ref 150–400)
PMV BLD AUTO: 10.1 FL (ref 8.9–12.9)
PMV BLD AUTO: 10.3 FL (ref 8.9–12.9)
PMV BLD AUTO: 10.4 FL (ref 8.9–12.9)
PMV BLD AUTO: 10.4 FL (ref 8.9–12.9)
PMV BLD AUTO: 10.5 FL (ref 8.9–12.9)
PMV BLD AUTO: 10.6 FL (ref 8.9–12.9)
PMV BLD AUTO: 10.7 FL (ref 8.9–12.9)
PMV BLD AUTO: 10.9 FL (ref 8.9–12.9)
PMV BLD AUTO: 11 FL (ref 8.9–12.9)
PMV BLD AUTO: 9.7 FL (ref 8.9–12.9)
PMV BLD AUTO: 9.9 FL (ref 8.9–12.9)
POTASSIUM SERPL-SCNC: 3.8 MMOL/L (ref 3.5–5.1)
POTASSIUM SERPL-SCNC: 4 MMOL/L (ref 3.5–5.1)
POTASSIUM SERPL-SCNC: 4 MMOL/L (ref 3.5–5.1)
POTASSIUM SERPL-SCNC: 4.1 MMOL/L (ref 3.5–5.1)
POTASSIUM SERPL-SCNC: 4.2 MMOL/L (ref 3.5–5.1)
POTASSIUM SERPL-SCNC: 4.3 MMOL/L (ref 3.5–5.1)
POTASSIUM SERPL-SCNC: 4.4 MMOL/L (ref 3.5–5.1)
POTASSIUM SERPL-SCNC: 4.7 MMOL/L (ref 3.5–5.1)
POTASSIUM SERPL-SCNC: 4.9 MMOL/L (ref 3.5–5.1)
POTASSIUM SERPL-SCNC: 4.9 MMOL/L (ref 3.5–5.1)
PREALB SERPL-MCNC: 8.3 MG/DL (ref 20–40)
PROCALCITONIN SERPL-MCNC: 0.33 NG/ML
PROT SERPL-MCNC: 5.6 G/DL (ref 6.4–8.2)
PROT SERPL-MCNC: 5.7 G/DL (ref 6.4–8.2)
PROT SERPL-MCNC: 5.9 G/DL (ref 6.4–8.2)
PROT SERPL-MCNC: 6 G/DL (ref 6.4–8.2)
PROT SERPL-MCNC: 6 G/DL (ref 6.4–8.2)
PROT SERPL-MCNC: 6.2 G/DL (ref 6.4–8.2)
PROT SERPL-MCNC: 6.2 G/DL (ref 6.4–8.2)
PROT SERPL-MCNC: 6.3 G/DL (ref 6.4–8.2)
PROT SERPL-MCNC: 6.5 G/DL (ref 6.4–8.2)
PROT SERPL-MCNC: 7.2 G/DL (ref 6.4–8.2)
PROT UR STRIP-MCNC: 100 MG/DL
PROT UR STRIP-MCNC: 300 MG/DL
PROTHROMBIN TIME: 11 SEC (ref 9–11.1)
Q-T INTERVAL, ECG07: 306 MS
Q-T INTERVAL, ECG07: 366 MS
QRS DURATION, ECG06: 104 MS
QRS DURATION, ECG06: 90 MS
QTC CALCULATION (BEZET), ECG08: 426 MS
QTC CALCULATION (BEZET), ECG08: 455 MS
RBC # BLD AUTO: 2.41 M/UL (ref 4.1–5.7)
RBC # BLD AUTO: 2.5 M/UL (ref 4.1–5.7)
RBC # BLD AUTO: 2.55 M/UL (ref 4.1–5.7)
RBC # BLD AUTO: 2.57 M/UL (ref 4.1–5.7)
RBC # BLD AUTO: 2.58 M/UL (ref 4.1–5.7)
RBC # BLD AUTO: 2.59 M/UL (ref 4.1–5.7)
RBC # BLD AUTO: 2.66 M/UL (ref 4.1–5.7)
RBC # BLD AUTO: 2.74 M/UL (ref 4.1–5.7)
RBC # BLD AUTO: 2.95 M/UL (ref 4.1–5.7)
RBC # BLD AUTO: 2.96 M/UL (ref 4.1–5.7)
RBC # BLD AUTO: 3.25 M/UL (ref 4.1–5.7)
RBC # BLD AUTO: 3.29 M/UL (ref 4.1–5.7)
RBC # BLD AUTO: 3.34 M/UL (ref 4.1–5.7)
RBC # BLD AUTO: 3.83 M/UL (ref 4.1–5.7)
RBC #/AREA URNS HPF: ABNORMAL /HPF (ref 0–5)
RBC #/AREA URNS HPF: ABNORMAL /HPF (ref 0–5)
RBC MORPH BLD: ABNORMAL
RETICS # AUTO: 0.07 M/UL (ref 0.03–0.1)
RETICS/RBC NFR AUTO: 2.6 % (ref 0.7–2.1)
RSV RNA SPEC QL NAA+PROBE: NOT DETECTED
RV+EV RNA SPEC QL NAA+PROBE: NOT DETECTED
SAMPLES BEING HELD,HOLD: NORMAL
SAMPLES BEING HELD,HOLD: NORMAL
SERVICE CMNT-IMP: ABNORMAL
SERVICE CMNT-IMP: NORMAL
SERVICE CMNT-IMP: NORMAL
SODIUM SERPL-SCNC: 138 MMOL/L (ref 136–145)
SODIUM SERPL-SCNC: 140 MMOL/L (ref 136–145)
SODIUM SERPL-SCNC: 142 MMOL/L (ref 136–145)
SODIUM SERPL-SCNC: 143 MMOL/L (ref 136–145)
SODIUM SERPL-SCNC: 144 MMOL/L (ref 136–145)
SODIUM SERPL-SCNC: 145 MMOL/L (ref 136–145)
SODIUM SERPL-SCNC: 145 MMOL/L (ref 136–145)
SODIUM SERPL-SCNC: 146 MMOL/L (ref 136–145)
SODIUM SERPL-SCNC: 147 MMOL/L (ref 136–145)
SODIUM SERPL-SCNC: 147 MMOL/L (ref 136–145)
SODIUM SERPL-SCNC: 148 MMOL/L (ref 136–145)
SP GR UR REFRACTOMETRY: 1.01 (ref 1–1.03)
SP GR UR REFRACTOMETRY: 1.01 (ref 1–1.03)
SP1: NORMAL
SP2: NORMAL
SP3: NORMAL
SPECIMEN EXP DATE BLD: NORMAL
SPECIMEN SOURCE: NORMAL
T3FREE SERPL-MCNC: 1.5 PG/ML (ref 2.2–4)
T4 FREE SERPL-MCNC: 0.8 NG/DL (ref 0.8–1.5)
THERAPEUTIC RANGE,PTTT: ABNORMAL SECS (ref 58–77)
THERAPEUTIC RANGE,PTTT: NORMAL SECS (ref 58–77)
TIBC SERPL-MCNC: 169 UG/DL (ref 250–450)
TROPONIN I SERPL-MCNC: 3.81 NG/ML
TROPONIN I SERPL-MCNC: 7.58 NG/ML
TROPONIN I SERPL-MCNC: 7.67 NG/ML
TSH SERPL DL<=0.05 MIU/L-ACNC: 0.15 UIU/ML (ref 0.36–3.74)
UA: UC IF INDICATED,UAUC: ABNORMAL
UR CULT HOLD, URHOLD: NORMAL
UROBILINOGEN UR QL STRIP.AUTO: 0.2 EU/DL (ref 0.2–1)
UROBILINOGEN UR QL STRIP.AUTO: 0.2 EU/DL (ref 0.2–1)
VANCOMYCIN SERPL-MCNC: 9.2 UG/ML
VENTRICULAR RATE, ECG03: 117 BPM
VENTRICULAR RATE, ECG03: 93 BPM
VIT B12 SERPL-MCNC: 685 PG/ML (ref 193–986)
WBC # BLD AUTO: 10.5 K/UL (ref 4.1–11.1)
WBC # BLD AUTO: 10.7 K/UL (ref 4.1–11.1)
WBC # BLD AUTO: 12.7 K/UL (ref 4.1–11.1)
WBC # BLD AUTO: 13.5 K/UL (ref 4.1–11.1)
WBC # BLD AUTO: 14 K/UL (ref 4.1–11.1)
WBC # BLD AUTO: 15.2 K/UL (ref 4.1–11.1)
WBC # BLD AUTO: 16 K/UL (ref 4.1–11.1)
WBC # BLD AUTO: 16.1 K/UL (ref 4.1–11.1)
WBC # BLD AUTO: 17.3 K/UL (ref 4.1–11.1)
WBC # BLD AUTO: 17.6 K/UL (ref 4.1–11.1)
WBC # BLD AUTO: 9 K/UL (ref 4.1–11.1)
WBC # BLD AUTO: 9.1 K/UL (ref 4.1–11.1)
WBC # BLD AUTO: 9.7 K/UL (ref 4.1–11.1)
WBC # BLD AUTO: 9.7 K/UL (ref 4.1–11.1)
WBC URNS QL MICRO: ABNORMAL /HPF (ref 0–4)
WBC URNS QL MICRO: ABNORMAL /HPF (ref 0–4)

## 2020-01-01 PROCEDURE — 82550 ASSAY OF CK (CPK): CPT

## 2020-01-01 PROCEDURE — 84100 ASSAY OF PHOSPHORUS: CPT

## 2020-01-01 PROCEDURE — 74011000250 HC RX REV CODE- 250: Performed by: STUDENT IN AN ORGANIZED HEALTH CARE EDUCATION/TRAINING PROGRAM

## 2020-01-01 PROCEDURE — 85025 COMPLETE CBC W/AUTO DIFF WBC: CPT

## 2020-01-01 PROCEDURE — 74011000258 HC RX REV CODE- 258: Performed by: INTERNAL MEDICINE

## 2020-01-01 PROCEDURE — 83605 ASSAY OF LACTIC ACID: CPT

## 2020-01-01 PROCEDURE — 74011250636 HC RX REV CODE- 250/636: Performed by: STUDENT IN AN ORGANIZED HEALTH CARE EDUCATION/TRAINING PROGRAM

## 2020-01-01 PROCEDURE — 94760 N-INVAS EAR/PLS OXIMETRY 1: CPT

## 2020-01-01 PROCEDURE — 87449 NOS EACH ORGANISM AG IA: CPT

## 2020-01-01 PROCEDURE — 83735 ASSAY OF MAGNESIUM: CPT

## 2020-01-01 PROCEDURE — 74011250637 HC RX REV CODE- 250/637: Performed by: STUDENT IN AN ORGANIZED HEALTH CARE EDUCATION/TRAINING PROGRAM

## 2020-01-01 PROCEDURE — 74011250637 HC RX REV CODE- 250/637: Performed by: INTERNAL MEDICINE

## 2020-01-01 PROCEDURE — 86141 C-REACTIVE PROTEIN HS: CPT

## 2020-01-01 PROCEDURE — 77030040831 HC BAG URINE DRNG MDII -A

## 2020-01-01 PROCEDURE — 97161 PT EVAL LOW COMPLEX 20 MIN: CPT

## 2020-01-01 PROCEDURE — 65660000000 HC RM CCU STEPDOWN

## 2020-01-01 PROCEDURE — 82962 GLUCOSE BLOOD TEST: CPT

## 2020-01-01 PROCEDURE — 84134 ASSAY OF PREALBUMIN: CPT

## 2020-01-01 PROCEDURE — C9113 INJ PANTOPRAZOLE SODIUM, VIA: HCPCS | Performed by: STUDENT IN AN ORGANIZED HEALTH CARE EDUCATION/TRAINING PROGRAM

## 2020-01-01 PROCEDURE — 74011250637 HC RX REV CODE- 250/637: Performed by: SPECIALIST

## 2020-01-01 PROCEDURE — 97530 THERAPEUTIC ACTIVITIES: CPT

## 2020-01-01 PROCEDURE — 80053 COMPREHEN METABOLIC PANEL: CPT

## 2020-01-01 PROCEDURE — 74011000250 HC RX REV CODE- 250: Performed by: INTERNAL MEDICINE

## 2020-01-01 PROCEDURE — 82728 ASSAY OF FERRITIN: CPT

## 2020-01-01 PROCEDURE — 94640 AIRWAY INHALATION TREATMENT: CPT

## 2020-01-01 PROCEDURE — 74011636637 HC RX REV CODE- 636/637: Performed by: STUDENT IN AN ORGANIZED HEALTH CARE EDUCATION/TRAINING PROGRAM

## 2020-01-01 PROCEDURE — 97535 SELF CARE MNGMENT TRAINING: CPT

## 2020-01-01 PROCEDURE — 86738 MYCOPLASMA ANTIBODY: CPT

## 2020-01-01 PROCEDURE — 84481 FREE ASSAY (FT-3): CPT

## 2020-01-01 PROCEDURE — 74176 CT ABD & PELVIS W/O CONTRAST: CPT

## 2020-01-01 PROCEDURE — 71045 X-RAY EXAM CHEST 1 VIEW: CPT

## 2020-01-01 PROCEDURE — 74011000258 HC RX REV CODE- 258: Performed by: STUDENT IN AN ORGANIZED HEALTH CARE EDUCATION/TRAINING PROGRAM

## 2020-01-01 PROCEDURE — 36415 COLL VENOUS BLD VENIPUNCTURE: CPT

## 2020-01-01 PROCEDURE — 94762 N-INVAS EAR/PLS OXIMTRY CONT: CPT

## 2020-01-01 PROCEDURE — 97116 GAIT TRAINING THERAPY: CPT

## 2020-01-01 PROCEDURE — 81001 URINALYSIS AUTO W/SCOPE: CPT

## 2020-01-01 PROCEDURE — 85652 RBC SED RATE AUTOMATED: CPT

## 2020-01-01 PROCEDURE — 84439 ASSAY OF FREE THYROXINE: CPT

## 2020-01-01 PROCEDURE — 77010033678 HC OXYGEN DAILY

## 2020-01-01 PROCEDURE — 85730 THROMBOPLASTIN TIME PARTIAL: CPT

## 2020-01-01 PROCEDURE — 74018 RADEX ABDOMEN 1 VIEW: CPT

## 2020-01-01 PROCEDURE — 86900 BLOOD TYPING SEROLOGIC ABO: CPT

## 2020-01-01 PROCEDURE — A9567 TECHNETIUM TC-99M AEROSOL: HCPCS

## 2020-01-01 PROCEDURE — 97165 OT EVAL LOW COMPLEX 30 MIN: CPT

## 2020-01-01 PROCEDURE — 74011250637 HC RX REV CODE- 250/637: Performed by: NURSE PRACTITIONER

## 2020-01-01 PROCEDURE — 74011250636 HC RX REV CODE- 250/636: Performed by: EMERGENCY MEDICINE

## 2020-01-01 PROCEDURE — 77030005513 HC CATH URETH FOL11 MDII -B

## 2020-01-01 PROCEDURE — 80202 ASSAY OF VANCOMYCIN: CPT

## 2020-01-01 PROCEDURE — 73130 X-RAY EXAM OF HAND: CPT

## 2020-01-01 PROCEDURE — 92610 EVALUATE SWALLOWING FUNCTION: CPT

## 2020-01-01 PROCEDURE — 77030029684 HC NEB SM VOL KT MONA -A

## 2020-01-01 PROCEDURE — 94664 DEMO&/EVAL PT USE INHALER: CPT

## 2020-01-01 PROCEDURE — 85610 PROTHROMBIN TIME: CPT

## 2020-01-01 PROCEDURE — 92526 ORAL FUNCTION THERAPY: CPT

## 2020-01-01 PROCEDURE — 0100U RESPIRATORY PANEL,PCR,NASOPHARYNGEAL: CPT

## 2020-01-01 PROCEDURE — 77030019905 HC CATH URETH INTMIT MDII -A

## 2020-01-01 PROCEDURE — 74011250636 HC RX REV CODE- 250/636: Performed by: FAMILY MEDICINE

## 2020-01-01 PROCEDURE — 85045 AUTOMATED RETICULOCYTE COUNT: CPT

## 2020-01-01 PROCEDURE — 82746 ASSAY OF FOLIC ACID SERUM: CPT

## 2020-01-01 PROCEDURE — 80069 RENAL FUNCTION PANEL: CPT

## 2020-01-01 PROCEDURE — 83540 ASSAY OF IRON: CPT

## 2020-01-01 PROCEDURE — 99285 EMERGENCY DEPT VISIT HI MDM: CPT

## 2020-01-01 PROCEDURE — 85027 COMPLETE CBC AUTOMATED: CPT

## 2020-01-01 PROCEDURE — 74011000250 HC RX REV CODE- 250: Performed by: EMERGENCY MEDICINE

## 2020-01-01 PROCEDURE — 96374 THER/PROPH/DIAG INJ IV PUSH: CPT

## 2020-01-01 PROCEDURE — 82272 OCCULT BLD FECES 1-3 TESTS: CPT

## 2020-01-01 PROCEDURE — 84145 PROCALCITONIN (PCT): CPT

## 2020-01-01 PROCEDURE — 73090 X-RAY EXAM OF FOREARM: CPT

## 2020-01-01 PROCEDURE — 76705 ECHO EXAM OF ABDOMEN: CPT

## 2020-01-01 PROCEDURE — 77030040361 HC SLV COMPR DVT MDII -B

## 2020-01-01 PROCEDURE — 77030040830 HC CATH URETH FOL MDII -A

## 2020-01-01 PROCEDURE — 97535 SELF CARE MNGMENT TRAINING: CPT | Performed by: OCCUPATIONAL THERAPIST

## 2020-01-01 PROCEDURE — 72125 CT NECK SPINE W/O DYE: CPT

## 2020-01-01 PROCEDURE — 87086 URINE CULTURE/COLONY COUNT: CPT

## 2020-01-01 PROCEDURE — 73110 X-RAY EXAM OF WRIST: CPT

## 2020-01-01 PROCEDURE — 87040 BLOOD CULTURE FOR BACTERIA: CPT

## 2020-01-01 PROCEDURE — 77030027138 HC INCENT SPIROMETER -A

## 2020-01-01 PROCEDURE — 83010 ASSAY OF HAPTOGLOBIN QUANT: CPT

## 2020-01-01 PROCEDURE — 93005 ELECTROCARDIOGRAM TRACING: CPT

## 2020-01-01 PROCEDURE — 84443 ASSAY THYROID STIM HORMONE: CPT

## 2020-01-01 PROCEDURE — 84484 ASSAY OF TROPONIN QUANT: CPT

## 2020-01-01 PROCEDURE — 93306 TTE W/DOPPLER COMPLETE: CPT

## 2020-01-01 PROCEDURE — 65270000029 HC RM PRIVATE

## 2020-01-01 PROCEDURE — 82607 VITAMIN B-12: CPT

## 2020-01-01 PROCEDURE — 71046 X-RAY EXAM CHEST 2 VIEWS: CPT

## 2020-01-01 PROCEDURE — 71250 CT THORAX DX C-: CPT

## 2020-01-01 PROCEDURE — 83880 ASSAY OF NATRIURETIC PEPTIDE: CPT

## 2020-01-01 PROCEDURE — 80074 ACUTE HEPATITIS PANEL: CPT

## 2020-01-01 PROCEDURE — 51798 US URINE CAPACITY MEASURE: CPT

## 2020-01-01 PROCEDURE — 70450 CT HEAD/BRAIN W/O DYE: CPT

## 2020-01-01 RX ORDER — DOXYCYCLINE HYCLATE 100 MG
100 TABLET ORAL EVERY 12 HOURS
Status: COMPLETED | OUTPATIENT
Start: 2020-01-01 | End: 2020-01-01

## 2020-01-01 RX ORDER — PANTOPRAZOLE SODIUM 40 MG/1
40 TABLET, DELAYED RELEASE ORAL
Status: DISCONTINUED | OUTPATIENT
Start: 2020-01-01 | End: 2020-01-01 | Stop reason: HOSPADM

## 2020-01-01 RX ORDER — SODIUM CHLORIDE 450 MG/100ML
75 INJECTION, SOLUTION INTRAVENOUS CONTINUOUS
Status: DISCONTINUED | OUTPATIENT
Start: 2020-01-01 | End: 2020-01-01

## 2020-01-01 RX ORDER — AMOXICILLIN AND CLAVULANATE POTASSIUM 500; 125 MG/1; MG/1
1 TABLET, FILM COATED ORAL EVERY 12 HOURS
Status: COMPLETED | OUTPATIENT
Start: 2020-01-01 | End: 2020-01-01

## 2020-01-01 RX ORDER — HEPARIN SODIUM 5000 [USP'U]/ML
5000 INJECTION, SOLUTION INTRAVENOUS; SUBCUTANEOUS EVERY 8 HOURS
Status: DISCONTINUED | OUTPATIENT
Start: 2020-01-01 | End: 2020-01-01

## 2020-01-01 RX ORDER — IPRATROPIUM BROMIDE AND ALBUTEROL SULFATE 2.5; .5 MG/3ML; MG/3ML
3 SOLUTION RESPIRATORY (INHALATION)
Qty: 30 NEBULE | Refills: 0 | Status: SHIPPED
Start: 2020-01-01 | End: 2020-01-01

## 2020-01-01 RX ORDER — PANTOPRAZOLE SODIUM 40 MG/1
40 TABLET, DELAYED RELEASE ORAL
Status: DISCONTINUED | OUTPATIENT
Start: 2020-01-01 | End: 2020-01-01

## 2020-01-01 RX ORDER — METOPROLOL TARTRATE 25 MG/1
25 TABLET, FILM COATED ORAL 2 TIMES DAILY
Qty: 60 TAB | Refills: 0 | Status: SHIPPED | OUTPATIENT
Start: 2020-01-01 | End: 2020-01-01

## 2020-01-01 RX ORDER — FERROUS SULFATE 300 MG/5ML
300 LIQUID (ML) ORAL 2 TIMES DAILY WITH MEALS
Status: DISCONTINUED | OUTPATIENT
Start: 2020-01-01 | End: 2020-01-01

## 2020-01-01 RX ORDER — LOSARTAN POTASSIUM 25 MG/1
25 TABLET ORAL DAILY
Status: DISCONTINUED | OUTPATIENT
Start: 2020-01-01 | End: 2020-01-01

## 2020-01-01 RX ORDER — METOPROLOL TARTRATE 5 MG/5ML
5 INJECTION INTRAVENOUS ONCE
Status: COMPLETED | OUTPATIENT
Start: 2020-01-01 | End: 2020-01-01

## 2020-01-01 RX ORDER — METOPROLOL TARTRATE 25 MG/1
25 TABLET, FILM COATED ORAL EVERY 12 HOURS
Qty: 60 TAB | Refills: 0 | Status: SHIPPED
Start: 2020-01-01 | End: 2020-01-01

## 2020-01-01 RX ORDER — SODIUM CHLORIDE 9 MG/ML
125 INJECTION, SOLUTION INTRAVENOUS CONTINUOUS
Status: DISCONTINUED | OUTPATIENT
Start: 2020-01-01 | End: 2020-01-01

## 2020-01-01 RX ORDER — IPRATROPIUM BROMIDE AND ALBUTEROL SULFATE 2.5; .5 MG/3ML; MG/3ML
3 SOLUTION RESPIRATORY (INHALATION)
Status: DISCONTINUED | OUTPATIENT
Start: 2020-01-01 | End: 2020-01-01 | Stop reason: HOSPADM

## 2020-01-01 RX ORDER — DEXTROSE 50 % IN WATER (D50W) INTRAVENOUS SYRINGE
12.5-25 AS NEEDED
Status: DISCONTINUED | OUTPATIENT
Start: 2020-01-01 | End: 2020-01-01 | Stop reason: HOSPADM

## 2020-01-01 RX ORDER — POLYETHYLENE GLYCOL 3350 17 G/17G
17 POWDER, FOR SOLUTION ORAL
Status: DISCONTINUED | OUTPATIENT
Start: 2020-01-01 | End: 2020-01-01

## 2020-01-01 RX ORDER — FUROSEMIDE 10 MG/ML
10 INJECTION INTRAMUSCULAR; INTRAVENOUS ONCE
Status: COMPLETED | OUTPATIENT
Start: 2020-01-01 | End: 2020-01-01

## 2020-01-01 RX ORDER — ALBUTEROL SULFATE 0.83 MG/ML
SOLUTION RESPIRATORY (INHALATION)
Status: DISPENSED
Start: 2020-01-01 | End: 2020-01-01

## 2020-01-01 RX ORDER — METOPROLOL TARTRATE 5 MG/5ML
2.5 INJECTION INTRAVENOUS ONCE
Status: COMPLETED | OUTPATIENT
Start: 2020-01-01 | End: 2020-01-01

## 2020-01-01 RX ORDER — CLOPIDOGREL BISULFATE 75 MG/1
75 TABLET ORAL DAILY
Qty: 90 TAB | Refills: 3 | Status: SHIPPED | OUTPATIENT
Start: 2020-01-01

## 2020-01-01 RX ORDER — VANCOMYCIN/0.9 % SOD CHLORIDE 1.5G/250ML
1500 PLASTIC BAG, INJECTION (ML) INTRAVENOUS ONCE
Status: COMPLETED | OUTPATIENT
Start: 2020-01-01 | End: 2020-01-01

## 2020-01-01 RX ORDER — MAGNESIUM SULFATE HEPTAHYDRATE 40 MG/ML
2 INJECTION, SOLUTION INTRAVENOUS ONCE
Status: COMPLETED | OUTPATIENT
Start: 2020-01-01 | End: 2020-01-01

## 2020-01-01 RX ORDER — ALBUTEROL SULFATE 0.83 MG/ML
2.5 SOLUTION RESPIRATORY (INHALATION)
Status: DISCONTINUED | OUTPATIENT
Start: 2020-01-01 | End: 2020-01-01 | Stop reason: HOSPADM

## 2020-01-01 RX ORDER — LOSARTAN POTASSIUM 25 MG/1
25 TABLET ORAL DAILY
Status: DISCONTINUED | OUTPATIENT
Start: 2020-01-01 | End: 2020-01-01 | Stop reason: HOSPADM

## 2020-01-01 RX ORDER — METRONIDAZOLE 250 MG/1
500 TABLET ORAL EVERY 12 HOURS
Status: DISCONTINUED | OUTPATIENT
Start: 2020-01-01 | End: 2020-01-01 | Stop reason: HOSPADM

## 2020-01-01 RX ORDER — DONEPEZIL HYDROCHLORIDE 5 MG/1
10 TABLET, FILM COATED ORAL
Status: DISCONTINUED | OUTPATIENT
Start: 2020-01-01 | End: 2020-01-01

## 2020-01-01 RX ORDER — CLOPIDOGREL BISULFATE 75 MG/1
75 TABLET ORAL DAILY
Status: DISCONTINUED | OUTPATIENT
Start: 2020-01-01 | End: 2020-01-01

## 2020-01-01 RX ORDER — METOPROLOL TARTRATE 25 MG/1
25 TABLET, FILM COATED ORAL EVERY 12 HOURS
Status: DISCONTINUED | OUTPATIENT
Start: 2020-01-01 | End: 2020-01-01 | Stop reason: HOSPADM

## 2020-01-01 RX ORDER — POLYETHYLENE GLYCOL 3350 17 G/17G
17 POWDER, FOR SOLUTION ORAL DAILY
Status: DISCONTINUED | OUTPATIENT
Start: 2020-01-01 | End: 2020-01-01 | Stop reason: HOSPADM

## 2020-01-01 RX ORDER — DOCUSATE SODIUM 100 MG/1
100 CAPSULE, LIQUID FILLED ORAL DAILY
Status: DISCONTINUED | OUTPATIENT
Start: 2020-01-01 | End: 2020-01-01

## 2020-01-01 RX ORDER — IPRATROPIUM BROMIDE AND ALBUTEROL SULFATE 2.5; .5 MG/3ML; MG/3ML
3 SOLUTION RESPIRATORY (INHALATION)
Status: DISCONTINUED | OUTPATIENT
Start: 2020-01-01 | End: 2020-01-01

## 2020-01-01 RX ORDER — HEPARIN SODIUM 10000 [USP'U]/100ML
12-25 INJECTION, SOLUTION INTRAVENOUS
Status: DISCONTINUED | OUTPATIENT
Start: 2020-01-01 | End: 2020-01-01

## 2020-01-01 RX ORDER — BUMETANIDE 1 MG/1
1 TABLET ORAL DAILY
Status: DISCONTINUED | OUTPATIENT
Start: 2020-01-01 | End: 2020-01-01

## 2020-01-01 RX ORDER — SODIUM CHLORIDE 9 MG/ML
65 INJECTION, SOLUTION INTRAVENOUS CONTINUOUS
Status: DISCONTINUED | OUTPATIENT
Start: 2020-01-01 | End: 2020-01-01

## 2020-01-01 RX ORDER — GUAIFENESIN 100 MG/5ML
81 LIQUID (ML) ORAL DAILY
Qty: 30 TAB | Refills: 0 | Status: SHIPPED
Start: 2020-01-01 | End: 2020-01-01

## 2020-01-01 RX ORDER — FUROSEMIDE 40 MG/1
40 TABLET ORAL ONCE
Status: COMPLETED | OUTPATIENT
Start: 2020-01-01 | End: 2020-01-01

## 2020-01-01 RX ORDER — SODIUM CHLORIDE 0.9 % (FLUSH) 0.9 %
5-40 SYRINGE (ML) INJECTION AS NEEDED
Status: DISCONTINUED | OUTPATIENT
Start: 2020-01-01 | End: 2020-01-01 | Stop reason: HOSPADM

## 2020-01-01 RX ORDER — MULTIVIT WITH IRON,MINERALS
500 TABLET ORAL
Status: DISCONTINUED | OUTPATIENT
Start: 2020-01-01 | End: 2020-01-01

## 2020-01-01 RX ORDER — THERA TABS 400 MCG
1 TAB ORAL DAILY
Status: DISCONTINUED | OUTPATIENT
Start: 2020-01-01 | End: 2020-01-01

## 2020-01-01 RX ORDER — ATORVASTATIN CALCIUM 40 MG/1
40 TABLET, FILM COATED ORAL DAILY
Qty: 90 TAB | Refills: 3 | Status: SHIPPED | OUTPATIENT
Start: 2020-01-01

## 2020-01-01 RX ORDER — DOCUSATE SODIUM 100 MG/1
100 CAPSULE, LIQUID FILLED ORAL DAILY
Status: DISCONTINUED | OUTPATIENT
Start: 2020-01-01 | End: 2020-01-01 | Stop reason: HOSPADM

## 2020-01-01 RX ORDER — METOPROLOL TARTRATE 5 MG/5ML
5 INJECTION INTRAVENOUS ONCE
Status: DISCONTINUED | OUTPATIENT
Start: 2020-01-01 | End: 2020-01-01

## 2020-01-01 RX ORDER — INSULIN LISPRO 100 [IU]/ML
INJECTION, SOLUTION INTRAVENOUS; SUBCUTANEOUS EVERY 6 HOURS
Status: DISCONTINUED | OUTPATIENT
Start: 2020-01-01 | End: 2020-01-01

## 2020-01-01 RX ORDER — ENOXAPARIN SODIUM 100 MG/ML
30 INJECTION SUBCUTANEOUS EVERY 24 HOURS
Status: DISCONTINUED | OUTPATIENT
Start: 2020-01-01 | End: 2020-01-01

## 2020-01-01 RX ORDER — AMLODIPINE BESYLATE 5 MG/1
10 TABLET ORAL DAILY
Status: DISCONTINUED | OUTPATIENT
Start: 2020-01-01 | End: 2020-01-01

## 2020-01-01 RX ORDER — GUAIFENESIN 100 MG/5ML
81 LIQUID (ML) ORAL DAILY
Status: DISCONTINUED | OUTPATIENT
Start: 2020-01-01 | End: 2020-01-01

## 2020-01-01 RX ORDER — LANOLIN ALCOHOL/MO/W.PET/CERES
1 CREAM (GRAM) TOPICAL 2 TIMES DAILY WITH MEALS
Status: DISCONTINUED | OUTPATIENT
Start: 2020-01-01 | End: 2020-01-01

## 2020-01-01 RX ORDER — SODIUM CHLORIDE 0.9 % (FLUSH) 0.9 %
5-40 SYRINGE (ML) INJECTION EVERY 8 HOURS
Status: DISCONTINUED | OUTPATIENT
Start: 2020-01-01 | End: 2020-01-01 | Stop reason: HOSPADM

## 2020-01-01 RX ORDER — LANOLIN ALCOHOL/MO/W.PET/CERES
325 CREAM (GRAM) TOPICAL
Status: DISCONTINUED | OUTPATIENT
Start: 2020-01-01 | End: 2020-01-01

## 2020-01-01 RX ORDER — DEXTROSE MONOHYDRATE 100 MG/ML
0-250 INJECTION, SOLUTION INTRAVENOUS AS NEEDED
Status: DISCONTINUED | OUTPATIENT
Start: 2020-01-01 | End: 2020-01-01 | Stop reason: HOSPADM

## 2020-01-01 RX ORDER — HEPARIN SODIUM 5000 [USP'U]/ML
4000 INJECTION, SOLUTION INTRAVENOUS; SUBCUTANEOUS ONCE
Status: COMPLETED | OUTPATIENT
Start: 2020-01-01 | End: 2020-01-01

## 2020-01-01 RX ORDER — LEVOTHYROXINE AND LIOTHYRONINE 19; 4.5 UG/1; UG/1
120 TABLET ORAL DAILY
Status: DISCONTINUED | OUTPATIENT
Start: 2020-01-01 | End: 2020-01-01

## 2020-01-01 RX ORDER — GLYCERIN ADULT
1 SUPPOSITORY, RECTAL RECTAL
Status: COMPLETED | OUTPATIENT
Start: 2020-01-01 | End: 2020-01-01

## 2020-01-01 RX ORDER — BUMETANIDE 1 MG/1
1 TABLET ORAL ONCE
Status: COMPLETED | OUTPATIENT
Start: 2020-01-01 | End: 2020-01-01

## 2020-01-01 RX ORDER — MAGNESIUM SULFATE 100 %
4 CRYSTALS MISCELLANEOUS AS NEEDED
Status: DISCONTINUED | OUTPATIENT
Start: 2020-01-01 | End: 2020-01-01 | Stop reason: HOSPADM

## 2020-01-01 RX ORDER — ATORVASTATIN CALCIUM 20 MG/1
40 TABLET, FILM COATED ORAL DAILY
Status: DISCONTINUED | OUTPATIENT
Start: 2020-01-01 | End: 2020-01-01 | Stop reason: HOSPADM

## 2020-01-01 RX ORDER — INSULIN LISPRO 100 [IU]/ML
INJECTION, SOLUTION INTRAVENOUS; SUBCUTANEOUS
Status: DISCONTINUED | OUTPATIENT
Start: 2020-01-01 | End: 2020-01-01 | Stop reason: HOSPADM

## 2020-01-01 RX ORDER — AMLODIPINE BESYLATE 5 MG/1
10 TABLET ORAL DAILY
Status: DISCONTINUED | OUTPATIENT
Start: 2020-01-01 | End: 2020-01-01 | Stop reason: HOSPADM

## 2020-01-01 RX ORDER — METRONIDAZOLE 500 MG/1
500 TABLET ORAL EVERY 12 HOURS
Qty: 15 TAB | Refills: 0 | Status: SHIPPED | OUTPATIENT
Start: 2020-01-01 | End: 2020-01-01

## 2020-01-01 RX ORDER — FUROSEMIDE 10 MG/ML
20 INJECTION INTRAMUSCULAR; INTRAVENOUS ONCE
Status: COMPLETED | OUTPATIENT
Start: 2020-01-01 | End: 2020-01-01

## 2020-01-01 RX ADMIN — ASPIRIN 81 MG 81 MG: 81 TABLET ORAL at 12:20

## 2020-01-01 RX ADMIN — METRONIDAZOLE 500 MG: 250 TABLET ORAL at 21:09

## 2020-01-01 RX ADMIN — Medication 10 ML: at 22:00

## 2020-01-01 RX ADMIN — AMLODIPINE BESYLATE 10 MG: 5 TABLET ORAL at 09:41

## 2020-01-01 RX ADMIN — IPRATROPIUM BROMIDE AND ALBUTEROL SULFATE 3 ML: .5; 3 SOLUTION RESPIRATORY (INHALATION) at 15:14

## 2020-01-01 RX ADMIN — VANCOMYCIN HYDROCHLORIDE 1250 MG: 1.25 INJECTION, POWDER, LYOPHILIZED, FOR SOLUTION INTRAVENOUS at 22:41

## 2020-01-01 RX ADMIN — LINAGLIPTIN 5 MG: 5 TABLET, FILM COATED ORAL at 10:12

## 2020-01-01 RX ADMIN — CLOPIDOGREL BISULFATE 75 MG: 75 TABLET ORAL at 08:28

## 2020-01-01 RX ADMIN — IPRATROPIUM BROMIDE AND ALBUTEROL SULFATE 3 ML: .5; 3 SOLUTION RESPIRATORY (INHALATION) at 08:26

## 2020-01-01 RX ADMIN — METOPROLOL TARTRATE 25 MG: 25 TABLET ORAL at 08:14

## 2020-01-01 RX ADMIN — CLOPIDOGREL BISULFATE 75 MG: 75 TABLET ORAL at 08:02

## 2020-01-01 RX ADMIN — INSULIN LISPRO 1 UNITS: 100 INJECTION, SOLUTION INTRAVENOUS; SUBCUTANEOUS at 21:39

## 2020-01-01 RX ADMIN — MINERAL SUPPLEMENT IRON 300 MG / 5 ML STRENGTH LIQUID 100 PER BOX UNFLAVORED 300 MG: at 16:49

## 2020-01-01 RX ADMIN — AMLODIPINE BESYLATE 10 MG: 5 TABLET ORAL at 08:09

## 2020-01-01 RX ADMIN — DOXYCYCLINE HYCLATE 100 MG: 100 TABLET, COATED ORAL at 09:10

## 2020-01-01 RX ADMIN — DEXTROSE MONOHYDRATE AND SODIUM CHLORIDE: 5; .45 INJECTION, SOLUTION INTRAVENOUS at 10:04

## 2020-01-01 RX ADMIN — IPRATROPIUM BROMIDE AND ALBUTEROL SULFATE 3 ML: .5; 3 SOLUTION RESPIRATORY (INHALATION) at 08:58

## 2020-01-01 RX ADMIN — DOXYCYCLINE HYCLATE 100 MG: 100 TABLET, COATED ORAL at 21:09

## 2020-01-01 RX ADMIN — BUMETANIDE 1 MG: 1 TABLET ORAL at 09:10

## 2020-01-01 RX ADMIN — DOXYCYCLINE HYCLATE 100 MG: 100 TABLET, COATED ORAL at 21:34

## 2020-01-01 RX ADMIN — METRONIDAZOLE 500 MG: 250 TABLET ORAL at 21:08

## 2020-01-01 RX ADMIN — Medication 10 ML: at 05:08

## 2020-01-01 RX ADMIN — INSULIN LISPRO 2 UNITS: 100 INJECTION, SOLUTION INTRAVENOUS; SUBCUTANEOUS at 12:02

## 2020-01-01 RX ADMIN — METOPROLOL TARTRATE 25 MG: 25 TABLET ORAL at 22:12

## 2020-01-01 RX ADMIN — Medication 10 ML: at 14:12

## 2020-01-01 RX ADMIN — IPRATROPIUM BROMIDE AND ALBUTEROL SULFATE 3 ML: .5; 3 SOLUTION RESPIRATORY (INHALATION) at 13:45

## 2020-01-01 RX ADMIN — INSULIN LISPRO 3 UNITS: 100 INJECTION, SOLUTION INTRAVENOUS; SUBCUTANEOUS at 18:22

## 2020-01-01 RX ADMIN — IPRATROPIUM BROMIDE AND ALBUTEROL SULFATE 3 ML: .5; 3 SOLUTION RESPIRATORY (INHALATION) at 04:47

## 2020-01-01 RX ADMIN — SODIUM CHLORIDE 500 ML: 900 INJECTION, SOLUTION INTRAVENOUS at 16:08

## 2020-01-01 RX ADMIN — FUROSEMIDE 40 MG: 40 TABLET ORAL at 11:43

## 2020-01-01 RX ADMIN — IPRATROPIUM BROMIDE AND ALBUTEROL SULFATE 3 ML: .5; 3 SOLUTION RESPIRATORY (INHALATION) at 20:01

## 2020-01-01 RX ADMIN — HEPARIN SODIUM 13 UNITS/KG/HR: 10000 INJECTION, SOLUTION INTRAVENOUS at 23:33

## 2020-01-01 RX ADMIN — AMLODIPINE BESYLATE 10 MG: 5 TABLET ORAL at 13:21

## 2020-01-01 RX ADMIN — HEPARIN SODIUM 5000 UNITS: 5000 INJECTION INTRAVENOUS; SUBCUTANEOUS at 21:34

## 2020-01-01 RX ADMIN — THERA TABS 1 TABLET: TAB at 08:02

## 2020-01-01 RX ADMIN — ASPIRIN 81 MG 81 MG: 81 TABLET ORAL at 09:40

## 2020-01-01 RX ADMIN — Medication 10 ML: at 18:20

## 2020-01-01 RX ADMIN — DOXYCYCLINE HYCLATE 100 MG: 100 TABLET, COATED ORAL at 22:12

## 2020-01-01 RX ADMIN — HEPARIN SODIUM 5000 UNITS: 5000 INJECTION INTRAVENOUS; SUBCUTANEOUS at 18:18

## 2020-01-01 RX ADMIN — IPRATROPIUM BROMIDE AND ALBUTEROL SULFATE 3 ML: .5; 3 SOLUTION RESPIRATORY (INHALATION) at 19:45

## 2020-01-01 RX ADMIN — DOCUSATE SODIUM 100 MG: 100 CAPSULE, LIQUID FILLED ORAL at 08:28

## 2020-01-01 RX ADMIN — DOXYCYCLINE 100 MG: 100 INJECTION, POWDER, LYOPHILIZED, FOR SOLUTION INTRAVENOUS at 00:56

## 2020-01-01 RX ADMIN — METRONIDAZOLE 500 MG: 250 TABLET ORAL at 09:10

## 2020-01-01 RX ADMIN — FERROUS SULFATE TAB 325 MG (65 MG ELEMENTAL FE) 325 MG: 325 (65 FE) TAB at 09:40

## 2020-01-01 RX ADMIN — DOXYCYCLINE HYCLATE 100 MG: 100 TABLET, COATED ORAL at 22:04

## 2020-01-01 RX ADMIN — ALBUTEROL SULFATE 1 DOSE: 2.5 SOLUTION RESPIRATORY (INHALATION) at 11:35

## 2020-01-01 RX ADMIN — AMLODIPINE BESYLATE 10 MG: 5 TABLET ORAL at 08:07

## 2020-01-01 RX ADMIN — DOXYCYCLINE 100 MG: 100 INJECTION, POWDER, LYOPHILIZED, FOR SOLUTION INTRAVENOUS at 10:39

## 2020-01-01 RX ADMIN — POLYETHYLENE GLYCOL 3350 17 G: 17 POWDER, FOR SOLUTION ORAL at 17:20

## 2020-01-01 RX ADMIN — CLOPIDOGREL BISULFATE 75 MG: 75 TABLET ORAL at 08:09

## 2020-01-01 RX ADMIN — POLYETHYLENE GLYCOL 3350 17 G: 17 POWDER, FOR SOLUTION ORAL at 18:19

## 2020-01-01 RX ADMIN — ALBUTEROL SULFATE 2.5 MG: 2.5 SOLUTION RESPIRATORY (INHALATION) at 18:10

## 2020-01-01 RX ADMIN — METOPROLOL TARTRATE 25 MG: 25 TABLET ORAL at 21:39

## 2020-01-01 RX ADMIN — METOPROLOL TARTRATE 25 MG: 25 TABLET ORAL at 08:45

## 2020-01-01 RX ADMIN — MINERAL SUPPLEMENT IRON 300 MG / 5 ML STRENGTH LIQUID 100 PER BOX UNFLAVORED 300 MG: at 12:53

## 2020-01-01 RX ADMIN — HEPARIN SODIUM 5000 UNITS: 5000 INJECTION INTRAVENOUS; SUBCUTANEOUS at 22:31

## 2020-01-01 RX ADMIN — Medication 10 ML: at 13:22

## 2020-01-01 RX ADMIN — Medication 10 ML: at 15:31

## 2020-01-01 RX ADMIN — IPRATROPIUM BROMIDE AND ALBUTEROL SULFATE 3 ML: .5; 3 SOLUTION RESPIRATORY (INHALATION) at 02:05

## 2020-01-01 RX ADMIN — CEFEPIME 2 G: 2 INJECTION, POWDER, FOR SOLUTION INTRAVENOUS at 00:11

## 2020-01-01 RX ADMIN — IPRATROPIUM BROMIDE AND ALBUTEROL SULFATE 3 ML: .5; 3 SOLUTION RESPIRATORY (INHALATION) at 07:26

## 2020-01-01 RX ADMIN — Medication 10 ML: at 21:10

## 2020-01-01 RX ADMIN — AMOXICILLIN AND CLAVULANATE POTASSIUM 1 TABLET: 500; 125 TABLET, FILM COATED ORAL at 08:45

## 2020-01-01 RX ADMIN — IPRATROPIUM BROMIDE AND ALBUTEROL SULFATE 3 ML: .5; 3 SOLUTION RESPIRATORY (INHALATION) at 01:01

## 2020-01-01 RX ADMIN — IPRATROPIUM BROMIDE AND ALBUTEROL SULFATE 3 ML: .5; 3 SOLUTION RESPIRATORY (INHALATION) at 20:27

## 2020-01-01 RX ADMIN — ATORVASTATIN CALCIUM 40 MG: 20 TABLET, FILM COATED ORAL at 09:08

## 2020-01-01 RX ADMIN — IPRATROPIUM BROMIDE AND ALBUTEROL SULFATE 3 ML: .5; 3 SOLUTION RESPIRATORY (INHALATION) at 20:26

## 2020-01-01 RX ADMIN — Medication 10 ML: at 00:18

## 2020-01-01 RX ADMIN — INSULIN LISPRO 2 UNITS: 100 INJECTION, SOLUTION INTRAVENOUS; SUBCUTANEOUS at 11:44

## 2020-01-01 RX ADMIN — AMOXICILLIN AND CLAVULANATE POTASSIUM 1 TABLET: 500; 125 TABLET, FILM COATED ORAL at 22:12

## 2020-01-01 RX ADMIN — METOPROLOL TARTRATE 25 MG: 25 TABLET ORAL at 16:57

## 2020-01-01 RX ADMIN — Medication 10 ML: at 06:00

## 2020-01-01 RX ADMIN — INSULIN LISPRO 2 UNITS: 100 INJECTION, SOLUTION INTRAVENOUS; SUBCUTANEOUS at 18:19

## 2020-01-01 RX ADMIN — SODIUM CHLORIDE 130 ML/HR: 9 INJECTION, SOLUTION INTRAVENOUS at 19:52

## 2020-01-01 RX ADMIN — POTASSIUM PHOSPHATE, MONOBASIC AND POTASSIUM PHOSPHATE, DIBASIC: 224; 236 INJECTION, SOLUTION, CONCENTRATE INTRAVENOUS at 05:03

## 2020-01-01 RX ADMIN — PANTOPRAZOLE SODIUM 40 MG: 40 TABLET, DELAYED RELEASE ORAL at 08:29

## 2020-01-01 RX ADMIN — METOPROLOL TARTRATE 25 MG: 25 TABLET ORAL at 09:40

## 2020-01-01 RX ADMIN — PANTOPRAZOLE SODIUM 40 MG: 40 TABLET, DELAYED RELEASE ORAL at 16:40

## 2020-01-01 RX ADMIN — AMOXICILLIN AND CLAVULANATE POTASSIUM 1 TABLET: 500; 125 TABLET, FILM COATED ORAL at 08:08

## 2020-01-01 RX ADMIN — DOXYCYCLINE HYCLATE 100 MG: 100 TABLET, COATED ORAL at 09:41

## 2020-01-01 RX ADMIN — IPRATROPIUM BROMIDE AND ALBUTEROL SULFATE 3 ML: .5; 3 SOLUTION RESPIRATORY (INHALATION) at 01:00

## 2020-01-01 RX ADMIN — IPRATROPIUM BROMIDE AND ALBUTEROL SULFATE 3 ML: .5; 3 SOLUTION RESPIRATORY (INHALATION) at 14:05

## 2020-01-01 RX ADMIN — LINAGLIPTIN 5 MG: 5 TABLET, FILM COATED ORAL at 09:16

## 2020-01-01 RX ADMIN — INSULIN LISPRO 2 UNITS: 100 INJECTION, SOLUTION INTRAVENOUS; SUBCUTANEOUS at 17:17

## 2020-01-01 RX ADMIN — CLOPIDOGREL BISULFATE 75 MG: 75 TABLET ORAL at 13:21

## 2020-01-01 RX ADMIN — DONEPEZIL HYDROCHLORIDE 10 MG: 5 TABLET, FILM COATED ORAL at 21:26

## 2020-01-01 RX ADMIN — HEPARIN SODIUM 5000 UNITS: 5000 INJECTION INTRAVENOUS; SUBCUTANEOUS at 21:26

## 2020-01-01 RX ADMIN — LEVOTHYROXINE, LIOTHYRONINE 120 MG: 19; 4.5 TABLET ORAL at 08:02

## 2020-01-01 RX ADMIN — IPRATROPIUM BROMIDE AND ALBUTEROL SULFATE 3 ML: .5; 3 SOLUTION RESPIRATORY (INHALATION) at 22:08

## 2020-01-01 RX ADMIN — BUMETANIDE 1 MG: 1 TABLET ORAL at 09:08

## 2020-01-01 RX ADMIN — DOXYCYCLINE HYCLATE 100 MG: 100 TABLET, COATED ORAL at 08:29

## 2020-01-01 RX ADMIN — IPRATROPIUM BROMIDE AND ALBUTEROL SULFATE 3 ML: .5; 3 SOLUTION RESPIRATORY (INHALATION) at 07:31

## 2020-01-01 RX ADMIN — FERROUS SULFATE TAB 325 MG (65 MG ELEMENTAL FE) 325 MG: 325 (65 FE) TAB at 06:02

## 2020-01-01 RX ADMIN — INSULIN LISPRO 1 UNITS: 100 INJECTION, SOLUTION INTRAVENOUS; SUBCUTANEOUS at 23:55

## 2020-01-01 RX ADMIN — IPRATROPIUM BROMIDE AND ALBUTEROL SULFATE 3 ML: .5; 3 SOLUTION RESPIRATORY (INHALATION) at 23:00

## 2020-01-01 RX ADMIN — ATORVASTATIN CALCIUM 40 MG: 20 TABLET, FILM COATED ORAL at 09:41

## 2020-01-01 RX ADMIN — CLOPIDOGREL BISULFATE 75 MG: 75 TABLET ORAL at 09:40

## 2020-01-01 RX ADMIN — LOSARTAN POTASSIUM 25 MG: 25 TABLET, FILM COATED ORAL at 08:07

## 2020-01-01 RX ADMIN — PANTOPRAZOLE SODIUM 40 MG: 40 TABLET, DELAYED RELEASE ORAL at 06:24

## 2020-01-01 RX ADMIN — AMOXICILLIN AND CLAVULANATE POTASSIUM 1 TABLET: 500; 125 TABLET, FILM COATED ORAL at 21:09

## 2020-01-01 RX ADMIN — LOSARTAN POTASSIUM 25 MG: 25 TABLET, FILM COATED ORAL at 08:09

## 2020-01-01 RX ADMIN — IPRATROPIUM BROMIDE AND ALBUTEROL SULFATE 3 ML: .5; 3 SOLUTION RESPIRATORY (INHALATION) at 07:02

## 2020-01-01 RX ADMIN — ASPIRIN 81 MG 81 MG: 81 TABLET ORAL at 08:45

## 2020-01-01 RX ADMIN — Medication 10 ML: at 21:41

## 2020-01-01 RX ADMIN — Medication 10 ML: at 21:40

## 2020-01-01 RX ADMIN — METOPROLOL TARTRATE 25 MG: 25 TABLET ORAL at 22:30

## 2020-01-01 RX ADMIN — Medication 1500 MG: at 02:31

## 2020-01-01 RX ADMIN — CEFEPIME 2 G: 2 INJECTION, POWDER, FOR SOLUTION INTRAVENOUS at 21:26

## 2020-01-01 RX ADMIN — METOPROLOL TARTRATE 5 MG: 5 INJECTION INTRAVENOUS at 00:50

## 2020-01-01 RX ADMIN — DOXYCYCLINE HYCLATE 100 MG: 100 TABLET, COATED ORAL at 22:31

## 2020-01-01 RX ADMIN — DOXYCYCLINE 100 MG: 100 INJECTION, POWDER, LYOPHILIZED, FOR SOLUTION INTRAVENOUS at 01:45

## 2020-01-01 RX ADMIN — ATORVASTATIN CALCIUM 40 MG: 20 TABLET, FILM COATED ORAL at 09:10

## 2020-01-01 RX ADMIN — Medication 500 MG: at 06:02

## 2020-01-01 RX ADMIN — LOSARTAN POTASSIUM 25 MG: 25 TABLET, FILM COATED ORAL at 09:10

## 2020-01-01 RX ADMIN — METOPROLOL TARTRATE 25 MG: 25 TABLET ORAL at 09:09

## 2020-01-01 RX ADMIN — AMLODIPINE BESYLATE 10 MG: 5 TABLET ORAL at 08:02

## 2020-01-01 RX ADMIN — DOXYCYCLINE HYCLATE 100 MG: 100 TABLET, COATED ORAL at 21:39

## 2020-01-01 RX ADMIN — ATORVASTATIN CALCIUM 40 MG: 20 TABLET, FILM COATED ORAL at 08:07

## 2020-01-01 RX ADMIN — PANTOPRAZOLE SODIUM 40 MG: 40 TABLET, DELAYED RELEASE ORAL at 06:32

## 2020-01-01 RX ADMIN — IPRATROPIUM BROMIDE AND ALBUTEROL SULFATE 3 ML: .5; 3 SOLUTION RESPIRATORY (INHALATION) at 09:27

## 2020-01-01 RX ADMIN — IPRATROPIUM BROMIDE AND ALBUTEROL SULFATE 3 ML: .5; 3 SOLUTION RESPIRATORY (INHALATION) at 14:51

## 2020-01-01 RX ADMIN — AMOXICILLIN AND CLAVULANATE POTASSIUM 1 TABLET: 500; 125 TABLET, FILM COATED ORAL at 22:31

## 2020-01-01 RX ADMIN — IPRATROPIUM BROMIDE AND ALBUTEROL SULFATE 3 ML: .5; 3 SOLUTION RESPIRATORY (INHALATION) at 07:48

## 2020-01-01 RX ADMIN — INSULIN LISPRO 2 UNITS: 100 INJECTION, SOLUTION INTRAVENOUS; SUBCUTANEOUS at 17:01

## 2020-01-01 RX ADMIN — DOXYCYCLINE HYCLATE 100 MG: 100 TABLET, COATED ORAL at 09:16

## 2020-01-01 RX ADMIN — Medication 10 ML: at 14:48

## 2020-01-01 RX ADMIN — DOXYCYCLINE 100 MG: 100 INJECTION, POWDER, LYOPHILIZED, FOR SOLUTION INTRAVENOUS at 23:29

## 2020-01-01 RX ADMIN — SODIUM CHLORIDE 125 ML/HR: 9 INJECTION, SOLUTION INTRAVENOUS at 18:00

## 2020-01-01 RX ADMIN — LINAGLIPTIN 5 MG: 5 TABLET, FILM COATED ORAL at 05:31

## 2020-01-01 RX ADMIN — Medication 10 ML: at 14:00

## 2020-01-01 RX ADMIN — DOXYCYCLINE 100 MG: 100 INJECTION, POWDER, LYOPHILIZED, FOR SOLUTION INTRAVENOUS at 12:17

## 2020-01-01 RX ADMIN — IPRATROPIUM BROMIDE AND ALBUTEROL SULFATE 3 ML: .5; 3 SOLUTION RESPIRATORY (INHALATION) at 13:15

## 2020-01-01 RX ADMIN — IPRATROPIUM BROMIDE AND ALBUTEROL SULFATE 3 ML: .5; 3 SOLUTION RESPIRATORY (INHALATION) at 01:03

## 2020-01-01 RX ADMIN — LINAGLIPTIN 5 MG: 5 TABLET, FILM COATED ORAL at 08:13

## 2020-01-01 RX ADMIN — AZITHROMYCIN MONOHYDRATE 500 MG: 500 INJECTION, POWDER, LYOPHILIZED, FOR SOLUTION INTRAVENOUS at 17:46

## 2020-01-01 RX ADMIN — ASPIRIN 81 MG 81 MG: 81 TABLET ORAL at 08:07

## 2020-01-01 RX ADMIN — AMOXICILLIN AND CLAVULANATE POTASSIUM 1 TABLET: 500; 125 TABLET, FILM COATED ORAL at 22:09

## 2020-01-01 RX ADMIN — HEPARIN SODIUM 5000 UNITS: 5000 INJECTION INTRAVENOUS; SUBCUTANEOUS at 06:33

## 2020-01-01 RX ADMIN — DEXTROSE MONOHYDRATE AND SODIUM CHLORIDE: 5; .45 INJECTION, SOLUTION INTRAVENOUS at 22:34

## 2020-01-01 RX ADMIN — LOSARTAN POTASSIUM 25 MG: 25 TABLET, FILM COATED ORAL at 08:29

## 2020-01-01 RX ADMIN — IPRATROPIUM BROMIDE AND ALBUTEROL SULFATE 3 ML: .5; 3 SOLUTION RESPIRATORY (INHALATION) at 08:14

## 2020-01-01 RX ADMIN — ASPIRIN 81 MG 81 MG: 81 TABLET ORAL at 08:28

## 2020-01-01 RX ADMIN — INSULIN LISPRO 3 UNITS: 100 INJECTION, SOLUTION INTRAVENOUS; SUBCUTANEOUS at 13:15

## 2020-01-01 RX ADMIN — LOSARTAN POTASSIUM 25 MG: 25 TABLET, FILM COATED ORAL at 08:14

## 2020-01-01 RX ADMIN — DOCUSATE SODIUM 100 MG: 100 CAPSULE, LIQUID FILLED ORAL at 09:40

## 2020-01-01 RX ADMIN — AMLODIPINE BESYLATE 10 MG: 5 TABLET ORAL at 08:28

## 2020-01-01 RX ADMIN — DOXYCYCLINE HYCLATE 100 MG: 100 TABLET, COATED ORAL at 08:14

## 2020-01-01 RX ADMIN — INSULIN LISPRO 2 UNITS: 100 INJECTION, SOLUTION INTRAVENOUS; SUBCUTANEOUS at 08:09

## 2020-01-01 RX ADMIN — HEPARIN SODIUM 5000 UNITS: 5000 INJECTION INTRAVENOUS; SUBCUTANEOUS at 14:48

## 2020-01-01 RX ADMIN — IPRATROPIUM BROMIDE AND ALBUTEROL SULFATE 3 ML: .5; 3 SOLUTION RESPIRATORY (INHALATION) at 20:30

## 2020-01-01 RX ADMIN — ALBUTEROL SULFATE 2.5 MG: 2.5 SOLUTION RESPIRATORY (INHALATION) at 12:32

## 2020-01-01 RX ADMIN — ATORVASTATIN CALCIUM 40 MG: 20 TABLET, FILM COATED ORAL at 08:09

## 2020-01-01 RX ADMIN — DOXYCYCLINE 100 MG: 100 INJECTION, POWDER, LYOPHILIZED, FOR SOLUTION INTRAVENOUS at 02:54

## 2020-01-01 RX ADMIN — DOXYCYCLINE HYCLATE 100 MG: 100 TABLET, COATED ORAL at 08:07

## 2020-01-01 RX ADMIN — FERROUS SULFATE TAB 325 MG (65 MG ELEMENTAL FE) 325 MG: 325 (65 FE) TAB at 08:28

## 2020-01-01 RX ADMIN — METOPROLOL TARTRATE 2.5 MG: 5 INJECTION INTRAVENOUS at 00:15

## 2020-01-01 RX ADMIN — Medication 10 ML: at 05:31

## 2020-01-01 RX ADMIN — HEPARIN SODIUM 5000 UNITS: 5000 INJECTION INTRAVENOUS; SUBCUTANEOUS at 14:57

## 2020-01-01 RX ADMIN — GLYCERIN 1 SUPPOSITORY: 2 SUPPOSITORY RECTAL at 15:00

## 2020-01-01 RX ADMIN — BUMETANIDE 1 MG: 1 TABLET ORAL at 17:17

## 2020-01-01 RX ADMIN — CLOPIDOGREL BISULFATE 75 MG: 75 TABLET ORAL at 08:45

## 2020-01-01 RX ADMIN — IPRATROPIUM BROMIDE AND ALBUTEROL SULFATE 3 ML: .5; 3 SOLUTION RESPIRATORY (INHALATION) at 01:51

## 2020-01-01 RX ADMIN — METRONIDAZOLE 500 MG: 250 TABLET ORAL at 08:14

## 2020-01-01 RX ADMIN — HEPARIN SODIUM 5000 UNITS: 5000 INJECTION INTRAVENOUS; SUBCUTANEOUS at 07:04

## 2020-01-01 RX ADMIN — HEPARIN SODIUM 4000 UNITS: 5000 INJECTION INTRAVENOUS; SUBCUTANEOUS at 03:34

## 2020-01-01 RX ADMIN — METOPROLOL TARTRATE 25 MG: 25 TABLET ORAL at 22:04

## 2020-01-01 RX ADMIN — POTASSIUM BICARBONATE 20 MEQ: 782 TABLET, EFFERVESCENT ORAL at 12:22

## 2020-01-01 RX ADMIN — SODIUM PHOSPHATE, MONOBASIC, MONOHYDRATE AND SODIUM PHOSPHATE, DIBASIC, ANHYDROUS 15 MMOL: 276; 142 INJECTION, SOLUTION INTRAVENOUS at 07:04

## 2020-01-01 RX ADMIN — HEPARIN SODIUM 5000 UNITS: 5000 INJECTION INTRAVENOUS; SUBCUTANEOUS at 05:30

## 2020-01-01 RX ADMIN — LOSARTAN POTASSIUM 25 MG: 25 TABLET, FILM COATED ORAL at 08:45

## 2020-01-01 RX ADMIN — POLYETHYLENE GLYCOL 3350 17 G: 17 POWDER, FOR SOLUTION ORAL at 15:30

## 2020-01-01 RX ADMIN — METOPROLOL TARTRATE 25 MG: 25 TABLET ORAL at 08:29

## 2020-01-01 RX ADMIN — IPRATROPIUM BROMIDE AND ALBUTEROL SULFATE 3 ML: .5; 3 SOLUTION RESPIRATORY (INHALATION) at 09:36

## 2020-01-01 RX ADMIN — INSULIN LISPRO 2 UNITS: 100 INJECTION, SOLUTION INTRAVENOUS; SUBCUTANEOUS at 16:49

## 2020-01-01 RX ADMIN — INSULIN LISPRO 1 UNITS: 100 INJECTION, SOLUTION INTRAVENOUS; SUBCUTANEOUS at 22:30

## 2020-01-01 RX ADMIN — CLOPIDOGREL BISULFATE 75 MG: 75 TABLET ORAL at 08:07

## 2020-01-01 RX ADMIN — METOPROLOL TARTRATE 25 MG: 25 TABLET ORAL at 21:09

## 2020-01-01 RX ADMIN — INSULIN LISPRO 2 UNITS: 100 INJECTION, SOLUTION INTRAVENOUS; SUBCUTANEOUS at 09:07

## 2020-01-01 RX ADMIN — PANTOPRAZOLE SODIUM 40 MG: 40 INJECTION, POWDER, FOR SOLUTION INTRAVENOUS at 09:10

## 2020-01-01 RX ADMIN — AMLODIPINE BESYLATE 10 MG: 5 TABLET ORAL at 09:08

## 2020-01-01 RX ADMIN — INSULIN LISPRO 1 UNITS: 100 INJECTION, SOLUTION INTRAVENOUS; SUBCUTANEOUS at 22:13

## 2020-01-01 RX ADMIN — AMLODIPINE BESYLATE 10 MG: 5 TABLET ORAL at 08:45

## 2020-01-01 RX ADMIN — LOSARTAN POTASSIUM 25 MG: 25 TABLET, FILM COATED ORAL at 09:40

## 2020-01-01 RX ADMIN — INSULIN LISPRO 2 UNITS: 100 INJECTION, SOLUTION INTRAVENOUS; SUBCUTANEOUS at 08:02

## 2020-01-01 RX ADMIN — INSULIN LISPRO 2 UNITS: 100 INJECTION, SOLUTION INTRAVENOUS; SUBCUTANEOUS at 08:08

## 2020-01-01 RX ADMIN — PANTOPRAZOLE SODIUM 40 MG: 40 INJECTION, POWDER, FOR SOLUTION INTRAVENOUS at 22:13

## 2020-01-01 RX ADMIN — METOPROLOL TARTRATE 25 MG: 25 TABLET ORAL at 08:08

## 2020-01-01 RX ADMIN — Medication 10 ML: at 22:09

## 2020-01-01 RX ADMIN — HEPARIN SODIUM 5000 UNITS: 5000 INJECTION INTRAVENOUS; SUBCUTANEOUS at 22:04

## 2020-01-01 RX ADMIN — FUROSEMIDE 10 MG: 10 INJECTION, SOLUTION INTRAMUSCULAR; INTRAVENOUS at 17:20

## 2020-01-01 RX ADMIN — AMOXICILLIN AND CLAVULANATE POTASSIUM 1 TABLET: 500; 125 TABLET, FILM COATED ORAL at 09:40

## 2020-01-01 RX ADMIN — CEFEPIME 2 G: 2 INJECTION, POWDER, FOR SOLUTION INTRAVENOUS at 01:29

## 2020-01-01 RX ADMIN — Medication 10 ML: at 22:14

## 2020-01-01 RX ADMIN — LINAGLIPTIN 5 MG: 5 TABLET, FILM COATED ORAL at 07:30

## 2020-01-01 RX ADMIN — AMOXICILLIN AND CLAVULANATE POTASSIUM 1 TABLET: 500; 125 TABLET, FILM COATED ORAL at 21:39

## 2020-01-01 RX ADMIN — AMLODIPINE BESYLATE 10 MG: 5 TABLET ORAL at 09:11

## 2020-01-01 RX ADMIN — INSULIN LISPRO 1 UNITS: 100 INJECTION, SOLUTION INTRAVENOUS; SUBCUTANEOUS at 00:15

## 2020-01-01 RX ADMIN — METOPROLOL TARTRATE 25 MG: 25 TABLET ORAL at 21:35

## 2020-01-01 RX ADMIN — Medication 10 ML: at 05:03

## 2020-01-01 RX ADMIN — SODIUM CHLORIDE 75 ML/HR: 450 INJECTION, SOLUTION INTRAVENOUS at 10:12

## 2020-01-01 RX ADMIN — MAGNESIUM SULFATE 2 G: 2 INJECTION INTRAVENOUS at 23:04

## 2020-01-01 RX ADMIN — INSULIN LISPRO 3 UNITS: 100 INJECTION, SOLUTION INTRAVENOUS; SUBCUTANEOUS at 16:55

## 2020-01-01 RX ADMIN — BUMETANIDE 1 MG: 1 TABLET ORAL at 08:28

## 2020-01-01 RX ADMIN — IPRATROPIUM BROMIDE AND ALBUTEROL SULFATE 3 ML: .5; 3 SOLUTION RESPIRATORY (INHALATION) at 00:27

## 2020-01-01 RX ADMIN — METOPROLOL TARTRATE 25 MG: 25 TABLET ORAL at 09:12

## 2020-01-01 RX ADMIN — LINAGLIPTIN 5 MG: 5 TABLET, FILM COATED ORAL at 08:34

## 2020-01-01 RX ADMIN — IPRATROPIUM BROMIDE AND ALBUTEROL SULFATE 3 ML: .5; 3 SOLUTION RESPIRATORY (INHALATION) at 13:23

## 2020-01-01 RX ADMIN — AMOXICILLIN AND CLAVULANATE POTASSIUM 1 TABLET: 500; 125 TABLET, FILM COATED ORAL at 09:16

## 2020-01-01 RX ADMIN — AMLODIPINE BESYLATE 10 MG: 5 TABLET ORAL at 08:14

## 2020-01-01 RX ADMIN — PANTOPRAZOLE SODIUM 40 MG: 40 TABLET, DELAYED RELEASE ORAL at 09:10

## 2020-01-01 RX ADMIN — HEPARIN SODIUM 12 UNITS/KG/HR: 10000 INJECTION, SOLUTION INTRAVENOUS at 03:33

## 2020-01-01 RX ADMIN — INSULIN LISPRO 2 UNITS: 100 INJECTION, SOLUTION INTRAVENOUS; SUBCUTANEOUS at 12:24

## 2020-01-01 RX ADMIN — ASPIRIN 81 MG 81 MG: 81 TABLET ORAL at 09:09

## 2020-01-01 RX ADMIN — ATORVASTATIN CALCIUM 40 MG: 20 TABLET, FILM COATED ORAL at 08:14

## 2020-01-01 RX ADMIN — INSULIN LISPRO 2 UNITS: 100 INJECTION, SOLUTION INTRAVENOUS; SUBCUTANEOUS at 12:22

## 2020-01-01 RX ADMIN — METOPROLOL TARTRATE 25 MG: 25 TABLET ORAL at 21:08

## 2020-01-01 RX ADMIN — INSULIN LISPRO 2 UNITS: 100 INJECTION, SOLUTION INTRAVENOUS; SUBCUTANEOUS at 08:45

## 2020-01-01 RX ADMIN — IPRATROPIUM BROMIDE AND ALBUTEROL SULFATE 3 ML: .5; 3 SOLUTION RESPIRATORY (INHALATION) at 22:16

## 2020-01-01 RX ADMIN — IPRATROPIUM BROMIDE AND ALBUTEROL SULFATE 3 ML: .5; 3 SOLUTION RESPIRATORY (INHALATION) at 00:36

## 2020-01-01 RX ADMIN — IPRATROPIUM BROMIDE AND ALBUTEROL SULFATE 3 ML: .5; 3 SOLUTION RESPIRATORY (INHALATION) at 20:21

## 2020-01-01 RX ADMIN — AMOXICILLIN AND CLAVULANATE POTASSIUM 1 TABLET: 500; 125 TABLET, FILM COATED ORAL at 21:35

## 2020-01-01 RX ADMIN — FUROSEMIDE 20 MG: 10 INJECTION, SOLUTION INTRAMUSCULAR; INTRAVENOUS at 13:15

## 2020-01-01 RX ADMIN — DOXYCYCLINE 100 MG: 100 INJECTION, POWDER, LYOPHILIZED, FOR SOLUTION INTRAVENOUS at 13:27

## 2020-01-01 RX ADMIN — METRONIDAZOLE 500 MG: 250 TABLET ORAL at 12:58

## 2020-01-01 RX ADMIN — INSULIN LISPRO 2 UNITS: 100 INJECTION, SOLUTION INTRAVENOUS; SUBCUTANEOUS at 16:57

## 2020-01-01 RX ADMIN — FERROUS SULFATE TAB 325 MG (65 MG ELEMENTAL FE) 325 MG: 325 (65 FE) TAB at 17:18

## 2020-01-01 RX ADMIN — DOCUSATE SODIUM 100 MG: 100 CAPSULE, LIQUID FILLED ORAL at 17:20

## 2020-01-01 RX ADMIN — WATER 1 G: 1 INJECTION INTRAMUSCULAR; INTRAVENOUS; SUBCUTANEOUS at 17:45

## 2020-01-01 RX ADMIN — HEPARIN SODIUM 5000 UNITS: 5000 INJECTION INTRAVENOUS; SUBCUTANEOUS at 14:09

## 2020-01-01 RX ADMIN — LOSARTAN POTASSIUM 25 MG: 25 TABLET, FILM COATED ORAL at 09:08

## 2020-01-01 RX ADMIN — AMLODIPINE BESYLATE 10 MG: 5 TABLET ORAL at 09:09

## 2020-01-01 RX ADMIN — LINAGLIPTIN 5 MG: 5 TABLET, FILM COATED ORAL at 06:37

## 2020-01-01 RX ADMIN — CEFEPIME 2 G: 2 INJECTION, POWDER, FOR SOLUTION INTRAVENOUS at 21:40

## 2020-01-01 RX ADMIN — IPRATROPIUM BROMIDE AND ALBUTEROL SULFATE 3 ML: .5; 3 SOLUTION RESPIRATORY (INHALATION) at 01:25

## 2020-01-01 RX ADMIN — PANTOPRAZOLE SODIUM 40 MG: 40 TABLET, DELAYED RELEASE ORAL at 17:01

## 2020-01-01 RX ADMIN — ATORVASTATIN CALCIUM 40 MG: 20 TABLET, FILM COATED ORAL at 08:45

## 2020-01-01 RX ADMIN — ATORVASTATIN CALCIUM 40 MG: 20 TABLET, FILM COATED ORAL at 08:28

## 2020-01-01 RX ADMIN — DOCUSATE SODIUM 100 MG: 100 CAPSULE, LIQUID FILLED ORAL at 09:09

## 2020-01-01 RX ADMIN — AMOXICILLIN AND CLAVULANATE POTASSIUM 1 TABLET: 500; 125 TABLET, FILM COATED ORAL at 09:10

## 2020-01-01 RX ADMIN — AMOXICILLIN AND CLAVULANATE POTASSIUM 1 TABLET: 500; 125 TABLET, FILM COATED ORAL at 08:28

## 2020-01-01 RX ADMIN — DEXTROSE MONOHYDRATE AND SODIUM CHLORIDE: 5; .45 INJECTION, SOLUTION INTRAVENOUS at 12:12

## 2020-01-01 RX ADMIN — CLOPIDOGREL BISULFATE 75 MG: 75 TABLET ORAL at 09:08

## 2020-01-16 NOTE — LETTER
10/26/2017 12:36 PM 
 
Mr. Alexis [place] [id number] Dear Joana Vidal.: 
 
Please find your most recent results below. Resulted Orders TSH 3RD GENERATION Result Value Ref Range TSH 9.410 (H) 0.450 - 4.500 uIU/mL Narrative Performed at:  [address]  [id number] : Brandon Titus MD, Phone:  [phone] T4, FREE Result Value Ref Range T4, Free 0.80 (L) 0.82 - 1.77 ng/dL Narrative Performed at:  [address]  [id number] : Brandon Titus MD, Phone:  [phone] HEMOGLOBIN A1C WITH EAG Result Value Ref Range Hemoglobin A1c 8.6 (H) 4.8 - 5.6 % Comment:  
            Pre-diabetes: 5.7 - 6.4 Diabetes: >6.4 Glycemic control for adults with diabetes: <7.0 Estimated average glucose 200 mg/dL Narrative Performed at:  [address]  [id number] : Brandon Titus MD, Phone:  [phone] RECOMMENDATIONS: 
Very low thyroid level and very high A1C.  Poor diabetes control.  Will need to adjust medicines. Please call me if you have any questions: [phone] Sincerely, Mulu Powell MD 

Lives at home, has HHA, walks with walker    No current tobacco, alcohol use or recreational drug use

## 2020-01-22 NOTE — PROGRESS NOTES
Patient has graduated from the Transitions of Care Coordination  program on 1/22/20. Patient's symptoms are stable at this time. Patient/family has the ability to self-manage. Care management goals have been completed at this time. No further nurse navigator follow up scheduled. Goals Addressed This Visit's Progress  Understands red flags post discharge. 1/22/20-Per pt's daughter he is doing great and has attended f/u appointments. 12/18/19-Pt received discharge summary and is aware of red flags after discharge. Per pt's daughter pt is doing well now and back to baseline mental status. Pt has scheduled f/u with PCP tomorrow, Per daughter neurology said pt does not need to f/u with them. Family will advise PCP if any red flags arise. CW 
  
  
 
 
Pt has nurse navigator's contact information for any further questions, concerns, or needs. Patients upcoming visits:  No future appointments.

## 2020-01-25 PROBLEM — R53.1 WEAKNESS: Status: ACTIVE | Noted: 2020-01-01

## 2020-01-25 PROBLEM — J18.9 HAP (HOSPITAL-ACQUIRED PNEUMONIA): Status: ACTIVE | Noted: 2020-01-01
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
## 2020-01-25 NOTE — PROGRESS NOTES
[facility]  
Senior Resident Admission Note CC: fall HPI: 
Guilherme Robles. is a 80 y.o. male w/ hx TIA, UTI, CKD4, dementia, DMII, HTN, HLD, hypothyroidism, pernicious anemia, hx colon CA s/p bowel resection, GERD, who presents to the ER via EMS after pt was found on the floor this afternoon and has had AMS since then. Hx provided by pt's daughter as pt is a poor historian. Pt lives at home and is primarily taken care of by his granddaughter, but she had to step out of the house at 10am. At around noon, pt's granddaughter returned home and found him on the floor. Per pt's daughter, he likely fell b/w 10am-noon. He was  Pt \"forgetful\" at [facility] but is usually oriented to person and place. Chart reviewed. Patient seen, examined, and discussed with Dr. Jenny Mckinney (PGY-1). See H&P note for more details. In the ED: vitals remarkable for RR 26. Labs remarkable for WBC 16.1, BUN 38, Cr 3.1, AST 79, gluc 293, Mg 1.7. Trop 3.81, NT pro-BNP: 14,236. LA 2.0. R hand + forearm XRs - no acute process. CXR - RUL PNA. CT abd/pelvis - no acute traumatic injury, +gall bladder sludge/stones, +RUL grand glass opacities incompletely visualized. CT C spine showed old odontoid fx and multilevel degenerative changes. CT head showed no acute process. Pt was treated w/ 500cc NS bolus, 1x IV rocephin + azithromycin. A/P: Symptoms likely d/t HAP. Will admit to telemetry 1. Sepsis 2/2 HAP: SIRS 2/4 (RR, WBC 16.1). Pt last in hospital 12/17 for acute metabolic encephalopathy 2/2 UTI. RUL PNA visualized on CXR and CT C-spine + CT abd/pelvis. S/p IV rocephin + azithromycin in ED. Will treat w/ IV vanc, cefepime, doxycycline. Pending BCx, UA + UCx. LA 2.0; repeat until <2. RVP, pro-calcitonin, strep pneumo, legionella, sputum Cx. Elevated AST likely 2/2 shock liver but will order acute hepatitis panel. Daily CBC, CMP. MIVF. Duo-nebs q4hrs PRN for wheezing. 2. Acute Metabolic Encephalopathy: Likely 2/2 HAP. Treatment of HAP as above. TSH. NPO until passed bedside swallow. CT head wnl and stroke work-up last month showed chronic white matter disease and superficial siderosis of R frontal lobe, EEG gen slowing. Carotid dopplers in Dec'19 showed chronic mild-mod L ICA stenoses. Continue home plavix. Pt was supposed to follow-up w/ neurology early Jan but never did. 3. NSTEMI/Type II MI in setting Grade 1 Diastolic Dysfunction: Trop 3.81; trenf q6hrs. NT pro-BNP 97259. Will check CK-MB. Echo in [address] [place] [address] EF 83-88%, grade 1 diastolic dysfunction. 4. HTN: Stable. Continue home norvasc. Hold home losartan and lasix until renal functions improve. 5. WILTON on CKD4: Cr 3.1 (BL ~2.5). Likely 2/2 sepsis. MIVF. Avoid nephrotoxic agents. Daily CMP 6. DMII: Hold home tradjenta and amaryl. SSI + q6hrs POC gluc while NPO. Hold home 7. HLD: Continue niacin. Hold home lipitor in setting transaminitis 8. GERD: Switch home prevacid to protonix 9. Hypothyroidism: TSH 11.7 on 12/19; will recheck w/ free T3  + T4. Continue home armour thyroid 120mg. 10. Pernicious Anemia: Continue home ferrous sulfate and thera-MV 11. Dementia: Continue home aricept 12. Hx Fall: Fall precautions. CT C-spine, XR R forearm, hand - no acute process. Will check UA + CK for possible rhabdo. Wound care c/s I agree with remaining assessment and plan as documented in Dr. Arian Chavez H&P note. Pt discussed with Dr. Alicia Hayes (on-call attending physician) Sharmila Chen MD 
Family Medicine Resident

## 2020-01-25 NOTE — ED TRIAGE NOTES
Family called EMS because they were unable to pick pt up off of floor. Pt states he felt weak and fell down. Pt was on floor for unknown amount of time. Family reports ~ 1 hour.

## 2020-01-25 NOTE — ED PROVIDER NOTES
The patient is an 70-year-old male with multiple medical problems that include hypertension, GERD, dyslipidemia, chronic kidney disease, diabetes, WILTON, pernicious anemia, dementia, stroke, hypothyroidism who presents to the ED by EMS for family at the bedside who state that the patient was found on the floor this afternoon and the last time he was seen well was yesterday. The patient is a difficult historian and unable to say with full certainty what happened to him. He is usually awake, alert oriented x3, ambulates with minimal assistance and able to take care of his ADLs. Most of the history was obtained from EMS and family at the bedside. Past Medical History:  
Diagnosis Date  WILTON (acute kidney injury) ([facility] [address] 8/2/2014  Cancer [facility])   
 colon  Chronic kidney disease  Diabetes ([facility] [address] 2008  
 type 2  Dyslipidemia  Esophageal reflux  Esophageal reflux 2/9/2012  Hypertension 2008  Incontinence  Memory loss  Mixed hyperlipidemia 2/9/2012  Other ill-defined conditions(799.89) broken neck 2012  Pernicious anemia 7/31/2012  Psychiatric disorder   
 dementia  Snoring  Stroke ([facility] [address] 10/18/2018  Unspecified hypothyroidism 4/26/2012 Past Surgical History:  
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    
 HX HERNIA REPAIR Bilateral inguinal  
 HX OTHER SURGICAL    
 colon resection  HX SMALL BOWEL RESECTION  2010  HX TONSIL AND ADENOIDECTOMY  HX TONSILLECTOMY  1936 Family History:  
Problem Relation Age of Onset  Heart Disease Mother  Hypertension Mother  Heart Disease Father  Hypertension Father  Cancer Paternal Aunt Social History Socioeconomic History  Marital status:  Spouse name: Not on file  Number of children: Not on file  Years of education: Not on file  Highest education level: Not on file Occupational History  Not on file Social Needs  Financial resource strain: Not on file  Food insecurity:  
  Worry: Not on file Inability: Not on file  Transportation needs:  
  Medical: Not on file Non-medical: Not on file Tobacco Use  Smoking status: Former Smoker Packs/day: 1.50 Years: 30.00 Pack years: 45.00 Last attempt to quit: 1982 Years since quittin.5  Smokeless tobacco: Never Used Substance and Sexual Activity  Alcohol use: No  
  Alcohol/week: 0.0 standard drinks Types: 1 - 2 Standard drinks or equivalent per week  Drug use: Never  Sexual activity: Not Currently Partners: Female Lifestyle  Physical activity:  
  Days per week: Not on file Minutes per session: Not on file  Stress: Not on file Relationships  Social connections:  
  Talks on phone: Not on file Gets together: Not on file Attends [employer] service: Not on file Active member of club or organization: Not on file Attends meetings of clubs or organizations: Not on file Relationship status: Not on file  Intimate partner violence:  
  Fear of current or ex partner: Not on file Emotionally abused: Not on file Physically abused: Not on file Forced sexual activity: Not on file Other Topics Concern  Not on file Social History Narrative  Not on file ALLERGIES: Synthroid [levothyroxine] Review of Systems Unable to perform ROS: Acuity of condition Vitals:  
 20 1419 BP: 140/65 Pulse: (!) 28 Resp: 26 SpO2: 95% Weight: 88.9 kg (196 lb) Height: 6' (1.829 m) Physical Exam 
Vitals signs and nursing note reviewed. CONSTITUTIONAL: Frail elderly male; in no apparent distress HEAD: Normocephalic; atraumatic EYES: PERRL; EOM intact; conjunctiva and sclera are clear bilaterally.  
ENT: No rhinorrhea; normal pharynx with no tonsillar hypertrophy; mucous membranes pink/dry, no erythema, no exudate. NECK: Supple; non-tender; no cervical lymphadenopathy CARD: Normal S1, S2; no murmurs, rubs, or gallops. Regular rate and rhythm. RESP: Normal respiratory effort; breath sounds clear and equal bilaterally; no wheezes, rhonchi, or rales. ABD: Normal bowel sounds; non-distended; diffuse tenderness without any rebound or guarding; no palpable organomegaly, no masses, no bruits. Back Exam: Normal inspection; no vertebral point tenderness, no CVA tenderness. Normal range of motion. EXT: Normal ROM in all four extremities; non-tender to palpation; no swelling or deformity; distal pulses are normal, no edema. SKIN: Warm; dry; skin tear of the right wrist and hand and abdominal wall NEURO:Alert and oriented x self and place, garbled speech but follow commands, SARANYA-XII grossly intact, sensory and motor are non-focal. 
 
 
MDM Number of Diagnoses or Management Options CKD (chronic kidney disease) stage 4, GFR 15-29 ml/min ([facility] [address]):  
Dementia without behavioral disturbance, unspecified dementia type [facility]):  
DNR (do not resuscitate) discussion:  
Elevated troponin: Pneumonia of right upper lobe due to infectious organism [facility]):  
Tear of skin of right wrist, initial encounter:  
Weakness:  
Diagnosis management comments: Assessment: Differential diagnosis include metabolic encephalopathy rule out ICH, electrolyte abnormality, ACS, infection including sepsis and UTI, dehydration/chronic debility Plan: EKG CT scan of the head/ /CT scan of the cervical spine/CT scan of the abdomen and pelvis /lab/IV fluid/chest x-ray/x-ray of the right forearm and hand/rectal temp/serial exam/ Monitor and Reevaluate. Amount and/or Complexity of Data Reviewed Clinical lab tests: reviewed and ordered Tests in the radiology section of CPT®: ordered and reviewed Tests in the medicine section of CPT®: ordered and reviewed Discussion of test results with the performing providers: yes Decide to obtain previous medical records or to obtain history from someone other than the patient: yes Obtain history from someone other than the patient: yes Review and summarize past medical records: yes Discuss the patient with other providers: yes Independent visualization of images, tracings, or specimens: yes Risk of Complications, Morbidity, and/or Mortality Presenting problems: moderate Diagnostic procedures: moderate Management options: moderate Critical Care Total time providing critical care: (Total critical care time spent exclusive of procedures: 45 minutes) Patient Progress Patient progress: stable Procedures ED EKG interpretation: 
Rhythm: normal sinus rhythm; and regular . Rate (approx.): 93; Axis: normal; P wave: normal; QRS interval: normal ; ST/T wave: non-specific changes; in  Lead: Diffusely; Other findings: abnormal ekg. This EKG was interpreted by Олег Grubbs MD,ED Provider. XRAY INTERPRETATION (ED MD) Chest Xray Right upper lobe pneumonia. Increased  heart size. No bony abnormalities. Deisi Murillo MD 3:13 PM 
 
XRAY INTERPRETATION (ED MD) Xray of right wrist shows no fracture. No subluxation/dislocation. No bony abnormality. Deisi Murillo MD 3:13 PM 
 
XRAY INTERPRETATION (ED MD) Xray of right forearm shows no fracture. No subluxation/dislocation. No bony abnormality. Deisi Murillo MD 3:13 PM 
 
PROGRESS NOTE: 
Pt has been reexamined by Deisi Murillo MD all available results have been reviewed with pt and any available family. Pt understands sx, dx, and tx in ED. Care plan has been outlined and questions have been answered. Pt and any available family understands and agrees to need for admission to hospital for further tx not available in ED. Pt is ready for admission. Written by Олег Grubbs MD,  5:00 PM. Hospitalist Noah Serve for Admission 5:27 PM 
 
 ED Room Number: LI60/14 Patient Name and age:  Eulalio Wiley 80 y.o.  male Working Diagnosis:  
1. Weakness 2. Pneumonia of right upper lobe due to infectious organism [facility]) 3. CKD (chronic kidney disease) stage 4, GFR 15-29 ml/min ([facility]) 4. Elevated troponin 5. DNR (do not resuscitate) discussion 6. Dementia without behavioral disturbance, unspecified dementia type ([facility] [address] 7. Tear of skin of right wrist, initial encounter Readmission: yes Isolation Requirements:  no 
Recommended Level of Care:  telemetry Code Status:  Do Not Resuscitate Department:[facility] ED - [phone] Other: The patient can no longer be cared for at home and require case management for evaluation for possible home placement however he is very sick and at risk for further deterioration. CONSULT NOTE: 
Elba Gutierrez MD spoke with the family practice residency team. Discussed patient's presentation, history, physical assessment, and available diagnostic results. They will evaluate, write orders and admit the patient to the hospital. 5:30 PM 
.

## 2020-01-26 PROBLEM — R41.0 DELIRIUM: Status: RESOLVED | Noted: 2018-10-11 | Resolved: 2020-01-01

## 2020-01-26 PROBLEM — N39.0 UTI (URINARY TRACT INFECTION): Status: RESOLVED | Noted: 2019-01-01 | Resolved: 2020-01-01

## 2020-01-26 PROBLEM — I21.4 NSTEMI (NON-ST ELEVATED MYOCARDIAL INFARCTION) (HCC): Status: ACTIVE | Noted: 2020-01-01

## 2020-01-26 PROBLEM — A41.9 SEPSIS (HCC): Status: ACTIVE | Noted: 2020-01-01
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
## 2020-01-26 PROBLEM — N39.0 URINARY TRACT INFECTION: Status: RESOLVED | Noted: 2018-10-11 | Resolved: 2020-01-01

## 2020-01-26 NOTE — PROGRESS NOTES
Up-trending Trops; 3.81 --> 7.67. Dr. Lisa Ramirez spoke to on-call cardiology (Dr. Juancarlos Chun) who states this could be NSTEMI but may all be related to underlying sepsis. Recommended starting heparin gtt unless otherwise CI. BL PTT 24.5. - D/c sub-cut heparin for vte ppx 
- IV heparin bolus + gtt 
- Heparin protocol to titrate - Cardiology to see in AM 
- Continue treatment of sepsis Graham Lopez MD 
Family Medicine Resident

## 2020-01-26 NOTE — PROGRESS NOTES
Radiologist does not want to give IV contrast due to abnormal labs, please consider VQ scan in addition notes in chart states \"avoid nephrotoxic agents\"

## 2020-01-26 NOTE — PROGRESS NOTES
Notified family practice regarding patient needing jet neb treatments as he is wheezing. Orders received.

## 2020-01-26 NOTE — PROGRESS NOTES
[address] [address] Pharmacy Dosing Services: Antimicrobial Stewardship Daily Doc Consult for antibiotic dosing of Vancomycin by Dr. Maris Villegas Indication:Sepsis 2/2 HAP: Pt last in hospital 12/17 for acute metabolic encephalopathy 2/2 UTI. RUL PNA visualized on CXR and CT C-spine + CT abd/pelvis. WILTON on CKD4 with eCrCl <20ml/min. WBC = 16.1; Lactic = 2; Procal pending Day of Therapy 1 Ht Readings from Last 1 Encounters:  
01/25/20 182.9 cm (72\") Wt Readings from Last 1 Encounters:  
01/25/20 88.9 kg (196 lb) Vancomycin therapy: 
Loading dose: 1250mg IV x 1 dose ~ 2100 in ED Maintenance dose: Due to acute on CKD4 and estimated CrCl <30ml/min, dosing per levels Dose calculated to approximate a therapeutic trough of 15-20 mcg/mL. Last trough level: New start Plan for level / Adjustment in Therapy: ~24 hours after loading dose given in ED - not ordered Dose administration notes:    
 
Date Dose & Interval Measured (mcg/mL) Extrapolated (mcg/mL) ? ? ? ?  
? ? ? ?  
? ? ? ? Other Antimicrobial  
(not dosed by pharmacist) CTX and Azithro x 1 in ED; Followed by Cefepime, Doxy Cultures 1/25 Blood - pending 1/25 Urine - pending 1/25 Legionella Ag pending 1/25 Mycoplasma pneumoniae by PCR pending Serum Creatinine Lab Results Component Value Date/Time Creatinine 3.10 (H) 01/25/2020 02:43 PM  
 Creatinine (POC) 1.9 (H) 10/11/2018 02:38 PM  
  
  
Creatinine Clearance Estimated Creatinine Clearance: 18.1 mL/min (A) (based on SCr of 3.1 mg/dL (H)). Temp Temp: 98.6 °F (37 °C) WBC Lab Results Component Value Date/Time WBC 16.1 (H) 01/25/2020 02:43 PM  
 
  
H/H Lab Results Component Value Date/Time HGB 12.5 01/25/2020 02:43 PM  
 
  
Platelets Lab Results Component Value Date/Time PLATELET 753 77/49/3301 02:43 PM  
 
  
 
 
Pharmacist Brian Roberts

## 2020-01-26 NOTE — ED NOTES
Spoke with Becki at [place] [address]. EMS expressed concern about pt living conditions. Pt arrived in urine soiled clothing that appeared to be old. APS report # [id number]. Advised APS to call [facility] for further information about living conditions.

## 2020-01-26 NOTE — PROGRESS NOTES
Paged family practice regarding note from cardiologist as to leaving heparin drip for 48 hours. Awaiting orders. Noted that the heparin drip was to be discontinued. Heparin drip left running while awaiting answer. Per family practice continue heparin. Heparin never stopped.

## 2020-01-26 NOTE — PROGRESS NOTES
Jake Herrera [address] Office [phone] Fax [phone] Assessment and Plan Analy Persaud is a 80 y.o. male admitted for sepsis 2/2 HAP. Also found to acute metabolic encephalopathy and NSTEMI/Type II MI in the setting of Grade 1 diastolic dysfunction. 24 Hour Events: Repeat trop increased from 3.81-> 7.67. Started on Heparin drip per cards rec. Required 4L O2 NC overnight d/t desaturation. Required sitter bc he kept trying to get up and remove his IV  
 
ACUTE PROBLEMS 1. Sepsis 2/2 HAP: 2/4 SIRS. Pt last in hospital 12/17 for acute metabolic encephalopathy 2/2 UTI. RUL PNA visualized on CXR and CT C-spine + CT abd/pelvis. S/p IV rocephin + azithromycin in ED. POA LA 2.0 repeat down to 1.8. Procal low risk at 0.33. WBC increased to 17.6. ESR elevated at 60. - Strict I &O  
- on MIVFIV  
-vanc, cefepime, doxycycline. - urine, blood and sputum cultures pending  
- RVP, strep pneumo, legionella, sputum Cx 
-daily CBC, CMP, Mag and phos - Duoneb q4h prn for wheezing  
- requiring 4LO2 NC- unable to determine Wells Score as Pt is AMS. Will get V/Q scan to r/o PE.   
 
  
2. Elevated AST: POA likely 2/2 shock liver  
- acute hepatitis panel pending  
- daily CMP  
  
3. Acute Metabolic Encephalopathy: Likely 2/2 HAP. CT head wnl and stroke work-up last month showed chronic white matter disease and superficial siderosis of R frontal lobe, EEG gen slowing. Carotid dopplers in Dec'19 showed chronic mild-mod L ICA stenoses. Pt was supposed to follow-up w/ neurology early Jan but never did. -Treatment of HAP as above. - Continue home plavix  
  
4. NSTEMI/Type II MI in setting Grade 1 Diastolic Dysfunction: Echo in Dec'19 EF 48-55%, grade 1 diastolic dysfunction. POA Trop 3.81-> 7.67 & NT pro-BNP 93219. CK-CKMB elevated at 4,051 and 42.3 respectively. - trend trop q6h  
- start Heparin drip per cardiology rec's d/t increase trop on repeat - Echo pending - c/s to cardiology recs appreciated  
  
CHRONIC PROBLEMS 1. HTN: Stable. -Continue home norvasc.  
-Hold home losartan and lasix until renal functions improve. 
  
2. WILTON on CKD4: Cr 3.1 (BL ~2.5). Likely 2/2 sepsis. -MIVF. -Avoid nephrotoxic agents. -Daily CMP 
  
3. DMII: POA glucose 293; HA1c 2019 (6.5)  
- Hold home tradjenta and amaryl 
- SSI 
-q6hrs POC gluc while NPO 
  
4. HLD:  
-Continue niacin 
- Hold home lipitor in setting transaminitis 
  
5. GERD:  
-Switch home prevacid to protonix 
  
6. Hypothyroidism: TSH 11.7 on . 
-  TSH, free T3  + T4 pending  
- Continue home armour thyroid 120mg.  
  
7. Pernicious Anemia: POA Hgb 12.5 
-Continue home ferrous sulfate and thera-MV 
  
8. Dementia:Continue home aricept 
  
9. H/o Fall:   CT C-spine, XR R forearm, hand - no acute process.  
-Fall precautions. 
-Will check UA + CK for possible rhabdo - Wound care c/s 
  
Obesity BMI Body mass index is 27.34 kg/m². - Encouraging lifestyle modifications and further follow up outpatient.  
  
  
FEN/GI - passed bedside swallow. Regular diet. MIVF NS at 130 mL/hr. Activity - Ambulate with assistance DVT prophylaxis - on Heparin Drip GI prophylaxis - Protonix Fall prophylaxis - Fall precautions ordered. Disposition - Admit to Telemetry. Plan to d/c to Home. Consulting PT/OT/CM Code Status - DNR, discussed with patient / caregivers. Next of Isis 69 Name and [phone] [facility] (Son) [phone], Theodore Adkins (dtr) [phone], Ray Encarnacion (son in law) [phone] 
  
Patient Daphne Peacock. will be discussed Dr. Janeen Farrell. I appreciate the opportunity to participate in the care of this patient, 
Aron Mccallum DO Family Medicine Resident Subjective / Objective Subjective Unable to be assessed as Pt is A&O x1. Temp (24hrs), Av.7 °F (37.1 °C), Min:97.8 °F (36.6 °C), Max:99.7 °F (37.6 °C) Objective Respiratory:   O2 Device: Room air O2 Device: Room air General: No acute distress. Alert. Cooperative. Frail elderly male Head: Normocephalic. Atraumatic. Eyes:              Conjunctiva pink. Sclera white. PERRL. Nose:             Septum midline. Mucosa pink. No drainage. Throat: Mucosa pink. Moist mucous membranes. No tonsillar exudates or erythema. Palate movement equal bilaterally. Neck: Supple. Normal ROM. No stiffness. Respiratory: Diffuse scattered expiratory wheezing heard throughout posterior lung fields. Moving air appropriately. Does not appear to be in respiratory distress. On 4L O2 NC Cardiovascular: RRR. Normal S1,S2. No m/r/g. Pulses 2+ throughout. GI: + bowel sounds. Nontender. No rebound tenderness or guarding. Nondistended. Extremities: No LE edema. Distal pulses intact. Able to move all 4 extremities. Does not illicit pain. Musculoskeletal: Full ROM in all extremities. Skin: Warm, dry. No rashes. Has bandage on Rt arm where daughter states she noticed abrasion. Dressing is clean dry and intact. Neuro: Limited d/t mental status change. A&O x1 (only to self) I/O: 
Date 01/25/20 0700 - 01/26/20 4918 01/26/20 0700 - 01/27/20 6691 Shift 2518-7746 2468-7671 24 Hour Total 3273-0996 2631-4343 24 Hour Total  
INTAKE  
P.O.  0 0     
  P. O.  0 0 Shift Total(mL/kg)  0(0) 0(0) OUTPUT Urine(mL/kg/hr) Urine Occurrence(s)  4 x 4 x Shift Total(mL/kg) NET  0 0 Weight (kg) 88.9 88.9 88.9 88.9 88.9 88.9 Inpatient Medications Current Facility-Administered Medications Medication Dose Route Frequency  insulin lispro (HUMALOG) injection   SubCUTAneous AC&HS  potassium phosphate 15 mmol in 0.9% sodium chloride 250 mL infusion   IntraVENous ONCE  
 heparin 25,000 units in D5W 250 ml infusion  12-25 Units/kg/hr IntraVENous TITRATE  amLODIPine (NORVASC) tablet 10 mg  10 mg Oral DAILY  clopidogreL (PLAVIX) tablet 75 mg  75 mg Oral DAILY  donepeziL (ARICEPT) tablet 10 mg  10 mg Oral QHS  ferrous sulfate tablet 325 mg  325 mg Oral ACB  therapeutic multivitamin (THERAGRAN) tablet 1 Tab  1 Tab Oral DAILY  thyroid (Pork) (ARMOUR) tablet 120 mg  120 mg Oral DAILY  nicotinic acid (NIACIN) tablet 500 mg  500 mg Oral ACB  sodium chloride (NS) flush 5-40 mL  5-40 mL IntraVENous Q8H  
 sodium chloride (NS) flush 5-40 mL  5-40 mL IntraVENous PRN  
 0.9% sodium chloride infusion  130 mL/hr IntraVENous CONTINUOUS  
 cefepime (MAXIPIME) 2 g in 0.9% sodium chloride (MBP/ADV) 100 mL  2 g IntraVENous Q24H  
 glucose chewable tablet 16 g  4 Tab Oral PRN  
 dextrose (D50W) injection syrg 12.5-25 g  12.5-25 g IntraVENous PRN  
 glucagon (GLUCAGEN) injection 1 mg  1 mg IntraMUSCular PRN  
 doxycycline (VIBRAMYCIN) 100 mg in 0.9% sodium chloride (MBP/ADV) 100 mL  100 mg IntraVENous Q12H  Pharmacy dosing Vancomycin per random levels    Other Rx Dosing/Monitoring  albuterol-ipratropium (DUO-NEB) 2.5 MG-0.5 MG/3 ML  3 mL Nebulization Q4H PRN  
 dextrose 10% infusion 0-250 mL  0-250 mL IntraVENous PRN Allergies Allergies Allergen Reactions  Synthroid [Levothyroxine] Other (comments) Confused, hallucinations CBC: 
Recent Labs  
  01/26/20 
0102 01/25/20 
1443 WBC 17.6* 16.1* HGB 10.9* 12.5 HCT 33.1* 37.0  229 Metabolic Panel: 
Recent Labs  
  01/26/20 
0102 01/25/20 
1443  138  
K 4.0 4.9 * 107 CO2 21 23 BUN 40* 38* CREA 2.66* 3.10* * 293* CA 7.8* 8.7 MG 2.4 1.7 PHOS 2.9 3.1 ALB 2.5* 3.0*  
SGOT 138* 79* ALT 30 23 INR  --  1.1 Imaging/procedures: 
EKG showed NSR, junctional ST depression probably normal.  
R hand + forearm XRs - no acute process. CXR - RUL PNA. CT abd/pelvis - no acute traumatic injury, +gall bladder sludge/stones, +RUL grand glass opacities incompletely visualized. CT C spine showed old odontoid fx and multilevel degenerative changes. CT head showed no acute process. For Billing Chief Complaint Patient presents with  Fall  
 Skin Problem Hospital Problems  Date Reviewed: 12/19/2019 Codes Class Noted POA Sepsis ([facility] ICD-10-CM: A41.9 ICD-9-CM: 038.9, 995.91  1/26/2020 Unknown Fall ICD-10-CM: W19. Mikaela Ambrosio ICD-9-CM: N608.5  1/26/2020 Unknown NSTEMI (non-ST elevated myocardial infarction) ([facility] ICD-10-CM: I21.4 ICD-9-CM: 410.70  1/26/2020 Unknown Weakness ICD-10-CM: R53.1 ICD-9-CM: 780.79  1/25/2020 Unknown * (Principal) HAP (hospital-acquired pneumonia) ICD-10-CM: J18.9, Y95 
ICD-9-CM: 486  1/25/2020 Unknown AMS (altered mental status) ICD-10-CM: R41.82 
ICD-9-CM: 780.97  12/14/2019 Yes CKD (chronic kidney disease) stage 4, GFR 15-29 ml/min (HCC) ICD-10-CM: N18.4 ICD-9-CM: 585.4  4/22/2019 Yes Dementia without behavioral disturbance (HCC) ICD-10-CM: F03.90 ICD-9-CM: 294.20  8/16/2016 Yes WILTON (acute kidney injury) ([facility] ICD-10-CM: N17.9 ICD-9-CM: 584.9  8/2/2014 Yes Pernicious anemia ICD-10-CM: D51.0 ICD-9-CM: 281.0  7/31/2012 Yes Acquired hypothyroidism ICD-10-CM: E03.9 ICD-9-CM: 244.9  4/26/2012 Yes Diabetes mellitus type 2, controlled ([facility] ICD-10-CM: E11.9 ICD-9-CM: 250.00  2/9/2012 Yes Essential hypertension, benign ICD-10-CM: I10 
ICD-9-CM: 401.1  2/9/2012 Yes HLD (hyperlipidemia) ICD-10-CM: E78.5 ICD-9-CM: 272.4  2/9/2012 Unknown Esophageal reflux ICD-10-CM: K21.9 ICD-9-CM: 530.81  2/9/2012 Yes

## 2020-01-26 NOTE — PROGRESS NOTES
Received call from CT that CT with contrast that was ordered for patient would not be performed d/t pt's kidney function and the recommendation to avoid nephrotoxic agents. Message relayed to [facility] resident.

## 2020-01-26 NOTE — ED NOTES
TRANSFER - OUT REPORT: 
 
Verbal report given to Brisa Arredondo.  being transferred to [address] [place] [address] for routine progression of care Report consisted of patients Situation, Background, Assessment and  
Recommendations(SBAR). Information from the following report(s) SBAR, ED Summary, [facility] and Recent Results was reviewed with the receiving nurse. Opportunity for questions and clarification was provided.

## 2020-01-26 NOTE — PROGRESS NOTES
Family practice aware that prior to stand evaluation orders patient had already been drinking water and had swallowed 2 pills . Md. from family practice stated \"Swallow test has been done. \"

## 2020-01-26 NOTE — CONSULTS
.. 
 
 
                       
Abdirahman Traylor [place] [address] [facility]. YOB: 1931 Assessment & Plan: 1.  WILTON/CKD4 
- IN SETTING OF IVVD 
- IN SETTING OF PNA/SEPSIS ON ABX 
- FAMILY IS AT BEDSIDE. THEY ARE CLEARLY IN THE LESS IS MORE CAMP WHICH I SUPPORT 
- AT 81 YO WITH MULTIPLE COMORBID CONDITIONS ANY RENAL REPLACEMENT THERAPY WOULD ONLY INCREASE SUFFERING AND DECREASE QUALITY OF LIFE 
- DR. LEMOS TO SEE IN AM  
 
 
Subjective:  
CC: WILTON/CKD4 HPI: Patient seen and examined. Family at the bedside. Former pt of Dr. Pawan Mas. Called given creat trend. Still making urine. SOB is better. Delirium on baseline dementia is worse. No f/c/ns. On abx. + troponin. ROS: He is a very poor historian thus ros is limited. Current Facility-Administered Medications Medication Dose Route Frequency  insulin lispro (HUMALOG) injection   SubCUTAneous AC&HS  [START ON 1/27/2020] Vancomycin Random AM  1 Each Other ONCE  
 heparin 25,000 units in D5W 250 ml infusion  12-25 Units/kg/hr (Order-Specific) IntraVENous TITRATE  sodium chloride (NS) flush 5-40 mL  5-40 mL IntraVENous Q8H  
 sodium chloride (NS) flush 5-40 mL  5-40 mL IntraVENous PRN  
 cefepime (MAXIPIME) 2 g in 0.9% sodium chloride (MBP/ADV) 100 mL  2 g IntraVENous Q24H  
 glucose chewable tablet 16 g  4 Tab Oral PRN  
 dextrose (D50W) injection syrg 12.5-25 g  12.5-25 g IntraVENous PRN  
 glucagon (GLUCAGEN) injection 1 mg  1 mg IntraMUSCular PRN  
 doxycycline (VIBRAMYCIN) 100 mg in 0.9% sodium chloride (MBP/ADV) 100 mL  100 mg IntraVENous Q12H  Pharmacy dosing Vancomycin per random levels    Other Rx Dosing/Monitoring  albuterol-ipratropium (DUO-NEB) 2.5 MG-0.5 MG/3 ML  3 mL Nebulization Q4H PRN  
 dextrose 10% infusion 0-250 mL  0-250 mL IntraVENous PRN Objective:  
 
Vitals: 
Blood pressure 131/75, pulse 93, temperature 97.8 °F (36.6 °C), resp.  rate 24, height 6' (1.829 m), weight 89 kg (196 lb 3.4 oz), SpO2 91 %. Temp (24hrs), Av.5 °F (36.9 °C), Min:97.8 °F (36.6 °C), Max:99.8 °F (37.7 °C) Intake and Output: 
No intake/output data recorded. No intake/output data recorded. Physical Exam:              
 Patient is intubated:  NO Physical Examination:  
GENERAL ASSESSMENT: NAD HEENT:ANICTERIC, DRY OM 
CHEST: COARSE HEART: S1S2 ABDOMEN: Soft,NT, 
:Walters:  
EXTREMITY: EDEMA - LEFT > RIGHT  
NEURO:Grossly non focal 
 
 
   
ECG/rhythm: 
 
Data Review No results for input(s): TNIPOC in the last 72 hours. No lab exists for component: ITNL Recent Labs  
  20 
0645 20 
0102 20 
1443 CPK  --  4,051* 2,422* CKMB  --  42.3*  --   
TROIQ 7.58* 7.67* 3.81* Recent Labs  
  20 
1342 20 
0102 20 
1443  143 138  
K 4.7 4.0 4.9 * 114* 107 CO2 20* 21 23 BUN 49* 40* 38* CREA 2.98* 2.66* 3.10* * 168* 293* PHOS 3.6 2.9 3.1 MG  --  2.4 1.7 CA 8.5 7.8* 8.7 ALB 2.5* 2.5* 3.0* WBC  --  17.6* 16.1* HGB  --  10.9* 12.5 HCT  --  33.1* 37.0 PLT  --  184 229 Recent Labs  
  20 
1110 20 
1443 INR  --  1.1 PTP  --  11.0 APTT 56.3* 24.5 Needs: urine analysis, urine sodium, protein and creatinine Lab Results Component Value Date/Time Sodium,urine random 58 2014 05:30 PM  
 Creatinine, urine 201.46 2014 05:30 PM  
 
 
 
Discussed with:  PT, FAMILY, AND NURSING  
: Ellen Hutson MD 
2020 [facility]: 
[email or web address] [facility] office: 
[address], [facility] [address] [place], [address] [place] [address] Phone: [phone] Fax :     [phone] [facility] office: 
[address] Phone - [phone] Fax - [phone]

## 2020-01-26 NOTE — PROGRESS NOTES
Jake Herrera [address] Office [phone] Fax [phone] Assessment and Plan Analy Persaud is a 80 y.o. male admitted for sepsis 2/2 HAP. Also found to acute metabolic encephalopathy and NSTEMI/Type II MI in the setting of Grade 1 diastolic dysfunction. 24 Hour Events: Trop peaked. Per cards: Hep gtt 48H, no cath. ACUTE PROBLEMS Sepsis 2/2 HAP: 2/4 SIRS (WBC 17.6, RR26). RUL PNA on (CXR,CT C-spine, CT abd/pelvis). S/p IV rocephin + azithromycin in ED. LA down-trended. Procal wnl. RVP neg. ^ESR 61. ^CRP >9.7. Bcx NG 2d, Ucx in process. - Strict I &Os  
- IV: cefepime + doxycycline Day 3 
- F/u [Bcx, Ucx, Resp cx, PNA labs, MRSA] - Daily CBC, CMP, Mag and phos - Duonebs q4h prn - F/u V/Q scan 
  
Acute Metabolic Encephalopathy in the setting of Dementia w/ Fall: Likely 2/2 HAP. CT head neg. Stroke work-up last month showed chronic white matter disease and superficial siderosis of R frontal lobe, EEG gen slowing. Carotid dopplers in Dec'19 showed chronic mild-mod L ICA stenoses. Pt was supposed to follow-up w/ neurology early Jan but never did. Fall evidenced by Dann [place] 2322-->4051-->1939 and R arm abrasion, neg (CT C-spine, XR R forearm, hand). - Tx of HAP as above - Continue home plavix - Fall precautions, wound care - F/u outpt Neuro NSTEMI/Type II MI in setting Grade 1 Diastolic Dysfunction:  POA Trop 3.81-> 7.67 & NT pro-BNP 27725. Echo 12/19: EF 32-99%, G1 diastolic dysfunction. ^CK 0028 ^CKMB 42.3. Trops peaked. ECHO: EF 66-33%, mod systolic dysf, mod pul HTN, Tricuspid Regurg.  
- F/u Cards c/s: ^Trops likely 2/2 to Rhabdo, Hep gtt for 48H. ? Cath: Poor candidate (dementia & severe CKD3) & family declines. Add BB if needed.  WILTON on CKD4: IMPROVING. POA Cr 3.1 (BL ~2.5). Likely 2/2 sepsis and poor hydration. - MIVF, Avoid nephrotoxic agents. - Daily CMP Elevated AST: POA likely 2/2 shock liver. Hepatitis panel neg. - Daily CMP  
  
CHRONIC PROBLEMS  
 
HTN: Stable. - Continue home norvasc.  
- Hold home losartan and lasix until renal functions improve. 
  
DMII: POA glucose 293; HA1c 2019 (6.5)  
- Hold home tradjenta and amaryl 
- SSI, q6hrs POC gluc while NPO 
  
HLD: Last lipid panel  [Tchol 185, , HDL 33, ]. Hepatitis panel neg.  
- Continue home niacin 
- Hold home lipitor in setting transaminitis 
  
GERD: Switch home prevacid to protonix 
  
Hypothyroidism: TSH low 0.15, T4 wnl, T3 low 1.5 
- Continue home armour thyroid 120mg--> consider reduce to 100mcg in OP 
- F/u outpt PCP  
  
Pernicious Anemia: POA Hgb 12.5 
- Continue home ferrous sulfate and thera-MV 
  
Dementia: Continue home aricept 
  
Obesity: Body mass index is 27.34 kg/m². - Encouraging lifestyle modifications and further follow up outpatient.  
  
  
FEN/GI - Regular diet. Hep gtt, MIVF? Activity - Ambulate with assistance DVT prophylaxis - Heparin gtt GI prophylaxis - Protonix Fall prophylaxis - Fall precautions ordered. Disposition - Admit to Telemetry. Plan to d/c to Home. Consulting PT/OT/CM Code Status - DNR, discussed with patient / caregivers. Next of Isis 69 Name and [phone] [facility] (Son) [phone], Heloise Krabbe (dtr) [phone], Mirza Flores (son in law) [phone] I appreciate the opportunity to participate in the care of this patient, 
Nguyen Cortes MD 
Family Medicine Resident Subjective / Objective Subjective Unable to be assessed as Pt is A&O x1. Temp (24hrs), Av.5 °F (36.9 °C), Min:97.8 °F (36.6 °C), Max:99.8 °F (37.7 °C) Visit Vitals /64 (BP 1 Location: Left arm, BP Patient Position: At rest;Head of bed elevated (Comment degrees)) Pulse 80 Temp 98.1 °F (36.7 °C) Resp 30 Ht 6' (1.829 m) Wt 196 lb 3.4 oz (89 kg) SpO2 95% BMI 26.61 kg/m² Physical Exam 
General:   Alert, cooperative, no acute distress Head:   Atraumatic Eyes:   Conjunctivae clear ENT:  Oral mucosa normal  
Neck:  Supple, trachea midline, no adenopathy No JVD Back:    No CVA tenderness Lungs:   Scattered expiratory wheeze BL. No resp distress/chest retractions. Heart:   Regular rhythm, no murmur Abdomen:    Soft, non-tender No masses or organomegaly Extremities:  No edema or DVT signs Skin:  Warm and dry. R arm abrasion. Clean dry dressing. Neurologic:  A&Ox 1. No focal deficits I/O: 
Date 01/26/20 0700 - 01/27/20 4911 01/27/20 0700 - 01/28/20 3713 Shift [id number] 1900-0659 24 Hour Total 4803-8566 6124-9837 24 Hour Total  
INTAKE Shift Total(mL/kg) OUTPUT Urine(mL/kg/hr) Urine Occurrence(s) 1 x 4 x 5 x Emesis/NG output Emesis Occurrence(s)  0 x 0 x Stool Stool Occurrence(s)  2 x 2 x Shift Total(mL/kg) NET Weight (kg) 89 89 89 89 89 89 Inpatient Medications Current Facility-Administered Medications Medication Dose Route Frequency  insulin lispro (HUMALOG) injection   SubCUTAneous AC&HS  heparin 25,000 units in D5W 250 ml infusion  12-25 Units/kg/hr (Order-Specific) IntraVENous TITRATE  sodium chloride (NS) flush 5-40 mL  5-40 mL IntraVENous Q8H  
 sodium chloride (NS) flush 5-40 mL  5-40 mL IntraVENous PRN  
 cefepime (MAXIPIME) 2 g in 0.9% sodium chloride (MBP/ADV) 100 mL  2 g IntraVENous Q24H  
 glucose chewable tablet 16 g  4 Tab Oral PRN  
 dextrose (D50W) injection syrg 12.5-25 g  12.5-25 g IntraVENous PRN  
 glucagon (GLUCAGEN) injection 1 mg  1 mg IntraMUSCular PRN  
 doxycycline (VIBRAMYCIN) 100 mg in 0.9% sodium chloride (MBP/ADV) 100 mL  100 mg IntraVENous Q12H  Pharmacy dosing Vancomycin per random levels    Other Rx Dosing/Monitoring  albuterol-ipratropium (DUO-NEB) 2.5 MG-0.5 MG/3 ML  3 mL Nebulization Q4H PRN  
 dextrose 10% infusion 0-250 mL  0-250 mL IntraVENous PRN Allergies Allergies Allergen Reactions  Synthroid [Levothyroxine] Other (comments) Confused, hallucinations CBC: 
Recent Labs  
  01/27/20 
0028 01/26/20 
0102 01/25/20 
1443 WBC 17.3* 17.6* 16.1* HGB 10.7* 10.9* 12.5 HCT 32.2* 33.1* 37.0  184 229 Metabolic Panel: 
Recent Labs  
  01/27/20 
0028 01/26/20 
1342 01/26/20 
0102 01/25/20 
1443  142 143 138  
K 4.3 4.7 4.0 4.9 * 114* 114* 107 CO2 21 20* 21 23 BUN 50* 49* 40* 38* CREA 3.00* 2.98* 2.66* 3.10* * 207* 168* 293* CA 8.2* 8.5 7.8* 8.7 MG 2.3  --  2.4 1.7 PHOS 3.6 3.6 2.9 3.1 ALB 2.4* 2.5* 2.5* 3.0*  
SGOT 125*  --  138* 79* ALT 37  --  30 23 INR  --   --   --  1.1 For Billing Chief Complaint Patient presents with  Fall  
 Skin Problem Hospital Problems  Date Reviewed: 12/19/2019 Codes Class Noted POA Sepsis ([facility].) ICD-10-CM: A41.9 ICD-9-CM: 038.9, 995.91  1/26/2020 Unknown Fall ICD-10-CM: W19Durga Dempsey ICD-9-CM: L942.5  1/26/2020 Unknown NSTEMI (non-ST elevated myocardial infarction) ([facility].) ICD-10-CM: I21.4 ICD-9-CM: 410.70  1/26/2020 Unknown Weakness ICD-10-CM: R53.1 ICD-9-CM: 780.79  1/25/2020 Unknown * (Principal) HAP (hospital-acquired pneumonia) ICD-10-CM: J18.9, Y95 
ICD-9-CM: 486  1/25/2020 Unknown AMS (altered mental status) ICD-10-CM: R41.82 
ICD-9-CM: 780.97  12/14/2019 Yes CKD (chronic kidney disease) stage 4, GFR 15-29 ml/min (HCC) ICD-10-CM: N18.4 ICD-9-CM: 585.4  4/22/2019 Yes Dementia without behavioral disturbance (HCC) ICD-10-CM: F03.90 ICD-9-CM: 294.20  8/16/2016 Yes WILTON (acute kidney injury) ([facility] 75.) ICD-10-CM: N17.9 ICD-9-CM: 584.9  8/2/2014 Yes Pernicious anemia ICD-10-CM: D51.0 ICD-9-CM: 281.0  7/31/2012 Yes Acquired hypothyroidism ICD-10-CM: E03.9 ICD-9-CM: 244.9  4/26/2012 Yes Diabetes mellitus type 2, controlled ([facility].) ICD-10-CM: E11.9 ICD-9-CM: 250.00  2/9/2012 Yes Essential hypertension, benign ICD-10-CM: I10 
ICD-9-CM: 401.1  2/9/2012 Yes HLD (hyperlipidemia) ICD-10-CM: E78.5 ICD-9-CM: 272.4  2/9/2012 Unknown Esophageal reflux ICD-10-CM: K21.9 ICD-9-CM: 530.81  2/9/2012 Yes

## 2020-01-26 NOTE — PROGRESS NOTES
Occupational Therapy Note:  Orders received and appreciated. Chart reviewed. Spoke with RN and pt with Up-trending Trops; 3.81 --> 7.67. RN reports lethargic affect, sitter, and patient son at bedside. He remains on contact and droplet precautions. Will defer PT evaluation, until Cardiology consult completed. RN agrees. Will f/u next treatment day, and proceed as indicated if medically stable with OT eval.  Thank you for the consult.  
Nani Porter, OTR/L, CBIS

## 2020-01-26 NOTE — PROGRESS NOTES
1/26/2020 
12:52 PM 
Reason for Admission:  80 y.o. male admitted for sepsis 2/2 HAP. Also found to acute metabolic encephalopathy and NSTEMI/Type II MI in the setting of Grade 1 diastolic dysfunction.   
             
RUR Score:   30 Resources/supports as identified by patient/family:  Pt has supportive family, lives with 3 nieces Top Challenges facing patient (as identified by patient/family and CM): Finances/Medication cost?   Pt has Rx coverage and uses [facility] in [place] Transportation? Pt does not leave the home except for , pt relies Support system or lack thereof? Pt has supportive family. Lives with nieces and has son and daughter who are also supportive Living arrangements? Currently lives in a 1 story home Self-care/ADLs/Cognition? PTA, pt was independent with some ADLs, family provides transportation and meals Current Advanced Directive/Advance Care Plan:  Pt's son Azul Starks and daughter- Stephanie Samuel are POA Plan for utilizing home health:  Pt is currently open with [facility], will need resumption order Transition of Care Plan:               
CM met with pt and pt's Alexandra Hodge [phone]. Completed assessment with son. Pt lives with his three nieces in a 1 story home. PTA, pt was independent with ADLs, but relies on family for meal preparation and transportation. Son stated pt only leaves the house for doctor appointments. Pt has Rx coverage and [facility] [place]. Pt has DME- walker, cane and SC.  Pt's son and daughter Jordin Day) have discussed pt being discharged to daughter's home in [place], as pt's nieces all work and pt is home alone most of the day. Son stated pt's JEANNIE is home all day and can assist with caring for pt. Pt's PCP verified, Kenny Sanchez.   Pt is currently open to [facility]. Pt's family will provide transport at discharge. Plan: 1. Discharge home with family when medically stable, may go stay at daughter's home. 2. Resume HH with [facility], resumption order needed. 3. PT/OT evals pending 4. CM following for needs. Care Management Interventions PCP Verified by CM: Yes(Dr. Sierra Ramos ) Mode of Transport at Discharge: Other (see comment)(Family can transport) Transition of Care Consult (CM Consult): Discharge Planning Physical Therapy Consult: Yes Occupational Therapy Consult: Yes Speech Therapy Consult: Yes Current Support Network: Relative's Home Confirm Follow Up Transport: Family Discharge Location Discharge Placement: Home with family assistance Cecilai Schroeder

## 2020-01-26 NOTE — CONSULTS
Nancy Ivy MD. [facility] [place] Patient: Mechelle Hills : 3/8/1931 Today's Date: 2020 HISTORY OF PRESENT ILLNESS:  
 
History of Present Illness: Mechelle Hills is a 80 y.o. male with known hx TIA, UTI, CKD4, dementia, DMII, HTN, HLD, hypothyroidism, pernicious anemia, hx colon CA s/p bowel resection, GERD, who presents to the ER via EMS after pt was found on the floor by grand daughter. Cardiology called for troponin elevation 3.8-->7. 5. CK was 4051. ProBNP 14k. Patient with AMS on arrival.  History obtained from the chart. CXR showed PNA and he was admitted with sepsis. Was recently admitted last month with UTI and AMS.   
EKG 20 - NSR, normal - I viewed myself. Patient unable to provide history. Family says that he usually is active walking around. Does not complain of much. Had no chest pain complaints lately. PAST MEDICAL HISTORY:  
 
Past Medical History:  
Diagnosis Date  WILTON (acute kidney injury) ([address] 2014  Cancer [facility])   
 colon  Chronic kidney disease  Delirium 10/11/2018  Diabetes ([address] 2008  
 type 2  Dyslipidemia  Esophageal reflux  Esophageal reflux 2012  Hypertension 2008  Incontinence  Memory loss  Mixed hyperlipidemia 2012  
 NSTEMI (non-ST elevated myocardial infarction) ([address] 2020  Other ill-defined conditions(799.89) broken neck   Pernicious anemia 2012  Psychiatric disorder   
 dementia  Snoring  Stroke ([address] 10/18/2018  Unspecified hypothyroidism 2012  Urinary tract infection 10/11/2018  UTI (urinary tract infection) 2019 Past Surgical History:  
Procedure Laterality Date  COLONOSCOPY  12 Rectosigmoid adenocarcinoma  HX CATARACT REMOVAL    
 HX HERNIA REPAIR Bilateral inguinal  
 HX OTHER SURGICAL    
 colon resection  HX SMALL BOWEL RESECTION    HX TONSIL AND ADENOIDECTOMY  HX TONSILLECTOMY  1936 MEDICATIONS:  
 
No current facility-administered medications on file prior to encounter. Current Outpatient Medications on File Prior to Encounter Medication Sig Dispense Refill  multivitamin, tx-iron-ca-min (THERA-M W/ IRON) 9 mg iron-400 mcg tab tablet Take 1 Tab by mouth daily.  clopidogrel (PLAVIX) 75 mg tab Take 1 Tab by mouth daily. 90 Tab 3  
 atorvastatin (LIPITOR) 40 mg tablet Take 1 Tab by mouth daily. 90 Tab 3  
 loperamide (IMODIUM) 2 mg capsule TAKE 1 CAPSULE BEFORE BREAKFAST, LUNCH, DINNER AND AT BEDTIME FOR DIARRHEA 360 Cap 0  
 amLODIPine (NORVASC) 10 mg tablet Take 1 Tab by mouth daily. 30 Tab 0  Thyroid, Pork, (ARMOUR THYROID) 120 mg tab Take 1 Tab by mouth daily. 90 Tab 1  
 furosemide (LASIX) 20 mg tablet TAKE 1 TABLET EVERY DAY AS DIRECTED 90 Tab 3  
 lansoprazole (PREVACID) 30 mg capsule Take 1 Cap by mouth Daily (before breakfast). 90 Cap 1  
 glimepiride (AMARYL) 4 mg tablet TAKE 1 TABLET EVERY MORNING 90 Tab 1  
 losartan (COZAAR) 25 mg tablet TAKE 1 TABLET EVERY DAY 90 Tab 1  
 donepezil (ARICEPT) 10 mg tablet Take 1 Tab by mouth nightly. 90 Tab 1  TRADJENTA 5 mg tablet TAKE 1 TABLET EVERY DAY FOR DIABETES 90 Tab 1  cholecalciferol (VITAMIN D3) 50,000 unit capsule TAKE 1 CAPSULE EVERY 7 DAYS 13 Cap 3  cetirizine (ZYRTEC) 10 mg tablet TAKE 1 TABLET EVERY DAY AS NEEDED FOR ALLERGIES 90 Tab 3  
 ferrous sulfate (IRON) 325 mg (65 mg iron) tablet Take 1 Tab by mouth Daily (before breakfast). 30 Tab 5  
 nicotinic acid (NIACIN) 500 mg tablet Take 500 mg by mouth Daily (before breakfast).  multivitamins-minerals-lutein (CENTRUM SILVER) tab tablet Take 1 Tab by mouth daily. Allergies Allergen Reactions  Synthroid [Levothyroxine] Other (comments) Confused, hallucinations SOCIAL HISTORY:  
 
Social History Tobacco Use  Smoking status: Former Smoker Packs/day: 1.50 Years: 30.00 Pack years: 45.00 Last attempt to quit: 1982 Years since quittin.5  Smokeless tobacco: Never Used Substance Use Topics  Alcohol use: No  
  Alcohol/week: 0.0 standard drinks Types: 1 - 2 Standard drinks or equivalent per week  Drug use: Never FAMILY HISTORY:  
 
Family History Problem Relation Age of Onset  Heart Disease Mother  Hypertension Mother  Heart Disease Father  Hypertension Father  Cancer Paternal Aunt REVIEW OF SYMPTOMS:  
 
Review of Symptoms: 
Unable to obtain from patient PHYSICAL EXAM:  
 
Physical Exam: 
Visit Vitals /75 (BP 1 Location: Left arm, BP Patient Position: At rest;Supine) Pulse 93 Temp 97.8 °F (36.6 °C) Resp 24 Ht 6' (1.829 m) Wt 196 lb 3.4 oz (89 kg) SpO2 91% BMI 26.61 kg/m² Patient in mild distress, lethargic, sleeping HEENT:  Hearing intact, non-icteric, normocephalic, atraumatic. Neck Exam: Supple, 
Lung Exam: coarse BS bilat. Cardiac Exam: Regular rate and rhythm with no murmur Abdomen: Soft, non-tender Extremities: Moves all ext well. No lower extremity edema. Vascular: 2+ dorsalis pedis pulses bilaterally. Psych: Appropriate affect Neuro - Grossly intact LABS / OTHER STUDIES:  
 
 
Recent Results (from the past 24 hour(s)) CULTURE, BLOOD, PAIRED Collection Time: 20  5:40 PM  
Result Value Ref Range Special Requests: NO SPECIAL REQUESTS Culture result: NO GROWTH AFTER 16 HOURS    
LACTIC ACID Collection Time: 20  5:40 PM  
Result Value Ref Range Lactic acid 2.0 0.4 - 2.0 MMOL/L  
GLUCOSE, POC Collection Time: 20 12:59 AM  
Result Value Ref Range Glucose (POC) 182 (H) 65 - 100 mg/dL Performed by John Baird TROPONIN I Collection Time: 20  1:02 AM  
Result Value Ref Range Troponin-I, Qt. 7.67 (H) <0.05 ng/mL TSH 3RD GENERATION  
 Collection Time: 01/26/20  1:02 AM  
Result Value Ref Range TSH 0.15 (L) 0.36 - 3.74 uIU/mL T3, FREE Collection Time: 01/26/20  1:02 AM  
Result Value Ref Range Free Triiodothyronine (T3) 1.5 (L) 2.2 - 4.0 pg/mL T4, FREE Collection Time: 01/26/20  1:02 AM  
Result Value Ref Range T4, Free 0.8 0.8 - 1.5 NG/DL  
RESPIRATORY PANEL,PCR,NASOPHARYNGEAL Collection Time: 01/26/20  1:02 AM  
Result Value Ref Range Adenovirus NOT DETECTED NOTD Coronavirus 229E NOT DETECTED NOTD Coronavirus HKU1 NOT DETECTED NOTD Coronavirus CVNL63 NOT DETECTED NOTD Coronavirus OC43 NOT DETECTED NOTD Metapneumovirus NOT DETECTED NOTD Rhinovirus and Enterovirus NOT DETECTED NOTD Influenza A NOT DETECTED NOTD Influenza A, subtype H1 NOT DETECTED NOTD Influenza A, subtype H3 NOT DETECTED NOTD INFLUENZA A H1N1 PCR NOT DETECTED NOTD Influenza B NOT DETECTED NOTD Parainfluenza 1 NOT DETECTED NOTD Parainfluenza 2 NOT DETECTED NOTD Parainfluenza 3 NOT DETECTED NOTD Parainfluenza virus 4 NOT DETECTED NOTD    
 RSV by PCR NOT DETECTED NOTD B. parapertussis, PCR NOT DETECTED NOTD Bordetella pertussis - PCR NOT DETECTED NOTD Chlamydophila pneumoniae DNA, QL, PCR NOT DETECTED NOTD Mycoplasma pneumoniae DNA, QL, PCR NOT DETECTED NOTD LACTIC ACID Collection Time: 01/26/20  1:02 AM  
Result Value Ref Range Lactic acid 1.8 0.4 - 2.0 MMOL/L  
CK W/ CKMB & INDEX Collection Time: 01/26/20  1:02 AM  
Result Value Ref Range CK 4,051 (H) 39 - 308 U/L  
 CK - MB 42.3 (H) <3.6 NG/ML  
 CK-MB Index 1.0 0.0 - 2.5    
CBC WITH AUTOMATED DIFF Collection Time: 01/26/20  1:02 AM  
Result Value Ref Range WBC 17.6 (H) 4.1 - 11.1 K/uL  
 RBC 3.34 (L) 4.10 - 5.70 M/uL  
 HGB 10.9 (L) 12.1 - 17.0 g/dL HCT 33.1 (L) 36.6 - 50.3 % MCV 99.1 (H) 80.0 - 99.0 FL  
 MCH 32.6 26.0 - 34.0 PG  
 MCHC 32.9 30.0 - 36.5 g/dL RDW 12.6 11.5 - 14.5 % PLATELET 258 075 - 865 K/uL MPV 9.9 8.9 - 12.9 FL  
 NRBC 0.0 0  WBC ABSOLUTE NRBC 0.00 0.00 - 0.01 K/uL NEUTROPHILS 83 (H) 32 - 75 % LYMPHOCYTES 7 (L) 12 - 49 % MONOCYTES 9 5 - 13 % EOSINOPHILS 0 0 - 7 % BASOPHILS 0 0 - 1 % IMMATURE GRANULOCYTES 1 (H) 0.0 - 0.5 % ABS. NEUTROPHILS 14.7 (H) 1.8 - 8.0 K/UL  
 ABS. LYMPHOCYTES 1.2 0.8 - 3.5 K/UL  
 ABS. MONOCYTES 1.5 (H) 0.0 - 1.0 K/UL  
 ABS. EOSINOPHILS 0.0 0.0 - 0.4 K/UL  
 ABS. BASOPHILS 0.0 0.0 - 0.1 K/UL  
 ABS. IMM. GRANS. 0.2 (H) 0.00 - 0.04 K/UL  
 DF AUTOMATED METABOLIC PANEL, COMPREHENSIVE Collection Time: 01/26/20  1:02 AM  
Result Value Ref Range Sodium 143 136 - 145 mmol/L Potassium 4.0 3.5 - 5.1 mmol/L Chloride 114 (H) 97 - 108 mmol/L  
 CO2 21 21 - 32 mmol/L Anion gap 8 5 - 15 mmol/L Glucose 168 (H) 65 - 100 mg/dL BUN 40 (H) 6 - 20 MG/DL Creatinine 2.66 (H) 0.70 - 1.30 MG/DL  
 BUN/Creatinine ratio 15 12 - 20 GFR est AA 28 (L) >60 ml/min/1.73m2 GFR est non-AA 23 (L) >60 ml/min/1.73m2 Calcium 7.8 (L) 8.5 - 10.1 MG/DL Bilirubin, total 1.2 (H) 0.2 - 1.0 MG/DL  
 ALT (SGPT) 30 12 - 78 U/L  
 AST (SGOT) 138 (H) 15 - 37 U/L Alk. phosphatase 68 45 - 117 U/L Protein, total 6.0 (L) 6.4 - 8.2 g/dL Albumin 2.5 (L) 3.5 - 5.0 g/dL Globulin 3.5 2.0 - 4.0 g/dL A-G Ratio 0.7 (L) 1.1 - 2.2 MAGNESIUM Collection Time: 01/26/20  1:02 AM  
Result Value Ref Range Magnesium 2.4 1.6 - 2.4 mg/dL PHOSPHORUS Collection Time: 01/26/20  1:02 AM  
Result Value Ref Range Phosphorus 2.9 2.6 - 4.7 MG/DL  
SED RATE (ESR) Collection Time: 01/26/20  1:02 AM  
Result Value Ref Range Sed rate, automated 60 (H) 0 - 20 mm/hr CRP, HIGH SENSITIVITY Collection Time: 01/26/20  1:02 AM  
Result Value Ref Range CRP, High sensitivity >9.5 mg/L  
GLUCOSE, POC Collection Time: 01/26/20  6:24 AM  
Result Value Ref Range Glucose (POC) 218 (H) 65 - 100 mg/dL Performed by Juan Cintron (CON) TROPONIN I Collection Time: 01/26/20  6:45 AM  
Result Value Ref Range Troponin-I, Qt. 7.58 (H) <0.05 ng/mL PTT Collection Time: 01/26/20 11:10 AM  
Result Value Ref Range aPTT 56.3 (H) 22.1 - 32.0 sec  
 aPTT, therapeutic range     58.0 - 77.0 SECS  
GLUCOSE, POC Collection Time: 01/26/20 12:29 PM  
Result Value Ref Range Glucose (POC) 251 (H) 65 - 100 mg/dL Performed by Terrie Hodges ECHO ADULT COMPLETE Collection Time: 01/26/20 12:57 PM  
Result Value Ref Range LA Volume 70.1 18 - 58 mL Right Atrial Area 4C 19.07 cm2 Tapse 1.99 1.5 - 2.0 cm Ao Root D 3.39 cm  
 AO ASC D 3.48 cm LVIDd 4.89 4.2 - 5.9 cm  
 LVPWd 1.16 (A) 0.6 - 1.0 cm LVIDs 4.21 cm IVSd 1.28 (A) 0.6 - 1.0 cm  
 LV ED Vol A2C 104.9 mL  
 LV ES Vol A4C 75.1 mL  
 LV ES Vol BP 74.1 (A) 22 - 58 mL  
 LVOT d 2.23 cm  
 MVA (PHT) 4.8 cm2  
 MV A Ishan 73.15 cm/s  
 MV E Ishan 93.78 cm/s  
 MV E/A 1.30 Left Atrium to Aortic Root Ratio 1.26   
 RVIDd 4.79 cm  
 BP EF 31.1 (A) 55 - 100 % LV Ejection Fraction MOD 4C 33 % LV Ejection Fraction MOD 2C 34 % LA Vol 4C 62.07 (A) 18 - 58 mL  
 LA Vol 2C 74.56 (A) 18 - 58 mL  
 LA Area 4C 20.9 cm2 LV Mass .7 (A) 88 - 224 g LV Mass AL Index 108.8 49 - 115 g/m2 LV ES Vol A2C 69.3 mL  
 LVES Vol Index BP 35.1 mL/m2 LV ED Vol A4C 111.6 mL  
 LVED Vol Index BP 50.9 mL/m2 Mitral Valve E Wave Deceleration Time 159.6 ms  
 Mitral Valve Pressure Half-time 46.3 ms Left Atrium Major Axis 4.25 cm Triscuspid Valve Regurgitation Peak Gradient 53.0 mmHg LV ED Vol .5 67 - 155 ml  
 TR Max Velocity 363.89 cm/s  
 LA Vol Index 33.17 16 - 28 ml/m2 LA Vol Index 35.28 16 - 28 ml/m2 LA Vol Index 29.37 16 - 28 ml/m2 LVED Vol Index A4C 52.8 mL/m2 LVED Vol Index A2C 49.6 mL/m2 LVES Vol Index A4C 35.5 mL/m2 LVES Vol Index A2C 32.8 mL/m2 Left Ventricular Fractional Shortening by 2D 41.305771143 % Mitral Valve Deceleration Buchanan [id number] Left Ventricular End Diastolic Volume by Teichholz Method 5.24719685743328 mL Left Ventricular End Systolic Volume by Teichholz Method 8.13848542614 mL Left Ventricular Stroke Volume by 2-D Biplane-MOD 43.054358155 mL Left Ventricular Stroke Volume by 2-D Single Plane- MOD 38.148904583 mL Left Ventricular Stroke Volume by Teichholz Method 84.977959867 mL Left Ventricular Stroke Volume by 2-D Single Plane- MOD 85.043995415 mL LACTIC ACID Collection Time: 01/26/20  1:42 PM  
Result Value Ref Range Lactic acid 1.2 0.4 - 2.0 MMOL/L  
RENAL FUNCTION PANEL Collection Time: 01/26/20  1:42 PM  
Result Value Ref Range Sodium 142 136 - 145 mmol/L Potassium 4.7 3.5 - 5.1 mmol/L Chloride 114 (H) 97 - 108 mmol/L  
 CO2 20 (L) 21 - 32 mmol/L Anion gap 8 5 - 15 mmol/L Glucose 207 (H) 65 - 100 mg/dL BUN 49 (H) 6 - 20 MG/DL Creatinine 2.98 (H) 0.70 - 1.30 MG/DL  
 BUN/Creatinine ratio 16 12 - 20 GFR est AA 24 (L) >60 ml/min/1.73m2 GFR est non-AA 20 (L) >60 ml/min/1.73m2 Calcium 8.5 8.5 - 10.1 MG/DL Phosphorus 3.6 2.6 - 4.7 MG/DL Albumin 2.5 (L) 3.5 - 5.0 g/dL GLUCOSE, POC Collection Time: 01/26/20  4:07 PM  
Result Value Ref Range Glucose (POC) 146 (H) 65 - 100 mg/dL Performed by Vivian Zeng (PCT) CARDIAC DIAGNOSTICS:  
 
Cardiac Evaluation Includes: 
 
Echo 11/18 - LVEF 55-60% Echo 12/15/19 - LVEF 55-60%, neg bubble study Carotid Doppler 12/15/19 - 0-49% stenosis bilat EKG 1/25/20 - NSR, normal - I viewed myself CXR 1/26/20 - Migrating opacities compatible with a combination of pneumonia and edema. ASSESSMENT AND PLAN:  
 
Assessment and Plan: 
1) Elevated troponin / NSTEMI / Rhabdo 
- suspect troponin elevation probably due to Rhabdo rather than ACS.   No ischemic EKG changes. CK was 4051 (MB Index 1.0). He was on ground for a couple of hours possibly. - I can't rule out CAD / NSTEMI so recommended starting IV heparin last night. As long as risk of bleeding is low, can complete 48 hours of heparin - Will check an echo - Patient is poor cath candidate (dementia, severe CKD). Family say they are not interested in a cath and decline any potential caths and prefer conservative medical management  
- Can treat for CAD risk factors (cont plavix, statin for secondary prevention measures - on it for CVA) - Can add a BB later as vitals allow - watch volume status carefully given high proBNP 2) CKD 3) PNA / Sepsis - per medicine team  
 
4) Prior CVA  
- see above Yolande Osman MD, [facility] [address], [address]  [address] [facility] [address]  [address] Ph: [phone]   Ph [phone]

## 2020-01-26 NOTE — H&P
Jake Herrera [address]  Office [phone] Fax [phone] Admission H&P Name: Rabia Steele. MRN: [id number]  Sex: Male YOB: 1931  Age: 80 y.o. PCP: Luna Portillo MD  
 
Source of Information: patient, medical records, patients daughter and son Chief complaint: fall History of Present Illness Rabia Yuen is a 80 y.o. male with known hx TIA, UTI, CKD4, dementia, DMII, HTN, HLD, hypothyroidism, pernicious anemia, hx colon CA s/p bowel resection, GERD, who presents to the ER via EMS after pt was found on the floor this afternoon and has had AMS since then. Hx provided by pt's daughter and son and son-in-law as pt is a poor historian. Pt lives at home with his granddaughter who is his primary care taker. At 10AM she left the house and at noon, pt's granddaughter returned home and found him on the floor confused. He could not tell her what time he fell or how long he was laying on the floor. Per pt's daughter, he likely fell b/w 10am-noon. He was  \"forgetful\" and family notes baseline he is usually oriented to person and place. The last time he was like this he had a UTI. Patient only see's his PCP and does not follow with any specialists. In the Er:  
vital signs were remarkable for RR 26.  
Labs were remarkable for WBC 16.1, BUN 38, Cr 3.1, AST 79, gluc 293, Mg 1.7. Trop 3.81, NT pro-BNP: 14,236. LA 2.0.. EKG showed NSR, junctional ST depression probably normal.  
R hand + forearm XRs - no acute process. CXR - RUL PNA. CT abd/pelvis - no acute traumatic injury, +gall bladder sludge/stones, +RUL grand glass opacities incompletely visualized. CT C spine showed old odontoid fx and multilevel degenerative changes. CT head showed no acute process. Pt was treated with w/ 500cc NS bolus, 1x IV rocephin + azithromycin. Patient Vitals for the past 12 hrs: 
 Temp Pulse Resp BP SpO2 01/25/20 2330  87     
01/25/20 2258 98.8 °F (37.1 °C) 84 29 138/56 96 % 01/25/20 2216     96 % 01/25/20 2046  84 27 109/62 92 % 01/25/20 2015  91 20 (!) 144/101 91 % 01/25/20 1915 98.6 °F (37 °C) 83 (!) 31 121/56 94 % 01/25/20 1900  91 17 132/70 94 % 01/25/20 1830  89 25 (!) 142/106 92 % 01/25/20 1800  82 (!) 32 132/72 91 % 01/25/20 1730  88 20 106/88 92 % 01/25/20 1700  91 (!) 31 141/74 90 % 01/25/20 1615  91 29 136/69 93 % 01/25/20 1607 99.7 °F (37.6 °C)      
01/25/20 1445  92 28 135/59 93 % 01/25/20 1419  86 26 140/65 95 % Past Medical History:  
Diagnosis Date  WILTON (acute kidney injury) ([facility] 8/2/2014  Cancer [facility])   
 colon  Chronic kidney disease  Diabetes ([facility] 2008  
 type 2  Dyslipidemia  Esophageal reflux  Esophageal reflux 2/9/2012  Hypertension 2008  Incontinence  Memory loss  Mixed hyperlipidemia 2/9/2012  Other ill-defined conditions(799.89) broken neck 2012  Pernicious anemia 7/31/2012  Psychiatric disorder   
 dementia  Snoring  Stroke ([facility] 10/18/2018  Unspecified hypothyroidism 4/26/2012 Home Medications Prior to Admission medications Medication Sig Start Date End Date Taking? Authorizing Provider  
multivitamin, tx-iron-ca-min (THERA-M W/ IRON) 9 mg iron-400 mcg tab tablet Take 1 Tab by mouth daily. Yes Other, MD Pushpa  
clopidogrel (PLAVIX) 75 mg tab Take 1 Tab by mouth daily. 4/94/79  Yes Lucas Rollins MD  
atorvastatin (LIPITOR) 40 mg tablet Take 1 Tab by mouth daily. 1/03/55  Yes Lucas Rollins MD  
loperamide (IMODIUM) 2 mg capsule TAKE 1 CAPSULE BEFORE BREAKFAST, LUNCH, DINNER AND AT BEDTIME FOR DIARRHEA 1/9/20  Yes Karen Gilmore Surinder, NP  
amLODIPine (NORVASC) 10 mg tablet Take 1 Tab by mouth daily. 12/18/19  Yes Darin Riddle DO Thyroid, Pork, (ARMOUR THYROID) 120 mg tab Take 1 Tab by mouth daily.  38/06/94  Yes Lucas Rollins MD  
 furosemide (LASIX) 20 mg tablet TAKE 1 TABLET EVERY DAY AS DIRECTED 43/24/62  Yes Chevy Duran MD  
lansoprazole (PREVACID) 30 mg capsule Take 1 Cap by mouth Daily (before breakfast). 33/12/09  Yes Chevy Duran MD  
glimepiride (AMARYL) 4 mg tablet TAKE 1 TABLET EVERY MORNING 95/93/08  Yes Chevy Duran MD  
losartan (COZAAR) 25 mg tablet TAKE 1 TABLET EVERY DAY 71/25/17  Yes Chevy Duran MD  
donepezil (ARICEPT) 10 mg tablet Take 1 Tab by mouth nightly. 55/86/41  Yes Chevy Duran MD  
TRADJENTA 5 mg tablet TAKE 1 TABLET EVERY DAY FOR DIABETES 0/5/93  Yes Chevy Duran MD  
cholecalciferol (VITAMIN D3) 50,000 unit capsule TAKE 1 CAPSULE EVERY 7 DAYS 3/93/93  Yes Chevy Duran MD  
cetirizine (ZYRTEC) 10 mg tablet TAKE 1 TABLET EVERY DAY AS NEEDED FOR ALLERGIES 43/64/58  Yes Chevy Duran MD  
ferrous sulfate (IRON) 325 mg (65 mg iron) tablet Take 1 Tab by mouth Daily (before breakfast). 9/29/62  Yes Chevy Duran MD  
nicotinic acid (NIACIN) 500 mg tablet Take 500 mg by mouth Daily (before breakfast). Yes Provider, Historical  
multivitamins-minerals-lutein (CENTRUM SILVER) tab tablet Take 1 Tab by mouth daily. Provider, Historical  
 
 
Allergies Allergies Allergen Reactions  Synthroid [Levothyroxine] Other (comments) Confused, hallucinations Past Medical History:  
Diagnosis Date  WILTON (acute kidney injury) ([facility] 8/2/2014  Cancer [facility])   
 colon  Chronic kidney disease  Diabetes ([facility] 2008  
 type 2  Dyslipidemia  Esophageal reflux  Esophageal reflux 2/9/2012  Hypertension 2008  Incontinence  Memory loss  Mixed hyperlipidemia 2/9/2012  Other ill-defined conditions(799.89) broken neck 2012  Pernicious anemia 7/31/2012  Psychiatric disorder   
 dementia  Snoring  Stroke ([facility] 10/18/2018  Unspecified hypothyroidism 4/26/2012 Past Surgical History:  
Procedure Laterality Date  COLONOSCOPY  12 Rectosigmoid adenocarcinoma  HX CATARACT REMOVAL    
 HX HERNIA REPAIR Bilateral inguinal  
 HX OTHER SURGICAL    
 colon resection  HX SMALL BOWEL RESECTION    HX TONSIL AND ADENOIDECTOMY  HX TONSILLECTOMY  1936 Family History Problem Relation Age of Onset  Heart Disease Mother  Hypertension Mother  Heart Disease Father  Hypertension Father  Cancer Paternal Aunt Social History Social History Socioeconomic History  Marital status:  Spouse name: Not on file  Number of children: Not on file  Years of education: Not on file  Highest education level: Not on file Occupational History  Not on file Social Needs  Financial resource strain: Not on file  Food insecurity:  
  Worry: Not on file Inability: Not on file  Transportation needs:  
  Medical: Not on file Non-medical: Not on file Tobacco Use  Smoking status: Former Smoker Packs/day: 1.50 Years: 30.00 Pack years: 45.00 Last attempt to quit: 1982 Years since quittin.5  Smokeless tobacco: Never Used Substance and Sexual Activity  Alcohol use: No  
  Alcohol/week: 0.0 standard drinks Types: 1 - 2 Standard drinks or equivalent per week  Drug use: Never  Sexual activity: Not Currently Partners: Female Lifestyle  Physical activity:  
  Days per week: Not on file Minutes per session: Not on file  Stress: Not on file Relationships  Social connections:  
  Talks on phone: Not on file Gets together: Not on file Attends Taoist service: Not on file Active member of club or organization: Not on file Attends meetings of clubs or organizations: Not on file Relationship status: Not on file  Intimate partner violence:  
  Fear of current or ex partner: Not on file Emotionally abused: Not on file Physically abused: Not on file Forced sexual activity: Not on file Other Topics Concern  Not on file Social History Narrative  Not on file Alcohol history: Not at all Smoking history: Former smoker quit in 1982. 1.5 PPD x30 years Illicit drug history: Not at all Living arrangement: patient lives with their family. Ambulates: Independently Review of Systems:  
Review of Systems Unable to perform ROS: Mental status change Physical Exam 
 
  O2 Device: Room air General: No acute distress. Alert. Cooperative. Frail elderly male Head: Normocephalic. Atraumatic. Eyes:              Conjunctiva pink. Sclera white. PERRL. Nose:             Septum midline. Mucosa pink. No drainage. Throat: Mucosa pink. Moist mucous membranes. No tonsillar exudates or erythema. Palate movement equal bilaterally. Neck: Supple. Normal ROM. No stiffness. Respiratory: Rhonchi and audible wheezing heard throughout posterior lung fields. Moving air appropriately. Does not appear to be in respiratory distress. Cardiovascular: RRR. Normal S1,S2. No m/r/g. Pulses 2+ throughout. GI: + bowel sounds. Nontender. No rebound tenderness or guarding. Nondistended. Extremities: No LE edema. Distal pulses intact. Able to move all 4 extremities. Does not illicit pain. Musculoskeletal: Full ROM in all extremities. Skin: Warm, dry. No rashes. Has bandage on Rt arm where daughter states she noticed abrasion. Dressing is clean dry and intact. Neuro: Limited d/t mental status change. A&O x1 (only knows his last name) Laboratory Data Recent Results (from the past 8 hour(s)) LACTIC ACID Collection Time: 01/25/20  5:40 PM  
Result Value Ref Range Lactic acid 2.0 0.4 - 2.0 MMOL/L Imaging CXR Results  (Last 48 hours) 01/25/20 1454  XR CHEST PORT Final result Impression:  IMPRESSION: Right upper lobe pneumonia. Narrative:  EXAM: XR CHEST PORT INDICATION: Chest pain COMPARISON: 2018 FINDINGS: A portable AP radiograph of the chest was obtained at 1450 hours. The  
patient is on a cardiac monitor. There is airspace opacification in the right  
upper lobe. The cardiac and mediastinal contours and pulmonary vascularity are  
normal.  The bones and soft tissues are grossly within normal limits. CT Results  (Last 48 hours) 01/25/20 1544  CT HEAD WO CONT Final result Impression:  IMPRESSION: No acute intracranial findings. Narrative:  EXAM: CT HEAD WO CONT INDICATION: Trauma COMPARISON: December 14. CONTRAST: None. TECHNIQUE: Unenhanced CT of the head was performed using 5 mm images. Brain and  
bone windows were generated. CT dose reduction was achieved through use of a  
standardized protocol tailored for this examination and automatic exposure  
control for dose modulation. FINDINGS:  
The ventricles and sulci are enlarged. There is periventricular hypoattenuation  
compatible with chronic small vessel ischemic changes. There is no intracranial  
hemorrhage, extra-axial collection, mass, mass effect or midline shift. The  
basilar cisterns are open. No acute infarct is identified. The bone windows  
demonstrate no abnormalities. There is mucosal thickening of the left ethmoid  
sinus. 01/25/20 1544  CT SPINE CERV WO CONT Final result Impression:  IMPRESSION:  
   
1. Old odontoid fracture. 2. Multilevel degenerative changes. 3. Groundglass opacification at the lung apices could further be evaluated by  
thorax CT if indicated. Narrative:  EXAM:  CT CERVICAL SPINE WITHOUT CONTRAST INDICATION: Trauma. COMPARISON: 2012 CONTRAST:  None. TECHNIQUE: Multislice helical CT of the cervical spine was performed without  
intravenous contrast administration. Sagittal and coronal reconstructions were generated. CT dose reduction was achieved through use of a standardized  
protocol tailored for this examination and automatic exposure control for dose  
modulation. FINDINGS:  
   
The alignment is within normal limits. There is an old healed fracture of the  
odontoid. The craniocervical junction is within normal limits. The prevertebral  
soft tissues are within normal limits. There are vertebral artery  
calcifications. There is groundglass opacification at the lung apices. C2-C3: Bilateral facet arthropathy and uncinate process hypertrophy. C3-C4: Left facet arthropathy and uncinate process hypertrophy with neural  
foraminal narrowing. C4-C5: Mild disc space narrowing. Facet arthropathy and uncinate process  
hypertrophy with bilateral neural foraminal narrowing, greater on the right. C5-C6: Disc space narrowing. Uncinate process hypertrophy with bilateral neural  
foraminal narrowing, greater on the left. C6-C7: Disc space narrowing. Uncinate process hypertrophy with minimal neural  
foraminal narrowing. C7-T1: There is no spinal canal or neural foraminal stenosis. 01/25/20 1544  CT ABD PELV WO CONT Final result Impression:  IMPRESSION:  
   
1. No acute traumatic injury to the abdomen or pelvis is identified. 2. Gallbladder sludge or stones. 3. Incompletely seen is groundglass opacification in the right upper lobe which  
could further be assessed by CT of the thorax if indicated. Narrative:  EXAM: CT ABD PELV WO CONT INDICATION: Fall and abd pain COMPARISON: 2018 CONTRAST:  None. TECHNIQUE:   
Thin axial images were obtained through the abdomen and pelvis. Coronal and  
sagittal reconstructions were generated. Oral contrast was not administered. CT  
dose reduction was achieved through use of a standardized protocol tailored for  
this examination and automatic exposure control for dose modulation. The absence of intravenous contrast material reduces the sensitivity for  
evaluation of the solid parenchymal organs of the abdomen. FINDINGS:   
LUNG BASES: Patchy groundglass opacification is seen in the right upper lobe on  
the first image of this study. INCIDENTALLY IMAGED HEART AND MEDIASTINUM: Unremarkable. LIVER: No mass or biliary dilatation. GALLBLADDER: Gallbladder sludge or stones. SPLEEN: No mass. PANCREAS: No mass or ductal dilatation. ADRENALS: Unremarkable. KIDNEYS/URETERS: No mass, calculus, or hydronephrosis. STOMACH: Unremarkable. SMALL BOWEL: No dilatation or wall thickening. COLON: No dilatation or wall thickening. APPENDIX: Unremarkable. PERITONEUM: No ascites or pneumoperitoneum. RETROPERITONEUM: No lymphadenopathy or aortic aneurysm. REPRODUCTIVE ORGANS: Normal.  
URINARY BLADDER: No mass or calculus. BONES: No destructive bone lesion. Degenerative changes of the spine. ADDITIONAL COMMENTS: N/A  
   
  
  
 
 
EKG: Normal sinus rhythm Junctional ST depression, probably normal  
Borderline ECG When compared with ECG of 14-DEC-2019 14:45, No significant change was found Assessment and Plan Najma Michelle is a 80 y.o. male who is admitted for sepsis 2/2 HAP. ACUTE PROBLEMS 1. Sepsis 2/2 HAP: Pt last in hospital 12/17 for acute metabolic encephalopathy 2/2 UTI. RUL PNA visualized on CXR and CT C-spine + CT abd/pelvis. S/p IV rocephin + azithromycin in ED.  
-admit to telemetry  
-vitals per unit protocol  
- NPO until passed bedside swallow. On MIVF 
- Lactic acid 2.0 will repeat until <2 
-start IV vanc, cefepime, doxycycline. - urine, blood and sputum cultures pending  
- RVP, pro-calcitonin, strep pneumo, legionella, sputum Cx 
-daily CBC, CMP, Mag and phos - Duoneb q4h prn for wheezing  
-Strict I &O 2. Elevated AST: POA likely 2/2 shock liver  
- acute hepatitis panel pending  
- daily CMP 3. Acute Metabolic Encephalopathy: Likely 2/2 HAP. CT head wnl and stroke work-up last month showed chronic white matter disease and superficial siderosis of R frontal lobe, EEG gen slowing. Carotid dopplers in Dec'19 showed chronic mild-mod L ICA stenoses. Pt was supposed to follow-up w/ neurology early Jan but never did. -Treatment of HAP as above. - TSH, Free T3 &T4 
- Continue home plavix 4. NSTEMI/Type II MI in setting Grade 1 Diastolic Dysfunction: Echo in Dec'19 EF 12-11%, grade 1 diastolic dysfunction. POA Trop 3.81 & NT pro-BNP [id number] 
- trend trop q6h  
- Echo pending - Will check CK-MB 
- c/s to cardiology recs appreciated CHRONIC PROBLEMS 1. HTN: Stable. -Continue home norvasc.  
-Hold home losartan and lasix until renal functions improve. 2. WILTON on CKD4: Cr 3.1 (BL ~2.5). Likely 2/2 sepsis. -MIVF. -Avoid nephrotoxic agents. -Daily CMP 3. DMII: POA glucose 293; HA1c 12/2019 (6.5)  
- Hold home tradjenta and amaryl 
- SSI 
-q6hrs POC gluc while NPO 4. HLD:  
-Continue niacin 
- Hold home lipitor in setting transaminitis 5. GERD:  
-Switch home prevacid to protonix 6. Hypothyroidism: TSH 11.7 on 12/19 
- will recheck w/ free T3  + T4 
- Continue home armour thyroid 120mg. 7. Pernicious Anemia: POA Hgb 12.5 
-Continue home ferrous sulfate and thera-MV 8. Dementia:Continue home aricept 9. H/o Fall:   CT C-spine, XR R forearm, hand - no acute process.  
-Fall precautions. 
-Will check UA + CK for possible rhabdo - Wound care c/s Obesity BMI Body mass index is 27.34 kg/m². - Encouraging lifestyle modifications and further follow up outpatient. FEN/GI - NPO until passes bedside swallow. NS at 130 mL/hr. Activity - Ambulate with assistance DVT prophylaxis - Sub Q Heparin GI prophylaxis - Protonix Fall prophylaxis - Fall precautions ordered. Disposition - Admit to Telemetry. Plan to d/c to Home. Consulting PT/OT/CM Code Status - DNR, discussed with patient / caregivers. Next of Isis 69 [address] and [address] (Son) [phone], Charli Villaseñor (dtr) [phone], Darryle Notch (son in law) [phone] Patient Enjon Rodrigues. will be discussed Dr. Amparo Chavarria. 6:40 PM, 01/26/20 Kang Palacios DO Family Medicine Resident For Billing Chief Complaint Patient presents with  Fall  
 Skin Problem Hospital Problems  Date Reviewed: 12/19/2019 Codes Class Noted POA Weakness ICD-10-CM: R53.1 ICD-9-CM: 780.79  1/25/2020 Unknown HAP (hospital-acquired pneumonia) ICD-10-CM: J18.9, Y95 
ICD-9-CM: 191  1/25/2020 Unknown

## 2020-01-26 NOTE — PROGRESS NOTES
Bedside and Verbal shift change report given to Lesvia (oncoming nurse) by Mitzy Santos (offgoing nurse). Report included the following information SBAR, Kardex and Cardiac Rhythm NSR.  
 
0830 patient turned and repositioned with turn team and tech, heels offloaded, perineal care performed. 1050 noted that patients breathing looks more labored as the morning progresses, called family practice to notify them that patients respiratory rate is between 28-32 at rest this AM. Oxygen saturation on 4L NC is 90%, wheezing and rhonchi heard throughout the lungs. HOB elevated. Dr. Tammy Arellano will come and see the patient shortly. Called respiratory therapy for PRN duo-neb. 1130 noted that stat labs for patients urine are still outstanding, new condom cath set placed on patient, will collect urine and send down as soon as possible  
 
1200 Orders from family practice for x-ray and decrease IV maintenance fluids. 1230 per Dr. Cordero Her, [facility] to discontinue troponins as last troponin plateau at 9.94 
 
2616 Dr. Jim Arellano of patients oral temp 99.4, per Dr. Tammy Arellano, no orders at this time as MD wants to see if temp peaks. [address] assuming care of patient.  Notified Brisa that patient needs urine sent however patient has not voided, Brisa RN passing this information off to [facility] MD.

## 2020-01-26 NOTE — PROGRESS NOTES
[facility] Dosing Services:   
 
Pharmacist Renal Dosing Progress Note for Cefepime Physician The following medication: Cefepime was automatically dose-adjusted per [facility] P&T Committee Protocol, with respect to renal function. Consult provided for this   80 y.o. , male , for the indication of UTI, PNA. Pt Weight:  
Wt Readings from Last 1 Encounters:  
01/25/20 88.9 kg (196 lb) Previous Regimen 2 grams iv every 8 hours Serum Creatinine Lab Results Component Value Date/Time Creatinine 3.10 (H) 01/25/2020 02:43 PM  
 Creatinine (POC) 1.9 (H) 10/11/2018 02:38 PM  
   
Creatinine Clearance Estimated Creatinine Clearance: 18.1 mL/min (A) (based on SCr of 3.1 mg/dL (H)). BUN Lab Results Component Value Date/Time BUN 38 (H) 01/25/2020 02:43 PM  
 BUN (POC) 27 (H) 10/11/2018 02:38 PM  
   
 
Dosage changed to:  2 grams IV every 24 hours for CrCl <20 Pharmacy to continue to monitor patient daily. Will make dosage adjustments based upon changing renal function. Signed Yoko Magallanes. Contact information:  [phone]

## 2020-01-26 NOTE — PROGRESS NOTES
Bedside shift change report given to ELLEN Lakhani (oncoming nurse) by Benjie Sales RN (offgoing nurse). Report included the following information SBAR and Kardex.

## 2020-01-26 NOTE — PROGRESS NOTES
Physical Therapy: Acknowledge PT consult orders. Reviewed medical chart, which confirms Up-trending Trops; 3.81 --> 7.67. Dr. Cesar Platt spoke to on-call cardiology (Dr. Rayna Carroll) who states this could be NSTEMI but may all be related to underlying sepsis. RN reports lethargic affect, sitter, and patient son at bedside. He remains on contact and droplet precautions. Will defer PT evaluation, until Cardiology consult completed. X-ray at bedside. Will f/u next treatment day, and proceed as indicated if medically stable.  
Thank you, Tara Hernandez, PT MS

## 2020-01-26 NOTE — ED NOTES
Attempted straight cath on patient unsuccessfully patient anatomy too small. attepted cath with 12 fr coude and pediatric cath without success, Md Shipley at bedside and aware

## 2020-01-27 PROBLEM — M62.82 RHABDOMYOLYSIS: Status: ACTIVE | Noted: 2020-01-01

## 2020-01-27 NOTE — PROGRESS NOTES
Physical Therapy Note: 
Orders acknowledged, chart reviewed, and spoke with nursing. Patient is currently off the floor for testing. Will continue to follow once returned and appropriate thank you.

## 2020-01-27 NOTE — PROGRESS NOTES
Spiritual Care Assessment/Progress Note Jeneane Curling 
 
 
NAME: Lore Mcdaniel. MRN: [id number] AGE: 80 y.o. SEX: male Mandaen Affiliation: Holiness  
Language: English  
 
1/27/2020     Total Time (in minutes): 11 Spiritual Assessment begun in [facility] 3 PRO CARE TELE 2 through conversation with: 
  
    [x]Patient        [x] Family    [] Friend(s) Reason for Consult: Palliative Care, Initial/Spiritual Assessment Spiritual beliefs: (Please include comment if needed) [x] Identifies with a linda tradition: Holiness    
   [] Supported by a linda community:        
   [] Claims no spiritual orientation:       
   [] Seeking spiritual identity:            
   [] Adheres to an individual form of spirituality:       
   [] Not able to assess:                   
 
    
Identified resources for coping:  
   [] Prayer                           
   [] Music                  [] Guided Imagery [x] Family/friends                 [] Pet visits [] Devotional reading                         [] Unknown 
   [] Other:                                          
 
 
Interventions offered during this visit: (See comments for more details) Patient Interventions: Affirmation of emotions/emotional suffering, Affirmation of linda, Coping skills reviewed/reinforced, Iconic (affirming the presence of God/Higher Power) Family/Friend(s): Affirmation of emotions/emotional suffering, Affirmation of linda, Catharsis/review of pertinent events in supportive environment, Coping skills reviewed/reinforced, Iconic (affirming the presence of God/Higher Power)(Son-in-Law (JEANNIE)) Plan of Care: 
 
 [] Support spiritual and/or cultural needs  
 [] Support AMD and/or advance care planning process    
 [] Support grieving process 
 [] Coordinate Rites and/or Rituals  
 [] Coordination with community clergy [] No spiritual needs identified at this time [] Detailed Plan of Care below (See Comments)  [] Make referral to Music Therapy 
[] Make referral to Pet Therapy    
[] Make referral to Addiction services 
[] Make referral to [facility] 
[] Make referral to Spiritual Care Partner 
[] No future visits requested       
[x] Follow up visits as needed Comments:  visit for initial spiritual assessment, palliative care consult. Met patient's son-in-law (JEANNIE) in hallway outside of room as staff were providing care. Provided spiritual presence and listening as he spoke briefly about the patient's health. Says he has been here with his father-in-law most of the night since 3:00 am.  Says his wife is on her way to the hospital.  Patient reclining in bed, good eye contact, smiling, friendly. Says he is feeling better. Aide is sitting at bedside. Patient did not engage in conversation other than saying he is comfortable and not experiencing any pain. Introduced myself and informed him of availability of  and pastoral care services. JEANNIE described good family support. The patient and his JEANNIE appeared comforted as a result of this visit and expressed gratitude for this visit. Visited by Rev. Bre Rodney MDiv, [facility], [facility]  paging service: [phone])

## 2020-01-27 NOTE — PROGRESS NOTES
[facility] Dosing Services:  
 
Consult for Vancomycin Dosing by Pharmacy by Dr. Arun Simms Indication: HAP Day of Therapy 3 Previous Regimen Dosing by random levels due to CrCl<20 Last Level 9.2 at [facility] ANDREW Other Current Antibiotics Cefepime 2 grams every 24 hours Doxycycline 100 mg IV every 12 hours Serum Creatinine Lab Results Component Value Date/Time Creatinine 3.00 (H) 01/27/2020 12:28 AM  
 Creatinine (POC) 1.9 (H) 10/11/2018 02:38 PM  
   
Creatinine Clearance Estimated Creatinine Clearance: 18.7 mL/min (A) (based on SCr of 3 mg/dL (H)). BUN Lab Results Component Value Date/Time BUN 50 (H) 01/27/2020 12:28 AM  
 BUN (POC) 27 (H) 10/11/2018 02:38 PM  
   
WBC Lab Results Component Value Date/Time WBC 17.3 (H) 01/27/2020 12:28 AM  
   
 
 
Plan for level/Adjustments: Random level is <15. SrCr is still elevated at 3. Re-dose now with 15 mg/kg x 1. Redraw Random with AM labs 1/28. Will continue to follow dosing. Joey Stokes, PharmD BCPS

## 2020-01-27 NOTE — PROGRESS NOTES
[facility] [address]. YOB: 1931 Assessment & Plan: 1. WILTON on CKD 4 
- IVVD,Rhabdo,PNA 
-CR 3 MG 
- Non oliguric 2. DM 
- SSI 3. HTN 
- not on meds 4. AMS 
- Multifactorial 
5. CHF: Diastolic Plan 1. Gentle IVF 2. PVR 3. Poor HD candidate 4. ABX 5. Serial CK Subjective:  
CC:WILTON HPI: Patient seen Cr still high CK better AMS persists DW SON 
ROS:Not reliable Current Facility-Administered Medications Medication Dose Route Frequency  insulin lispro (HUMALOG) injection   SubCUTAneous AC&HS  heparin 25,000 units in D5W 250 ml infusion  12-25 Units/kg/hr (Order-Specific) IntraVENous TITRATE  sodium chloride (NS) flush 5-40 mL  5-40 mL IntraVENous Q8H  
 sodium chloride (NS) flush 5-40 mL  5-40 mL IntraVENous PRN  
 cefepime (MAXIPIME) 2 g in 0.9% sodium chloride (MBP/ADV) 100 mL  2 g IntraVENous Q24H  
 glucose chewable tablet 16 g  4 Tab Oral PRN  
 dextrose (D50W) injection syrg 12.5-25 g  12.5-25 g IntraVENous PRN  
 glucagon (GLUCAGEN) injection 1 mg  1 mg IntraMUSCular PRN  
 doxycycline (VIBRAMYCIN) 100 mg in 0.9% sodium chloride (MBP/ADV) 100 mL  100 mg IntraVENous Q12H  
 albuterol-ipratropium (DUO-NEB) 2.5 MG-0.5 MG/3 ML  3 mL Nebulization Q4H PRN  
 dextrose 10% infusion 0-250 mL  0-250 mL IntraVENous PRN Objective:  
 
Vitals: 
Blood pressure 137/66, pulse 66, temperature 97.7 °F (36.5 °C), resp. rate 22, height 6' (1.829 m), weight 89 kg (196 lb 3.4 oz), SpO2 96 %. Temp (24hrs), Av.4 °F (36.9 °C), Min:97.7 °F (36.5 °C), Max:99.8 °F (37.7 °C) Intake and Output: 
No intake/output data recorded. No intake/output data recorded. Physical Exam:              
 Patient is intubated:  no 
 
Physical Examination:  
GENERAL ASSESSMENT: NAD HEENT:Nontraumatic CHEST: CTA HEART: S1S2 ABDOMEN: Soft,NT, 
:Walters: n EXTREMITY: EDEMA 
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 
 
Data Review No results for input(s): TNIPOC in the last 72 hours. No lab exists for component: ITNL Recent Labs  
  01/27/20 
0028 01/26/20 
0645 01/26/20 
0102 01/25/20 
1443 CPK 1,939*  --  4,051* 2,422* CKMB  --   --  42.3*  --   
TROIQ  --  7.58* 7.67* 3.81* Recent Labs  
  01/27/20 
0028 01/26/20 
1342 01/26/20 
0102 01/25/20 
1443  142 143 138  
K 4.3 4.7 4.0 4.9 * 114* 114* 107 CO2 21 20* 21 23 BUN 50* 49* 40* 38* CREA 3.00* 2.98* 2.66* 3.10* * 207* 168* 293* PHOS 3.6 3.6 2.9 3.1 MG 2.3  --  2.4 1.7 CA 8.2* 8.5 7.8* 8.7 ALB 2.4* 2.5* 2.5* 3.0* WBC 17.3*  --  17.6* 16.1* HGB 10.7*  --  10.9* 12.5 HCT 32.2*  --  33.1* 37.0   --  184 229 Recent Labs  
  01/27/20 
0844 01/27/20 
0028 01/26/20 
1756  01/25/20 
1443 INR  --   --   --   --  1.1 PTP  --   --   --   --  11.0 APTT 53.3* 81.9* 68.9*   < > 24.5  
 < > = values in this interval not displayed. Needs: urine analysis, urine sodium, protein and creatinine Lab Results Component Value Date/Time Sodium,urine random 58 07/29/2014 05:30 PM  
 Creatinine, urine 201.46 07/29/2014 05:30 PM  
 
 
 
Discussed with:  Family 
 
: Mary Crook MD 
1/27/2020 [facility]: 
[email or web address] [place] office: 
[address] [place] [address], [address] [place], [address] [facility] Phone: [phone] Fax :     [phone] [place] office: 
[address] [facility] [place], [address] [place] [address] Phone - [phone] Fax - [phone]

## 2020-01-27 NOTE — PROGRESS NOTES
Occupational Therapy Note: 
Orders acknowledged, chart reviewed, and spoke with nursing. Patient is currently off the floor for testing. Will continue to follow once returned and appropriate.    
Samy Azevedo, OTR/L

## 2020-01-27 NOTE — WOUND CARE
Wound care consult: 
Initial visit for skin and wound assessment present on admission. Alert- family and staff at bedside. Assessment All skin folds and bony prominences assessed, turned with staff assistance. Sacral area, heels and elbows intact Right lower arm and hand edematous and discolored- purple intact discoloration in palm of hand- large area of partal thickness skin loss on the inner wrist- partial thickness skin loss - scattered areas of yellow fluid filled blister on hand and fingers, intact. Unable to discern etiology- present on admission- found on floor at home prior to admission. Treatment Repositioned in bed Heels floated Wound left open to air- MD to assess and staff to reapply dressing. Recommendations/Plan Turn, reposition every 2 hours as tolerated, float heels, place in off loading boots Incontinent care--- Apply Zinc to all open areas, moisture barrier as needed. Wound care per attending. Will follow, reconsult as needed.  
[address] [facility], [place], operated [facility]

## 2020-01-27 NOTE — PROGRESS NOTES
[address] Medicine Residency Resident Note in Brief 
---------------------------------------- 
 
S: 
  
Patient seen and examined at bedside after call from nursing concerned about blistering of right wrist and bruising of hand. O: 
Visit Vitals /61 (BP 1 Location: Left arm) Pulse 92 Temp 98.5 °F (36.9 °C) Resp 18 Ht 6' (1.829 m) Wt 196 lb 3.4 oz (89 kg) SpO2 95% BMI 26.61 kg/m² Physical Exam 
Patient appears comfortable resting in bed Right wrist with blisters over the lateral dorsal aspect of the wrist with open blister over the medioventral aspect of the the right wrist. Significant bruising of the dorsal aspect of the right hand with TTP over the radial aspect of the wrist (See images below) A/P 
 
79yo M admitted for acute metabolic encephalopathy in the setting of Rhabdomyolysis after unwitnessed fall. Findings on exam this morning concerning for possible fracture although hand films were negative on admission - XR R wrist 
- Agree with wound consult 
- monitor blistering and for and extension Please see full daily progress note for complete plan.  
Dexter Simon MD 
12:38 PM

## 2020-01-27 NOTE — PROGRESS NOTES
0700-Bedside and Verbal shift change report given to [address] [place] [address]  (oncoming nurse) by Yamileth Newman  (offgoing nurse). Report included the following information SBAR, Kardex, ED Summary, Intake/Output, MAR, Med Rec Status and Cardiac Rhythm nsr. .  
 
80- Paged Dr. Wu Alvarenga office regarding post void bladder scan >250mL. Awaiting call back. 1900-Bedside and Verbal shift change report given to Carol (oncoming nurse) by [address] [place] [address] (offgoing nurse). Report included the following information SBAR, Kardex, Intake/Output, MAR, Accordion, Recent Results and Med Rec Status.

## 2020-01-27 NOTE — CONSULTS
Palliative Medicine Consult Lacy: [phone] ([phone]) Patient Name: Consuelo Peña. YOB: 1931 Date of Initial Consult: 2020 Reason for Consult: Goals of care discussion Requesting Provider: Dr. Willa Gutierrez Primary Care Physician: Sandee Yuen MD 
 
 SUMMARY:  
Consuelo England is a 80 y.o. with a past history of TIAs, DM 2, CKD 4, HTN, hx: CA status post resection, UTIs, falls, memory deficit, who was admitted on 2020 from home with a diagnosis of AMS and  NSTEMI. Current medical issues leading to Palliative Medicine involvement include: Goals of care discussion and setting of advanced age, altered mental status, and multiple comorbidities. Patient presented to the ER with CC of unwitnessed fall and altered mental status, apparently patient was down for unknown period of time. In ER patient confused, BNP 14 K, troponin 3.8. CXR PNA. Cardiology consulted, likely NSTEMI, patient admitted. Course of hospitalization: Patients mentation has improved, though family stated not at baseline. Psychosocial assessment: Patient was a  many years, retired approximately 26 years ago. Is , wife  in . Patient has 2 children, 1 daughter and 1 son. Patient lives in his own home, his granddaughter and 2 of her friends live with him. Everyone except the patient's son-in-law works, weekday mornings until approximately 1 or 2 he is alone. Patient has 2 children, 1 daughter and 1 son. Family reports he has lived a very sedentary life since skilled nursing, mostly enjoys watching TV. Baseline cognitive and functional status: Prior to hospitalization patient had been mostly independent, has a walker but does not use it, consequently he has had multiple falls. Patient is reportedly able to bathe and toilet self, but daughter stated he did not change his clothes very often.   She stated that he did not prepare his meals, but was able to feed himself, pour himself a cup of coffee etc. he new medications and one colored box were for morning and took them appropriately, sometimes he would go to bed early and missed taking his nighttime medication. Daughter stated patient not formally diagnosed with dementia, however he has not recalled her name over the past 2 months, thinks he is his sister, has asked her about his own mother. PALLIATIVE DIAGNOSES:  
1. Advanced care planning discussion 2. Goals of care discussion 3. DNR discussion 4. Altered mental status, unspecified 5. Debility PLAN:  
1. Prior to visit completed extensive chart review, including documentation from previous hospitalization. 2. I met with the patient, his daughter and son-in-law were at bedside. Introduced myself and role of palliative medicine. 3. Assessed their understanding of hospitalization, questions regarding rhabdomyolysis. 4. Psychosocial assessment completed 5. Assessment of Baseline cognitive and functional status completed 6. Advanced care planning discussion-patient has an AMD in EMR. In this document he had designated his wife, who is now , as his primary healthcare decision-maker. He designated his daughter Russell Mast, [phone] and son Nataliia Garcia as his secondary healthcare decision-makers. · His advanced medical directive also indicated that in the setting of terminal condition/illness, he would not want life-sustaining treatments or interventions. 7. Goals of care discussion-daughter Kisha Vance stated that she wants patient to be discharged to her home, does not want him to go back to his house. Daughter's  is retired and is able to care for him while she is at work.   We discussed possibility of patient participating in PT, that therapist might recommended SNF at discharge, daughter stated that she would not send him to a SNF, would be okay to receive therapy at home, but does not think she would send him to the SNF. 8. DNR discussion-patient has a DNR CODE STATUS, daughter reiterates these wishes, but stated that he does not have a durable DNR and record, recommend obtaining before patient is discharged. 9. Altered mental status, unspecified-improved, but not at baseline. Patient speech is mostly unintelligible, he is lethargic, has difficulty following simple commands. 10. Debility-at baseline patient has some memory deficits, has had falls, now he is status post fall, down for unknown period of time, rhabdo, right arm swollen and difficult for him to use, undergoing deconditioning. 11. Initial consult note routed to primary continuity provider and/or primary health care team members 12. Communicated plan of care with: Palliative [facility] MARTIN 192 Team 
 
 GOALS OF CARE / TREATMENT PREFERENCES:  
 
GOALS OF CARE: 
Patient/Health Care Proxy Stated Goals: Prolong life TREATMENT PREFERENCES:  
Code Status: DNR Advance Care Planning: 
[x] [facility] Interdisciplinary Team has updated the ACP Navigator with Allen and Patient Capacity Advance Care Planning 1/27/2020 Patient's Healthcare Decision Maker is: Named in scanned ACP document Primary Decision Maker Name -  
Primary Decision Maker Phone Number -  
Primary Decision Maker Relationship to Patient -  
Confirm Advance Directive Yes, on file Does the patient have other document types Do Not Resuscitate Medical Interventions: Limited additional interventions Other Instructions:  
Artificially Administered Nutrition: No feeding tube Other: As far as possible, the palliative care team has discussed with patient / health care proxy about goals of care / treatment preferences for patient. HISTORY:  
 
History obtained from: Medical records/family CHIEF COMPLAINT: N/A 
 
HPI/SUBJECTIVE: The patient is:  
[] Verbal and participatory [x] Non-participatory due to:  
Patient is drowsy, quiet, speech unintelligible, having difficulty following simple commands, unable to provide ROS Clinical Pain Assessment (nonverbal scale for severity on nonverbal patients):  
Clinical Pain Assessment Severity: 0 Location: unable to assess Character: unable to assess Duration: unable to assess Effect: unable to assess Factors: unable to assess Frequency: unable to assess Activity (Movement): Lying quietly, normal position Duration: for how long has pt been experiencing pain (e.g., 2 days, 1 month, years) Frequency: how often pain is an issue (e.g., several times per day, once every few days, constant) FUNCTIONAL ASSESSMENT:  
 
Palliative Performance Scale (PPS): PPS: 30 
 
 
 PSYCHOSOCIAL/SPIRITUAL SCREENING:  
 
Palliative IDT has assessed this patient for cultural preferences / practices and a referral made as appropriate to needs (Cultural Services, Patient Advocacy, Ethics, etc.) Any spiritual / Taoism concerns: 
[] Yes /  [x] No 
 
Caregiver Burnout: 
[] Yes /  [x] No /  [] No Caregiver Present Anticipatory grief assessment:  
[x] Normal  / [] Maladaptive ESAS Anxiety: Anxiety: 0 
 
ESAS Depression:    
 
 
 REVIEW OF SYSTEMS:  
 
Positive and pertinent negative findings in ROS are noted above in HPI. The following systems were [] reviewed / [x] unable to be reviewed as noted in HPI Other findings are noted below. Systems: constitutional, ears/nose/mouth/throat, respiratory, gastrointestinal, genitourinary, musculoskeletal, integumentary, neurologic, psychiatric, endocrine. Positive findings noted below. Modified ESAS Completed by: provider Fatigue: 10 Drowsiness: 7 Pain: 0 Anxiety: 0 Nausea: 0 Anorexia: 2 Dyspnea: 0 Stool Occurrence(s): 0 PHYSICAL EXAM:  
 
From RN flowsheet: 
Wt Readings from Last 3 Encounters:  
01/26/20 196 lb 3.4 oz (89 kg) 12/19/19 196 lb (88.9 kg) 12/15/19 194 lb (88 kg) Blood pressure 143/61, pulse 92, temperature 98.5 °F (36.9 °C), resp. rate 18, height 6' (1.829 m), weight 196 lb 3.4 oz (89 kg), SpO2 95 %. Pain Scale 1: Numeric (0 - 10) Pain Intensity 1: 0 Last bowel movement, if known:  
 
Constitutional: Elderly, well-nourished, disheveled, fatigued, NAD Eyes: pupils equal, anicteric ENMT: no nasal discharge, moist mucous membranes Cardiovascular: regular rhythm, distal pulses intact Respiratory: breathing not labored, symmetric Gastrointestinal: soft non-tender, +bowel sounds Musculoskeletal: no deformity, no tenderness to palpation Skin: warm, dry Neurologic: Patient fatigued, drowsy, difficulty following simple commands, speech unintelligible, unable to tell me the name of his daughter who is at bedside Psychiatric: Appropriate affect, unable to assess for hallucinations Other: 
 
 
 HISTORY:  
 
Principal Problem: 
  HAP (hospital-acquired pneumonia) (1/25/2020) Active Problems: 
  Diabetes mellitus type 2, controlled ([address] (2/9/2012) Essential hypertension, benign (2/9/2012) HLD (hyperlipidemia) (2/9/2012) Esophageal reflux (2/9/2012) Acquired hypothyroidism (4/26/2012) Pernicious anemia (7/31/2012) WILTON (acute kidney injury) ([address] (8/2/2014) Dementia without behavioral disturbance ([address] (8/16/2016) CKD (chronic kidney disease) stage 4, GFR 15-29 ml/min ([facility]) (4/22/2019) AMS (altered mental status) (12/14/2019) Weakness (1/25/2020) Sepsis ([address] (1/26/2020) Fall (1/26/2020) NSTEMI (non-ST elevated myocardial infarction) ([address] (1/26/2020) Past Medical History:  
Diagnosis Date  WILTON (acute kidney injury) ([address] 8/2/2014  Cancer [facility])   
 colon  Chronic kidney disease  Delirium 10/11/2018  Diabetes ([address] 2008  
 type 2  Dyslipidemia  Esophageal reflux  Esophageal reflux 2/9/2012  Hypertension 2008  Incontinence  Memory loss  Mixed hyperlipidemia 2012  
 NSTEMI (non-ST elevated myocardial infarction) ([facility] 2020  Other ill-defined conditions(799.89) broken neck   Pernicious anemia 2012  Psychiatric disorder   
 dementia  Snoring  Stroke ([facility] 10/18/2018  Unspecified hypothyroidism 2012  Urinary tract infection 10/11/2018  UTI (urinary tract infection) 2019 Past Surgical History:  
Procedure Laterality Date  COLONOSCOPY  12 Rectosigmoid adenocarcinoma  HX CATARACT REMOVAL    
 HX HERNIA REPAIR Bilateral inguinal  
 HX OTHER SURGICAL    
 colon resection  HX SMALL BOWEL RESECTION    HX TONSIL AND ADENOIDECTOMY  HX TONSILLECTOMY  193 Family History Problem Relation Age of Onset  Heart Disease Mother  Hypertension Mother  Heart Disease Father  Hypertension Father  Cancer Paternal Aunt History reviewed, no pertinent family history. Social History Tobacco Use  Smoking status: Former Smoker Packs/day: 1.50 Years: 30.00 Pack years: 45.00 Last attempt to quit: 1982 Years since quittin.5  Smokeless tobacco: Never Used Substance Use Topics  Alcohol use: No  
  Alcohol/week: 0.0 standard drinks Types: 1 - 2 Standard drinks or equivalent per week Allergies Allergen Reactions  Synthroid [Levothyroxine] Other (comments) Confused, hallucinations Current Facility-Administered Medications Medication Dose Route Frequency  sodium bicarbonate (8.4%) 75 mEq in dextrose 5 % - 0.45% NaCl 1,000 mL infusion   IntraVENous CONTINUOUS  
 [START ON 2020] atorvastatin (LIPITOR) tablet 40 mg  40 mg Oral DAILY  clopidogreL (PLAVIX) tablet 75 mg  75 mg Oral DAILY  amLODIPine (NORVASC) tablet 10 mg  10 mg Oral DAILY  insulin lispro (HUMALOG) injection   SubCUTAneous AC&HS  sodium chloride (NS) flush 5-40 mL  5-40 mL IntraVENous Q8H  
  sodium chloride (NS) flush 5-40 mL  5-40 mL IntraVENous PRN  
 cefepime (MAXIPIME) 2 g in 0.9% sodium chloride (MBP/ADV) 100 mL  2 g IntraVENous Q24H  
 glucose chewable tablet 16 g  4 Tab Oral PRN  
 dextrose (D50W) injection syrg 12.5-25 g  12.5-25 g IntraVENous PRN  
 glucagon (GLUCAGEN) injection 1 mg  1 mg IntraMUSCular PRN  
 doxycycline (VIBRAMYCIN) 100 mg in 0.9% sodium chloride (MBP/ADV) 100 mL  100 mg IntraVENous Q12H  
 albuterol-ipratropium (DUO-NEB) 2.5 MG-0.5 MG/3 ML  3 mL Nebulization Q4H PRN  
 dextrose 10% infusion 0-250 mL  0-250 mL IntraVENous PRN  
 
 
 
 LAB AND IMAGING FINDINGS:  
 
Lab Results Component Value Date/Time WBC 17.3 (H) 01/27/2020 12:28 AM  
 HGB 10.7 (L) 01/27/2020 12:28 AM  
 PLATELET 205 76/63/7417 12:28 AM  
 
Lab Results Component Value Date/Time Sodium 145 01/27/2020 12:28 AM  
 Potassium 4.3 01/27/2020 12:28 AM  
 Chloride 115 (H) 01/27/2020 12:28 AM  
 CO2 21 01/27/2020 12:28 AM  
 BUN 50 (H) 01/27/2020 12:28 AM  
 Creatinine 3.00 (H) 01/27/2020 12:28 AM  
 Calcium 8.2 (L) 01/27/2020 12:28 AM  
 Magnesium 2.3 01/27/2020 12:28 AM  
 Phosphorus 3.6 01/27/2020 12:28 AM  
  
Lab Results Component Value Date/Time AST (SGOT) 125 (H) 01/27/2020 12:28 AM  
 Alk. phosphatase 65 01/27/2020 12:28 AM  
 Protein, total 6.2 (L) 01/27/2020 12:28 AM  
 Albumin 2.4 (L) 01/27/2020 12:28 AM  
 Globulin 3.8 01/27/2020 12:28 AM  
 
Lab Results Component Value Date/Time INR 1.1 01/25/2020 02:43 PM  
 Prothrombin time 11.0 01/25/2020 02:43 PM  
 aPTT 53.3 (H) 01/27/2020 08:44 AM  
  
Lab Results Component Value Date/Time Iron 14 (L) 07/31/2014 02:30 AM  
 TIBC 163 (L) 07/31/2014 02:30 AM  
 Iron % saturation 9 (L) 07/31/2014 02:30 AM  
 Ferritin 379 07/31/2014 02:30 AM  
  
No results found for: PH, PCO2, PO2 No components found for: [place] Lab Results Component Value Date/Time  CK 1,939 (H) 01/27/2020 12:28 AM  
 CK - MB 42.3 (H) 01/26/2020 01:02 AM  
  
 
 Total time:Counseling / coordination time, 70 min 
spent as noted above: 55 min 
> 50% counseling / coordination?: yes Prolonged service was provided for  []30 min   []75 min in face to face time in the presence of the patient, spent as noted above. Time Start:  
Time End:  
Note: this can only be billed with 81248 (initial) or 53511 (follow up). If multiple start / stop times, list each separately.

## 2020-01-27 NOTE — CARDIO/PULMONARY
Cardiopulmonary Rehab: Chart reviewed due to elevated troponin. Per Dr Carla Chua, dx includes: NSTEMI, possibly due to rhabdo rather than ACS. 81 yo has dementia & severe CKD. Family declined cath. Pt is not a candidate for outpatient cardiac rehab.

## 2020-01-27 NOTE — PROGRESS NOTES
2667  Talked with Dr Geoffry Lefort about efficacy of MRSA and urine culture orders since patient had already received doxycycline, cefepime and vancomycin. She said that she would discuss it with the team and contact day shift nurse on how to proceed with orders. 0721  Notified Dr Beckie Montano in patient's room of 6 beat run of non-sustained V-tach. Bedside shift change report given to [facility] (oncoming nurse) by Phyllis Rocha (offgoing nurse). Report included the following information SBAR, Kardex, MAR and Recent Results.

## 2020-01-27 NOTE — PROGRESS NOTES
Problem: Dysphagia (Adult) Goal: *Acute Goals and Plan of Care (Insert Text) Description Swallowing goals initiated 1-27-20: 
1) tolerate mech soft, thins without s/s aspiration by 1-30-20 Outcome: Progressing Towards Goal 
 SPEECH LANGUAGE PATHOLOGY BEDSIDE SWALLOW EVALUATION Patient: Foster Montano (80 y.o. male) Date: 1/27/2020 Primary Diagnosis: HAP (hospital-acquired pneumonia) [J18.9, Y95] Weakness [R53.1] Precautions: aspiration ASSESSMENT : 
Based on the objective data described below, the patient is ready for PO diet. Occasional coughing s/p liquids. Kevin Colons He is a feeder and is confused, which are aspiration risks. Family reports he eats everything at home without dentures. Admitted 1-25-20 with RUL PNA after a fall, WILTON. PMH:  HTN< GERd, XOL, CKD, DM, anemia, dementia, TIA, hypothyroid, UTI, DM, colon CA with bowel resection. SLP last saw this patient [address] [facility] [phone] with similar results: mild oral issues with chewing due to edentulous status. Patient will benefit from skilled intervention to address the above impairments. Patients rehabilitation potential is considered to be Fair PLAN : 
Recommendations and Planned Interventions: 
Ok for mech soft, thins. Watch for oral residue. Feed only when awake and alert. Frequency/Duration: Patient will be followed by speech-language pathology 2 times a week to address goals. Discharge Recommendations: To Be Determined SUBJECTIVE:  
Patient cooperative in eval, but minimally verbal.  \"Eat when you can\" he said. OBJECTIVE:  
 
Past Medical History:  
Diagnosis Date  WILTON (acute kidney injury) ([facility] 8/2/2014  Cancer [facility])   
 colon  Chronic kidney disease  Delirium 10/11/2018  Diabetes ([facility] 2008  
 type 2  Dyslipidemia  Esophageal reflux  Esophageal reflux 2/9/2012  Hypertension 2008  Incontinence  Memory loss  Mixed hyperlipidemia 2/9/2012  NSTEMI (non-ST elevated myocardial infarction) ([facility] 1/26/2020  Other ill-defined conditions(799.89) broken neck 2012  Pernicious anemia 7/31/2012  Psychiatric disorder   
 dementia  Snoring  Stroke ([facility] 10/18/2018  Unspecified hypothyroidism 4/26/2012  Urinary tract infection 10/11/2018  UTI (urinary tract infection) 12/14/2019 Past Surgical History:  
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    
 HX HERNIA REPAIR Bilateral inguinal  
 HX OTHER SURGICAL    
 colon resection  HX SMALL BOWEL RESECTION  2010  HX TONSIL AND ADENOIDECTOMY  HX TONSILLECTOMY  1936 Prior Level of Function/Home Situation:  
Home Situation Home Environment: Private residence One/Two Story Residence: One story Living Alone: No(lives with 3 nieces) Support Systems: Family member(s), Child(shweta) Patient Expects to be Discharged to[de-identified] Private residence Current DME Used/Available at Home: Arabella Davis, [address] [facility] Bakari chair, Jeff Boys, straight Diet prior to admission: regular, thins Current Diet:  NPO Cognitive and Communication Status: 
Neurologic State: Alert Orientation Level: Oriented to person, Disoriented to place, Disoriented to situation, Disoriented to time Cognition: Decreased command following, Impaired decision making, Impulsive, Memory loss Perception: Appears intact Perseveration: No perseveration noted Safety/Judgement: Decreased insight into deficits Oral Assessment: 
Oral Assessment Labial: (masked facies) Dentition: Edentulous(has dentures but refuses to wear at home) Oral Hygiene: [facility] Lingual: No impairment Velum: No impairment P.O. Trials: 
Patient Position: upright in bed Vocal quality prior to P.O.: No impairment Consistency Presented: Thin liquid; Solid;Puree How Presented: SLP-fed/presented;Spoon;Straw;Successive swallows ORAL PHASE:  
Bolus Acceptance: No impairment Bolus Formation/Control: No impairment Propulsion: No impairment Oral Residue: None PHARYNGEAL PHASE:  
Initiation of Swallow: No impairment Laryngeal Elevation: Functional 
Aspiration Signs/Symptoms: (occasional delayed cough s/p fluids x1) Pharyngeal Phase Characteristics: (occasional delayed cough) NOMS:  
The NOMS functional outcome measure was used to quantify this patient's level of swallowing impairment. Based on the NOMS, the patient was determined to be at level 5 for swallow function NOMS Swallowing Levels: 
Level 1 (CN): NPO Level 2 (CM): NPO but takes consistency in therapy Level 3 (CL): Takes less than 50% of nutrition p.o. and continues with nonoral feedings; and/or safe with mod cues; and/or max diet restriction Level 4 (CK): Safe swallow but needs mod cues; and/or mod diet restriction; and/or still requires some nonoral feeding/supplements Level 5 (CJ): Safe swallow with min diet restriction; and/or needs min cues Level 6 (CI): Independent with p.o.; rare cues; usually self cues; may need to avoid some foods or needs extra time Level 7 (CH): Independent for all p.o. ALEXA. (2003). National Outcomes Measurement System (NOMS): Adult Speech-Language Pathology User's Guide. Pain: 
Pain Scale 1: Numeric (0 - 10) Pain Intensity 1: 0 After treatment:  
Nursing notified and Caregiver / family present, SITTER 
 
COMMUNICATION/EDUCATION:  
Patient was educated regarding his deficit(s) of dysphagia potential  as this relates to his diagnosis of PNA. He demonstrated Guarded understanding as evidenced by dementia. Family present and understood. . 
 
The patient's plan of care including recommendations, planned interventions, and recommended diet changes were discussed with: Registered Nurse and Certified Nursing Assistant/Patient Care Technician. Patient is unable to participate in goal setting and plan of care.  
 
Thank you for this referral. 
 Sachin Abad, SLP Time Calculation: 15 mins

## 2020-01-27 NOTE — PROGRESS NOTES
Tarah Bueno MD. [facility] [place] Patient: Iris Sharif. : 3/8/1931 Today's Date: 2020 CARDIOLOGY PROGRESS NOTE 
S: Looks comfortable. No CP. Confused. O: 
Visit Vitals /66 (BP 1 Location: Left arm, BP Patient Position: Supine; At rest) Pulse 66 Temp 97.7 °F (36.5 °C) Resp 22 Ht 6' (1.829 m) Wt 196 lb 3.4 oz (89 kg) SpO2 96% BMI 26.61 kg/m² Patient NAD HEENT:  Hearing intact, non-icteric, normocephalic, atraumatic. Neck Exam: Supple, 
Lung Exam: clear ant Cardiac Exam: Regular rate and rhythm with no murmur Abdomen: Soft, non-tender Extremities: Moves all ext well. Mild edema. Psych: Appropriate affect Neuro - Grossly intact Review of Symptoms: 
Unable to obtain from patient  
  
 
 
 
LABS / OTHER STUDIES:  
 
Recent Results (from the past 24 hour(s)) PTT Collection Time: 20 11:10 AM  
Result Value Ref Range aPTT 56.3 (H) 22.1 - 32.0 sec  
 aPTT, therapeutic range     58.0 - 77.0 SECS  
GLUCOSE, POC Collection Time: 20 12:29 PM  
Result Value Ref Range Glucose (POC) 251 (H) 65 - 100 mg/dL Performed by Axel Roth ECHO ADULT COMPLETE Collection Time: 20 12:57 PM  
Result Value Ref Range LA Volume 70.1 18 - 58 mL Right Atrial Area 4C 19.07 cm2 Tapse 1.99 1.5 - 2.0 cm Ao Root D 3.39 cm  
 AO ASC D 3.48 cm LVIDd 4.89 4.2 - 5.9 cm  
 LVPWd 1.16 (A) 0.6 - 1.0 cm LVIDs 4.21 cm IVSd 1.28 (A) 0.6 - 1.0 cm  
 LV ED Vol A2C 104.9 mL  
 LV ES Vol A4C 75.1 mL  
 LV ES Vol BP 74.1 (A) 22 - 58 mL  
 LVOT d 2.23 cm  
 MVA (PHT) 4.8 cm2  
 MV A Ishan 73.15 cm/s  
 MV E Ishan 93.78 cm/s  
 MV E/A 1.28 Left Atrium to Aortic Root Ratio 1.26   
 RVIDd 4.79 cm  
 BP EF 31.1 (A) 55 - 100 % LV Ejection Fraction MOD 4C 33 % LV Ejection Fraction MOD 2C 34 %  LA Vol 4C 62.07 (A) 18 - 58 mL  
 LA Vol 2C 74.56 (A) 18 - 58 mL  
 LA Area 4C 20.9 cm2  
 LV Mass .7 (A) 88 - 224 g LV Mass AL Index 130.0 49 - 115 g/m2 LV ES Vol A2C 69.3 mL  
 LVES Vol Index BP 35.1 mL/m2 LV ED Vol A4C 111.6 mL  
 LVED Vol Index BP 50.9 mL/m2 Mitral Valve E Wave Deceleration Time 159.6 ms  
 Mitral Valve Pressure Half-time 46.3 ms Left Atrium Major Axis 4.25 cm Triscuspid Valve Regurgitation Peak Gradient 53.0 mmHg LV ED Vol .5 67 - 155 ml  
 TR Max Velocity 363.89 cm/s  
 LA Vol Index 33.17 16 - 28 ml/m2 LA Vol Index 35.28 16 - 28 ml/m2 LA Vol Index 29.37 16 - 28 ml/m2 LVED Vol Index A4C 52.8 mL/m2 LVED Vol Index A2C 49.6 mL/m2 LVES Vol Index A4C 35.5 mL/m2 LVES Vol Index A2C 32.8 mL/m2 Left Ventricular Fractional Shortening by 2D 73.354872759 % Mitral Valve Deceleration Clayton 5.9470521302 Left Ventricular End Diastolic Volume by Teichholz Method 4.29148393204329 mL Left Ventricular End Systolic Volume by Teichholz Method 0.84137232991 mL Left Ventricular Stroke Volume by 2-D Biplane-MOD 36.071550632 mL Left Ventricular Stroke Volume by 2-D Single Plane- MOD 23.971841600 mL Left Ventricular Stroke Volume by Teichholz Method 98.519799044 mL Left Ventricular Stroke Volume by 2-D Single Plane- MOD 22.112060815 mL PASP 55.0 mmHg LACTIC ACID Collection Time: 01/26/20  1:42 PM  
Result Value Ref Range Lactic acid 1.2 0.4 - 2.0 MMOL/L  
RENAL FUNCTION PANEL Collection Time: 01/26/20  1:42 PM  
Result Value Ref Range Sodium 142 136 - 145 mmol/L Potassium 4.7 3.5 - 5.1 mmol/L Chloride 114 (H) 97 - 108 mmol/L  
 CO2 20 (L) 21 - 32 mmol/L Anion gap 8 5 - 15 mmol/L Glucose 207 (H) 65 - 100 mg/dL BUN 49 (H) 6 - 20 MG/DL Creatinine 2.98 (H) 0.70 - 1.30 MG/DL  
 BUN/Creatinine ratio 16 12 - 20 GFR est AA 24 (L) >60 ml/min/1.73m2 GFR est non-AA 20 (L) >60 ml/min/1.73m2 Calcium 8.5 8.5 - 10.1 MG/DL Phosphorus 3.6 2.6 - 4.7 MG/DL Albumin 2.5 (L) 3.5 - 5.0 g/dL GLUCOSE, POC Collection Time: 01/26/20  4:07 PM  
Result Value Ref Range Glucose (POC) 146 (H) 65 - 100 mg/dL Performed by James Boyle (PCT) URINALYSIS W/ REFLEX CULTURE Collection Time: 01/26/20  5:56 PM  
Result Value Ref Range Color YELLOW/STRAW Appearance CLEAR CLEAR Specific gravity 1.013 1.003 - 1.030    
 pH (UA) 5.0 5.0 - 8.0 Protein 300 (A) NEG mg/dL Glucose 100 (A) NEG mg/dL Ketone NEGATIVE  NEG mg/dL Bilirubin NEGATIVE  NEG Blood LARGE (A) NEG Urobilinogen 0.2 0.2 - 1.0 EU/dL Nitrites NEGATIVE  NEG Leukocyte Esterase TRACE (A) NEG    
 WBC 10-20 0 - 4 /hpf  
 RBC 0-5 0 - 5 /hpf Epithelial cells FEW FEW /lpf Bacteria NEGATIVE  NEG /hpf  
 UA:UC IF INDICATED URINE CULTURE ORDERED (A) CNI Hyaline cast 2-5 0 - 5 /lpf PTT Collection Time: 01/26/20  5:56 PM  
Result Value Ref Range aPTT 68.9 (H) 22.1 - 32.0 sec  
 aPTT, therapeutic range     58.0 - 77.0 SECS  
GLUCOSE, POC Collection Time: 01/26/20  9:09 PM  
Result Value Ref Range Glucose (POC) 176 (H) 65 - 100 mg/dL Performed by Antonino Huff (PCT) CBC WITH AUTOMATED DIFF Collection Time: 01/27/20 12:28 AM  
Result Value Ref Range WBC 17.3 (H) 4.1 - 11.1 K/uL  
 RBC 3.25 (L) 4.10 - 5.70 M/uL  
 HGB 10.7 (L) 12.1 - 17.0 g/dL HCT 32.2 (L) 36.6 - 50.3 % MCV 99.1 (H) 80.0 - 99.0 FL  
 MCH 32.9 26.0 - 34.0 PG  
 MCHC 33.2 30.0 - 36.5 g/dL  
 RDW 12.6 11.5 - 14.5 % PLATELET 265 277 - 547 K/uL MPV 10.4 8.9 - 12.9 FL  
 NRBC 0.0 0  WBC ABSOLUTE NRBC 0.00 0.00 - 0.01 K/uL NEUTROPHILS 78 (H) 32 - 75 % LYMPHOCYTES 12 12 - 49 % MONOCYTES 9 5 - 13 % EOSINOPHILS 0 0 - 7 % BASOPHILS 0 0 - 1 % IMMATURE GRANULOCYTES 1 (H) 0.0 - 0.5 % ABS. NEUTROPHILS 13.5 (H) 1.8 - 8.0 K/UL  
 ABS. LYMPHOCYTES 2.0 0.8 - 3.5 K/UL  
 ABS. MONOCYTES 1.6 (H) 0.0 - 1.0 K/UL  
 ABS. EOSINOPHILS 0.0 0.0 - 0.4 K/UL  
 ABS. BASOPHILS 0.1 0.0 - 0.1 K/UL ABS. IMM. GRANS. 0.1 (H) 0.00 - 0.04 K/UL  
 DF AUTOMATED METABOLIC PANEL, COMPREHENSIVE Collection Time: 01/27/20 12:28 AM  
Result Value Ref Range Sodium 145 136 - 145 mmol/L Potassium 4.3 3.5 - 5.1 mmol/L Chloride 115 (H) 97 - 108 mmol/L  
 CO2 21 21 - 32 mmol/L Anion gap 9 5 - 15 mmol/L Glucose 157 (H) 65 - 100 mg/dL BUN 50 (H) 6 - 20 MG/DL Creatinine 3.00 (H) 0.70 - 1.30 MG/DL  
 BUN/Creatinine ratio 17 12 - 20 GFR est AA 24 (L) >60 ml/min/1.73m2 GFR est non-AA 20 (L) >60 ml/min/1.73m2 Calcium 8.2 (L) 8.5 - 10.1 MG/DL Bilirubin, total 0.9 0.2 - 1.0 MG/DL  
 ALT (SGPT) 37 12 - 78 U/L  
 AST (SGOT) 125 (H) 15 - 37 U/L Alk. phosphatase 65 45 - 117 U/L Protein, total 6.2 (L) 6.4 - 8.2 g/dL Albumin 2.4 (L) 3.5 - 5.0 g/dL Globulin 3.8 2.0 - 4.0 g/dL A-G Ratio 0.6 (L) 1.1 - 2.2 MAGNESIUM Collection Time: 01/27/20 12:28 AM  
Result Value Ref Range Magnesium 2.3 1.6 - 2.4 mg/dL PHOSPHORUS Collection Time: 01/27/20 12:28 AM  
Result Value Ref Range Phosphorus 3.6 2.6 - 4.7 MG/DL Matthew Hernandez Collection Time: 01/27/20 12:28 AM  
Result Value Ref Range Vancomycin, random 9.2 UG/ML  
PTT Collection Time: 01/27/20 12:28 AM  
Result Value Ref Range aPTT 81.9 (H) 22.1 - 32.0 sec  
 aPTT, therapeutic range     58.0 - 77.0 SECS  
CK Collection Time: 01/27/20 12:28 AM  
Result Value Ref Range CK 1,939 (H) 39 - 308 U/L  
GLUCOSE, POC Collection Time: 01/27/20  6:31 AM  
Result Value Ref Range Glucose (POC) 171 (H) 65 - 100 mg/dL Performed by Georges Finnegan (PCT) PTT Collection Time: 01/27/20  8:44 AM  
Result Value Ref Range aPTT 53.3 (H) 22.1 - 32.0 sec  
 aPTT, therapeutic range     58.0 - 77.0 SECS  
 
 
 
  
CARDIAC DIAGNOSTICS:  
  
Cardiac Evaluation Includes: 
  
Echo 11/18 - LVEF 55-60% Echo 12/15/19 - LVEF 55-60%, neg bubble study Carotid Doppler 12/15/19 - 0-49% stenosis bilat  
  
 Echo 1/26/20 - TDS. LVEF 35-40%, Inferior and apical hypokinesis. mod pulm HTN, AV sclerosis EKG 1/25/20 - NSR, normal - I viewed myself CXR 1/26/20 - Migrating opacities compatible with a combination of pneumonia and edema. 
  
  
ASSESSMENT AND PLAN:  
  
Assessment and Plan: 
1) Elevated troponin / Possible NSTEMI 
- No ischemic EKG changes. Trop ~ 7.  CK was 4051 (MB Index 1.0). He was on ground for a couple of hours possibly Rhabdo played role in abnormal labs - Echo shows moderate LV systolic dysfunction with Inferior and apical hypokinesis. - Suspect he has CAD (probably multivessel) - Patient is poor cath candidate (dementia, severe CKD). Family say they are not interested in a cath and decline any potential caths and prefer conservative medical management - When he can tolerate PO, can resume plavix and statin and add a beta-blocker / ARB if BP/renal function allow - watch volume status carefully given high proBNP 
- I reviewed echo findings with family today and he agrees to continue conservative approach  
  
2) CKD 
  
3) PNA / Sepsis - per medicine team  
  
4) Prior CVA  
- see above  
  
Bello Gomez MD, [address], [address]      [address] [facility] [place] [address]                 Awais Lieberman, [address] [place] [address] Ph: [phone]                               Ph [phone]

## 2020-01-27 NOTE — PROGRESS NOTES
1/27/2020 
10:48 AM 
Chart reviewed. Patient is currently off the floor. Noted PT/OT consults for this patient, but they have not been able to see him yet due to up-trending troponin. Noted that prior to admission patient was independent with ADLs, and will likely be discharging to his daughters home versus back to where he was living with his nieces. Noted also that patient is already open to [facility], if he requires home health at discharge (vs. SNF) resumption orders will be needed. Plan:  
1. Working toward medical stability; noted that troponin is elevated, patient is not tolerating PO  
2. Discharge needs to be determined mainly by PT/OT evals 3. Resumption orders for [facility] if appropriate, orders for SNF if that is more appropriate 4. Patient to be discharged to his daughter's home 5. Outpatient follow up with PCP, Cardio/pulmonary 6. CM to continue to follow and assist with discharge planning SAADIA Kiser, RN, MSN/Care manager

## 2020-01-28 NOTE — PROGRESS NOTES
[address] [facility] [place]  Office [phone] Fax [phone] Assessment and Plan Blenda Music. is a 80 y.o. male admitted for sepsis 2/2 HAP. Also found to acute metabolic encephalopathy and NSTEMI/Type II MI in the setting of Grade 1 diastolic dysfunction. 24 Hour Events: Overnight, Afib w/ RVR on EKG, given 2.5mg metoprolol. Palliative c/s: daughter declining SNF, wants him home w/ her and  who would be taking care of pt while she is at work. DNR. Pending PT/OT recs. V/Q scan: low probability for PE. R Wrist XR: no fracture. ACUTE PROBLEMS Sepsis 2/2 HAP: IMPROVING. 2/4 SIRS (WBC 17.6, RR26). RUL PNA on (CXR,CT C-spine, CT abd/pelvis). S/p IV rocephin + azithromycin in ED. LA down-trended. Procal wnl. RVP neg. ^ESR 61. ^CRP >9.7. Bcx NG 2d, Ucx neg.  
- Strict I &Os  
- IV: cefepime + doxycycline Day 4 
- F/u [Bcx, Ucx, Resp cx, PNA labs, MRSA] - Daily CBC, CMP, Mag and phos - Duonebs q4h prn - F/u V/Q scan 
  
Acute Metabolic Encephalopathy in the setting of Dementia w/ Fall: IMPROVING per family. Likely 2/2 HAP. CT head neg. Stroke work-up last month showed chronic white matter disease and superficial siderosis of R frontal lobe, EEG gen slowing. Carotid dopplers in Dec'19 showed chronic mild-mod L ICA stenoses. Pt was supposed to follow-up w/ neurology early Jan but never did. Fall evidenced by R arm abrasion.  
- Tx of HAP as above - Continue home plavix 75mg daily - Fall precautions, wound care - F/u outpt Neuro NSTEMI/Type II MI in setting of Mod Systolic Dysfunction:  likely 2/2 rhabdomyolysis s/p fall. POA Trop 3.81 & NT pro-BNP 42965. ^CK 2409 ^CKMB 42.3. ECHO:  EF 94-69%, mod systolic dysf, mod pul HTN.  
- F/u Cards c/s: ^Trops likely 2/2 to Rhabdo, poor cath or outpt cardiac rehab candidate (dementia & severe CKD3) & family declines. Can home meds. - Continue tele monitoring Rhabdomyolysis: CK down-trending POA CK 2322-->4051-->1939-->1211 likely 2/2 to fall. Pt was down for possibly >1hour. Sustained R arm abrasion, neg (CT C-spine, XR R forearm x2, hand). - Continue to trend CK - Hydration  
 
 WILTON on CKD4: IMPROVING. POA Cr 3.1 (BL ~2.5). Likely 2/2 sepsis and poor hydration. - MIVF, Avoid nephrotoxic agents. - Daily CMP 
- Nephro c/s: NaCO3 w/ D5 1/2 gtt, poor HD candidate Elevated AST: POA likely 2/2 shock liver. Hepatitis panel neg.  
- Daily CMP  
  
CHRONIC PROBLEMS  
 
HTN: Stable. - Continue home norvasc 10mg daily, losartan 25mg daily 
- Hold home lasix until renal functions improve. 
   
DMII: POA glucose 293; HA1c 12/2019 (6.5)  
- Hold home tradjenta and amaryl 
- SSI, q6hrs POC gluc - will consider adjusting regimen  
  
HLD: Last lipid panel 12/19 [Tchol 185, , HDL 33, ]. Hepatitis panel neg.  
- Continue home niacin 
- Hold home lipitor in setting transaminitis 
  
GERD: Switch home prevacid to protonix 
  
Hypothyroidism: TSH low 0.15, T4 wnl, T3 low 1.5 
- Continue home armour thyroid 120mg--> consider reduce to 100mcg in OP 
- F/u outpt PCP  
  
Pernicious Anemia: POA Hgb 12.5 
- Continue home ferrous sulfate and thera-MV 
  
Dementia: Continue home aricept 
  
Obesity: Body mass index is 27.34 kg/m². - Encouraging lifestyle modifications and further follow up outpatient.  
  
  
FEN/GI - mechanical soft, NaCO3 w/ D5 1/2 gtt, per nephro Activity - Ambulate with assistance DVT prophylaxis - Heparin gtt GI prophylaxis - Protonix Fall prophylaxis - Fall precautions ordered. Disposition - Telemetry. Plan to d/c to Home per family's wishes. C/s PT/OT/CM Code Status - DNR, discussed with patient / caregivers. Next of Isis [address] (Son) [phone], Melody Quirino (dtr) [phone], Jami Stern (son in law) [phone] I appreciate the opportunity to participate in the care of this patient, 
Andrew Chamberlain MD 
Family Medicine Resident Subjective / Objective Subjective : Pt more conversational today. Pt states he felt \"not too bad\". He denied CP/palp/N/V/fever/chills. Temp (24hrs), Av.3 °F (36.8 °C), Min:97.6 °F (36.4 °C), Max:98.6 °F (37 °C) Visit Vitals /69 (BP 1 Location: Left arm, BP Patient Position: At rest) Pulse 93 Temp 98.4 °F (36.9 °C) Resp 20 Ht 6' (1.829 m) Wt 192 lb 4.8 oz (87.2 kg) SpO2 95% BMI 26.08 kg/m² Physical Exam 
General:   Alert, cooperative, no acute distress Head:   Atraumatic Eyes:   Conjunctivae clear ENT:  Oral mucosa normal  
Neck:  Supple, trachea midline, no adenopathy No JVD Back:    No CVA tenderness Lungs:   Scattered expiratory wheeze BL. No resp distress/chest retractions. Heart:   Regular rhythm, no murmur Abdomen:    Soft, non-tender No masses or organomegaly Extremities:  No edema or DVT signs Skin:  Warm and dry. R arm abrasion. Clean dry dressing. Neurologic:  A&Ox 1. No focal deficits I/O: 
Date 20 - 20 9189 20 - 20 9199 Shift 5428-7442 5859-5661 24 Hour Total 3583-5667 4287-9661 24 Hour Total  
INTAKE  
I.V.(mL/kg/hr)    800  800 Volume (dextrose 10% infusion 0-250 mL)    0  0 Volume (doxycycline (VIBRAMYCIN) 100 mg in 0.9% sodium chloride (MBP/ADV) 100 mL)    500  500 Volume (cefepime (MAXIPIME) 2 g in 0.9% sodium chloride (MBP/ADV) 100 mL)    300  300 Shift Total(mL/kg)    800(9.2)  800(9.2) OUTPUT Urine(mL/kg/hr) Urine Occurrence(s) 1 x 1 x 2 x Shift Total(mL/kg) NET    800  800 Weight (kg) 89 87.2 87.2 87.2 87.2 87.2 Inpatient Medications Current Facility-Administered Medications Medication Dose Route Frequency  sodium bicarbonate (8.4%) 75 mEq in dextrose 5 % - 0.45% NaCl 1,000 mL infusion   IntraVENous CONTINUOUS  
 atorvastatin (LIPITOR) tablet 40 mg  40 mg Oral DAILY  clopidogreL (PLAVIX) tablet 75 mg  75 mg Oral DAILY  amLODIPine (NORVASC) tablet 10 mg  10 mg Oral DAILY  losartan (COZAAR) tablet 25 mg  25 mg Oral DAILY  insulin lispro (HUMALOG) injection   SubCUTAneous AC&HS  sodium chloride (NS) flush 5-40 mL  5-40 mL IntraVENous Q8H  
 sodium chloride (NS) flush 5-40 mL  5-40 mL IntraVENous PRN  
 cefepime (MAXIPIME) 2 g in 0.9% sodium chloride (MBP/ADV) 100 mL  2 g IntraVENous Q24H  
 glucose chewable tablet 16 g  4 Tab Oral PRN  
 dextrose (D50W) injection syrg 12.5-25 g  12.5-25 g IntraVENous PRN  
 glucagon (GLUCAGEN) injection 1 mg  1 mg IntraMUSCular PRN  
 doxycycline (VIBRAMYCIN) 100 mg in 0.9% sodium chloride (MBP/ADV) 100 mL  100 mg IntraVENous Q12H  
 albuterol-ipratropium (DUO-NEB) 2.5 MG-0.5 MG/3 ML  3 mL Nebulization Q4H PRN  
 dextrose 10% infusion 0-250 mL  0-250 mL IntraVENous PRN Allergies Allergies Allergen Reactions  Synthroid [Levothyroxine] Other (comments) Confused, hallucinations CBC: 
Recent Labs  
  01/28/20 
0136 01/27/20 
0028 01/26/20 
0102 WBC 15.2* 17.3* 17.6* HGB 10.7* 10.7* 10.9* HCT 32.7* 32.2* 33.1*  
 157 184 Metabolic Panel: 
Recent Labs  
  01/28/20 
0136 01/27/20 
0028 01/26/20 
1342 01/26/20 
0102 01/25/20 
1443  145 142 143 138  
K 4.2 4.3 4.7 4.0 4.9 * 115* 114* 114* 107 CO2 23 21 20* 21 23 BUN 54* 50* 49* 40* 38* CREA 3.02* 3.00* 2.98* 2.66* 3.10* * 157* 207* 168* 293* CA 8.3* 8.2* 8.5 7.8* 8.7 MG 2.3 2.3  --  2.4 1.7 PHOS 3.6 3.6 3.6 2.9 3.1 ALB 2.4* 2.4* 2.5* 2.5* 3.0*  
SGOT 91* 125*  --  138* 79* ALT 45 37  --  30 23 INR  --   --   --   --  1.1 For Billing Chief Complaint Patient presents with  Fall  
 Skin Problem Hospital Problems  Date Reviewed: 12/19/2019 Codes Class Noted POA Rhabdomyolysis ICD-10-CM: X23.88 ICD-9-CM: 728.88  1/27/2020 Unknown [facility]eps[facility] ([facility] ICD-10-CM: A41.9 ICD-9-CM: 038.9, 995.91  1/26/2020 Unknown Fall ICD-10-CM: W19. Navneet Steen ICD-9-CM: B876.6  1/26/2020 Unknown NSTEMI (non-ST elevated myocardial infarction) ([facility] ICD-10-CM: I21.4 ICD-9-CM: 410.70  1/26/2020 Unknown Weakness ICD-10-CM: R53.1 ICD-9-CM: 780.79  1/25/2020 Unknown * (Principal) HAP (hospital-acquired pneumonia) ICD-10-CM: J18.9, Y95 
ICD-9-CM: 486  1/25/2020 Unknown AMS (altered mental status) ICD-10-CM: R41.82 
ICD-9-CM: 780.97  12/14/2019 Yes CKD (chronic kidney disease) stage 4, GFR 15-29 ml/min (HCC) ICD-10-CM: N18.4 ICD-9-CM: 585.4  4/22/2019 Yes Dementia without behavioral disturbance (HCC) ICD-10-CM: F03.90 ICD-9-CM: 294.20  8/16/2016 Yes WILTON (acute kidney injury) ([facility] ICD-10-CM: N17.9 ICD-9-CM: 584.9  8/2/2014 Yes Pernicious anemia ICD-10-CM: D51.0 ICD-9-CM: 281.0  7/31/2012 Yes Acquired hypothyroidism ICD-10-CM: E03.9 ICD-9-CM: 244.9  4/26/2012 Yes Diabetes mellitus type 2, controlled ([facility] ICD-10-CM: E11.9 ICD-9-CM: 250.00  2/9/2012 Yes Essential hypertension, benign ICD-10-CM: I10 
ICD-9-CM: 401.1  2/9/2012 Yes HLD (hyperlipidemia) ICD-10-CM: E78.5 ICD-9-CM: 272.4  2/9/2012 Unknown Esophageal reflux ICD-10-CM: K21.9 ICD-9-CM: 530.81  2/9/2012 Yes

## 2020-01-28 NOTE — PROGRESS NOTES
Problem: Mobility Impaired (Adult and Pediatric) Goal: *Acute Goals and Plan of Care (Insert Text) Description FUNCTIONAL STATUS PRIOR TO ADMISSION: Patient required minimal assistance for basic and instrumental ADLs. HOME SUPPORT PRIOR TO ADMISSION: The patient lived with family who assist patient with hand held assist. 
 
Physical Therapy Goals Initiated 1/28/2020 1. Patient will move from supine to sit and sit to supine , scoot up and down and roll side to side in bed with supervision/set-up within 7 day(s). 2.  Patient will transfer from bed to chair and chair to bed with minimal assistance/contact guard assist using the least restrictive device within 7 day(s). 3.  Patient will perform sit to stand with minimal assistance/contact guard assist within 7 day(s). 4.  Patient will ambulate with minimal assistance/contact guard assist for 50 feet with the least restrictive device within 7 day(s). Outcome: Progressing Towards Goal 
 PHYSICAL THERAPY EVALUATION Patient: Patricia Castillo (80 y.o. male) Date: 1/28/2020 Primary Diagnosis: HAP (hospital-acquired pneumonia) [J18.9, Y95] Weakness [R53.1] Precautions: fall ASSESSMENT Based on the objective data described below, the patient presents with difficulty with ambulation. Rolled on the edge of bed max assist x 2, sit on the edge of bed max assist x 2, sit to stand max assist x 2, ambulate max assist x 2 towards the recliner no loss of balance. OOB to chair as tolerated performed some active range of motion exercise on both LE all planes. Communicated with nurse who agreed to monitor patient. Current Level of Function Impacting Discharge (mobility/balance): max assist x 2 with transfers and ambulation Functional Outcome Measure: The patient scored 5/100 on the barthel index outcome measure . Other factors to consider for discharge: fall Patient will benefit from skilled therapy intervention to address the above noted impairments. PLAN : 
Recommendations and Planned Interventions: bed mobility training, transfer training, gait training, therapeutic exercises, neuromuscular re-education, patient and family training/education, and therapeutic activities Frequency/Duration: Patient will be followed by physical therapy:  5 times a week to address goals. Recommendation for discharge: (in order for the patient to meet his/her long term goals) Therapy up to 5 days/week in SNF setting This discharge recommendation: 
Has been made in collaboration with the attending provider and/or case management IF patient discharges home will need the following DME: to be determined (TBD) SUBJECTIVE:  
Patient stated ok.  OBJECTIVE DATA SUMMARY:  
HISTORY:   
Past Medical History:  
Diagnosis Date WILTON (acute kidney injury) ([facility] [address] 8/2/2014 Cancer [facility])   
 colon Chronic kidney disease Delirium 10/11/2018 Diabetes ([facility] [address] 2008  
 type 2 Dyslipidemia Esophageal reflux Esophageal reflux 2/9/2012 Hypertension 2008 Incontinence Memory loss Mixed hyperlipidemia 2/9/2012 NSTEMI (non-ST elevated myocardial infarction) ([facility] [address] 1/26/2020 Other ill-defined conditions(799.89) broken neck 2012 Pernicious anemia 7/31/2012 Psychiatric disorder   
 dementia Snoring Stroke ([facility] [address] 10/18/2018 Unspecified hypothyroidism 4/26/2012 Urinary tract infection 10/11/2018 UTI (urinary tract infection) 12/14/2019 Past Surgical History:  
Procedure Laterality Date COLONOSCOPY  5/29/12 Rectosigmoid adenocarcinoma HX CATARACT REMOVAL    
 HX HERNIA REPAIR Bilateral inguinal  
 HX OTHER SURGICAL    
 colon resection HX SMALL BOWEL RESECTION  2010 HX TONSIL AND ADENOIDECTOMY HX TONSILLECTOMY  1936 Personal factors and/or comorbidities impacting plan of care:  
 
Home Situation Home Environment: Private residence One/Two Story Residence: One story Living Alone: No(lives with 3 nieces) Support Systems: Family member(s), Child(shweta) Patient Expects to be Discharged to[de-identified] Private residence Current DME Used/Available at Home: Karime Trevino, [address] [facility] chair, Erik Landrumapurva hernandez EXAMINATION/PRESENTATION/DECISION MAKING:  
Critical Behavior: 
Neurologic State: Alert Orientation Level: Unable to verbalize Cognition: Decreased attention/concentration, Impulsive, Poor safety awareness, Impaired decision making, Decreased command following Safety/Judgement: Decreased awareness of environment, Lack of insight into deficits Hearing: Auditory Auditory Impairment: None Range Of Motion: 
AROM: Generally decreased, functional 
  
  
  
PROM: Generally decreased, functional 
  
  
  
Strength:   
Strength: Generally decreased, functional 
  
  
  
  
  
  
Tone & Sensation:  
  
  
  
  
  
  
  
  
  
   
Coordination: 
Coordination: Generally decreased, functional 
Vision:  
  
Functional Mobility: 
Bed Mobility: 
Rolling: Assist x2; Moderate assistance Supine to Sit: Maximum assistance;Assist x2 Sit to Supine: (patient remained up at end of tx) Scooting: Maximum assistance;Assist x2; Additional time Transfers: 
Sit to Stand: Maximum assistance;Assist x2 Stand to Sit: Maximum assistance;Assist x2 Stand Pivot Transfers: Maximum assistance;Assist x2 Bed to Chair: Maximum assistance;Assist x2; Additional time Balance:  
Sitting: Impaired Sitting - Static: Fair (occasional) Sitting - Dynamic: Poor (constant support) Standing: Impaired Standing - Static: Poor Standing - Dynamic : Poor Ambulation/Gait Training: 
Distance (ft): 5 Feet (ft) Ambulation - Level of Assistance: Maximum assistance; Additional time;Assist x2 Gait Description (WDL): Exceptions to [facility] Gait Abnormalities: Antalgic; Path deviations; Step to gait Base of Support: Narrowed Speed/Mckenna: Fluctuations; Slow Step Length: Right shortened;Left shortened Therapeutic Exercises:  
 Instructed patient to continue active range of motion exercise on both legs while up on chair or on bed. Functional Measure: 
Barthel Index: 
 
Bathin Bladder: 0 Bowels: 0 Groomin Dressin Feedin Mobility: 0 Stairs: 0 Toilet Use: 0 Transfer (Bed to Chair and Back): 5 Total: 5/100 The Barthel ADL Index: Guidelines 1. The index should be used as a record of what a patient does, not as a record of what a patient could do. 2. The main aim is to establish degree of independence from any help, physical or verbal, however minor and for whatever reason. 3. The need for supervision renders the patient not independent. 4. A patient's performance should be established using the best available evidence. Asking the patient, friends/relatives and nurses are the usual sources, but direct observation and common sense are also important. However direct testing is not needed. 5. Usually the patient's performance over the preceding 24-48 hours is important, but occasionally longer periods will be relevant. 6. Middle categories imply that the patient supplies over 50 per cent of the effort. 7. Use of aids to be independent is allowed. Margie Melara., Barthel, DDurgaW. ([id number]). Functional evaluation: the Barthel Index. [address] [facility] (14)2. TYLER Rodriguez, Mp Schmitz., Kingsley Tenorio., Delia Harada, [address] [place] [address] (). Measuring the change indisability after inpatient rehabilitation; comparison of the responsiveness of the Barthel Index and Functional [place] Measure. Journal of Neurology, Neurosurgery, and Psychiatry, 66(4), 124-125. Alex Erazo, N.[place].A, MANJINDER Ribeiro, & Lai Sanchez MTAINA. (2004.) Assessment of post-stroke quality of life in cost-effectiveness studies: The usefulness of the Barthel Index and the EuroQoL-5D. [facility], 13, 259-17 Physical Therapy Evaluation Charge Determination History Examination Presentation Decision-Making HIGH Complexity :3+ comorbidities / personal factors will impact the outcome/ POC  HIGH Complexity : 4+ Standardized tests and measures addressing body structure, function, activity limitation and / or participation in recreation  HIGH Complexity : Unstable and unpredictable characteristics  Other outcome measures barthel  HIGH Based on the above components, the patient evaluation is determined to be of the following complexity level: HIGH Pain Ratin/10 Activity Tolerance:  
Good Please refer to the flowsheet for vital signs taken during this treatment. After treatment patient left in no apparent distress:  
Sitting in chair, Heels elevated for pressure relief, Call bell within reach, Bed / chair alarm activated, Caregiver / family present, and Side rails x 3 
 
COMMUNICATION/EDUCATION:  
The patients plan of care was discussed with: Occupational Therapist, Registered Nurse, Physician, and . Fall prevention education was provided and the patient/caregiver indicated understanding. Thank you for this referral. 
Yvonne Irvin PT,WCC. Time Calculation: 27 mins

## 2020-01-28 NOTE — PROGRESS NOTES
[address] [facility], [address] with [address] [facility] Resident Progress Note in Brief S: Patient seen and examined at bedside after RN called for HR in 120-140s. Patient is pleasantly demented and has no complaints and denies palpitations, nausea, vomiting, abdominal pain. O: 
Visit Vitals /59 (BP 1 Location: Left arm, BP Patient Position: At rest) Pulse (!) 125 Comment: Notified RN Temp 98.6 °F (37 °C) Resp 19 Ht 6' (1.829 m) Wt 196 lb 3.4 oz (89 kg) SpO2 92% BMI 26.61 kg/m² Physical Examination General appearance - alert, and in no distress Chest - clear to auscultation, no wheezes, rales or rhonchi, symmetric air entry Heart - tachycardia, irregular rhythm, normal S1, S2, no murmurs, rubs, clicks or gallops, Abdomen - soft, nontender, nondistended, no masses or organomegaly Neurological - alert, oriented, normal speech, no focal findings Extremities - peripheral pulses normal, no pedal edema, no clubbing or cyanosis A/P:  
Natalie Baires. is a 80 y.o. male admitted for sepsis 2/2 HAP now with afib with RVR. EKG done and showed afib with RVR with rate in 120s. -will transfer to tele 
-given metoprolol 2.5mg IV push and 5mg IV push and then was rate controlled a-fib 
-consider dilt gtt if persistently in a-fib with RVR 
-replete electrolytes prn Please see daily progress note for detailed plan. Dallin Villagran DO Family Medicine Resident

## 2020-01-28 NOTE — PROGRESS NOTES
Problem: Falls - Risk of 
Goal: *Absence of Falls Description Document Radhaclarisajourdan  Fall Risk and appropriate interventions in the flowsheet. Outcome: Progressing Towards Goal 
Note: Fall Risk Interventions: 
Mobility Interventions: Bed/chair exit alarm, Patient to call before getting OOB Mentation Interventions: Adequate sleep, hydration, pain control, Bed/chair exit alarm, Eyeglasses and hearing aids Medication Interventions: Bed/chair exit alarm Elimination Interventions: Bed/chair exit alarm, Call light in reach, Urinal in reach History of Falls Interventions: Bed/chair exit alarm, Door open when patient unattended, Room close to nurse's station

## 2020-01-28 NOTE — PROGRESS NOTES
2328 
Telemetry notified RN that pt's HR was maintaining in 130s-140s. RN notified Dr. Elaina Marshall MD placed order for EKG. [address] [facility] EKG performed by PCT, showed afib with RVR. RN notified Dr. Elaina Marshall MD placed order for 2.5mg metoprolol IV. MD also stated that pt would need to be moved to a higher level of care for the night. 0015 Pt's , /75. RN administered metoprolol 2.5mg IV. Will recheck BP and HR in 15min. [address] Pt's , /77. RN notified Dr. Elaina Marshall MD placed order for 5mg Metoprolol IV.  
4647 Pt's , /79. RN administered metoprolol 5mg IV. Will recheck BP and HR in 15min.  
0109 Pt's HR now 94, /72. Pt in NSR with PVCs according to telemetry hub. RN notified Dr. Elaina Marshall MD stated that pt would still need to be transferred for the night. Pt to be placed in room 334.

## 2020-01-28 NOTE — PROGRESS NOTES
Problem: Dysphagia (Adult) Goal: *Acute Goals and Plan of Care (Insert Text) Description Swallowing goals initiated 1-27-20: 
1) tolerate mech soft, thins without s/s aspiration by 1-30-20 Outcome: Progressing Towards Goal 
 SPEECH LANGUAGE PATHOLOGY DYSPHAGIA TREATMENT Patient: Wilfredo Darnell (80 y.o. male) Date: 1/28/2020 Diagnosis: HAP (hospital-acquired pneumonia) [J18.9, Y95] Weakness [R53.1] HAP (hospital-acquired pneumonia) Precautions: aspiration ASSESSMENT: 
Patient with impulsive eating/feeding as as aspiration risk. He vomited black liquid this am after breakfast.  RN monitoring. PLAN: 
Recommendations and Planned Interventions: 
Continue mech soft, thins. Notify MD if additional vomiting. Patient continues to benefit from skilled intervention to address the above impairments. Continue treatment per established plan of care. Discharge Recommendations: To Be Determined SUBJECTIVE:  
Patient being fed by daughter. OBJECTIVE:  
Cognitive and Communication Status: 
Neurologic State: Alert Orientation Level: Unable to verbalize Cognition: Decreased attention/concentration, Follows commands, Impaired decision making Perception: Appears intact Perseveration: No perseveration noted Safety/Judgement: Decreased insight into deficits Dysphagia Treatment: 
Oral Assessment: P.O. Trials: 
Patient Position: up in chair Vocal quality prior to P.O.:   
Consistency Presented: Mechanical soft; Thin liquid ORAL PHASE:  
Bolus Acceptance: No impairment Bolus Formation/Control: Impaired(excessive chew on solids. RN also reports impulsive self feeding) Type of Impairment: Mastication Propulsion: No impairment Oral Residue: None PHARYNGEAL PHASE:  
Initiation of Swallow: No impairment Laryngeal Elevation: Functional 
Aspiration Signs/Symptoms: None(he has risks) Exercises: 
Laryngeal Exercises: Pain: 
Pain Scale 1: Numeric (0 - 10) Pain Intensity 1: 0 After treatment:  
Call bell within reach, DAUGHTER AND SITTER PRESENT. COMMUNICATION/EDUCATION:  
Patient was educated regarding his deficit(s) of dysphagia  as this relates to his diagnosis of dementia. He demonstrated Guarded understanding as evidenced by dementia. Daughter understood. . 
 
The patient's plan of care including recommendations, planned interventions, and recommended diet changes were discussed with: Registered Nurse and Certified Nursing Assistant/Patient Care Technician. SOLOMON Hansen Time Calculation: 10 mins

## 2020-01-28 NOTE — ROUTINE PROCESS
Bedside shift change report given to Hans Miller (oncoming nurse) by Milind Gross (offgoing nurse). Report included the following information SBAR, Kardex, Intake/Output, MAR, Accordion and Recent Results.

## 2020-01-28 NOTE — PROGRESS NOTES
Bedside shift change report given to Susannah Lyles RN (oncoming nurse) by Rosalba Renee RN (offgoing nurse). Report included the following information SBAR, Kardex and MAR.

## 2020-01-28 NOTE — PROGRESS NOTES
Problem: Self Care Deficits Care Plan (Adult) Goal: *Acute Goals and Plan of Care (Insert Text) Description FUNCTIONAL STATUS PRIOR TO ADMISSION: Patient was independent and active without use of DME per daughter prior to admission. HOME SUPPORT: The patient lived with granddaughter; Daughter present and reports there were times when patient was alone PTA. Occupational Therapy Goals Initiated 1/28/2020 1. Patient will perform grooming with moderate assistance  within 7 day(s). 2.  Patient will perform upper body dressing and bathing with maximal assistance within 7 day(s). 3.  Patient will perform lower body dressing and bathing with maximal assistance within 7 day(s). 4.  Patient will perform toilet transfers to [facility] with moderate assistance  within 7 day(s). 5.  Patient will perform all aspects of toileting with moderate assistance  within 7 day(s). 6.  Patient will participate in upper extremity therapeutic exercise/activities with supervision/set-up for 10 minutes within 7 day(s). Outcome: Progressing Towards Goal 
 OCCUPATIONAL THERAPY EVALUATION Patient: Santy Morales (80 y.o. male) Date: 1/28/2020 Primary Diagnosis: HAP (hospital-acquired pneumonia) [J18.9, Y95] Weakness [R53.1] Precautions: fall ASSESSMENT Based on the objective data described below, the patient presents with decreased activity tolerance, generalized weakness, jerky tremulous movements of all extremities, impaired balance, poor cognition with disorientation and confusion following admission on 1/25 for weakness and encephalopathy after being found down at home for unknown time. CT done and negative for acute process. Patient had recent admission, December 2019, where he was also found down at home with similar presentation. Patient returned home after that admission per family request.  Daughter present and providing background information.   She reports patient was independent with all care and does not use AE for transfers unless he is sick. Daughter does not live with patient and does admit that he is alone at times as his granddaughter is in and out of the home. Today, patient was able to follow ~25% of commands and does best with visual cuing and guidance. He was able to perform transfer OOB to the chair with max A x2 with significant safety concerns d/t poor balance and command following. Patient unable to engage in ADLs requiring total A overall. Patient would benefit from continued skilled OT to progress towards goals and improve overall independence. Current Level of Function Impacting Discharge (ADLs/self-care): Patient required max A x2/total A for bed mobility and OOB transfer to the chair with hand held assist; Unable to use the RW d/t unfamiliar task. Patient required max to total A for all ADLs. Functional Outcome Measure: The patient scored Total: 5/100 on the Barthel Index outcome measure . Other factors to consider for discharge: Does not have 24/7 assistance at home. Recent admission with similar presentation. Patient will benefit from skilled therapy intervention to address the above noted impairments. PLAN : 
Recommendations and Planned Interventions: self care training, functional mobility training, therapeutic exercise, balance training, therapeutic activities, endurance activities, neuromuscular re-education, patient education, home safety training, and family training/education Frequency/Duration: Patient will be followed by occupational therapy 5 times a week to address goals. Recommendation for discharge: (in order for the patient to meet his/her long term goals) Therapy up to 5 days/week in SNF setting This discharge recommendation: 
Has been made in collaboration with the attending provider and/or case management IF patient discharges home will need the following DME: none SUBJECTIVE:  
 Patient stated Ok.  OBJECTIVE DATA SUMMARY:  
HISTORY:  
Past Medical History:  
Diagnosis Date WILTON (acute kidney injury) ([facility] [address] 8/2/2014 Cancer [facility])   
 colon Chronic kidney disease Delirium 10/11/2018 Diabetes ([facility] [address] 2008  
 type 2 Dyslipidemia Esophageal reflux Esophageal reflux 2/9/2012 Hypertension 2008 Incontinence Memory loss Mixed hyperlipidemia 2/9/2012 NSTEMI (non-ST elevated myocardial infarction) ([facility] [address] 1/26/2020 Other ill-defined conditions(799.89) broken neck 2012 Pernicious anemia 7/31/2012 Psychiatric disorder   
 dementia Snoring Stroke ([facility] [address] 10/18/2018 Unspecified hypothyroidism 4/26/2012 Urinary tract infection 10/11/2018 UTI (urinary tract infection) 12/14/2019 Past Surgical History:  
Procedure Laterality Date COLONOSCOPY  5/29/12 Rectosigmoid adenocarcinoma HX CATARACT REMOVAL    
 HX HERNIA REPAIR Bilateral inguinal  
 HX OTHER SURGICAL    
 colon resection HX SMALL BOWEL RESECTION  2010 HX TONSIL AND ADENOIDECTOMY HX TONSILLECTOMY  1936 Expanded or extensive additional review of patient history:  
 
Home Situation Home Environment: Private residence One/Two Story Residence: One story Living Alone: No(lives with 3 nieces) Support Systems: Family member(s), Child(shweta) Patient Expects to be Discharged to[de-identified] Private residence Current DME Used/Available at Home: Josue Connors, [address] [facility], [place], straight Hand dominance: Right EXAMINATION OF PERFORMANCE DEFICITS: 
Cognitive/Behavioral Status: 
Neurologic State: Alert Orientation Level: Unable to verbalize Cognition: Decreased attention/concentration; Impulsive;Poor safety awareness; Impaired decision making;Decreased command following Perception: Cues to maintain midline in sitting;Cues to maintain midline in standing Perseveration: Perseverates during ADLS Safety/Judgement: Decreased awareness of environment;Lack of insight into deficits Skin: Intact in the uppers Edema: None noted in the uppers Hearing: Auditory Auditory Impairment: None Range of Motion: WDL in the uppers-PROM Strength: 
Grossly decreased non functional in the uppers Coordination: 
Fine Motor Skills-Upper: Left Impaired;Right Impaired Gross Motor Skills-Upper: Left Impaired;Right Impaired Tone & Sensation: 
Tone: normal 
Sensation: intact Balance: 
Sitting: Impaired Sitting - Static: Fair (occasional) Sitting - Dynamic: Poor (constant support) Standing: Impaired Standing - Static: Poor Standing - Dynamic : Poor Functional Mobility and Transfers for ADLs: 
Bed Mobility: 
Rolling: Assist x2; Moderate assistance Supine to Sit: Maximum assistance;Assist x2 Sit to Supine: (patient remained up at end of tx) Scooting: Maximum assistance;Assist x2; Additional time Transfers: 
Sit to Stand: Maximum assistance;Assist x2 Stand to Sit: Maximum assistance;Assist x2 Bed to Chair: Maximum assistance;Assist x2; Additional time ADL Assessment: 
Feeding: Maximum assistance Oral Facial Hygiene/Grooming: Total assistance Bathing: Total assistance Upper Body Dressing: Total assistance Lower Body Dressing: Total assistance Toileting: Total assistance Cognitive Retraining Safety/Judgement: Decreased awareness of environment;Lack of insight into deficits Functional Measure: 
Barthel Index: 
 
Bathin Bladder: 0 Bowels: 0 Groomin Dressin Feedin Mobility: 0 Stairs: 0 Toilet Use: 0 Transfer (Bed to Chair and Back): 5 Total: 5/100 The Barthel ADL Index: Guidelines 1. The index should be used as a record of what a patient does, not as a record of what a patient could do. 2. The main aim is to establish degree of independence from any help, physical or verbal, however minor and for whatever reason. 3. The need for supervision renders the patient not independent. 4. A patient's performance should be established using the best available evidence. Asking the patient, friends/relatives and nurses are the usual sources, but direct observation and common sense are also important. However direct testing is not needed. 5. Usually the patient's performance over the preceding 24-48 hours is important, but occasionally longer periods will be relevant. 6. Middle categories imply that the patient supplies over 50 per cent of the effort. 7. Use of aids to be independent is allowed. Erika Cobos., Barthel, D.W. (1910). Functional evaluation: the Barthel Index. [facility] (14)2. Jesika Caballero josue TYLER Duque, Mya Jimenez., Nicole Lynch., [place], [address] (1999). Measuring the change indisability after inpatient rehabilitation; comparison of the responsiveness of the Barthel Index and Functional [place] Measure. Journal of Neurology, Neurosurgery, and Psychiatry, 66(4), 798-840. Maynor Arellano, N.J.A, MANJINDER Ribeiro, & Mane Gutierres MDurgaA. (2004.) Assessment of post-stroke quality of life in cost-effectiveness studies: The usefulness of the Barthel Index and the EuroQoL-5D. [facility], 13, 498-94 Occupational Therapy Evaluation Charge Determination History Examination Decision-Making LOW Complexity : Brief history review  LOW Complexity : 1-3 performance deficits relating to physical, cognitive , or psychosocial skils that result in activity limitations and / or participation restrictions  LOW Complexity : No comorbidities that affect functional and no verbal or physical assistance needed to complete eval tasks Based on the above components, the patient evaluation is determined to be of the following complexity level: LOW Activity Tolerance:  
Poor Please refer to the flowsheet for vital signs taken during this treatment. After treatment patient left in no apparent distress: Sitting in chair, Call bell within reach, Bed / chair alarm activated, and Caregiver / family present COMMUNICATION/EDUCATION:  
The patients plan of care was discussed with: Physical Therapist, Registered Nurse, and patient. Home safety education was provided and the patient/caregiver indicated understanding., Patient/family have participated as able in goal setting and plan of care. , and Patient/family agree to work toward stated goals and plan of care. This patients plan of care is appropriate for delegation to [facility]. Thank you for this referral. 
Kitty Parekh, OTR/L Time Calculation: 29 mins

## 2020-01-28 NOTE — PROGRESS NOTES
Cardiology Progress Note       [facility] 
NAME:  Shea Hooper :   3/8/1931 MRN:   [id number] Assessment/Plan: 1. Elevated troponin / NSTEMI 
- No ischemic EKG changes.  Trop ~ 7.  CK was 4051 (MB Index 1.0). - He was on ground for a couple of hours, Rhabdo played role in abnormal labs - Echo shows moderate LV systolic dysfunction with Inferior and apical hypokinesis. - Suspect he has CAD (probably multivessel) - Patient is poor cath candidate (dementia, severe CKD).  Family is not interested in a cath and decline any potential caths and prefer conservative medical management. - add asa low dose, cont statin, plavix has been resumed. - watch volume status carefully given high proBNP 
- consider palliative medicine consult for goals of care/ Hospice. 2. LV dysfunction: EF 35-40% - resume lasix when ok with renal  
- arb has been resumed 3. PAF RVR: occurred last night, now in SR.  
- consider stopping plavix an starting renally dosed eliquis. Will need to discuss with family given falls/dementia.   
4) CKD stage IV: cr 3.02  
  
5) PNA / Sepsis  
- per medicine team  
  
6) Prior CVA:  
 
7. dementia:  
 
8. HTN: norvasc, ARB 9. HLD: on statin CARDIOLOGY ATTENDING Patient personally seen and examined. All the elements of history and examination were personally performed. Assessment and plan was discussed and agree as written above Patient with Afib with RVR last night. Now back in sinus. Will add a beta-blocker and titrate that up. I reviewed findings with daughter and recommended we switch from antiplatelets to an anticoagulant instead (Eliquis 2.5 mg BID) to help lower stroke risk -- she said she wants to talk to brother before making any changes. She again confirms that patient's wish to not have any invasive procedures or surgery. Blair Arias MD, [facility]  
 
 
  
 
  
 
Subjective: Susy Salinas Jr. is a 80 y. o. male with known hx TIA, UTI, CKD4, dementia, DMII, HTN, HLD, hypothyroidism, pernicious anemia, hx colon CA s/p bowel resection, GERD, who presented to the ER via EMS after pt was found on the floor by grand daughter. Cardiology consulted for troponin elevation 3.8-->7. 5. CK was 4051. ProBNP 14k. CXR showed PNA and he was admitted with sepsis. Was recently admitted last month with UTI and AMS. Cardiac ROS: + dyspnea. Patient denies any exertional chest pain, palpitations, syncope, orthopnea, edema or paroxysmal nocturnal dyspnea. Previous Cardiac Eval 
Echo  - LVEF 55-60% Echo 12/15/19 - LVEF 55-60%, neg bubble study Carotid Doppler 12/15/19 - 0-49% stenosis bilat  
  
Echo 20 - TDS. LVEF 35-40%, Inferior and apical hypokinesis. mod pulm HTN, AV sclerosis  
  
  
EKG 20 - NSR, normal - I viewed myself  
  
CXR 20 - Migrating opacities compatible with a combination of pneumonia and edema. Review of Systems: No nausea, indigestion, vomiting, pain, cough, sputum. No bleeding. Taking po. Objective:  
 
Visit Vitals /69 (BP 1 Location: Left arm, BP Patient Position: At rest) Pulse 93 Temp 98.4 °F (36.9 °C) Resp 20 Ht 6' (1.829 m) Wt 192 lb 4.8 oz (87.2 kg) SpO2 95% BMI 26.08 kg/m² O2 Flow Rate (L/min): 4 l/min O2 Device: Nasal cannula Temp (24hrs), Av.3 °F (36.8 °C), Min:97.6 °F (36.4 °C), Max:98.6 °F (37 °C) 
 
 
 07 -  1900 In: 800 [I.V.:800] Out: - No intake/output data recorded. TELE: SR PVC's General: AAOx 1 cooperative, no acute distress. HEENT: Atraumatic. Pink and moist.  Anicteric sclerae. Neck : Supple. Lungs: exp wheezing. Heart: Regular rhythm, no murmur, no rubs, no gallops. No JVD. No carotid bruits. Abdomen: Soft, non-distended, non-tender. + Bowel sounds. Extremities: No edema Neurologic: Grossly intact.   Alert and oriented X1   No acute neurological distress. Psych: Limited insight. Agitated over night. Care Plan discussed with: 
  Comments Patient x Family RN x Care Manager Consultant:     
 
 
Data Review: No lab exists for component: ITNL Recent Labs  
  01/28/20 
0136 01/27/20 
0028 01/26/20 
0645 01/26/20 
0102 01/25/20 
1443 CPK 1,211* 1,939*  --  4,051* 2,422* CKMB  --   --   --  42.3*  --   
TROIQ  --   --  7.58* 7.67* 3.81* Recent Labs  
  01/28/20 
0136 01/27/20 
0028 01/26/20 
1342 01/26/20 
0102  145 142 143  
K 4.2 4.3 4.7 4.0  
* 115* 114* 114* CO2 23 21 20* 21 BUN 54* 50* 49* 40* CREA 3.02* 3.00* 2.98* 2.66* * 157* 207* 168* PHOS 3.6 3.6 3.6 2.9 MG 2.3 2.3  --  2.4 ALB 2.4* 2.4* 2.5* 2.5* WBC 15.2* 17.3*  --  17.6* HGB 10.7* 10.7*  --  10.9* HCT 32.7* 32.2*  --  33.1*  
 157  --  184 Recent Labs  
  01/27/20 
0844 01/27/20 
0028 01/26/20 
1756  01/25/20 
1443 INR  --   --   --   --  1.1 PTP  --   --   --   --  11.0 APTT 53.3* 81.9* 68.9*   < > 24.5  
 < > = values in this interval not displayed. Medications reviewed Current Facility-Administered Medications Medication Dose Route Frequency  sodium bicarbonate (8.4%) 75 mEq in dextrose 5 % - 0.45% NaCl 1,000 mL infusion   IntraVENous CONTINUOUS  
 atorvastatin (LIPITOR) tablet 40 mg  40 mg Oral DAILY  clopidogreL (PLAVIX) tablet 75 mg  75 mg Oral DAILY  amLODIPine (NORVASC) tablet 10 mg  10 mg Oral DAILY  losartan (COZAAR) tablet 25 mg  25 mg Oral DAILY  insulin lispro (HUMALOG) injection   SubCUTAneous AC&HS  sodium chloride (NS) flush 5-40 mL  5-40 mL IntraVENous Q8H  
 sodium chloride (NS) flush 5-40 mL  5-40 mL IntraVENous PRN  
 cefepime (MAXIPIME) 2 g in 0.9% sodium chloride (MBP/ADV) 100 mL  2 g IntraVENous Q24H  
 glucose chewable tablet 16 g  4 Tab Oral PRN  
 dextrose (D50W) injection syrg 12.5-25 g  12.5-25 g IntraVENous PRN  
  glucagon (GLUCAGEN) injection 1 mg  1 mg IntraMUSCular PRN  
 doxycycline (VIBRAMYCIN) 100 mg in 0.9% sodium chloride (MBP/ADV) 100 mL  100 mg IntraVENous Q12H  
 albuterol-ipratropium (DUO-NEB) 2.5 MG-0.5 MG/3 ML  3 mL Nebulization Q4H PRN  
 dextrose 10% infusion 0-250 mL  0-250 mL IntraVENous PRN Hui Brizuela NP

## 2020-01-28 NOTE — PROGRESS NOTES
Abdirahman Soto renettas [place] [address]. YOB: 1931 Assessment & Plan: 1. WILTON on CKD 4 
- IVVD,Rhabdo,PNA 
-CR 3 MG and stable 
- ck better 
- Non oliguric 2. DM 
- SSI 3. HTN 
- not on meds 4. AMS 
- Multifactorial 
5. CHF: Diastolic Plan 1. Gentle IVF 2. Labs in am  
3. Poor HD candidate 4. ABX 5. Serial CK 
DW Pt Subjective:  
CC:WILTON HPI: Patient seen Cr still high and stable CK better at 1200 AMS persists DW SON 
ROS:Not reliable Current Facility-Administered Medications Medication Dose Route Frequency  aspirin chewable tablet 81 mg  81 mg Oral DAILY  sodium bicarbonate (8.4%) 75 mEq in dextrose 5 % - 0.45% NaCl 1,000 mL infusion   IntraVENous CONTINUOUS  
 atorvastatin (LIPITOR) tablet 40 mg  40 mg Oral DAILY  clopidogreL (PLAVIX) tablet 75 mg  75 mg Oral DAILY  amLODIPine (NORVASC) tablet 10 mg  10 mg Oral DAILY  losartan (COZAAR) tablet 25 mg  25 mg Oral DAILY  insulin lispro (HUMALOG) injection   SubCUTAneous AC&HS  sodium chloride (NS) flush 5-40 mL  5-40 mL IntraVENous Q8H  
 sodium chloride (NS) flush 5-40 mL  5-40 mL IntraVENous PRN  
 cefepime (MAXIPIME) 2 g in 0.9% sodium chloride (MBP/ADV) 100 mL  2 g IntraVENous Q24H  
 glucose chewable tablet 16 g  4 Tab Oral PRN  
 dextrose (D50W) injection syrg 12.5-25 g  12.5-25 g IntraVENous PRN  
 glucagon (GLUCAGEN) injection 1 mg  1 mg IntraMUSCular PRN  
 doxycycline (VIBRAMYCIN) 100 mg in 0.9% sodium chloride (MBP/ADV) 100 mL  100 mg IntraVENous Q12H  
 albuterol-ipratropium (DUO-NEB) 2.5 MG-0.5 MG/3 ML  3 mL Nebulization Q4H PRN  
 dextrose 10% infusion 0-250 mL  0-250 mL IntraVENous PRN Objective:  
 
Vitals: 
Blood pressure 142/69, pulse 93, temperature 98.4 °F (36.9 °C), resp. rate 20, height 6' (1.829 m), weight 87.2 kg (192 lb 4.8 oz), SpO2 95 %. Temp (24hrs), Av.3 °F (36.8 °C), Min:97.6 °F (36.4 °C), Max:98.6 °F (37 °C) Intake and Output: 
 07 - 1900 In: 800 [I.V.:800] Out: 100 No intake/output data recorded. Physical Exam:              
 Patient is intubated:  no 
 
Physical Examination:  
GENERAL ASSESSMENT: NAD HEENT:Nontraumatic CHEST: CTA HEART: S1S2 ABDOMEN: Soft,NT, 
:Walters: n EXTREMITY: EDEMA 
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 
 
Data Review No results for input(s): TNIPOC in the last 72 hours. No lab exists for component: ITNL Recent Labs  
  20 
0136 20 
0028 20 
0645 20 
0102 20 
1443 CPK 1,211* 1,939*  --  4,051* 2,422* CKMB  --   --   --  42.3*  --   
TROIQ  --   --  7.58* 7.67* 3.81* Recent Labs  
  20 
0136 20 
0028 20 
1342 20 
0102  145 142 143  
K 4.2 4.3 4.7 4.0  
* 115* 114* 114* CO2 23 21 20* 21 BUN 54* 50* 49* 40* CREA 3.02* 3.00* 2.98* 2.66* * 157* 207* 168* PHOS 3.6 3.6 3.6 2.9 MG 2.3 2.3  --  2.4 CA 8.3* 8.2* 8.5 7.8* ALB 2.4* 2.4* 2.5* 2.5* WBC 15.2* 17.3*  --  17.6* HGB 10.7* 10.7*  --  10.9* HCT 32.7* 32.2*  --  33.1*  
 157  --  184 Recent Labs  
  20 
0844 20 
0028 20 
1756  20 
1443 INR  --   --   --   --  1.1 PTP  --   --   --   --  11.0 APTT 53.3* 81.9* 68.9*   < > 24.5  
 < > = values in this interval not displayed. Needs: urine analysis, urine sodium, protein and creatinine Lab Results Component Value Date/Time Sodium,urine random 58 2014 05:30 PM  
 Creatinine, urine 201.46 2014 05:30 PM  
 
 
 
Discussed with:  Family 
 
: Chivo Cadena MD 
2020 [facility]: 
[email or web address]ass[email or web address]. [employer] [email or web address] [place] office: 
[address], [address] Phone: [phone] Fax :     [phone] [place] office: [address] [facility], [address] [place] [address] Phone - [phone] Fax - [phone]

## 2020-01-28 NOTE — PROGRESS NOTES
1/28/2020 
4:53 PM 
[facility] called for clarification on several things. I answered her questions and faxed her some documentation (ED notes, CM initial assessment, and palliative assessment) so that there was no question what our notes said. SAADIA Grace 
 
 
3:10 PM 
Spoke to patient's daughter Tono Vargas ([phone]) over the phone regarding the patient's discharge needs. PT/OT are recommending SNF as patient is not really safe to go home on his own. She explained to me what the situation would be at home, and I explained to her that our eventual goal was to have the patient be completely safe, and even if he were to go to a SNF it would be temporary, until he was safe to come home. She expressed that she feels his faculties recover much more quickly at home and she was concerned that if he were to go to a SNF that he might have a much slower recovery. She seemed to be more amenable to a SNF knowing it was temporary and the goal was for him to be safe (vs. Stronger, which is how it was put to her in the past). She will be back to visit tomorrow and will want to discuss more then, specifically when her brother is here as well. I let her know that even if it was not me, it would be another  who would be able to provide the same resources and recommendations. Will continue to follow. SAADIA Galo 
 
 
11:01 AM 
Chart reviewed; noted that per MD note patient's family would like for him to be discharged home. Patient is on the schedule for evals by PT/OT today--will have a better idea of discharge needs once those have been conducted. I will continue to follow this patient's progress today and update discharge needs once they are more clear. Plan: 1. Medical stability 2. Home with home health/family assistance vs. SNF/IPR dependent on PT/OT eval 
3. PCP, specialty follow ups as needed 4. CM to continue to follow and assist with discharge planning.   
 
SAADIA Grace 
 
 Jonnie Stover RN, MSN/Care manager

## 2020-01-28 NOTE — PROGRESS NOTES
TRANSFER - OUT REPORT: 
 
Verbal report [facility]enn[facility](name[facility]  [facility]) for change in patient condition(afib with rvr) Report consisted of patients Situation, Background, Assessment and  
Recommendations(SBAR). Information from the following report(s) SBAR, Intake/Output, MAR and Recent Results was reviewed with the receiving nurse. Lines:  
Peripheral IV 01/25/20 Right Antecubital (Active) Site Assessment Clean, dry, & intact 1/27/2020  9:38 PM  
Phlebitis Assessment 0 1/27/2020  9:38 PM  
Infiltration Assessment 0 1/27/2020  9:38 PM  
Dressing Status Clean, dry, & intact 1/27/2020  9:38 PM  
Dressing Type Transparent;Tape 1/27/2020  9:38 PM  
Hub Color/Line Status Pink; Infusing 1/27/2020  9:38 PM  
Action Taken Open ports on tubing capped 1/27/2020  9:38 PM  
Alcohol Cap Used Yes 1/27/2020  9:38 PM  
  
 
Opportunity for questions and clarification was provided. Patient transported with: 
 Monitor O2 @ 4 liters Registered Nurse Tech

## 2020-01-28 NOTE — PROGRESS NOTES
TRANSFER - IN REPORT: 
 
Verbal report received from Elías RN(name) on Donnie Kelley.  being received from PCC(unit) for routine progression of care Report consisted of patients Situation, Background, Assessment and  
Recommendations(SBAR). Information from the following report(s) SBAR, Intake/Output, MAR and Recent Results was reviewed with the receiving nurse. Opportunity for questions and clarification was provided. Assessment completed upon patients arrival to unit and care assumed.

## 2020-01-28 NOTE — ROUTINE PROCESS
Primary Nurse Avelino Gilliland RN and Mar Henderson RN performed a dual skin assessment on this patient Impairment noted- see wound doc flow sheet Carroll score is 13 
 
wound to R hand- dressing C/D/I Small scab to R hip Ecchymosis bilateral UE Abrasion to chest

## 2020-01-29 NOTE — PROGRESS NOTES
Problem: Falls - Risk of 
Goal: *Absence of Falls Description Document Aj Orona Fall Risk and appropriate interventions in the flowsheet. Outcome: Progressing Towards Goal 
Note: Fall Risk Interventions: 
Mobility Interventions: Bed/chair exit alarm, OT consult for ADLs, PT Consult for mobility concerns Mentation Interventions: Adequate sleep, hydration, pain control, Bed/chair exit alarm Medication Interventions: Bed/chair exit alarm Elimination Interventions: Bed/chair exit alarm History of Falls Interventions: Bed/chair exit alarm, Door open when patient unattended Problem: Patient Education: Go to Patient Education Activity Goal: Patient/Family Education Outcome: Progressing Towards Goal 
  
Problem: Pressure Injury - Risk of 
Goal: *Prevention of pressure injury Description Document Carroll Scale and appropriate interventions in the flowsheet. Outcome: Progressing Towards Goal 
Note: Pressure Injury Interventions: 
Sensory Interventions: Assess changes in LOC, Assess need for specialty bed, Check visual cues for pain, Float heels, Keep linens dry and wrinkle-free, Maintain/enhance activity level, Minimize linen layers, Turn and reposition approx. every two hours (pillows and wedges if needed) Moisture Interventions: Absorbent underpads, Check for incontinence Q2 hours and as needed, Internal/External urinary devices Activity Interventions: PT/OT evaluation Mobility Interventions: Float heels, HOB 30 degrees or less, PT/OT evaluation, Turn and reposition approx. every two hours(pillow and wedges) Nutrition Interventions: Document food/fluid/supplement intake Friction and Shear Interventions: Apply protective barrier, creams and emollients, HOB 30 degrees or less, Lift sheet, Lift team/patient mobility team 
 
  
 
 
 
  
Problem: Patient Education: Go to Patient Education Activity Goal: Patient/Family Education Outcome: Progressing Towards Goal 
  
 Problem: Sepsis: Day 5 Goal: *Oxygen saturation within defined limits Outcome: Progressing Towards Goal 
Goal: *Vital signs within defined limits Outcome: Progressing Towards Goal 
Goal: *Tolerating diet Outcome: Progressing Towards Goal 
Goal: Diagnostic Test/Procedures Outcome: Progressing Towards Goal 
Goal: Nutrition/Diet Outcome: Progressing Towards Goal 
Goal: Discharge Planning Outcome: Progressing Towards Goal 
Goal: Medications Outcome: Progressing Towards Goal 
Goal: Respiratory Outcome: Progressing Towards Goal 
Goal: Treatments/Interventions/Procedures Outcome: Progressing Towards Goal

## 2020-01-29 NOTE — PROGRESS NOTES
[facility] Dosing Services: 1/29/20 The following medication: Augmentin was automatically dose-adjusted per [facility] P&T Committee Protocol, with respect to renal function. Consult provided for this   80 y.o. , male , for the indication of aspiration pneumonia. Dosage changed to: Augmentin 500-125mg PO BID Pt Weight:  
Wt Readings from Last 1 Encounters:  
01/29/20 87.7 kg (193 lb 5.5 oz) Previous Regimen Augmentin 875-125mg PO BID Serum Creatinine Lab Results Component Value Date/Time Creatinine 2.89 (H) 01/29/2020 01:50 AM  
 Creatinine (POC) 1.9 (H) 10/11/2018 02:38 PM  
   
Creatinine Clearance Estimated Creatinine Clearance: 19.4 mL/min (A) (based on SCr of 2.89 mg/dL (H)). BUN Lab Results Component Value Date/Time BUN 73 (H) 01/29/2020 01:50 AM  
 BUN (POC) 27 (H) 10/11/2018 02:38 PM  
   
 
 
Additional notes: 
 
 
Pharmacy to continue to monitor patient daily. Will make dosage adjustments based upon changing renal function. Signed [facility] KERRI Silva [facility],  [facility] information:  [phone]

## 2020-01-29 NOTE — PROGRESS NOTES
Abdirahman Traylor [place] 79 Sofia Mosley. YOB: 1931 Assessment & Plan: 1. WILTON on CKD 4 
- IVVD,Rhabdo,PNA 
- CR touch better 
- Non oliguric 2. DM 
- SSI 3. HTN 
- not on meds 4. AMS 
- Multifactorial 
5. CHF: Diastolic Plan 1. [facility] 
2. CKD visit on dc Subjective:  
CC:WILTON HPI: Patient seen Cr touch better, CK better, up in chair ROS:Not reliable Current Facility-Administered Medications Medication Dose Route Frequency  sodium phosphate 15 mmol in 0.9% sodium chloride 250 mL infusion  15 mmol IntraVENous ONCE  
 albuterol-ipratropium (DUO-NEB) 2.5 MG-0.5 MG/3 ML  3 mL Nebulization Q6H RT  
 amoxicillin-clavulanate (AUGMENTIN) 500-125 mg per tablet 1 Tab  1 Tab Oral Q12H  
 doxycycline (VIBRA-TABS) tablet 100 mg  100 mg Oral Q12H  
 linaGLIPtin (TRADJENTA) tablet 5 mg  5 mg Oral ACB  
 0.45% sodium chloride infusion  75 mL/hr IntraVENous CONTINUOUS  
 aspirin chewable tablet 81 mg  81 mg Oral DAILY  metoprolol tartrate (LOPRESSOR) tablet 25 mg  25 mg Oral Q12H  
 losartan (COZAAR) tablet 25 mg  25 mg Oral DAILY  atorvastatin (LIPITOR) tablet 40 mg  40 mg Oral DAILY  clopidogreL (PLAVIX) tablet 75 mg  75 mg Oral DAILY  amLODIPine (NORVASC) tablet 10 mg  10 mg Oral DAILY  insulin lispro (HUMALOG) injection   SubCUTAneous AC&HS  sodium chloride (NS) flush 5-40 mL  5-40 mL IntraVENous Q8H  
 sodium chloride (NS) flush 5-40 mL  5-40 mL IntraVENous PRN  
 glucose chewable tablet 16 g  4 Tab Oral PRN  
 dextrose (D50W) injection syrg 12.5-25 g  12.5-25 g IntraVENous PRN  
 glucagon (GLUCAGEN) injection 1 mg  1 mg IntraMUSCular PRN  
 dextrose 10% infusion 0-250 mL  0-250 mL IntraVENous PRN Objective:  
 
Vitals: 
Blood pressure 146/65, pulse 75, temperature 97.3 °F (36.3 °C), resp. rate 17, height 6' (1.829 m), weight 87.7 kg (193 lb 5.5 oz), SpO2 96 %. Temp (24hrs), Av.9 °F (36.6 °C), Min:97.3 °F (36.3 °C), Max:98.2 °F (36.8 °C) Intake and Output: 
701 - 1900 In: 2990.9 [I.V.:2990.9] Out: -  
1901 -  0700 In: 1060 [P.O.:60; I.V.:1000] Out: 550 [Urine:450] Physical Exam:              
 Patient is intubated:  no 
 
Physical Examination:  
GENERAL ASSESSMENT: NAD HEENT:Nontraumatic CHEST: CTA HEART: S1S2 ABDOMEN: Soft,NT, 
:Walters: n EXTREMITY: EDEMA 
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 
 
Data Review No results for input(s): TNIPOC in the last 72 hours. No lab exists for component: ITNL Recent Labs  
  20 
0150 20 
0136 20 
0028 * 1,211* 1,939* Recent Labs  
  20 
0150 20 
0136 20 
0028 * 142 145  
K 4.0 4.2 4.3 * 112* 115* CO2 26 23 21 BUN 73* 54* 50* CREA 2.89* 3.02* 3.00* * 254* 157* PHOS 2.3* 3.6 3.6 MG 2.2 2.3 2.3 CA 8.3* 8.3* 8.2* ALB 2.2* 2.4* 2.4* WBC 12.7* 15.2* 17.3* HGB 9.6* 10.7* 10.7* HCT 29.9* 32.7* 32.2*  
 174 157 Recent Labs  
  20 
0844 20 
0028 20 
1756 APTT 53.3* 81.9* 68.9* Needs: urine analysis, urine sodium, protein and creatinine Lab Results Component Value Date/Time Sodium,urine random 58 2014 05:30 PM  
 Creatinine, urine 201.46 2014 05:30 PM  
 
 
 
Discussed with:  Family 
 
: Chivo Cadena MD 
2020 [facility]: 
[email or web address] [place] office: 
[address] [place] [address], [address] Phone: [phone] Fax :     [phone] [place] office: 
[address] [facility], [address] Phone - [phone] Fax - [phone]

## 2020-01-29 NOTE — PROGRESS NOTES
Problem: Self Care Deficits Care Plan (Adult) Goal: *Acute Goals and Plan of Care (Insert Text) Description FUNCTIONAL STATUS PRIOR TO ADMISSION: Patient was independent and active without use of DME per daughter prior to admission. HOME SUPPORT: The patient lived with granddaughter; Daughter present and reports there were times when patient was alone PTA. Occupational Therapy Goals Initiated 1/28/2020 1. Patient will perform grooming with moderate assistance  within 7 day(s). 2.  Patient will perform upper body dressing and bathing with maximal assistance within 7 day(s). 3.  Patient will perform lower body dressing and bathing with maximal assistance within 7 day(s). 4.  Patient will perform toilet transfers to [facility] with moderate assistance  within 7 day(s). 5.  Patient will perform all aspects of toileting with moderate assistance  within 7 day(s). 6.  Patient will participate in upper extremity therapeutic exercise/activities with supervision/set-up for 10 minutes within 7 day(s). Outcome: Progressing Towards Goal 
  
OCCUPATIONAL THERAPY TREATMENT Patient: Sofia Morrell (80 y.o. male) Date: 1/29/2020 Diagnosis: HAP (hospital-acquired pneumonia) [J18.9, Y95] Weakness [R53.1] HAP (hospital-acquired pneumonia) Precautions:  fall, bed alarm, DNR, EF 35-40% Chart, occupational therapy assessment, plan of care, and goals were reviewed. ASSESSMENT Patient continues with skilled OT services and is progressing towards goals. Pt with no verbalizations this session, but responds to name. Assisted pt with self feeding lunch. He required total A for everything but grasping cup with LUE and bringing drink to mouth. Pt opened mouth when food presented he opens and chews, but would not reach to grasp food. Continue to recommend SNF at discharge as pt requires physical and cognitive assist for all tasks and assist x2 people for safe mobility. Current Level of Function Impacting Discharge (ADLs): total A Other factors to consider for discharge: Pt with inconsistent 24/7 assist at home per chart PLAN : 
Patient continues to benefit from skilled intervention to address the above impairments. Continue treatment per established plan of care. to address goals. Recommend with staff: Recommend with nursing patient to complete as able in order to maintain strength, endurance and independence: ADLs with total A/setup, OOB to chair 3x/day and mobilizing to the [facility] for toileting with total assist. Thank you for your assistance. Recommend next OT session: any participation inn ADLs, toilet transfers using [facility] Recommendation for discharge: (in order for the patient to meet his/her long term goals) Therapy up to 5 days/week in SNF setting This discharge recommendation: 
Has been made in collaboration with the attending provider and/or case management IF patient discharges home will need the following DME: TBD SUBJECTIVE:  
Patient with no verbalizations OBJECTIVE DATA SUMMARY:  
Cognitive/Behavioral Status: 
Neurologic State: Alert;Confused Orientation Level: Unable to verbalize(responds to name) Cognition: Decreased attention/concentration;Decreased command following;Memory loss;Poor safety awareness Perception: Appears intact Perseveration: No perseveration noted Safety/Judgement: Decreased awareness of environment;Decreased awareness of need for assistance;Decreased awareness of need for safety;Decreased insight into deficits; Fall prevention Functional Mobility and Transfers for ADLs: Pt up in chair upon arrival 
Transfers: 
Sit to Stand: Moderate assistance; Additional time;Assist x2;Maximum assistance- co-tx with PT as pt required the skill of 2 therapist for command following and safe mobility Balance: 
Sitting: Impaired Sitting - Static: Fair (occasional) Sitting - Dynamic: Fair (occasional) Standing: Impaired; With support;Pull to stand Standing - Static: Fair;Constant support Standing - Dynamic : Poor ADL Intervention: 
Feeding Feeding Assistance: Maximum assistance(pt only initiates grasping cup and bringing drink to mouth) Container Management: Total assistance (dependent) Cutting Food: Total assistance (dependent) Utensil Management: Total assistance (dependent) Food to Mouth: Total assistance (dependent) Drink to Mouth: Contact guard assistance Cues: Physical assistance; Tactile cues provided;Verbal cues provided;Visual cues provided Cognitive Retraining Safety/Judgement: Decreased awareness of environment;Decreased awareness of need for assistance;Decreased awareness of need for safety;Decreased insight into deficits; Fall prevention Pain: 
No c/o pain Activity Tolerance:  
Fair, requires frequent rest breaks, and observed SOB with activity Please refer to the flowsheet for vital signs taken during this treatment. After treatment patient left in no apparent distress:  
Sitting in chair, Call bell within reach, and Bed / chair alarm activated COMMUNICATION/COLLABORATION:  
The patients plan of care was discussed with: Physical Therapist, Speech Therapist, and Registered Nurse Jesica Sal OT Time Calculation: 15 mins
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
## 2020-01-29 NOTE — PROGRESS NOTES
Problem: Mobility Impaired (Adult and Pediatric) Goal: *Acute Goals and Plan of Care (Insert Text) Description FUNCTIONAL STATUS PRIOR TO ADMISSION: Patient required minimal assistance for basic and instrumental ADLs. HOME SUPPORT PRIOR TO ADMISSION: The patient lived with family who assist patient with hand held assist. 
 
Physical Therapy Goals Initiated 1/28/2020 1. Patient will move from supine to sit and sit to supine , scoot up and down and roll side to side in bed with supervision/set-up within 7 day(s). 2.  Patient will transfer from bed to chair and chair to bed with minimal assistance/contact guard assist using the least restrictive device within 7 day(s). 3.  Patient will perform sit to stand with minimal assistance/contact guard assist within 7 day(s). 4.  Patient will ambulate with minimal assistance/contact guard assist for 50 feet with the least restrictive device within 7 day(s). Outcome: Progressing Towards Goal 
 PHYSICAL THERAPY TREATMENT Patient: María Kimbrough (80 y.o. male) Date: 1/29/2020 Diagnosis: HAP (hospital-acquired pneumonia) [J18.9, Y95] Weakness [R53.1] HAP (hospital-acquired pneumonia) Precautions:  fall Chart, physical therapy assessment, plan of care and goals were reviewed. ASSESSMENT Patient continues with skilled PT services and is progressing towards goals. Patient already up on the chair. Sit to stand mod assist x 2. Sitting and standing balance fair. Performed some weight shifting from side to side when standing, performed some active range of motion exercise on both LE all  planes. Follows some simple command and assisted back to the recliner after standing nurse in the room and agreed with all goals set for the patient. Current Level of Function Impacting Discharge (mobility/balance): mod assist x 2 with transfers Other factors to consider for discharge: falls PLAN : 
 Patient continues to benefit from skilled intervention to address the above impairments. Continue treatment per established plan of care. to address goals. Recommendation for discharge: (in order for the patient to meet his/her long term goals) Therapy up to 5 days/week in SNF setting This discharge recommendation: 
Has been made in collaboration with the attending provider and/or case management IF patient discharges home will need the following DME: patient owns DME required for discharge SUBJECTIVE:  
Patient stated ok.  OBJECTIVE DATA SUMMARY:  
Critical Behavior: 
Neurologic State: Alert, Confused Orientation Level: Unable to verbalize(responds to name) Cognition: Decreased attention/concentration, Decreased command following, Memory loss, Poor safety awareness Safety/Judgement: Decreased awareness of environment, Decreased awareness of need for assistance, Decreased awareness of need for safety, Decreased insight into deficits, Fall prevention Functional Mobility Training: 
Bed Mobility: 
Rolling: (already up on the recliner) Transfers: 
Sit to Stand: Moderate assistance; Additional time;Assist x2;Maximum assistance Stand to Sit: Moderate assistance; Additional time;Assist x2 Balance: 
Sitting: Impaired Sitting - Static: Fair (occasional) Sitting - Dynamic: Fair (occasional) Standing: Impaired; With support;Pull to stand Standing - Static: Fair;Constant support Standing - Dynamic : Poor Ambulation/Gait Training: 
  
  
  
  
 
Therapeutic Exercises:  
 Instructed patient to continue active range of motion exercise on both legs while up on chair or on bed. Pain Ratin/10 Activity Tolerance:  
Good Please refer to the flowsheet for vital signs taken during this treatment.  
 
After treatment patient left in no apparent distress:  
Sitting in chair, Heels elevated for pressure relief, Call bell within reach, and Bed / chair alarm activated COMMUNICATION/COLLABORATION:  
The patients plan of care was discussed with: Occupational Therapist and Registered Nurse Josue Rae PT,WCC. Time Calculation: 23 mins

## 2020-01-29 NOTE — PROGRESS NOTES
Cardiology Progress Note       [facility] 
NAME:  Wilfredo Wallace. :   3/8/1931 MRN:   [id number] Assessment/Plan: 1. Elevated troponin / NSTEMI 
- No ischemic EKG changes.  Trop ~ 7.  CK was 4051 (MB Index 1.0). - He was on ground for a couple of hours, Rhabdo played role in abnormal labs - Echo shows moderate LV systolic dysfunction with Inferior and apical hypokinesis. - Suspect he has CAD (probably multivessel) - Patient is poor cath candidate (dementia, severe CKD).  Family is not interested in a cath and decline any potential caths and prefer conservative medical management. -  cont asa, statin, plavix has been resumed. - watch volume status carefully given high proBNP 
- consider palliative medicine consult for goals of care/ Hospice. 2. LV dysfunction: EF 35-40% - resume lasix when ok with renal  
- arb has been resumed 3. PAF RVR: occurred last night, now in SR.  
- consider stopping plavix an starting renally dosed eliquis. - family to discuss DOAC and let us know. - HR 80' s on BB  
  
4) CKD stage IV: cr 2.89  
  
5) PNA / Sepsis  
- per medicine team  
  
6) Prior CVA:  
 
7. dementia:  
 
8. HTN: norvasc, ARB 9. HLD: on statin 10. Deconditioned: needs A 2 with OOB. CARDIOLOGY ATTENDING Patient personally seen and examined. All the elements of history and examination were personally performed. Assessment and plan was discussed and agree as written above Hard to say what his volume status is -- he is a poor historian. Consider rechecking a CXR tomorrow and then diuresing him if he still seems wet. Continue ischemic cardiomyopathy meds - BB, ARB Family said they would discuss DOAC for Afib and get back to us. Boo Kent MD, [facility]  
 
  
 
  
 
Subjective: Johnathan Riedel Jr. is a 80 y. o. male with known hx TIA, UTI, CKD4, dementia, DMII, HTN, HLD, hypothyroidism, pernicious anemia, hx colon CA s/p bowel resection, GERD, who presented to the ER via EMS after pt was found on the floor by grand daughter. Cardiology consulted for troponin elevation 3.8-->7. 5. CK was 4051. ProBNP 14k. CXR showed PNA and he was admitted with sepsis. Was recently admitted last month with UTI and AMS. Cardiac ROS: + dyspnea. Patient denies any exertional chest pain, palpitations, syncope, orthopnea, edema or paroxysmal nocturnal dyspnea. Previous Cardiac Eval 
Echo  - LVEF 55-60% Echo 12/15/19 - LVEF 55-60%, neg bubble study Carotid Doppler 12/15/19 - 0-49% stenosis bilat  
  
Echo 20 - TDS. LVEF 35-40%, Inferior and apical hypokinesis. mod pulm HTN, AV sclerosis  
  
  
EKG 20 - NSR, normal - I viewed myself  
  
CXR 20 - Migrating opacities compatible with a combination of pneumonia and edema. Review of Systems: No nausea, indigestion, vomiting, pain, cough, sputum. No bleeding. Taking po. Objective:  
 
Visit Vitals /65 (BP 1 Location: Left arm, BP Patient Position: Head of bed elevated (Comment degrees)) Pulse 75 Temp 97.3 °F (36.3 °C) Resp 17 Ht 6' (1.829 m) Wt 193 lb 5.5 oz (87.7 kg) SpO2 96% BMI 26.22 kg/m² O2 Flow Rate (L/min): 2 l/min O2 Device: Nasal cannula Temp (24hrs), Av.9 °F (36.6 °C), Min:97.3 °F (36.3 °C), Max:98.2 °F (36.8 °C) 
 
 
701 - 1900 In: 2990.9 [I.V.:2990.9] Out: -  
 
1901 -  0700 In: 1060 [P.O.:60; I.V.:1000] Out: 550 [Urine:450] TELE: SR PVC's General: AAOx 1 cooperative, no acute distress. HEENT: Atraumatic. Pink and moist.  Anicteric sclerae. Neck : Supple. Lungs: exp wheezing. Heart: Regular rhythm, no murmur, no rubs, no gallops. No JVD. No carotid bruits. Abdomen: Soft, non-distended, non-tender. + Bowel sounds. Extremities: No edema Neurologic: Grossly intact. Alert and oriented X1   No acute neurological distress. Psych: Limited insight. Agitated over night. Care Plan discussed with: 
  Comments Patient x Family RN x Care Manager Consultant:     
 
 
Data Review: No lab exists for component: ITNL Recent Labs  
  01/29/20 
0150 01/28/20 
0136 01/27/20 
0028 * 1,211* 1,939* Recent Labs  
  01/29/20 
0150 01/28/20 
0136 01/27/20 
0028 * 142 145  
K 4.0 4.2 4.3 * 112* 115* CO2 26 23 21 BUN 73* 54* 50* CREA 2.89* 3.02* 3.00* * 254* 157* PHOS 2.3* 3.6 3.6 MG 2.2 2.3 2.3 ALB 2.2* 2.4* 2.4* WBC 12.7* 15.2* 17.3* HGB 9.6* 10.7* 10.7* HCT 29.9* 32.7* 32.2*  
 174 157 Recent Labs  
  01/27/20 
0844 01/27/20 
0028 01/26/20 
1756 APTT 53.3* 81.9* 68.9* Medications reviewed Current Facility-Administered Medications Medication Dose Route Frequency  sodium phosphate 15 mmol in 0.9% sodium chloride 250 mL infusion  15 mmol IntraVENous ONCE  
 albuterol-ipratropium (DUO-NEB) 2.5 MG-0.5 MG/3 ML  3 mL Nebulization Q6H RT  
 amoxicillin-clavulanate (AUGMENTIN) 500-125 mg per tablet 1 Tab  1 Tab Oral Q12H  
 doxycycline (VIBRA-TABS) tablet 100 mg  100 mg Oral Q12H  
 linaGLIPtin (TRADJENTA) tablet 5 mg  5 mg Oral ACB  
 0.45% sodium chloride infusion  75 mL/hr IntraVENous CONTINUOUS  
 aspirin chewable tablet 81 mg  81 mg Oral DAILY  metoprolol tartrate (LOPRESSOR) tablet 25 mg  25 mg Oral Q12H  
 losartan (COZAAR) tablet 25 mg  25 mg Oral DAILY  atorvastatin (LIPITOR) tablet 40 mg  40 mg Oral DAILY  clopidogreL (PLAVIX) tablet 75 mg  75 mg Oral DAILY  amLODIPine (NORVASC) tablet 10 mg  10 mg Oral DAILY  insulin lispro (HUMALOG) injection   SubCUTAneous AC&HS  sodium chloride (NS) flush 5-40 mL  5-40 mL IntraVENous Q8H  
 sodium chloride (NS) flush 5-40 mL  5-40 mL IntraVENous PRN  
 glucose chewable tablet 16 g  4 Tab Oral PRN  
 dextrose (D50W) injection syrg 12.5-25 g  12.5-25 g IntraVENous PRN  
  glucagon (GLUCAGEN) injection 1 mg  1 mg IntraMUSCular PRN  
 dextrose 10% infusion 0-250 mL  0-250 mL IntraVENous PRN Hutchinson Nicky, NP

## 2020-01-29 NOTE — PROGRESS NOTES
Palliative Medicine Consult Regino: [phone]) Patient Name: Analy Persaud YOB: 1931 Date of Initial Consult: 2020 Reason for Consult: Goals of care discussion Requesting Provider: Dr. Praful Maza Primary Care Physician: Larisa Machado MD 
 
 SUMMARY:  
Analy Persaud is a 80 y.o. with a past history of TIAs, DM 2, CKD 4, HTN, hx: CA status post resection, UTIs, falls, memory deficit, who was admitted on 2020 from home with a diagnosis of AMS, rhabdo and  NSTEMI. Current medical issues leading to Palliative Medicine involvement include: Goals of care discussion and setting of advanced age, altered mental status, and multiple comorbidities. Patient presented to the ER with CC of unwitnessed fall and altered mental status, apparently patient was down for unknown period of time. In ER patient confused, BNP 14 K, troponin 3.8. CXR PNA. Cardiology consulted, likely NSTEMI, patient admitted. Course of hospitalization: Patients mentation has improved, though family stated not at baseline. Psychosocial assessment: Patient was a  many years, retired approximately 26 years ago. Is , wife  in . Patient has 2 children, 1 daughter and 1 son. Patient lives in his own home, his granddaughter and 2 of her friends live with him. Everyone except the patient's son-in-law works, weekday mornings until approximately 1 or 2 he is alone. Patient has 2 children, 1 daughter and 1 son. Family reports he has lived a very sedentary life since penitentiary, mostly enjoys watching TV. Baseline cognitive and functional status: Prior to hospitalization patient had been mostly independent, has a walker but does not use it, consequently he has had multiple falls. Patient is reportedly able to bathe and toilet self, but daughter stated he did not change his clothes very often.   She stated that he did not prepare his meals, but was able to feed himself, pour himself a cup of coffee etc. he new medications and one colored box were for morning and took them appropriately, sometimes he would go to bed early and missed taking his nighttime medication. Daughter stated patient not formally diagnosed with dementia, however he has not recalled her name over the past 2 months, thinks he is his sister, has asked her about his own mother. PALLIATIVE DIAGNOSES:  
 
1. Goals of care discussion 2. DNR discussion 3. Altered mental status, unspecified 4. Debility 5. Dyspnea on exertion PLAN:  
1. Prior to visit completed chart review for updated information. Also spoke with patient's nurse Phil Zhao, stated patient needing assistance with all ADLs including assistance with feeding, which is definitely not the baseline family reported during initial visit. 2. I met with patient, no family at bedside. Patient was awake, oriented to self, sitting up in recliner, appeared restless, trying to do something, scooting forward in the chair, when I asked he was unable to tell me he was doing or what he needed. He was unable to provide ROS 3. Goals of care discussion-patient unable to participate in this discussion, previous visit indicated daughter wanted to bring patient home with her and when she is at work, her  will be able to assist with his care. 4. DNR discussion-patient has a DNR CODE STATUS, will need to obtain a durable DNR prior to discharge. 5. Altered mental status, unspecified-possibly improved over the course of hospitalization, however not returned to the baseline that the patient's daughter described. AMS contributing to overall debility. Per daughters report from initial visit, patient appears to be significantly more debilitated, requires full assist with sit to stand and transfer to bedside chair, assists with cutting his food as well as eating, incontinent. 6. Debility-significantly below baseline, per daughters report during our initial visit, patient appears to be significantly more debilitated, now requiring 2 person max assist with rolling to side of bed, sit to stand and transfer to bedside chair. Patient is now requiring assistance with eating, unclear if it is related to right arm injury/pain, but does not take the initiative, however sets food when it is put in his mouth. Patient also incontinent, which family denied being PTA. 7. Dyspnea on exertion-etiology includes, ardiomyopathy, moderate pulmonary hypertension, deconditioning, large round abdomen giving to restrictive lung RUL PNA. She is on room air at time of visit, however when he was trying to reposition self in a recliner chair he was quite dyspneic 8. Team will follow up with patient's daughter regarding [address] and attempt to obtain durable DNR 
 
9. Communicated plan of care with: Palliative [facility] Team, Virginia Campos RN 
 
 GOALS OF CARE / TREATMENT PREFERENCES:  
 
GOALS OF CARE: 
Patient/Health Care Proxy Stated Goals: Prolong life TREATMENT PREFERENCES:  
Code Status: DNR Advance Care Planning: 
[x] The [facility] Interdisciplinary Team has updated the ACP Navigator with [address] and Patient Capacity Advance Care Planning 1/27/2020 Patient's Healthcare Decision Maker is: Named in scanned ACP document Primary Decision Maker Name -  
Primary Decision Maker Phone Number -  
Primary Decision Maker Relationship to Patient -  
Confirm Advance Directive Yes, on file Does the patient have other document types Do Not Resuscitate Medical Interventions: Limited additional interventions Other Instructions:  
Artificially Administered Nutrition: No feeding tube Other: As far as possible, the palliative care team has discussed with patient / health care proxy about goals of care / treatment preferences for patient.  
 
 HISTORY:  
 
 History obtained from: Medical records/family CHIEF COMPLAINT: N/A 
 
HPI/SUBJECTIVE: The patient is:  
[] Verbal and participatory [x] Non-participatory due to:  
Patient alert, awake, does answer some yes no type questions however confused, not able to tell me where he is or why he needs Clinical Pain Assessment (nonverbal scale for severity on nonverbal patients):  
Clinical Pain Assessment Severity: 0 Location: unable to assess Character: unable to assess Duration: unable to assess Effect: unable to assess Factors: unable to assess Frequency: unable to assess Activity (Movement): Lying quietly, normal position Duration: for how long has pt been experiencing pain (e.g., 2 days, 1 month, years) Frequency: how often pain is an issue (e.g., several times per day, once every few days, constant) FUNCTIONAL ASSESSMENT:  
 
Palliative Performance Scale (PPS): PPS: 30 
 
 
 PSYCHOSOCIAL/SPIRITUAL SCREENING:  
 
Palliative IDT has assessed this patient for cultural preferences / practices and a referral made as appropriate to needs (Cultural Services, Patient Advocacy, Ethics, etc.) Any spiritual / Orthodoxy concerns: Unable to assess[] Yes /  [] No 
 
Caregiver Burnout: 
[] Yes /  [] No /  [x] No Caregiver Present Anticipatory grief assessment:  
[x] Normal  / [] Maladaptive ESAS Anxiety: Anxiety: 0 
 
ESAS Depression:    
 
 
 REVIEW OF SYSTEMS:  
 
Positive and pertinent negative findings in ROS are noted above in HPI. The following systems were [] reviewed / [x] unable to be reviewed as noted in HPI Other findings are noted below. Systems: constitutional, ears/nose/mouth/throat, respiratory, gastrointestinal, genitourinary, musculoskeletal, integumentary, neurologic, psychiatric, endocrine. Positive findings noted below. Modified ESAS Completed by: provider Fatigue: 10 Drowsiness: 7 Pain: 0 Anxiety: 0 Nausea: 0 Anorexia: 2 Dyspnea: 0 Stool Occurrence(s): 0 PHYSICAL EXAM:  
 
From RN flowsheet: 
Wt Readings from Last 3 Encounters:  
01/29/20 193 lb 5.5 oz (87.7 kg) 12/19/19 196 lb (88.9 kg) 12/15/19 194 lb (88 kg) Blood pressure 120/68, pulse 62, temperature 97.7 °F (36.5 °C), resp. rate 21, height 6' (1.829 m), weight 193 lb 5.5 oz (87.7 kg), SpO2 96 %. Pain Scale 1: Numeric (0 - 10) Pain Intensity 1: 0 Last bowel movement, if known:  
 
Constitutional: Elderly, well-nourished, disheveled, fatigued, restless, though denied anxiety Eyes: pupils equal, anicteric ENMT: no nasal discharge, moist mucous membranes Cardiovascular: regular rhythm, distal pulses intact Respiratory: breathing not labored, symmetric, + BRITT with repositioning self in bed, room air Gastrointestinal: Large, round, soft non-tender, +bowel sounds Musculoskeletal: no deformity, no tenderness to palpation Skin: warm, dry Neurologic: Patient is weak, confused, not oriented to time or place, poor insight to hospitalization, difficulty following simple commands, speech was clearer than it had been during initial visit, however only gives 1-2 word responses to questions,Psychiatric: Restricted affect, unable to assess for hallucinations Other: 
 
 
 HISTORY:  
 
Principal Problem: 
  HAP (hospital-acquired pneumonia) (1/25/2020) Active Problems: 
  Diabetes mellitus type 2, controlled ([address] (2/9/2012) Essential hypertension, benign (2/9/2012) HLD (hyperlipidemia) (2/9/2012) Esophageal reflux (2/9/2012) Acquired hypothyroidism (4/26/2012) Pernicious anemia (7/31/2012) WILTON (acute kidney injury) ([address] (8/2/2014) Dementia without behavioral disturbance ([address] (8/16/2016) CKD (chronic kidney disease) stage 4, GFR 15-29 ml/min ([facility]) (4/22/2019) AMS (altered mental status) (12/14/2019) Weakness (1/25/2020) Sepsis ([address] (1/26/2020) Fall (1/26/2020) NSTEMI (non-ST elevated myocardial infarction) ([facility] (2020) Rhabdomyolysis (2020) Past Medical History:  
Diagnosis Date  WILTON (acute kidney injury) ([facility] 2014  Cancer [facility])   
 colon  Chronic kidney disease  Delirium 10/11/2018  Diabetes ([facility] 2008  
 type 2  Dyslipidemia  Esophageal reflux  Esophageal reflux 2012  Hypertension 2008  Incontinence  Memory loss  Mixed hyperlipidemia 2012  
 NSTEMI (non-ST elevated myocardial infarction) ([facility] 2020  Other ill-defined conditions(799.89) broken neck   Pernicious anemia 2012  Psychiatric disorder   
 dementia  Snoring  Stroke ([facility] 10/18/2018  Unspecified hypothyroidism 2012  Urinary tract infection 10/11/2018  UTI (urinary tract infection) 2019 Past Surgical History:  
Procedure Laterality Date  COLONOSCOPY  12 Rectosigmoid adenocarcinoma  HX CATARACT REMOVAL    
 HX HERNIA REPAIR Bilateral inguinal  
 HX OTHER SURGICAL    
 colon resection  HX SMALL BOWEL RESECTION    HX TONSIL AND ADENOIDECTOMY  HX TONSILLECTOMY  193 Family History Problem Relation Age of Onset  Heart Disease Mother  Hypertension Mother  Heart Disease Father  Hypertension Father  Cancer Paternal Aunt History reviewed, no pertinent family history. Social History Tobacco Use  Smoking status: Former Smoker Packs/day: 1.50 Years: 30.00 Pack years: 45.00 Last attempt to quit: 1982 Years since quittin.5  Smokeless tobacco: Never Used Substance Use Topics  Alcohol use: No  
  Alcohol/week: 0.0 standard drinks Types: 1 - 2 Standard drinks or equivalent per week Allergies Allergen Reactions  Synthroid [Levothyroxine] Other (comments) Confused, hallucinations Current Facility-Administered Medications Medication Dose Route Frequency  albuterol-ipratropium (DUO-NEB) 2.5 MG-0.5 MG/3 ML  3 mL Nebulization Q6H RT  
 amoxicillin-clavulanate (AUGMENTIN) 500-125 mg per tablet 1 Tab  1 Tab Oral Q12H  
 doxycycline (VIBRA-TABS) tablet 100 mg  100 mg Oral Q12H  
 linaGLIPtin (TRADJENTA) tablet 5 mg  5 mg Oral ACB  
 0.45% sodium chloride infusion  75 mL/hr IntraVENous CONTINUOUS  
 aspirin chewable tablet 81 mg  81 mg Oral DAILY  metoprolol tartrate (LOPRESSOR) tablet 25 mg  25 mg Oral Q12H  
 losartan (COZAAR) tablet 25 mg  25 mg Oral DAILY  atorvastatin (LIPITOR) tablet 40 mg  40 mg Oral DAILY  clopidogreL (PLAVIX) tablet 75 mg  75 mg Oral DAILY  amLODIPine (NORVASC) tablet 10 mg  10 mg Oral DAILY  insulin lispro (HUMALOG) injection   SubCUTAneous AC&HS  sodium chloride (NS) flush 5-40 mL  5-40 mL IntraVENous Q8H  
 sodium chloride (NS) flush 5-40 mL  5-40 mL IntraVENous PRN  
 glucose chewable tablet 16 g  4 Tab Oral PRN  
 dextrose (D50W) injection syrg 12.5-25 g  12.5-25 g IntraVENous PRN  
 glucagon (GLUCAGEN) injection 1 mg  1 mg IntraMUSCular PRN  
 dextrose 10% infusion 0-250 mL  0-250 mL IntraVENous PRN  
 
 
 
 LAB AND IMAGING FINDINGS:  
 
Lab Results Component Value Date/Time WBC 12.7 (H) 01/29/2020 01:50 AM  
 HGB 9.6 (L) 01/29/2020 01:50 AM  
 PLATELET 677 14/44/0796 01:50 AM  
 
Lab Results Component Value Date/Time Sodium 148 (H) 01/29/2020 01:50 AM  
 Potassium 4.0 01/29/2020 01:50 AM  
 Chloride 115 (H) 01/29/2020 01:50 AM  
 CO2 26 01/29/2020 01:50 AM  
 BUN 73 (H) 01/29/2020 01:50 AM  
 Creatinine 2.89 (H) 01/29/2020 01:50 AM  
 Calcium 8.3 (L) 01/29/2020 01:50 AM  
 Magnesium 2.2 01/29/2020 01:50 AM  
 Phosphorus 2.3 (L) 01/29/2020 01:50 AM  
  
Lab Results Component Value Date/Time AST (SGOT) 58 (H) 01/29/2020 01:50 AM  
 Alk.  phosphatase 62 01/29/2020 01:50 AM  
 Protein, total 5.9 (L) 01/29/2020 01:50 AM  
 Albumin 2.2 (L) 01/29/2020 01:50 AM  
 Globulin 3.7 01/29/2020 01:50 AM  
 
 Lab Results Component Value Date/Time INR 1.1 01/25/2020 02:43 PM  
 Prothrombin time 11.0 01/25/2020 02:43 PM  
 aPTT 53.3 (H) 01/27/2020 08:44 AM  
  
Lab Results Component Value Date/Time Iron 14 (L) 07/31/2014 02:30 AM  
 TIBC 163 (L) 07/31/2014 02:30 AM  
 Iron % saturation 9 (L) 07/31/2014 02:30 AM  
 Ferritin 379 07/31/2014 02:30 AM  
  
No results found for: PH, PCO2, PO2 No components found for: [place] Lab Results Component Value Date/Time  (H) 01/29/2020 01:50 AM  
 CK - MB 42.3 (H) 01/26/2020 01:02 AM  
  
 
 
   
 
Total time:Counseling / coordination time, 25 min 
spent as noted above: 15 min 
> 50% counseling / coordination?: yes Prolonged service was provided for  []30 min   []75 min in face to face time in the presence of the patient, spent as noted above. Time Start:  
Time End:  
Note: this can only be billed with 22700 (initial) or 41836 (follow up). If multiple start / stop times, list each separately.
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
## 2020-01-29 NOTE — PROGRESS NOTES
Meeting arranged for tomorrow with case management @ 130 0pm.I left SNF choices in the room for daughter.  
 
Anabel Hammer

## 2020-01-29 NOTE — PROGRESS NOTES
Bedside shift change report given to Hitesh Meza RN (oncoming nurse) by Edith Crouch RN (offgoing nurse). Report included the following information SBAR, Kardex and MAR.

## 2020-01-29 NOTE — PROGRESS NOTES
Plan: I contacted pt.'s daughter Rosalva @ [phone]. Daughter was under the impression that she had a meeting today @ 4:30 pm with case management. I informed daughter that I will leave SNF choices in pt.'s room I asked  daughter and son to meet me early in the morning if possible Alessandra Hardin Daughter did state that [facility] is close to where she lives and to please suubmit clinicals to them. Choice letter completed over the phone and placed in bedside chart to be scanned into EMR.  
 
Alessandra Hardin
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
## 2020-01-30 NOTE — PROGRESS NOTES
1930: Bedside and Verbal shift change report given to Deb French RN (oncoming nurse) by Hans Miller RN (offgoing nurse). Report included the following information SBAR, Kardex, MAR and Accordion. 2330: Bedside and Verbal shift change report given to Milind Gross RN (oncoming nurse) by Deb French RN (offgoing nurse). Report included the following information SBAR, Kardex, MAR and Accordion.

## 2020-01-30 NOTE — PROGRESS NOTES
Bedside shift change report given to Ivonne Mckenna RN (oncoming nurse) by Evita Britton RN (offgoing nurse). Report included the following information SBAR, Kardex and MAR.

## 2020-01-30 NOTE — PROGRESS NOTES
Cardiology Progress Note       [address] 
NAME:  Donnie Jeong :   3/8/1931 MRN:   [id number] Assessment/Plan: 1. Elevated troponin / NSTEMI 
- No ischemic EKG changes.  Trop ~ 7.  CK was 4051 (MB Index 1.0). - Echo with moderate LV systolic dysfunction with Inferior and apical hypokinesis. - Suspect he has CAD (probably multivessel) - Patient is poor cath candidate (dementia, severe CKD).  Family is not interested in a cath and decline any potential caths and prefer conservative medical management. -  cont asa, statin, plavix has been resumed. - watch volume status carefully given high proBNP 
- palliative medicine consult for goals of care/ Hospice. 2. LV dysfunction: EF 35-40% - chest xray today  
- needs diuretic resumed 
-cont BB/ARB 
- every day weights, I/O. Low na diet 3. PAF RVR: 
-  now in SR.  
- consider stopping plavix and starting renally dosed eliquis. - family to discuss DOAC and let us know.   
4) CKD stage IV: cr 2.89  
  
5) PNA / Sepsis  
- per medicine team  
  
6) Prior CVA:  
 
7. dementia:  
 
8. HTN: norvasc, ARB 9. HLD: on statin 10. Deconditioned: needs A 2 with OOB. CARDIOLOGY ATTENDING Patient personally seen and examined. All the elements of history and examination were personally performed. Assessment and plan was discussed and agree as written above He has some wheezing. CXR looks wet. Have started PO loops (he was on lasix 20 mg daily prior to arrival). Chau Packer MD, [facility]  
 
  
 
  
 
Subjective: Robbie Posada Jr. is a 80 y. o. male with known hx TIA, UTI, CKD4, dementia, DMII, HTN, HLD, hypothyroidism, pernicious anemia, hx colon CA s/p bowel resection, GERD, who presented to the ER via EMS after pt was found on the floor by grand daughter. Cardiology consulted for troponin elevation 3.8-->7. 5. CK was 4051. ProBNP 14k. CXR showed PNA and he was admitted with sepsis. Was recently admitted last month with UTI and AMS. Cardiac ROS: + dyspnea. Patient denies any exertional chest pain, palpitations, syncope, orthopnea, edema or paroxysmal nocturnal dyspnea. Previous Cardiac Eval 
Echo  - LVEF 55-60% Echo 12/15/19 - LVEF 55-60%, neg bubble study Carotid Doppler 12/15/19 - 0-49% stenosis bilat  
  
Echo 20 - TDS. LVEF 35-40%, Inferior and apical hypokinesis. mod pulm HTN, AV sclerosis  
  
  
EKG 20 - NSR, normal - I viewed myself  
  
CXR 20 - Migrating opacities compatible with a combination of pneumonia and edema. Review of Systems: No nausea, indigestion, vomiting, pain, cough, sputum. No bleeding. Taking po. Objective:  
 
Visit Vitals /79 Pulse 86 Temp 98.4 °F (36.9 °C) Resp 22 Ht 6' (1.829 m) Wt 206 lb 4.8 oz (93.6 kg) SpO2 96% BMI 27.98 kg/m² O2 Flow Rate (L/min): 2 l/min O2 Device: Room air Temp (24hrs), Av.8 °F (36.6 °C), Min:97.3 °F (36.3 °C), Max:98.4 °F (36.9 °C) No intake/output data recorded.  1901 -  0700 In: 4054.6 [P.O.:140; I.V.:3914.6] Out: 3228 [EWWEO:6679] TELE: SR PVC's General: AAOx 1 cooperative, no acute distress. [place] IRA HEENT: Atraumatic. Pink and moist.  Anicteric sclerae. Neck : Supple. Lungs: fine exp wheezing. Heart: Regular rhythm, no murmur, no rubs, no gallops. No JVD. No carotid bruits. Abdomen: Soft, non-distended, non-tender. + Bowel sounds. Extremities: No edema Neurologic: Grossly intact. Alert and oriented X1   No acute neurological distress. Psych: Limited insight. Care Plan discussed with: 
  Comments Patient x Family RN x Care Manager Consultant:     
 
 
Data Review: No lab exists for component: ITNL Recent Labs  
  20 
0208 20 
0150 20 
013  529* 1,211* Recent Labs  
  20 
0208 20 
0150 20 
013  148* 142  
K 3.8 4.0 4.2 * 115* 112* CO2 26 26 23 BUN 68* 73* 54* CREA 2.70* 2.89* 3.02* * 220* 254* PHOS 3.9 2.3* 3.6 MG 2.1 2.2 2.3 ALB 2.4* 2.2* 2.4* WBC 13.5* 12.7* 15.2* HGB 9.7* 9.6* 10.7* HCT 30.0* 29.9* 32.7*  
 177 174 No results for input(s): INR, PTP, APTT, INREXT, INREXT in the last 72 hours. Medications reviewed Current Facility-Administered Medications Medication Dose Route Frequency  heparin (porcine) injection 5,000 Units  5,000 Units SubCUTAneous Q8H  
 albuterol-ipratropium (DUO-NEB) 2.5 MG-0.5 MG/3 ML  3 mL Nebulization Q6H RT  
 amoxicillin-clavulanate (AUGMENTIN) 500-125 mg per tablet 1 Tab  1 Tab Oral Q12H  
 doxycycline (VIBRA-TABS) tablet 100 mg  100 mg Oral Q12H  
 linaGLIPtin (TRADJENTA) tablet 5 mg  5 mg Oral ACB  
 0.45% sodium chloride infusion  75 mL/hr IntraVENous CONTINUOUS  
 aspirin chewable tablet 81 mg  81 mg Oral DAILY  metoprolol tartrate (LOPRESSOR) tablet 25 mg  25 mg Oral Q12H  
 losartan (COZAAR) tablet 25 mg  25 mg Oral DAILY  atorvastatin (LIPITOR) tablet 40 mg  40 mg Oral DAILY  clopidogreL (PLAVIX) tablet 75 mg  75 mg Oral DAILY  amLODIPine (NORVASC) tablet 10 mg  10 mg Oral DAILY  insulin lispro (HUMALOG) injection   SubCUTAneous AC&HS  sodium chloride (NS) flush 5-40 mL  5-40 mL IntraVENous Q8H  
 sodium chloride (NS) flush 5-40 mL  5-40 mL IntraVENous PRN  
 glucose chewable tablet 16 g  4 Tab Oral PRN  
 dextrose (D50W) injection syrg 12.5-25 g  12.5-25 g IntraVENous PRN  
 glucagon (GLUCAGEN) injection 1 mg  1 mg IntraMUSCular PRN  
 dextrose 10% infusion 0-250 mL  0-250 mL IntraVENous PRN Abbie Metcalf, CARMELA

## 2020-01-30 NOTE — PROGRESS NOTES
Problem: Falls - Risk of 
Goal: *Absence of Falls Description Document Curtis Rodriguez Fall Risk and appropriate interventions in the flowsheet. Outcome: Progressing Towards Goal 
Note: Fall Risk Interventions: 
Mobility Interventions: Bed/chair exit alarm, Patient to call before getting OOB Mentation Interventions: Adequate sleep, hydration, pain control, Bed/chair exit alarm, Door open when patient unattended, Familiar objects from home Medication Interventions: Bed/chair exit alarm, Patient to call before getting OOB, Teach patient to arise slowly Elimination Interventions: Bed/chair exit alarm, Call light in reach History of Falls Interventions: Bed/chair exit alarm, Door open when patient unattended, Room close to nurse's station

## 2020-01-30 NOTE — PROGRESS NOTES
Problem: Self Care Deficits Care Plan (Adult) Goal: *Acute Goals and Plan of Care (Insert Text) Description FUNCTIONAL STATUS PRIOR TO ADMISSION: Patient was independent and active without use of DME per daughter prior to admission. HOME SUPPORT: The patient lived with granddaughter; Daughter present and reports there were times when patient was alone PTA. Occupational Therapy Goals Initiated 1/28/2020 1. Patient will perform grooming with moderate assistance  within 7 day(s). 2.  Patient will perform upper body dressing and bathing with maximal assistance within 7 day(s). 3.  Patient will perform lower body dressing and bathing with maximal assistance within 7 day(s). 4.  Patient will perform toilet transfers to [facility] with moderate assistance  within 7 day(s). 5.  Patient will perform all aspects of toileting with moderate assistance  within 7 day(s). 6.  Patient will participate in upper extremity therapeutic exercise/activities with supervision/set-up for 10 minutes within 7 day(s). Outcome: Progressing Towards Goal 
 OCCUPATIONAL THERAPY TREATMENT Patient: Wilfredo Darnell (80 y.o. male) Date: 1/30/2020 Diagnosis: HAP (hospital-acquired pneumonia) [J18.9, Y95] Weakness [R53.1] HAP (hospital-acquired pneumonia) Precautions:   
Chart, occupational therapy assessment, plan of care, and goals were reviewed. ASSESSMENT Patient continues with skilled OT services and is progressing towards goals. Patient following commands today, able to move to EOB with  moderate assistance. He is able to stand with mod assist x2 and transfer to chair, using RW and mod assist.  Patient set up with meal tray while in chair and eating cheese sandwich with supervision, but requests assist of daughter for soup. Patient family with questions regarding mobility and therapy. Patient left in chair in NAD at end of session. Current Level of Function Impacting Discharge (ADLs): mod assist for transfers, up to total assist for ADLs Other factors to consider for discharge: safety at home PLAN : 
Patient continues to benefit from skilled intervention to address the above impairments. Continue treatment per established plan of care. to address goals. Recommend with staff: OOB for meals Recommend next OT session: progression of goals Recommendation for discharge: (in order for the patient to meet his/her long term goals) Therapy up to 5 days/week in SNF setting This discharge recommendation: 
Has been made in collaboration with the attending provider and/or case management IF patient discharges home will need the following DME: TBD SUBJECTIVE:  
Patient stated Im ok I guess.  OBJECTIVE DATA SUMMARY:  
Cognitive/Behavioral Status: 
Neurologic State: Alert;Confused Orientation Level: Unable to verbalize Cognition: Decreased attention/concentration;Decreased command following; Impaired decision making Perception: Appears intact Perseveration: No perseveration noted Safety/Judgement: Decreased insight into deficits Functional Mobility and Transfers for ADLs: 
Bed Mobility: 
Rolling: Moderate assistance Supine to Sit: Moderate assistance Sit to Supine: Moderate assistance Scooting: Moderate assistance Transfers: 
Sit to Stand: Moderate assistance Bed to Chair: Moderate assistance Balance: 
Sitting: Intact; High guard Sitting - Static: Fair (occasional) Sitting - Dynamic: Fair (occasional) Standing: Impaired;Pull to stand; With support Standing - Static: Fair Standing - Dynamic : Fair ADL Intervention: Lower Body Dressing Assistance Socks: Total assistance (dependent) Leg Crossed Method Used: No 
Position Performed: Supine Cues: Physical assistance Cognitive Retraining Safety/Judgement: Decreased insight into deficits Therapeutic Exercises: Pain: 
Patient denies pain Activity Tolerance:  
Fair and requires rest breaks Please refer to the flowsheet for vital signs taken during this treatment. After treatment patient left in no apparent distress:  
Sitting in chair, Call bell within reach, Bed / chair alarm activated, and Caregiver / family present COMMUNICATION/COLLABORATION:  
The patients plan of care was discussed with: Physical Therapist and Registered Nurse Krissy Browning OTR/L Time Calculation: 28 mins

## 2020-01-30 NOTE — PROGRESS NOTES
Abdirahman Traylor Grant 79 [address]. YOB: 1931 Assessment & Plan: 1. WILTON on CKD 4 
- IVVD,Rhabdo,PNA 
- Cr is better 
- Non oliguric 2. DM 
- SSI 3. HTN 
- BB,CCB 4. AMS 
- Multifactorial 
5. CHF: Diastolic Plan 1. Ok to dc from renal 
2. CKD visit on dc 3. Ok to use loops if x ray is wet Subjective:  
CC:WILTON HPI: Patient seen Cr touch better, WT UP, BP STABLE 
ROS:Not reliable Current Facility-Administered Medications Medication Dose Route Frequency  heparin (porcine) injection 5,000 Units  5,000 Units SubCUTAneous Q8H  
 albuterol-ipratropium (DUO-NEB) 2.5 MG-0.5 MG/3 ML  3 mL Nebulization Q6H RT  
 amoxicillin-clavulanate (AUGMENTIN) 500-125 mg per tablet 1 Tab  1 Tab Oral Q12H  
 doxycycline (VIBRA-TABS) tablet 100 mg  100 mg Oral Q12H  
 linaGLIPtin (TRADJENTA) tablet 5 mg  5 mg Oral ACB  
 0.45% sodium chloride infusion  75 mL/hr IntraVENous CONTINUOUS  
 aspirin chewable tablet 81 mg  81 mg Oral DAILY  metoprolol tartrate (LOPRESSOR) tablet 25 mg  25 mg Oral Q12H  
 losartan (COZAAR) tablet 25 mg  25 mg Oral DAILY  atorvastatin (LIPITOR) tablet 40 mg  40 mg Oral DAILY  clopidogreL (PLAVIX) tablet 75 mg  75 mg Oral DAILY  amLODIPine (NORVASC) tablet 10 mg  10 mg Oral DAILY  insulin lispro (HUMALOG) injection   SubCUTAneous AC&HS  sodium chloride (NS) flush 5-40 mL  5-40 mL IntraVENous Q8H  
 sodium chloride (NS) flush 5-40 mL  5-40 mL IntraVENous PRN  
 glucose chewable tablet 16 g  4 Tab Oral PRN  
 dextrose (D50W) injection syrg 12.5-25 g  12.5-25 g IntraVENous PRN  
 glucagon (GLUCAGEN) injection 1 mg  1 mg IntraMUSCular PRN  
 dextrose 10% infusion 0-250 mL  0-250 mL IntraVENous PRN Objective:  
 
Vitals: 
Blood pressure 159/79, pulse 86, temperature 97.9 °F (36.6 °C), resp. rate 22, height 6' (1.829 m), weight 93.6 kg (206 lb 4.8 oz), SpO2 96 %. Temp (24hrs), Av.8 °F (36.6 °C), Min:97.3 °F (36.3 °C), Max:98.4 °F (36.9 °C) Intake and Output: 
No intake/output data recorded.  1901 -  0700 In: 4054.6 [P.O.:140; I.V.:3914.6] Out: 8680 [HBZQE:7713] Physical Exam:              
 Patient is intubated:  no 
 
Physical Examination:  
GENERAL ASSESSMENT: NAD HEENT:Nontraumatic CHEST: CTA HEART: S1S2 ABDOMEN: Soft,NT, 
:Walters: n EXTREMITY: EDEMA 
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 
 
Data Review No results for input(s): TNIPOC in the last 72 hours. No lab exists for component: ITNL Recent Labs  
  20 
02020 
0150 20 
013  529* 1,211* Recent Labs  
  20 
0150 20 
0136  148* 142  
K 3.8 4.0 4.2 * 115* 112* CO2 26 26 23 BUN 68* 73* 54* CREA 2.70* 2.89* 3.02* * 220* 254* PHOS 3.9 2.3* 3.6 MG 2.1 2.2 2.3 CA 8.2* 8.3* 8.3* ALB 2.4* 2.2* 2.4* WBC 13.5* 12.7* 15.2* HGB 9.7* 9.6* 10.7* HCT 30.0* 29.9* 32.7*  
 177 174 No results for input(s): INR, PTP, APTT, INREXT, INREXT in the last 72 hours. Needs: urine analysis, urine sodium, protein and creatinine Lab Results Component Value Date/Time Sodium,urine random 58 2014 05:30 PM  
 Creatinine, urine 201.46 2014 05:30 PM  
 
 
 
Discussed with:  Family 
 
: Fernando Lawrence MD 
2020 [facility]: 
[email or web address] Laureano Ruiz office: 
[address] [place] [address], [address] [place], [address] [facility] Phone: [phone] Fax :     [phone] [place] office: 
[address] [facility] Andie Hobson Phone - [phone] Fax - [phone]

## 2020-01-30 NOTE — PROGRESS NOTES
Discussion with family at bedside. Updated on results of CXR showing increased vascular congestion so IVFs have been stopped and patient given dose of lasix. Will continue to monitor lung exam. 
 
Discussed with family whether they had made decision about starting eliquis. They have decided against starting eliquis given they feel risk of bleeding in event of a fall is too concerning and state they know the patient is at increased risk of stroke as a result. Family has reviewed list of SNFs for patient and is planning to meet with CM at 1:30pm today. Reiterated that SNF for discharge would provide patient the medical care , opportunity for rehabilitation to regain strength and supervision he needs in order to heal which will be difficult for family to provide in the home. Bertha Rae MD 
[address] Medicine

## 2020-01-30 NOTE — PROGRESS NOTES
Jake Herrera [address] Office [phone] Fax [phone] Assessment and Plan Melany Francisco. is a 80 y.o. male admitted for sepsis 2/2 HAP. Also found to acute metabolic encephalopathy and NSTEMI/Type II MI in the setting of Grade 1 diastolic dysfunction. 24 Hour Events: 1. Nephro signed off- f/u outpt 2. CM to have discussion w/ family regarding SNF choices today. 3. Speech eval: concern for aspiration: po Augmentin + Doxy (day 6/7 abx) 4. Cards: discussing switching plavix to renally dosed Eliquis for pAF w/ RVR (family to make decision) 5. Repeat CXR acquired ACUTE PROBLEMS Sepsis 2/2 HAP: IMPROVING. 2/4 SIRS (WBC 17.6, RR26). RUL PNA on (CXR,CT C-spine, CT abd/pelvis). Concern for aspiration per Speech Eval.  LA down-trended. Procal wnl. RVP neg. ^ESR 61. ^CRP >9.7. Bcx NG 4D, Ucx neg. V/Q scan neg. Legionella neg. - Strict I &Os - po Augmentin + Doxy  
- Daily CBC, CMP, Mag and phos - Duonebs q6h  
  
Acute Metabolic Encephalopathy in the setting of Dementia w/ Fall: IMPROVING per family. Likely 2/2 HAP. CT head neg. Stroke work-up last month showed chronic white matter disease and superficial siderosis of R frontal lobe, EEG gen slowing. Carotid dopplers in Dec'19 showed chronic mild-mod L ICA stenoses. Pt was supposed to follow-up w/ neurology early Jan but never did. Fall evidenced by R arm abrasion.  
- Tx of HAP as above - Continue home plavix 75mg daily - Fall precautions, wound care - F/u outpt Neuro NSTEMI/Type II MI in setting of Mod Systolic Dysfunction:  likely 2/2 rhabdomyolysis s/p fall. POA Trop 3.81 & NT pro-BNP 64718. ^CK 1812 ^CKMB 42.3. ECHO:  EF 64-92%, mod systolic dysf, mod pul HTN.  
- F/u Cards c/s: ^Trops likely 2/2 to Rhabdo, poor cath or outpt cardiac rehab candidate (dementia & severe CKD3) & family declines. Continue home meds. - Continue tele monitoring Rhabdomyolysis: RESOLVED. CK down-trending POA CK 2322-->4051-->1939-->1211-->529--> 288. likely 2/2 to fall. Pt was down for possibly >1hour. Sustained R arm abrasion, neg (CT C-spine, XR R forearm x2, hand). - Continue Hydration  
 
 WILTON on CKD4: IMPROVING. POA Cr 3.1 (BL ~2.5). Likely 2/2 sepsis and poor hydration. - MIVF, Avoid nephrotoxic agents. - Daily CMP 
- Nephro c/s: signed off. Safe for DC. poor HD candidate Elevated AST: POA likely 2/2 shock liver. Hepatitis panel neg.  
- Daily CMP  
  
CHRONIC PROBLEMS  
 
HTN: Stable. - Continue home norvasc 10mg daily, losartan 25mg daily 
- Hold home lasix until renal functions improve. 
   
DMII: POA glucose 293; HA1c 12/2019 (6.5) - Resumed home Tradjenta 5mg. Hold home Amaryl 5mg 
- SSI, q6hrs POC gluc - will consider adjusting regimen  
- Diabetic mechanical soft diet 
  
HLD: Last lipid panel 12/19 [Tchol 185, , HDL 33, ]. Hepatitis panel neg.  
- Continue home niacin & Lipitor 40mg daily  
  
GERD: Switch home prevacid to protonix 
  
Hypothyroidism: TSH low 0.15, T4 wnl, T3 low 1.5 
- Continue home armour thyroid 120mg--> consider reduce to 100mcg in OP 
- F/u outpt PCP  
  
Pernicious Anemia: POA Hgb 12.5 
- Continue home ferrous sulfate and thera-MV 
  
Dementia: Continue home aricept 
  
Obesity: Body mass index is 27.34 kg/m². - Encouraging lifestyle modifications and further follow up outpatient.  
  
  
FEN/GI - diabetic mechanical soft, MIVF Activity - Ambulate with assistance DVT prophylaxis - SC Heparin. GI prophylaxis - Protonix Fall prophylaxis - Fall precautions ordered. Disposition - Telemetry. Plan to d/c to Home per family's wishes. C/s PT/OT/CM Code Status - DNR, discussed with patient / caregivers. Next of Isis [address] and [phone] [facility] (Son) [phone], Leti Galvan (dtr) [phone], Yaya Begum (son in law) [phone] I appreciate the opportunity to participate in the care of this patient, 
Nona Knight MD 
 Family Medicine Resident Subjective / Objective Subjective : Pt appeared more comfortable but when asked how he was feeling, he said \"not too good\" but couldn't verbalize specifics. More conversational. He denied CP/palp/N/V/fever/chills. Temp (24hrs), Av.7 °F (36.5 °C), Min:97.3 °F (36.3 °C), Max:98.4 °F (36.9 °C) Visit Vitals /61 (BP 1 Location: Left arm, BP Patient Position: At rest) Pulse 85 Temp 98.4 °F (36.9 °C) Resp 18 Ht 6' (1.829 m) Wt 206 lb 4.8 oz (93.6 kg) SpO2 97% BMI 27.98 kg/m² Physical Exam 
General:   Alert, cooperative, no acute distress Head:   Atraumatic Eyes:   Conjunctivae clear ENT:  Oral mucosa normal  
Neck:  Supple, trachea midline, no adenopathy No JVD Back:    No CVA tenderness Lungs:   Expiratory wheeze BL. Wheezing. No resp distress/chest retractions. Heart:   Regular rhythm, no murmur Abdomen:    Soft, non-tender No masses or organomegaly Extremities:  No edema or DVT signs Skin:  Warm and dry. Burn-like abrasion (appears to be ruptured bullae) on R forearm and Abdomen. Clean dry dressing. Neurologic:  A&Ox 1. No focal deficits I/O: 
Date 20 - 20 7633 20 - 20 7812 Shift  24 Hour Total 1900-0659 24 Hour Total  
INTAKE  
P.O.  80 80     
  P. O.  80 80     
I. V.(mL/kg/hr) 1916.8(7.5) 723.8 3714.6 Volume (sodium phosphate 15 mmol in 0.9% sodium chloride 250 mL infusion) 38.3  38.3 Volume (0.45% sodium chloride infusion)  723.8 723.8 Volume (dextrose 10% infusion 0-250 mL) 0  0 Volume (sodium bicarbonate (8.4%) 75 mEq in dextrose 5 % - 0.45% NaCl 1,000 mL infusion) 2952.6  2952. 6 Volume (doxycycline (VIBRAMYCIN) 100 mg in 0.9% sodium chloride (MBP/ADV) 100 mL) 0  0   Volume (cefepime (MAXIPIME) 2 g in 0.9% sodium chloride (MBP/ADV) 100 mL) 0  0     
 Shift Total(mL/kg) 2990. 9(34.1) 803.8(8.6) 3794. 6(40.6) OUTPUT Urine(mL/kg/hr)  600 600 Urine Voided  600 600 Shift Total(mL/kg)  600(6.4) 600(6.4) NET 2990.9 203.8 3194.6 Weight (kg) 87.7 93.6 93.6 93.6 93.6 93.6 Inpatient Medications Current Facility-Administered Medications Medication Dose Route Frequency  albuterol-ipratropium (DUO-NEB) 2.5 MG-0.5 MG/3 ML  3 mL Nebulization Q6H RT  
 amoxicillin-clavulanate (AUGMENTIN) 500-125 mg per tablet 1 Tab  1 Tab Oral Q12H  
 doxycycline (VIBRA-TABS) tablet 100 mg  100 mg Oral Q12H  
 linaGLIPtin (TRADJENTA) tablet 5 mg  5 mg Oral ACB  
 0.45% sodium chloride infusion  75 mL/hr IntraVENous CONTINUOUS  
 aspirin chewable tablet 81 mg  81 mg Oral DAILY  metoprolol tartrate (LOPRESSOR) tablet 25 mg  25 mg Oral Q12H  
 losartan (COZAAR) tablet 25 mg  25 mg Oral DAILY  atorvastatin (LIPITOR) tablet 40 mg  40 mg Oral DAILY  clopidogreL (PLAVIX) tablet 75 mg  75 mg Oral DAILY  amLODIPine (NORVASC) tablet 10 mg  10 mg Oral DAILY  insulin lispro (HUMALOG) injection   SubCUTAneous AC&HS  sodium chloride (NS) flush 5-40 mL  5-40 mL IntraVENous Q8H  
 sodium chloride (NS) flush 5-40 mL  5-40 mL IntraVENous PRN  
 glucose chewable tablet 16 g  4 Tab Oral PRN  
 dextrose (D50W) injection syrg 12.5-25 g  12.5-25 g IntraVENous PRN  
 glucagon (GLUCAGEN) injection 1 mg  1 mg IntraMUSCular PRN  
 dextrose 10% infusion 0-250 mL  0-250 mL IntraVENous PRN Allergies Allergies Allergen Reactions  Synthroid [Levothyroxine] Other (comments) Confused, hallucinations CBC: 
Recent Labs  
  01/30/20 
0208 01/29/20 
0150 01/28/20 
0136 WBC 13.5* 12.7* 15.2* HGB 9.7* 9.6* 10.7* HCT 30.0* 29.9* 32.7*  
 177 174 Metabolic Panel: 
Recent Labs  
  01/30/20 
0208 01/29/20 
0150 01/28/20 
0136  148* 142  
K 3.8 4.0 4.2 * 115* 112* CO2 26 26 23 BUN 68* 73* 54*  
 CREA 2.70* 2.89* 3.02* * 220* 254* CA 8.2* 8.3* 8.3*  
MG 2.1 2.2 2.3 PHOS 3.9 2.3* 3.6 ALB 2.4* 2.2* 2.4* SGOT 59* 58* 91* ALT 49 41 45 For Billing Chief Complaint Patient presents with  Fall  
 Skin Problem Hospital Problems  Date Reviewed: 12/19/2019 Codes Class Noted POA Rhabdomyolysis ICD-10-CM: P20.22 ICD-9-CM: 728.88  1/27/2020 Unknown Sepsis ([facility] ICD-10-CM: A41.9 ICD-9-CM: 038.9, 995.91  1/26/2020 Unknown Fall ICD-10-CM: W19. Tian Conway ICD-9-CM: F086.0  1/26/2020 Unknown NSTEMI (non-ST elevated myocardial infarction) ([facility] ICD-10-CM: I21.4 ICD-9-CM: 410.70  1/26/2020 Unknown Weakness ICD-10-CM: R53.1 ICD-9-CM: 780.79  1/25/2020 Unknown * (Principal) HAP (hospital-acquired pneumonia) ICD-10-CM: J18.9, Y95 
ICD-9-CM: 486  1/25/2020 Unknown AMS (altered mental status) ICD-10-CM: R41.82 
ICD-9-CM: 780.97  12/14/2019 Yes CKD (chronic kidney disease) stage 4, GFR 15-29 ml/min (HCC) ICD-10-CM: N18.4 ICD-9-CM: 585.4  4/22/2019 Yes Dementia without behavioral disturbance (HCC) ICD-10-CM: F03.90 ICD-9-CM: 294.20  8/16/2016 Yes WILTON (acute kidney injury) ([facility].) ICD-10-CM: N17.9 ICD-9-CM: 584.9  8/2/2014 Yes Pernicious anemia ICD-10-CM: D51.0 ICD-9-CM: 281.0  7/31/2012 Yes Acquired hypothyroidism ICD-10-CM: E03.9 ICD-9-CM: 244.9  4/26/2012 Yes Diabetes mellitus type 2, controlled ([facility] ICD-10-CM: E11.9 ICD-9-CM: 250.00  2/9/2012 Yes Essential hypertension, benign ICD-10-CM: I10 
ICD-9-CM: 401.1  2/9/2012 Yes HLD (hyperlipidemia) ICD-10-CM: E78.5 ICD-9-CM: 272.4  2/9/2012 Unknown Esophageal reflux ICD-10-CM: K21.9 ICD-9-CM: 530.81  2/9/2012 Yes

## 2020-01-30 NOTE — PROGRESS NOTES
Problem: Mobility Impaired (Adult and Pediatric) Goal: *Acute Goals and Plan of Care (Insert Text) Description FUNCTIONAL STATUS PRIOR TO ADMISSION: Patient required minimal assistance for basic and instrumental ADLs. HOME SUPPORT PRIOR TO ADMISSION: The patient lived with family who assist patient with hand held assist. 
 
Physical Therapy Goals Initiated 1/28/2020 1. Patient will move from supine to sit and sit to supine , scoot up and down and roll side to side in bed with supervision/set-up within 7 day(s). 2.  Patient will transfer from bed to chair and chair to bed with minimal assistance/contact guard assist using the least restrictive device within 7 day(s). 3.  Patient will perform sit to stand with minimal assistance/contact guard assist within 7 day(s). 4.  Patient will ambulate with minimal assistance/contact guard assist for 50 feet with the least restrictive device within 7 day(s). Outcome: Progressing Towards Goal 
 PHYSICAL THERAPY TREATMENT Patient: Shea Hooper (80 y.o. male) Date: 1/30/2020 Diagnosis: HAP (hospital-acquired pneumonia) [J18.9, Y95] Weakness [R53.1] HAP (hospital-acquired pneumonia) Precautions:  fall Chart, physical therapy assessment, plan of care and goals were reviewed. ASSESSMENT Patient continues with skilled PT services and is progressing towards goals. Sit on the edge of bed mod assist, sit to stand mod assist, sitting and standing balance fair. Ambulate with rolling walker mod assist x 2 towards the chair. OOB to chair as tolerated performed some active range of motion exercise on both LE all planes. Family in the room during the entire therapy sessions and agreed with all goals set for the patient. Initiated family training to encouraged patient to continue some active range of motion exercise on both LE all planes when up on the chair ans on bed. Communicated with nurse who agreed to monitor patient. Current Level of Function Impacting Discharge (mobility/balance): mod assist x 2 with transfers and ambulation using a rolling walker Other factors to consider for discharge: on and off confusion PLAN : 
Patient continues to benefit from skilled intervention to address the above impairments. Continue treatment per established plan of care. to address goals. Recommendation for discharge: (in order for the patient to meet his/her long term goals) Therapy up to 5 days/week in SNF setting This discharge recommendation: 
Has been made in collaboration with the attending provider and/or case management IF patient discharges home will need the following DME: patient owns DME required for discharge SUBJECTIVE:  
Patient stated ok.  OBJECTIVE DATA SUMMARY:  
Critical Behavior: 
Neurologic State: Alert, Confused Orientation Level: Unable to verbalize Cognition: Decreased attention/concentration, Decreased command following, Impaired decision making Safety/Judgement: Decreased awareness of environment, Decreased awareness of need for assistance, Decreased awareness of need for safety, Decreased insight into deficits, Fall prevention Functional Mobility Training: 
Bed Mobility: 
Rolling: Moderate assistance Supine to Sit: Moderate assistance Sit to Supine: Moderate assistance Scooting: Moderate assistance Transfers: 
Sit to Stand: Moderate assistance Stand to Sit: Moderate assistance Stand Pivot Transfers: Moderate assistance Bed to Chair: Moderate assistance Balance: 
Sitting: Intact; High guard Sitting - Static: Fair (occasional) Sitting - Dynamic: Fair (occasional) Standing: Impaired;Pull to stand; With support Standing - Static: Fair Standing - Dynamic : Fair Ambulation/Gait Training: 
Distance (ft): 5 Feet (ft) Assistive Device: Walker, rolling;Gait belt Ambulation - Level of Assistance:  Moderate assistance;Assist x2 
  
 Gait Description (WDL): Exceptions to [facility] Gait Abnormalities: Path deviations Base of Support: Widened Speed/Mckenna: Fluctuations; Slow Step Length: Right shortened;Left shortened Therapeutic Exercises:  
 Instructed patient to continue active range of motion exercise on both legs while up on chair or on bed. Pain Ratin/10 Activity Tolerance:  
Good Please refer to the flowsheet for vital signs taken during this treatment. After treatment patient left in no apparent distress:  
Sitting in chair, Heels elevated for pressure relief, Call bell within reach, Bed / chair alarm activated, and Caregiver / family present COMMUNICATION/COLLABORATION:  
The patients plan of care was discussed with: Occupational Therapist and Registered Nurse Jorge Luis Villalobos PT,CHRISS. Time Calculation: 24 mins

## 2020-01-30 NOTE — PROGRESS NOTES
Shift Summary Bedside and Verbal shift change report given to Bebe Mendez RN (oncoming nurse) by Sussy Curran RN (offgoing nurse). Report included the following information SBAR, Kardex, MAR, Recent Results and Cardiac Rhythm  . Patient down to xray. [id number] Patient returned from xray. Incontinence care provided and linen changed. Patient breath sounds worsening. Family at bedside and would like results of chest xray. MD notified. Nebs given. Fluids stopped and diuretics given. Improvement noted in breathing. Patient up in chair majority of the afternoon. 0 Bedside and Verbal shift change report given to Kristina Berry (oncoming nurse) by HUGH Brock RN (offgoing nurse). Report included the following information SBAR, Kardex, MAR, Recent Results and Cardiac Rhythm  .

## 2020-01-30 NOTE — PROGRESS NOTES
Nutrition Assessment: 
 
RECOMMENDATIONS/INTERVENTION(S):  
1. Continue with Consistent CHO / Mechanical Soft diet order. 2. Assist pt with all meals. 3. Will monitor PO intakes/assess need for ONS, labs (BG), weight. ASSESSMENT:  
1/30: 79 yo male admitted for pneumonia. RD assessment for LOS. PMhx: TIA, CKD stage IV, DM, HTN, HLD, dementia, colon CA s/p bowel resection. Weight WNL per BMI per advanced age. Weight hx per EMR indicates weight gain PTA. Family in room at time of visit, provided all information about pt. They indicate pt's weight has stayed around 89.5kg at home, denying any recent weight loss. Current weight charted as 93.5kg, confirming weight gain. Family reports pt has very good appetite at home, eats at his own schedule so some days he will sleep late and only eat 2 meals and other days he may eat 4 meals plus snacks. Admits that he does not follow a diet for his BG but has stopped drinking all sugar sweetened beverages and only drinks water now. Likes to snack on pretzels. SLP is following this admission, on recommended diet of Mechanical Soft/ CCD and is tolerating. Right hand is swollen and wrapped, making it difficult for pt to feed himself as he is right handed. Needs some assistance with meals at this time (at home he is able to feed himself). Family tries to be available at meal times to help him. They state his appetite/intakes have not been as good since admission because of this and him not feeling well. Today he had all of the tomato soup and jello, 1/2 grilled cheese sandwich for lunch. No intakes recorded in chart from previous days. Labs: , BUN 68, Cr 2.7, pre-albumin 8.3 (CRP is high), POC -177-230. Meds: statin, bumex, Humalog. Diet Order: Consistent carb, Mechanical soft 
% Eaten:  No data found. Pertinent Medications: [x] Reviewed Labs: [x] Reviewed Anthropometrics: Height: 6' (182.9 cm) Weight: 93.6 kg (206 lb 4.8 oz) IBW (%IBW):   ( ) UBW (%UBW):   (  %) BMI: Body mass index is 27.98 kg/m². This BMI is indicative of: 
 [] Underweight    [x] Normal - per age    [] Overweight    []  Obesity    []  Extreme Obesity (BMI>40) Estimated Nutrition Needs (Based on): 2136 Kcals/day(REE 1643 x AF 1.3) , 75 g(75-94gm (0.8-1gm/kg/d)) Protein Carbohydrate: At Least 130 g/day  Fluids: 2136 mL/day (1 ml/kcal) Last BM: 1/28   [x]Active     []Hyperactive  []Hypoactive       [] Absent   BS Skin:    [] Intact   [] Incision  [x] Breakdown - right arm  [] DTI   [] Tears/Excoriation/Abrasion  [x]Edema - 1+ RUE [] Other: Wt Readings from Last 30 Encounters:  
01/30/20 93.6 kg (206 lb 4.8 oz) 12/19/19 88.9 kg (196 lb) 12/15/19 88 kg (194 lb)  
11/13/19 88.2 kg (194 lb 6.4 oz) 11/07/19 86.6 kg (191 lb) 04/22/19 88.2 kg (194 lb 6.4 oz) 01/16/19 85.3 kg (188 lb) 12/05/18 82.1 kg (181 lb) 10/15/18 85.5 kg (188 lb 9.6 oz) 10/11/18 90.7 kg (200 lb) 10/10/18 90.7 kg (200 lb) 08/13/18 91 kg (200 lb 9.6 oz)  
03/28/18 87.3 kg (192 lb 6.4 oz) 08/30/17 91.5 kg (201 lb 12.8 oz) 05/31/17 89.8 kg (198 lb)  
01/25/17 89.8 kg (198 lb) 08/15/16 90 kg (198 lb 6.4 oz) 04/13/16 95.7 kg (211 lb) 10/26/15 97.6 kg (215 lb 3.2 oz)  
07/15/15 94.9 kg (209 lb 3.2 oz) 04/15/15 92.5 kg (204 lb) 04/01/15 86.2 kg (190 lb) 02/09/15 89.4 kg (197 lb) 01/12/15 90.5 kg (199 lb 9.6 oz)  
12/12/14 86.2 kg (190 lb 0.3 oz)  
10/03/14 79.3 kg (174 lb 12.8 oz) 09/12/14 81.8 kg (180 lb 6.4 oz) 09/05/14 80.5 kg (177 lb 6.4 oz) 08/28/14 81.6 kg (180 lb) 08/06/14 83.8 kg (184 lb 12.8 oz) NUTRITION DIAGNOSES:  
Problem:  Altered nutrition-related lab values Etiology: related to endocrine dysfunction Signs/Symptoms: as evidenced by BG 206mg/dL NUTRITION INTERVENTIONS: 
Meals/Snacks: General/healthful diet GOAL:  
Control BG < 160mg/dL while consuming > 75% meals on Consistent CHO diet within next 3-5 days Cultural, Restorationism, or Ethnic Dietary Needs: None EDUCATION & DISCHARGE NEEDS:  
 [x] None Identified 
 [] Identified and Education Provided/Documented 
 [] Identified and Pt declined/was not appropriate [x] Interdisciplinary Care Plan Reviewed/Documented  
 [x] Discharge Needs:   Consistent CHO diet 
 [] No Nutrition Related Discharge Needs NUTRITION RISK:  
Pt Is At Nutrition Risk  [x] No Nutrition Risk Identified  [] PT SEEN FOR:  
 []  MD Consult: []Calorie Count []Diabetic Diet Education []Diet Education []Electrolyte Management []General Nutrition Management and Supplements []Management of Tube Feeding []TPN Recommendations []  RN Referral:  []MST score >=2 
   []Enteral/Parenteral Nutrition PTA []Pregnant: Gestational DM or Multigestation  
              [] Pressure Ulcer 
 
[]  Low BMI      [x]  Length of Stay       [] Dysphagia Diet         [] Ventilator 
[]  Follow-up Previous Recommendations: 
 [] Implemented          [] Not Implemented          [x] Not Applicable Previous Goal: 
 [] Met              [] Progressing Towards Goal              [] Not Progressing Towards Goal   [x] Not Applicable Arvind Abarca RD Pager [phone] Phone [phone]

## 2020-01-30 NOTE — PROGRESS NOTES
Plan: 1. I met with daughter and her  to discuss their SNF choices. 2.Daughter has given me her first choice for SNF(yesterday) which is [facility]. 3.Attending,please notify case management when pt is approaching discharge as insurance authorization will need to be obtained as pt has [employer]. 4.Case management continues to follow pt for discharge needs. 5.I reiterated with pt.'s daughter what the attending had discussed with daughter-that SNF will be beneficial for pt and this is the recommendation from the treatment team. 
 
Ramez Welsh 
[phone]

## 2020-01-31 NOTE — PROGRESS NOTES
Problem: Mobility Impaired (Adult and Pediatric) Goal: *Acute Goals and Plan of Care (Insert Text) Description FUNCTIONAL STATUS PRIOR TO ADMISSION: Patient required minimal assistance for basic and instrumental ADLs. HOME SUPPORT PRIOR TO ADMISSION: The patient lived with family who assist patient with hand held assist. 
 
Physical Therapy Goals Initiated 1/28/2020 1. Patient will move from supine to sit and sit to supine , scoot up and down and roll side to side in bed with supervision/set-up within 7 day(s). 2.  Patient will transfer from bed to chair and chair to bed with minimal assistance/contact guard assist using the least restrictive device within 7 day(s). 3.  Patient will perform sit to stand with minimal assistance/contact guard assist within 7 day(s). 4.  Patient will ambulate with minimal assistance/contact guard assist for 50 feet with the least restrictive device within 7 day(s).   
  
Outcome: Progressing Towards Goal

## 2020-01-31 NOTE — PROGRESS NOTES
Problem: Falls - Risk of 
Goal: *Absence of Falls Description Document [place] Fall Risk and appropriate interventions in the flowsheet. Outcome: Progressing Towards Goal 
Note: Fall Risk Interventions: 
Mobility Interventions: Patient to call before getting OOB, PT Consult for mobility concerns, Bed/chair exit alarm Mentation Interventions: Bed/chair exit alarm, Adequate sleep, hydration, pain control, Reorient patient Medication Interventions: Bed/chair exit alarm, Patient to call before getting OOB, Teach patient to arise slowly Elimination Interventions: Bed/chair exit alarm, Call light in reach, Toileting schedule/hourly rounds History of Falls Interventions: Bed/chair exit alarm, Utilize gait belt for transfer/ambulation Problem: Patient Education: Go to Patient Education Activity Goal: Patient/Family Education Outcome: Progressing Towards Goal 
  
Problem: Pressure Injury - Risk of 
Goal: *Prevention of pressure injury Description Document Carroll Scale and appropriate interventions in the flowsheet. Outcome: Progressing Towards Goal 
Note: Pressure Injury Interventions: 
Sensory Interventions: Check visual cues for pain, Assess need for specialty bed, Keep linens dry and wrinkle-free, Pressure redistribution bed/mattress (bed type) Moisture Interventions: Maintain skin hydration (lotion/cream), Minimize layers, Check for incontinence Q2 hours and as needed, Apply protective barrier, creams and emollients Activity Interventions: Assess need for specialty bed, Pressure redistribution bed/mattress(bed type), Increase time out of bed Mobility Interventions: Pressure redistribution bed/mattress (bed type) Nutrition Interventions: Document food/fluid/supplement intake Friction and Shear Interventions: Apply protective barrier, creams and emollients Problem: Patient Education: Go to Patient Education Activity Goal: Patient/Family Education Outcome: Progressing Towards Goal 
  
Problem: Patient Education: Go to Patient Education Activity Goal: Patient/Family Education Outcome: Progressing Towards Goal 
  
Problem: Sepsis: Day of Diagnosis Goal: Off Pathway (Use only if patient is Off Pathway) Outcome: Progressing Towards Goal 
Goal: *Fluid resuscitation Outcome: Progressing Towards Goal 
Goal: *Paired blood cultures prior to first dose of antibiotic Outcome: Progressing Towards Goal 
Goal: *First dose of  appropriate antibiotic within 3 hours of arrival to ED, within 1 hour of arrival to ICU Outcome: Progressing Towards Goal 
Goal: *Lactic acid with first set of blood cultures Outcome: Progressing Towards Goal 
Goal: *Pneumococcal immunization (if eligible) Outcome: Progressing Towards Goal 
Goal: *Influenza immunization (if eligible) Outcome: Progressing Towards Goal 
Goal: Activity/Safety Outcome: Progressing Towards Goal 
Goal: Consults, if ordered Outcome: Progressing Towards Goal 
Goal: Diagnostic Test/Procedures Outcome: Progressing Towards Goal 
Goal: Nutrition/Diet Outcome: Progressing Towards Goal 
Goal: Discharge Planning Outcome: Progressing Towards Goal 
Goal: Medications Outcome: Progressing Towards Goal 
Goal: Respiratory Outcome: Progressing Towards Goal 
Goal: Treatments/Interventions/Procedures Outcome: Progressing Towards Goal 
Goal: Psychosocial 
Outcome: Progressing Towards Goal 
  
Problem: Sepsis: Day 2 Goal: Off Pathway (Use only if patient is Off Pathway) Outcome: Progressing Towards Goal 
Goal: *Central Venous Pressure maintained at 8-12 mm Hg Outcome: Progressing Towards Goal 
Goal: *Hemodynamically stable Outcome: Progressing Towards Goal 
Goal: *Tolerating diet Outcome: Progressing Towards Goal 
Goal: Activity/Safety Outcome: Progressing Towards Goal 
Goal: Consults, if ordered Outcome: Progressing Towards Goal 
Goal: Diagnostic Test/Procedures Outcome: Progressing Towards Goal 
 Goal: Nutrition/Diet Outcome: Progressing Towards Goal 
Goal: Discharge Planning Outcome: Progressing Towards Goal 
Goal: Medications Outcome: Progressing Towards Goal 
Goal: Respiratory Outcome: Progressing Towards Goal 
Goal: Treatments/Interventions/Procedures Outcome: Progressing Towards Goal 
Goal: Psychosocial 
Outcome: Progressing Towards Goal 
  
Problem: Sepsis: Day 3 Goal: Off Pathway (Use only if patient is Off Pathway) Outcome: Progressing Towards Goal 
Goal: *Central Venous Pressure maintained at 8-12 mm Hg Outcome: Progressing Towards Goal 
Goal: *Oxygen saturation within defined limits Outcome: Progressing Towards Goal 
Goal: *Vital sign stability Outcome: Progressing Towards Goal 
Goal: *Tolerating diet Outcome: Progressing Towards Goal 
Goal: *Demonstrates progressive activity Outcome: Progressing Towards Goal 
Goal: Activity/Safety Outcome: Progressing Towards Goal 
Goal: Consults, if ordered Outcome: Progressing Towards Goal 
Goal: Diagnostic Test/Procedures Outcome: Progressing Towards Goal 
Goal: Nutrition/Diet Outcome: Progressing Towards Goal 
Goal: Discharge Planning Outcome: Progressing Towards Goal 
Goal: Medications Outcome: Progressing Towards Goal 
Goal: Respiratory Outcome: Progressing Towards Goal 
Goal: Treatments/Interventions/Procedures Outcome: Progressing Towards Goal 
Goal: Psychosocial 
Outcome: Progressing Towards Goal 
  
Problem: Sepsis: Day 4 Goal: Off Pathway (Use only if patient is Off Pathway) Outcome: Progressing Towards Goal 
Goal: Activity/Safety Outcome: Progressing Towards Goal 
Goal: Consults, if ordered Outcome: Progressing Towards Goal 
Goal: Diagnostic Test/Procedures Outcome: Progressing Towards Goal 
Goal: Nutrition/Diet Outcome: Progressing Towards Goal 
Goal: Discharge Planning Outcome: Progressing Towards Goal 
Goal: Medications Outcome: Progressing Towards Goal 
Goal: Respiratory Outcome: Progressing Towards Goal 
 Goal: Treatments/Interventions/Procedures Outcome: Progressing Towards Goal 
Goal: Psychosocial 
Outcome: Progressing Towards Goal 
Goal: *Oxygen saturation within defined limits Outcome: Progressing Towards Goal 
Goal: *Hemodynamically stable Outcome: Progressing Towards Goal 
Goal: *Vital signs within defined limits Outcome: Progressing Towards Goal 
Goal: *Tolerating diet Outcome: Progressing Towards Goal 
Goal: *Demonstrates progressive activity Outcome: Progressing Towards Goal 
Goal: *Fluid volume maintenance Outcome: Progressing Towards Goal 
  
Problem: Sepsis: Day 5 Goal: Off Pathway (Use only if patient is Off Pathway) Outcome: Progressing Towards Goal 
Goal: *Oxygen saturation within defined limits Outcome: Progressing Towards Goal 
Goal: *Vital signs within defined limits Outcome: Progressing Towards Goal 
Goal: *Tolerating diet Outcome: Progressing Towards Goal 
Goal: *Demonstrates progressive activity Outcome: Progressing Towards Goal 
Goal: *Discharge plan identified Outcome: Progressing Towards Goal 
Goal: Activity/Safety Outcome: Progressing Towards Goal 
Goal: Consults, if ordered Outcome: Progressing Towards Goal 
Goal: Diagnostic Test/Procedures Outcome: Progressing Towards Goal 
Goal: Nutrition/Diet Outcome: Progressing Towards Goal 
Goal: Discharge Planning Outcome: Progressing Towards Goal 
Goal: Medications Outcome: Progressing Towards Goal 
Goal: Respiratory Outcome: Progressing Towards Goal 
Goal: Treatments/Interventions/Procedures Outcome: Progressing Towards Goal 
Goal: Psychosocial 
Outcome: Progressing Towards Goal 
  
Problem: Sepsis: Day 6 Goal: Off Pathway (Use only if patient is Off Pathway) Outcome: Progressing Towards Goal 
Goal: *Oxygen saturation within defined limits Outcome: Progressing Towards Goal 
Goal: *Vital signs within defined limits Outcome: Progressing Towards Goal 
Goal: *Tolerating diet Outcome: Progressing Towards Goal 
 Goal: *Demonstrates progressive activity Outcome: Progressing Towards Goal 
Goal: Influenza immunization Outcome: Progressing Towards Goal 
Goal: *Pneumococcal immunization Outcome: Progressing Towards Goal 
Goal: Activity/Safety Outcome: Progressing Towards Goal 
Goal: Diagnostic Test/Procedures Outcome: Progressing Towards Goal 
Goal: Nutrition/Diet Outcome: Progressing Towards Goal 
Goal: Discharge Planning Outcome: Progressing Towards Goal 
Goal: Medications Outcome: Progressing Towards Goal 
Goal: Respiratory Outcome: Progressing Towards Goal 
Goal: Treatments/Interventions/Procedures Outcome: Progressing Towards Goal 
Goal: Psychosocial 
Outcome: Progressing Towards Goal 
  
Problem: Sepsis: Discharge Outcomes Goal: *Vital signs within defined limits Outcome: Progressing Towards Goal 
Goal: *Tolerating diet Outcome: Progressing Towards Goal 
Goal: *Verbalizes understanding and describes prescribed diet Outcome: Progressing Towards Goal 
Goal: *Demonstrates progressive activity Outcome: Progressing Towards Goal 
Goal: *Describes follow-up/return visits to physicians Outcome: Progressing Towards Goal 
Goal: *Verbalizes name, dosage, time, side effects, and number of days to continue medications Outcome: Progressing Towards Goal 
Goal: *Influenza immunization (Oct-Mar only) Outcome: Progressing Towards Goal 
Goal: *Pneumococcal immunization Outcome: Progressing Towards Goal 
Goal: *Lungs clear or at baseline Outcome: Progressing Towards Goal 
Goal: *Oxygen saturation returns to baseline or 90% or better on room air Outcome: Progressing Towards Goal 
Goal: *Glycemic control Outcome: Progressing Towards Goal 
Goal: *Absence of deep venous thrombosis signs and symptoms(Stroke Metric) Outcome: Progressing Towards Goal 
Goal: *Describes available resources and support systems Outcome: Progressing Towards Goal 
Goal: *Optimal pain control at patient's stated goal 
 Outcome: Progressing Towards Goal

## 2020-01-31 NOTE — PROGRESS NOTES
Abdirahman Traylor [place] 79 [address]. YOB: 1931 Assessment & Plan: 1. WILTON on CKD 4 
- IVVD,Rhabdo,PNA 
- Cr is 2.8 
- Non oliguric 2. DM 
- SSI 3. HTN 
- BB,CCB 4. AMS 
- Multifactorial 
5. CHF: Diastolic Plan 1. Ok to dc from renal 
2. CKD visit on dc 3. Ok to use loops Subjective:  
CC:WILTON HPI: Patient seen Cr touch high, WT better, BP STABLE 
ROS:Not reliable Current Facility-Administered Medications Medication Dose Route Frequency  potassium bicarb-citric acid (EFFER-K) tablet 20 mEq  20 mEq Oral NOW  
 heparin (porcine) injection 5,000 Units  5,000 Units SubCUTAneous Q8H  
 albuterol (PROVENTIL VENTOLIN) nebulizer solution 2.5 mg  2.5 mg Nebulization Q4H PRN  
 bumetanide (BUMEX) tablet 1 mg  1 mg Oral DAILY  albuterol-ipratropium (DUO-NEB) 2.5 MG-0.5 MG/3 ML  3 mL Nebulization Q6H RT  
 amoxicillin-clavulanate (AUGMENTIN) 500-125 mg per tablet 1 Tab  1 Tab Oral Q12H  
 doxycycline (VIBRA-TABS) tablet 100 mg  100 mg Oral Q12H  
 linaGLIPtin (TRADJENTA) tablet 5 mg  5 mg Oral ACB  aspirin chewable tablet 81 mg  81 mg Oral DAILY  metoprolol tartrate (LOPRESSOR) tablet 25 mg  25 mg Oral Q12H  
 losartan (COZAAR) tablet 25 mg  25 mg Oral DAILY  atorvastatin (LIPITOR) tablet 40 mg  40 mg Oral DAILY  clopidogreL (PLAVIX) tablet 75 mg  75 mg Oral DAILY  amLODIPine (NORVASC) tablet 10 mg  10 mg Oral DAILY  insulin lispro (HUMALOG) injection   SubCUTAneous AC&HS  sodium chloride (NS) flush 5-40 mL  5-40 mL IntraVENous Q8H  
 sodium chloride (NS) flush 5-40 mL  5-40 mL IntraVENous PRN  
 glucose chewable tablet 16 g  4 Tab Oral PRN  
 dextrose (D50W) injection syrg 12.5-25 g  12.5-25 g IntraVENous PRN  
 glucagon (GLUCAGEN) injection 1 mg  1 mg IntraMUSCular PRN  
 dextrose 10% infusion 0-250 mL  0-250 mL IntraVENous PRN Objective:  
 
Vitals: Blood pressure 151/90, pulse 81, temperature 98.3 °F (36.8 °C), resp. rate 26, height 6' (1.829 m), weight 92.6 kg (204 lb 1.6 oz), SpO2 95 %. Temp (24hrs), Av.3 °F (36.8 °C), Min:97.9 °F (36.6 °C), Max:98.7 °F (37.1 °C) Intake and Output: 
No intake/output data recorded.  1901 -  0700 In: 1283.8 [P.O.:410; I.V.:723.8] Out: 975 [Urine:975] Physical Exam:              
 Patient is intubated:  no 
 
Physical Examination:  
GENERAL ASSESSMENT: NAD HEENT:Nontraumatic CHEST: CTA HEART: S1S2 ABDOMEN: Soft,NT, 
:Walters: n EXTREMITY: EDEMA 
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 
 
Data Review No results for input(s): TNIPOC in the last 72 hours. No lab exists for component: ITNL Recent Labs  
  20 
0451 20 
0208 20 
0150  288 529* Recent Labs  
  20 
04520 
0208 20 
0150  145 148* K 4.1 3.8 4.0  
 111* 115* CO2 25 26 26 BUN 71* 68* 73* CREA 2.81* 2.70* 2.89* * 206* 220* PHOS 3.9 3.9 2.3*  
MG 2.0 2.1 2.2 CA 8.3* 8.2* 8.3* ALB 2.4* 2.4* 2.2* WBC 16.0* 13.5* 12.7* HGB 8.9* 9.7* 9.6* HCT 27.8* 30.0* 29.9*  
 200 177 No results for input(s): INR, PTP, APTT, INREXT, INREXT in the last 72 hours. Needs: urine analysis, urine sodium, protein and creatinine Lab Results Component Value Date/Time Sodium,urine random 58 2014 05:30 PM  
 Creatinine, urine 201.46 2014 05:30 PM  
 
 
 
Discussed with:  Family 
 
: Karley Kendrick MD 
2020 [facility]: 
[email or web address] [email or web address] [place] office: 
[address], [address] Phone: [phone] Fax :     [phone] [place] office: 
[address] [facility], [facility] Phone - [phone] Fax - [phone]

## 2020-01-31 NOTE — PROGRESS NOTES
@3pm.  
- spoke to radiology regarding the following  CT Chest reading:  
 
IMPRESSION: 
1. Suspect congestive failure with mild interstitial edema with bilateral 
pleural effusions with overlying compressive atelectasis. 2. Cholecystolithiasis. 3. Fluid-filled gastric distention. 4. Coronary artery disease. 5. Additional incidental findings as above. - per Radiology, Cholecystolithiasis is synonymous to cholelithiasis and \"fluid-filled gastric distension\" is not concerning in a pt w/o abdominal complaints/issues. In this case, our pt ate prior to imaging.   
 
Denis Ya MD

## 2020-01-31 NOTE — PROGRESS NOTES
Problem: Self Care Deficits Care Plan (Adult) Goal: *Acute Goals and Plan of Care (Insert Text) Description FUNCTIONAL STATUS PRIOR TO ADMISSION: Patient was independent and active without use of DME per daughter prior to admission. HOME SUPPORT: The patient lived with granddaughter; Daughter present and reports there were times when patient was alone PTA. Occupational Therapy Goals Initiated 1/28/2020 1. Patient will perform grooming with moderate assistance  within 7 day(s). 2.  Patient will perform upper body dressing and bathing with maximal assistance within 7 day(s). 3.  Patient will perform lower body dressing and bathing with maximal assistance within 7 day(s). 4.  Patient will perform toilet transfers to [facility] with moderate assistance  within 7 day(s). 5.  Patient will perform all aspects of toileting with moderate assistance  within 7 day(s). 6.  Patient will participate in upper extremity therapeutic exercise/activities with supervision/set-up for 10 minutes within 7 day(s). Outcome: Progressing Towards Goal 
 OCCUPATIONAL THERAPY TREATMENT Patient: Analy Persaud (80 y.o. male) Date: 1/31/2020 Diagnosis: HAP (hospital-acquired pneumonia) [J18.9, Y95] Weakness [R53.1] HAP (hospital-acquired pneumonia) Precautions:   
Chart, occupational therapy assessment, plan of care, and goals were reviewed. ASSESSMENT Patient continues with skilled OT services and is progressing very slowly towards goals. Mr. Monico Montez was received in bed agreeable to activity with encouragement. Patient with increased WOB/SOB noted at rest and with activity, decreased command following, decreased attention, and increased assistance needed today compared to previous txs. Patient was incontinent of urine and required total A for hygiene. He was able to come to sitting EOB with good overall sitting balance.   Patient required total A for dressing (UB and LB) and UB bathing. Patient transferred to the chair with mod A x1-2 and max verbal cues and guidance for safe technique and direction. Patient would benefit from continued skilled OT to progress towards goals and improve overall independence. Current Level of Function Impacting Discharge (ADLs): Patient required mod A x1-2 for bed mobility and OOB transfers. Patient requires total A for ADLs with decreased command following today. PLAN : 
Patient continues to benefit from skilled intervention to address the above impairments. Continue treatment per established plan of care. to address goals. Recommend with staff:  
Recommend patient be OOB to chair as frequently as tolerated; Goal of 3x/day for all meals for 60 minutes at a time. For toileting needs, recommend [facility] transfers with assist.   
Encourage patient involvement in personal care as able. Encourage exercises frequently throughout the day. Recommend next OT session: ADLs, ADL transfers, therapeutic ex Recommendation for discharge: (in order for the patient to meet his/her long term goals) Therapy up to 5 days/week in SNF setting This discharge recommendation: 
Has been made in collaboration with the attending provider and/or case management IF patient discharges home will need the following DME: none SUBJECTIVE:  
Patient stated I don't know.   Patient stated this when asked if he understood what I was asking him. OBJECTIVE DATA SUMMARY:  
Cognitive/Behavioral Status: 
Neurologic State: Alert Orientation Level: Unable to verbalize Cognition: Decreased command following;Memory loss;Poor safety awareness Perception: Cues to maintain midline in sitting;Cues to maintain midline in standing Perseveration: No perseveration noted Safety/Judgement: Decreased awareness of environment;Decreased awareness of need for assistance;Decreased awareness of need for safety Functional Mobility and Transfers for ADLs: 
Bed Mobility: 
Rolling: Moderate assistance Supine to Sit: Moderate assistance Sit to Supine: (pt remained up at end of tx) Scooting: Moderate assistance Transfers: 
Sit to Stand: Moderate assistance Bed to Chair: Moderate assistance Balance: 
Sitting: Intact Sitting - Static: Fair (occasional) Sitting - Dynamic: Fair (occasional) Standing: Impaired;Pull to stand; With support Standing - Static: Fair Standing - Dynamic : Fair ADL Intervention: 
Upper Body Dressing Assistance Dressing Assistance: Total assistance(dependent) Shirt simulation with hospital gown: Total assistance (dependent) Lower Body Dressing Assistance Dressing Assistance: Total assistance(dependent) Socks: Total assistance (dependent) Toileting Toileting Assistance: Total assistance(dependent) Bladder Hygiene: Total assistance (dependent) Bowel Hygiene: Total assistance (dependent) Clothing Management: Total assistance (dependent) Cues: Verbal cues provided Cognitive Retraining Safety/Judgement: Decreased awareness of environment;Decreased awareness of need for assistance;Decreased awareness of need for safety Activity Tolerance:  
Fair Please refer to the flowsheet for vital signs taken during this treatment. After treatment patient left in no apparent distress:  
Sitting in chair, Call bell within reach, Bed / chair alarm activated, and Caregiver / family present COMMUNICATION/COLLABORATION:  
The patients plan of care was discussed with: Physical Therapist, Registered Nurse, and patient. SHAWN Reddy/JUWAN Time Calculation: 28 mins

## 2020-01-31 NOTE — PROGRESS NOTES
CM follow up: 
 
Patient has increased leukocytosis and work of breathing today. Family would like referral to [facility] when ready for SNF placement, await direction from MD to make referral.  Patient has  - [facility] which will require Authorization for SNF. Please advise when closer to discharge. Marek Parisi RN, MSN/Care manager

## 2020-01-31 NOTE — PROGRESS NOTES
Cardiology Progress Note       [address] 
NAME:  Jm Menjivar. :   3/8/1931 MRN:   [id number] Assessment/Plan: 1. Elevated troponin / NSTEMI 
- No ischemic EKG changes.  Trop ~ 7.  CK was 4051 (MB Index 1.0). - Echo with moderate LV systolic dysfunction with Inferior and apical hypokinesis. - Suspect he has CAD (probably multivessel) - Patient is poor cath candidate (dementia, severe CKD).  Family is not interested in a cath and decline any potential caths and prefer conservative medical management. -  cont asa, statin, plavix. - palliative medicine following 2. LV dysfunction: EF 35-40% - cont bumex 1 mg every day  
-cont BB/ARB 
-daily weights, I/O. Low na diet 3. PAF RVR: 
-  now in SR.  
- consider stopping plavix and starting renally dosed eliquis. - family was to discuss DOAC and let us know. ---> daughter said that family decided not to switch to [address] (they are worried about fall and bleeding risk)   
4) CKD stage IV: cr 2.89  
  
5) PNA / Sepsis  
- per medicine team  
  
6) Prior CVA:  
 
7. dementia:  
 
8. HTN: norvasc, ARB 9. HLD: on statin 10. Deconditioned: needs A 2 with OOB. Will see prn, please call if needed. CARDIOLOGY ATTENDING Patient personally seen and examined. All the elements of history and examination were personally performed. Assessment and plan was discussed and agree as written above He remains confused. Seems comfortable at rest without complaints. O2 sats OK. Continue Bumex 1 mg daily. Continue cardiomyopathy meds as above. Daughter yesterday said that family decided not to switch to [address] (they are worried about fall and bleeding risk). I will sign off and see him back as an outpatient. Please call if any questions. Janee Arreola MD, [facility] [place]  
 
  
 
  
 
Subjective: Jeff Vazquez Jr. is a 80 y. o. male with known hx TIA, UTI, CKD4, dementia, DMII, HTN, HLD, hypothyroidism, pernicious anemia, hx colon CA s/p bowel resection, GERD, who presented to the ER via EMS after pt was found on the floor by grand daughter. Cardiology consulted for troponin elevation 3.8-->7. 5. CK was 4051. ProBNP 14k. CXR showed PNA and he was admitted with sepsis. Was recently admitted last month with UTI and AMS. Cardiac ROS:   Patient denies any exertional chest pain, palpitations, syncope, orthopnea, edema or paroxysmal nocturnal dyspnea. Previous Cardiac Eval 
Echo 11/18 - LVEF 55-60% Echo 12/15/19 - LVEF 55-60%, neg bubble study Carotid Doppler 12/15/19 - 0-49% stenosis bilat  
  
Echo 1/26/20 - TDS. LVEF 35-40%, Inferior and apical hypokinesis. mod pulm HTN, AV sclerosis  
  
  
EKG 1/25/20 - NSR, normal - I viewed myself  
  
CXR 1/26/20 - Migrating opacities compatible with a combination of pneumonia and edema. XR Results (most recent): 
Results from [facility] Encounter encounter on 01/25/20 XR CHEST PA LAT Narrative Chest 2 views dated 1/30/2020 Comparison chest dated 1/26/2020 History is reevaluate pneumonia AP and lateral views of the chest were obtained. It is difficult to accurately 
assess the size of the cardiac silhouette. There continues to be prominence of 
the pulmonary interstitial markings. This is suggestive of congestive change. There has been a decrease in the conspicuity of the interstitial markings on the 
right. There is some indistinctness of the left hemidiaphragm. Impression IMPRESSION: Abnormal prominence of the pulmonary interstitial markings as 
described above. Improved aeration of the right lung. Review of Systems: No nausea, indigestion, vomiting, pain, cough, sputum. No bleeding. Taking po. Objective:  
 
Visit Vitals /90 (BP 1 Location: Left arm, BP Patient Position: At rest;Supine) Pulse 81 Temp 98.3 °F (36.8 °C) Resp 26 Ht 6' (1.829 m) Wt 204 lb 1.6 oz (92.6 kg) SpO2 94% BMI 27.68 kg/m² O2 Flow Rate (L/min): 2 l/min O2 Device: Room air Temp (24hrs), Av.3 °F (36.8 °C), Min:97.9 °F (36.6 °C), Max:98.7 °F (37.1 °C) No intake/output data recorded.  1901 -  0700 In: 1283.8 [P.O.:410; I.V.:723.8] Out: 975 [Urine:975] TELE:  PVC's General: AAOx 1 cooperative, no acute distress. [place] HEENT: Atraumatic. Pink and moist.  Anicteric sclerae. Neck : Supple. Lungs: fine exp wheezing. Heart: Regular rhythm, no murmur, no rubs, no gallops. No JVD. No carotid bruits. Abdomen: Soft, non-distended, non-tender. + Bowel sounds. Extremities: No edema Neurologic: Grossly intact. Alert and oriented X1   No acute neurological distress. Psych: Limited insight. Care Plan discussed with: 
  Comments Patient x Family RN x Care Manager Consultant:     
 
 
Data Review: No lab exists for component: ITNL Recent Labs  
  20 
0451 20 
0208 20 
0150  288 529* Recent Labs  
  20 
0451 20 
0208 20 
0150  145 148* K 4.1 3.8 4.0  
 111* 115* CO2 25 26 26 BUN 71* 68* 73* CREA 2.81* 2.70* 2.89* * 206* 220* PHOS 3.9 3.9 2.3*  
MG 2.0 2.1 2.2 ALB 2.4* 2.4* 2.2* WBC 16.0* 13.5* 12.7* HGB 8.9* 9.7* 9.6* HCT 27.8* 30.0* 29.9*  
 200 177 No results for input(s): INR, PTP, APTT, INREXT, INREXT in the last 72 hours. Medications reviewed Current Facility-Administered Medications Medication Dose Route Frequency  heparin (porcine) injection 5,000 Units  5,000 Units SubCUTAneous Q8H  
 albuterol (PROVENTIL VENTOLIN) nebulizer solution 2.5 mg  2.5 mg Nebulization Q4H PRN  
 bumetanide (BUMEX) tablet 1 mg  1 mg Oral DAILY  albuterol-ipratropium (DUO-NEB) 2.5 MG-0.5 MG/3 ML  3 mL Nebulization Q6H RT  
 amoxicillin-clavulanate (AUGMENTIN) 500-125 mg per tablet 1 Tab  1 Tab Oral Q12H  doxycycline (VIBRA-TABS) tablet 100 mg  100 mg Oral Q12H  
 linaGLIPtin (TRADJENTA) tablet 5 mg  5 mg Oral ACB  aspirin chewable tablet 81 mg  81 mg Oral DAILY  metoprolol tartrate (LOPRESSOR) tablet 25 mg  25 mg Oral Q12H  
 losartan (COZAAR) tablet 25 mg  25 mg Oral DAILY  atorvastatin (LIPITOR) tablet 40 mg  40 mg Oral DAILY  clopidogreL (PLAVIX) tablet 75 mg  75 mg Oral DAILY  amLODIPine (NORVASC) tablet 10 mg  10 mg Oral DAILY  insulin lispro (HUMALOG) injection   SubCUTAneous AC&HS  sodium chloride (NS) flush 5-40 mL  5-40 mL IntraVENous Q8H  
 sodium chloride (NS) flush 5-40 mL  5-40 mL IntraVENous PRN  
 glucose chewable tablet 16 g  4 Tab Oral PRN  
 dextrose (D50W) injection syrg 12.5-25 g  12.5-25 g IntraVENous PRN  
 glucagon (GLUCAGEN) injection 1 mg  1 mg IntraMUSCular PRN  
 dextrose 10% infusion 0-250 mL  0-250 mL IntraVENous PRN Sue Robin NP

## 2020-01-31 NOTE — PROGRESS NOTES
Shift Change: 
 
Bedside and Verbal shift change report given to Lisa Alberto RN orienting with Soila Richardson (oncoming nurse) by Sujata Perkins (offgoing nurse). Report included the following information SBAR, Kardex, and Quality Measures. Shift Summary: 
 
Pt slept well throughout the night. No complaints of pain or distress. VSS. Blood sugar - (1unit Humalog) Pt had multiple incontinent episodes of urine, last night. Condom cath nonfunctional for this pt. End of Shift Report: 
 
Bedside and Verbal shift change report given to ELLEN Hernandez (oncoming nurse) by Vick Davidson, Student by Lisa Alberto RN (offgoing nurse). Report to include information SBAR, Kardex, and Quality Measures.

## 2020-01-31 NOTE — PROGRESS NOTES
Problem: Dysphagia (Adult) Goal: *Acute Goals and Plan of Care (Insert Text) Description Swallowing goals initiated 1-27-20: 
1) tolerate mech soft, thins without s/s aspiration by 1-30-20 
2) downgraded to dysphagia 1, nectars due to respiratory status Outcome: Not Progressing Towards Goal 
 
SPEECH LANGUAGE PATHOLOGY DYSPHAGIA TREATMENT Patient: Iris Fernández (80 y.o. male) Date: 1/31/2020 Diagnosis: HAP (hospital-acquired pneumonia) [J18.9, Y95] Weakness [R53.1] HAP (hospital-acquired pneumonia) Precautions: aspiration ASSESSMENT: 
Increased WOB today. See Chest CT:  
Fluids in chest may increase WOB and RR, which then increase his aspiration risks due to difficulty cessating respiration for deglutition. Have downgraded diet to slow fluid movement in oral-pharyngeal cavity and to eliminate chew load. PLAN: 
Recommendations and Planned Interventions: 
Downgrade diet to dysphagia 1, nectars. Patient continues to benefit from skilled intervention to address the above impairments. Continue treatment per established plan of care. Discharge Recommendations: To Be Determined, SNF was recommended. SUBJECTIVE:  
Patient interested in PO. . 
 
OBJECTIVE:  
Cognitive and Communication Status: 
Neurologic State: Alert Orientation Level: Unable to verbalize Cognition: Follows commands, Other (comment)(slow in following commands ) Perception: Appears intact Perseveration: No perseveration noted Safety/Judgement: Decreased insight into deficits Dysphagia Treatment: 
Oral Assessment: P.O. Trials: 
Patient Position: up in bed Vocal quality prior to P.O.:   
Consistency Presented: Thin liquid How Presented: Self-fed/presented;Straw ORAL PHASE:  
Rn noted continued excessive chewing at breakfast 
PHARYNGEAL Phase:  
P atient with SOB and WOB>  
RN reports he went for CT chest this am after bfast and then returned SOB. HE had wheezing instead of congestion. He was unable in this session to cessate breathing for subsequent swallows of liquids, so SLP recommended holding for breathing tx. After breathing tx, patient's respirations were improved. AFter eating, he was SOB again with wheezing. Chest CT appears to show more fluid than aspiration:  
 
  IMPRESSION: 
  
1. Suspect congestive failure with mild interstitial edema with bilateral 
pleural effusions with overlying compressive atelectasis. 2. Cholecystolithiasis. 3. Fluid-filled gastric distention. 4. Coronary artery disease. 5. Additional incidental findings as above. HOWEVER, increased fluids in lungs with increased WOB increases his aspiration risks , as he may have difficulty coordinating respiration and deglutition. Changed diet to purees, nectars to slow down bolus transit and avoid fatigue from chewing load at this time. Exercises: 
Laryngeal Exercises: 
  
  
  
  
  
  
  
  
  
  
  
  
  
  
  
  
  
  
  
  
  
  
  
  
  
  
  
  
  
  
  
  
  
  
  
  
  
  
  
  
  
  
  
Pain: 
Pain Scale 1: Numeric (0 - 10) Pain Intensity 1: 0 After treatment:  
Patient left in no apparent distress in bed, Nursing notified and Caregiver / family present COMMUNICATION/EDUCATION:  
Patient was educated regarding his deficit(s) of dysphagia  as this relates to his diagnosis of increase WOB. He demonstrated Guarded understanding as evidenced by dementia. Family present and understood. . 
 
The patient's plan of care including recommendations, planned interventions, and recommended diet changes were discussed with: Registered Nurse. Kashif Thurman SLP Time Calculation: 25 mins

## 2020-02-01 NOTE — PROGRESS NOTES
Problem: Falls - Risk of 
Goal: *Absence of Falls Description Document Coco Lema Fall Risk and appropriate interventions in the flowsheet. Outcome: Progressing Towards Goal 
Note: Fall Risk Interventions: 
Mobility Interventions: Bed/chair exit alarm, Patient to call before getting OOB, PT Consult for mobility concerns, PT Consult for assist device competence, Utilize walker, cane, or other assistive device, Utilize gait belt for transfers/ambulation Mentation Interventions: Adequate sleep, hydration, pain control, Bed/chair exit alarm, Door open when patient unattended, Eyeglasses and hearing aids, Gait belt with transfers/ambulation, More frequent rounding, Reorient patient Medication Interventions: Patient to call before getting OOB, Teach patient to arise slowly, Bed/chair exit alarm, Utilize gait belt for transfers/ambulation Elimination Interventions: Bed/chair exit alarm, Call light in reach, Toileting schedule/hourly rounds History of Falls Interventions: Bed/chair exit alarm, Utilize gait belt for transfer/ambulation, Assess for delayed presentation/identification of injury for 48 hrs (comment for end date) Problem: Patient Education: Go to Patient Education Activity Goal: Patient/Family Education Outcome: Progressing Towards Goal 
  
Problem: Pressure Injury - Risk of 
Goal: *Prevention of pressure injury Description Document Carroll Scale and appropriate interventions in the flowsheet. Outcome: Progressing Towards Goal 
Note: Pressure Injury Interventions: 
Sensory Interventions: Assess changes in LOC, Check visual cues for pain, Keep linens dry and wrinkle-free, Maintain/enhance activity level, Turn and reposition approx. every two hours (pillows and wedges if needed) Moisture Interventions: Absorbent underpads, Apply protective barrier, creams and emollients, Check for incontinence Q2 hours and as needed, Moisture barrier Activity Interventions: Increase time out of bed, PT/OT evaluation, Pressure redistribution bed/mattress(bed type) Mobility Interventions: HOB 30 degrees or less, Pressure redistribution bed/mattress (bed type), PT/OT evaluation, Turn and reposition approx. every two hours(pillow and wedges) Nutrition Interventions: Document food/fluid/supplement intake, Offer support with meals,snacks and hydration Friction and Shear Interventions: Lift team/patient mobility team, Transferring/repositioning devices, Apply protective barrier, creams and emollients Problem: Patient Education: Go to Patient Education Activity Goal: Patient/Family Education Outcome: Progressing Towards Goal 
  
Problem: Patient Education: Go to Patient Education Activity Goal: Patient/Family Education Outcome: Progressing Towards Goal 
  
Problem: Sepsis: Day of Diagnosis Goal: Off Pathway (Use only if patient is Off Pathway) Outcome: Progressing Towards Goal 
Goal: *Fluid resuscitation Outcome: Progressing Towards Goal 
Goal: *Paired blood cultures prior to first dose of antibiotic Outcome: Progressing Towards Goal 
Goal: *First dose of  appropriate antibiotic within 3 hours of arrival to ED, within 1 hour of arrival to ICU Outcome: Progressing Towards Goal 
Goal: *Lactic acid with first set of blood cultures Outcome: Progressing Towards Goal 
Goal: *Pneumococcal immunization (if eligible) Outcome: Progressing Towards Goal 
Goal: *Influenza immunization (if eligible) Outcome: Progressing Towards Goal 
Goal: Activity/Safety Outcome: Progressing Towards Goal 
Goal: Consults, if ordered Outcome: Progressing Towards Goal 
Goal: Diagnostic Test/Procedures Outcome: Progressing Towards Goal 
Goal: Nutrition/Diet Outcome: Progressing Towards Goal 
Goal: Discharge Planning Outcome: Progressing Towards Goal 
Goal: Medications Outcome: Progressing Towards Goal 
Goal: Respiratory Outcome: Progressing Towards Goal 
 Goal: Treatments/Interventions/Procedures Outcome: Progressing Towards Goal 
Goal: Psychosocial 
Outcome: Progressing Towards Goal 
  
Problem: Sepsis: Day 2 Goal: Off Pathway (Use only if patient is Off Pathway) Outcome: Progressing Towards Goal 
Goal: *Central Venous Pressure maintained at 8-12 mm Hg Outcome: Progressing Towards Goal 
Goal: *Hemodynamically stable Outcome: Progressing Towards Goal 
Goal: *Tolerating diet Outcome: Progressing Towards Goal 
Goal: Activity/Safety Outcome: Progressing Towards Goal 
Goal: Consults, if ordered Outcome: Progressing Towards Goal 
Goal: Diagnostic Test/Procedures Outcome: Progressing Towards Goal 
Goal: Nutrition/Diet Outcome: Progressing Towards Goal 
Goal: Discharge Planning Outcome: Progressing Towards Goal 
Goal: Medications Outcome: Progressing Towards Goal 
Goal: Respiratory Outcome: Progressing Towards Goal 
Goal: Treatments/Interventions/Procedures Outcome: Progressing Towards Goal 
Goal: Psychosocial 
Outcome: Progressing Towards Goal 
  
Problem: Sepsis: Day 3 Goal: Off Pathway (Use only if patient is Off Pathway) Outcome: Progressing Towards Goal 
Goal: *Central Venous Pressure maintained at 8-12 mm Hg Outcome: Progressing Towards Goal 
Goal: *Oxygen saturation within defined limits Outcome: Progressing Towards Goal 
Goal: *Vital sign stability Outcome: Progressing Towards Goal 
Goal: *Tolerating diet Outcome: Progressing Towards Goal 
Goal: *Demonstrates progressive activity Outcome: Progressing Towards Goal 
Goal: Activity/Safety Outcome: Progressing Towards Goal 
Goal: Consults, if ordered Outcome: Progressing Towards Goal 
Goal: Diagnostic Test/Procedures Outcome: Progressing Towards Goal 
Goal: Nutrition/Diet Outcome: Progressing Towards Goal 
Goal: Discharge Planning Outcome: Progressing Towards Goal 
Goal: Medications Outcome: Progressing Towards Goal 
Goal: Respiratory Outcome: Progressing Towards Goal 
 Goal: Treatments/Interventions/Procedures Outcome: Progressing Towards Goal 
Goal: Psychosocial 
Outcome: Progressing Towards Goal 
  
Problem: Sepsis: Day 4 Goal: Off Pathway (Use only if patient is Off Pathway) Outcome: Progressing Towards Goal 
Goal: Activity/Safety Outcome: Progressing Towards Goal 
Goal: Consults, if ordered Outcome: Progressing Towards Goal 
Goal: Diagnostic Test/Procedures Outcome: Progressing Towards Goal 
Goal: Nutrition/Diet Outcome: Progressing Towards Goal 
Goal: Discharge Planning Outcome: Progressing Towards Goal 
Goal: Medications Outcome: Progressing Towards Goal 
Goal: Respiratory Outcome: Progressing Towards Goal 
Goal: Treatments/Interventions/Procedures Outcome: Progressing Towards Goal 
Goal: Psychosocial 
Outcome: Progressing Towards Goal 
Goal: *Oxygen saturation within defined limits Outcome: Progressing Towards Goal 
Goal: *Hemodynamically stable Outcome: Progressing Towards Goal 
Goal: *Vital signs within defined limits Outcome: Progressing Towards Goal 
Goal: *Tolerating diet Outcome: Progressing Towards Goal 
Goal: *Demonstrates progressive activity Outcome: Progressing Towards Goal 
Goal: *Fluid volume maintenance Outcome: Progressing Towards Goal 
  
Problem: Sepsis: Day 5 Goal: Off Pathway (Use only if patient is Off Pathway) Outcome: Progressing Towards Goal 
Goal: *Oxygen saturation within defined limits Outcome: Progressing Towards Goal 
Goal: *Vital signs within defined limits Outcome: Progressing Towards Goal 
Goal: *Tolerating diet Outcome: Progressing Towards Goal 
Goal: *Demonstrates progressive activity Outcome: Progressing Towards Goal 
Goal: *Discharge plan identified Outcome: Progressing Towards Goal 
Goal: Activity/Safety Outcome: Progressing Towards Goal 
Goal: Consults, if ordered Outcome: Progressing Towards Goal 
Goal: Diagnostic Test/Procedures Outcome: Progressing Towards Goal 
Goal: Nutrition/Diet Outcome: Progressing Towards Goal 
Goal: Discharge Planning Outcome: Progressing Towards Goal 
Goal: Medications Outcome: Progressing Towards Goal 
Goal: Respiratory Outcome: Progressing Towards Goal 
Goal: Treatments/Interventions/Procedures Outcome: Progressing Towards Goal 
Goal: Psychosocial 
Outcome: Progressing Towards Goal 
  
Problem: Sepsis: Day 6 Goal: Off Pathway (Use only if patient is Off Pathway) Outcome: Progressing Towards Goal 
Goal: *Oxygen saturation within defined limits Outcome: Progressing Towards Goal 
Goal: *Vital signs within defined limits Outcome: Progressing Towards Goal 
Goal: *Tolerating diet Outcome: Progressing Towards Goal 
Goal: *Demonstrates progressive activity Outcome: Progressing Towards Goal 
Goal: Influenza immunization Outcome: Progressing Towards Goal 
Goal: *Pneumococcal immunization Outcome: Progressing Towards Goal 
Goal: Activity/Safety Outcome: Progressing Towards Goal 
Goal: Diagnostic Test/Procedures Outcome: Progressing Towards Goal 
Goal: Nutrition/Diet Outcome: Progressing Towards Goal 
Goal: Discharge Planning Outcome: Progressing Towards Goal 
Goal: Medications Outcome: Progressing Towards Goal 
Goal: Respiratory Outcome: Progressing Towards Goal 
Goal: Treatments/Interventions/Procedures Outcome: Progressing Towards Goal 
Goal: Psychosocial 
Outcome: Progressing Towards Goal 
  
Problem: Sepsis: Discharge Outcomes Goal: *Vital signs within defined limits Outcome: Progressing Towards Goal 
Goal: *Tolerating diet Outcome: Progressing Towards Goal 
Goal: *Verbalizes understanding and describes prescribed diet Outcome: Progressing Towards Goal 
Goal: *Demonstrates progressive activity Outcome: Progressing Towards Goal 
Goal: *Describes follow-up/return visits to physicians Outcome: Progressing Towards Goal 
Goal: *Verbalizes name, dosage, time, side effects, and number of days to continue medications Outcome: Progressing Towards Goal 
 Goal: *Influenza immunization (Oct-Mar only) Outcome: Progressing Towards Goal 
Goal: *Pneumococcal immunization Outcome: Progressing Towards Goal 
Goal: *Lungs clear or at baseline Outcome: Progressing Towards Goal 
Goal: *Oxygen saturation returns to baseline or 90% or better on room air Outcome: Progressing Towards Goal 
Goal: *Glycemic control Outcome: Progressing Towards Goal 
Goal: *Absence of deep venous thrombosis signs and symptoms(Stroke Metric) Outcome: Progressing Towards Goal 
Goal: *Describes available resources and support systems Outcome: Progressing Towards Goal 
Goal: *Optimal pain control at patient's stated goal 
Outcome: Progressing Towards Goal

## 2020-02-01 NOTE — PROGRESS NOTES
Shift Change: 
Bedside and Verbal shift change report given to Zahira Wang RN orienting with Cherelle Rubio RN(oncoming nurses) by Riccardo Lorenz (offgoing nurse) and Ryland Avila, Student. Report included the following information SBAR, Kardex, and Quality Measures. Shift Summary: 
Pt slept well throughout the night. No change in assessment. Remains on room air, O2Sats >95%. Denies pain. Remains incontinent urine, No BM overnight,.

## 2020-02-01 NOTE — PROGRESS NOTES
Problem: Patient Education: Go to Patient Education Activity Goal: Patient/Family Education Outcome: Progressing Towards Goal 
  
Problem: Pressure Injury - Risk of 
Goal: *Prevention of pressure injury Description Document Carroll Scale and appropriate interventions in the flowsheet. Outcome: Progressing Towards Goal 
Note: Pressure Injury Interventions: 
Sensory Interventions: Assess changes in LOC, Check visual cues for pain, Keep linens dry and wrinkle-free, Maintain/enhance activity level, Turn and reposition approx. every two hours (pillows and wedges if needed) Moisture Interventions: Absorbent underpads, Apply protective barrier, creams and emollients, Check for incontinence Q2 hours and as needed, Moisture barrier Activity Interventions: Increase time out of bed, PT/OT evaluation, Pressure redistribution bed/mattress(bed type) Mobility Interventions: HOB 30 degrees or less, Pressure redistribution bed/mattress (bed type), PT/OT evaluation, Turn and reposition approx. every two hours(pillow and wedges) Nutrition Interventions: Document food/fluid/supplement intake, Offer support with meals,snacks and hydration Friction and Shear Interventions: Lift team/patient mobility team, Transferring/repositioning devices, Apply protective barrier, creams and emollients

## 2020-02-01 NOTE — PROGRESS NOTES
Jake Iyer Jesus [address] Office [phone] Fax [phone] Assessment and Plan [place]. is a 80 y.o. male admitted for sepsis 2/2 HAP. Also found to acute metabolic encephalopathy and NSTEMI/Type II MI in the setting of Grade 1 diastolic dysfunction. 24 Hour Events: ALPHONSO. ACUTE PROBLEMS Sepsis 2/2 HAP vs concern for Aspiration PNA: 2/4 SIRS (WBC 17.6, RR26). RUL PNA on (CXR,CT C-spine, CT abd/pelvis). Concern for aspiration per Speech Eval.  LA down-trended. Procal wnl. RVP neg. ^ESR 61. ^CRP >9.7. Bcx NG 4D, Ucx neg. V/Q scan neg. Legionella neg. CT chest  
- po Augmentin + Doxy (day 4/7 Total of Abx) - Daily CBC, CMP, Mag and phos - Duonebs q6h  & prn q4H  
  
Anemia: POC 8.5 (BL 12-13). .5 
-Iron profile  
-Ferritin, Retic count, Haptoglobin 
-B12 and Folate 
-FOBT Leukocytosis: WBC trending up to 16.0. CT chest does not show infectious process and points towards CHF with pleural effusion and overlying atelectasis. Repeat UA no wbc, LE, or nitrites. Likely a stress reaction.   
-daily CBC & monitor for sx of infection Acute Metabolic Encephalopathy in the setting of Dementia w/ Fall: IMPROVING per family. Likely 2/2 HAP. CT head neg. Stroke work-up last month showed chronic white matter disease and superficial siderosis of R frontal lobe, EEG gen slowing. Carotid dopplers in Dec'19 showed chronic mild-mod L ICA stenoses. Pt was supposed to follow-up w/ neurology early Jan but never did. Fall evidenced by R arm abrasion. Family declined. - Tx of HAP as above - Family wants does not want DOAC b/c concern for bleeding.- cont Plavix - Fall precautions, wound care - F/u outpt Neuro NSTEMI/Type II MI in setting of Mod Systolic Dysfunction: likely 2/2 rhabdomyolysis s/p fall. POA Trop 3.81 & NT pro-BNP 63601. ^CK 5349 ^CKMB 42.3. ECHO:  EF 13-06%, mod systolic dysf, mod pul HTN.  
- Home Bumex 1mg daily - Strict I &Os - F/u Cards c/s: ^Trops likely 2/2 to Rhabdo, poor cath or outpt cardiac rehab candidate (dementia & severe CKD3) & family declines. Family declines Plavix switch to Eliquis. - Continue tele monitoring Rhabdomyolysis: RESOLVED. likely 2/2 to fall. CK down-trending POA CK 2322-->downtrended-->288. Pt was down for possibly >1hour. Sustained R arm abrasion, neg (CT C-spine, XR R forearm x2, hand).  WILTON on CKD4: IMPROVING. POA Cr 3.1 (BL ~2.5). Likely 2/2 sepsis and poor hydration. - MIVF, Avoid nephrotoxic agents. - Daily CMP 
- Nephro c/s: signed off. Safe for DC. poor HD candidate Elevated AST: POA likely 2/2 shock liver. Hepatitis panel neg.  
- Daily CMP  
  
CHRONIC PROBLEMS  
 
HTN: Stable. - Continue home norvasc 10mg daily, losartan 25mg daily 
- Continue Metoprolol 25mg daily 
- Hold home lasix until renal functions improve. 
   
DMII: POA glucose 293; HA1c 12/2019 (6.5) - Resumed home Tradjenta 5mg. Hold home Amaryl 5mg 
- SSI, q6hrs POC gluc - will consider adjusting regimen  
- Diabetic mechanical soft diet 
  
HLD: Last lipid panel 12/19 [Tchol 185, , HDL 33, ]. Hepatitis panel neg.  
- Continue home niacin & Lipitor 40mg daily  
  
GERD: Switch home prevacid to protonix 
  
Hypothyroidism: TSH low 0.15, T4 wnl, T3 low 1.5 
- Continue home armour thyroid 120mg--> consider reduce to 100mcg in OP 
- F/u outpt PCP  
  
Pernicious Anemia: POA Hgb 12.5 
- Continue home ferrous sulfate and thera-MV 
  
Dementia: Continue home aricept 
  
Obesity: Body mass index is 27.34 kg/m². - Encouraging lifestyle modifications and further follow up outpatient.  
  
  
FEN/GI - diabetic mechanical soft, MIVF Activity - Ambulate with assistance DVT prophylaxis - SC Heparin. GI prophylaxis - Protonix Fall prophylaxis - Fall precautions ordered. Disposition - Telemetry. Plan to d/c to Home per family's wishes. C/s PT/OT/CM Code Status - DNR, discussed with patient / caregivers. Next of Isis [phone] (Son) [phone], Sergey Monroe (dtr) [phone], Logan Harrison (son in law) [phone] I appreciate the opportunity to participate in the care of this patient, 
Jim Mcneil DO Family Medicine Resident Subjective / Objective Subjective : A&Ox1 He denied CP/palp/N/V/fever/chills. Denies pain. Temp (24hrs), Av.7 °F (37.1 °C), Min:98.3 °F (36.8 °C), Max:99.3 °F (37.4 °C) Visit Vitals /64 Pulse 66 Temp 98.5 °F (36.9 °C) Resp 28 Ht 6' (1.829 m) Wt 192 lb 8 oz (87.3 kg) SpO2 93% BMI 26.11 kg/m² Physical Exam 
General:   Alert, cooperative, no acute distress Head:   Atraumatic Eyes:   Conjunctivae clear ENT:  Oral mucosa normal  
Neck:  Supple, trachea midline, no adenopathy No JVD Back:    No CVA tenderness Lungs:   audible Expiratory wheeze BL. No resp distress/chest retractions. Heart:   Regular rhythm, no murmur Abdomen:    Soft, non-tender No masses or organomegaly Extremities:  No edema or DVT signs Skin:  Warm and dry. Abrasion on Rt Forearm. Clean dry dressing. Neurologic:  A&Ox 1. No focal deficits I/O: 
Date 20 - 20 4709 20 - 20 5573 Shift [id number] 24 Hour Total [id number] 8721-3094 24 Hour Total  
INTAKE Shift Total(mL/kg) OUTPUT Urine(mL/kg/hr) 600(0.5)  600 Urine 300  300 Urine Voided 300  300 Urine Occurrence(s) 3 x 3 x 6 x Shift Total(mL/kg) 600(6.5)  600(6.9) NET -600  -600 Weight (kg) 92.6 87.3 87.3 87.3 87.3 87.3 Inpatient Medications Current Facility-Administered Medications Medication Dose Route Frequency  polyethylene glycol (MIRALAX) packet 17 g  17 g Oral DAILY  docusate sodium (COLACE) capsule 100 mg  100 mg Oral DAILY  pantoprazole (PROTONIX) tablet 40 mg  40 mg Oral ACB  heparin (porcine) injection 5,000 Units  5,000 Units SubCUTAneous Q8H  
 albuterol (PROVENTIL VENTOLIN) nebulizer solution 2.5 mg  2.5 mg Nebulization Q4H PRN  
 bumetanide (BUMEX) tablet 1 mg  1 mg Oral DAILY  albuterol-ipratropium (DUO-NEB) 2.5 MG-0.5 MG/3 ML  3 mL Nebulization Q6H RT  
 amoxicillin-clavulanate (AUGMENTIN) 500-125 mg per tablet 1 Tab  1 Tab Oral Q12H  
 doxycycline (VIBRA-TABS) tablet 100 mg  100 mg Oral Q12H  
 linaGLIPtin (TRADJENTA) tablet 5 mg  5 mg Oral ACB  aspirin chewable tablet 81 mg  81 mg Oral DAILY  metoprolol tartrate (LOPRESSOR) tablet 25 mg  25 mg Oral Q12H  
 losartan (COZAAR) tablet 25 mg  25 mg Oral DAILY  atorvastatin (LIPITOR) tablet 40 mg  40 mg Oral DAILY  clopidogreL (PLAVIX) tablet 75 mg  75 mg Oral DAILY  amLODIPine (NORVASC) tablet 10 mg  10 mg Oral DAILY  insulin lispro (HUMALOG) injection   SubCUTAneous AC&HS  sodium chloride (NS) flush 5-40 mL  5-40 mL IntraVENous Q8H  
 sodium chloride (NS) flush 5-40 mL  5-40 mL IntraVENous PRN  
 glucose chewable tablet 16 g  4 Tab Oral PRN  
 dextrose (D50W) injection syrg 12.5-25 g  12.5-25 g IntraVENous PRN  
 glucagon (GLUCAGEN) injection 1 mg  1 mg IntraMUSCular PRN  
 dextrose 10% infusion 0-250 mL  0-250 mL IntraVENous PRN Allergies Allergies Allergen Reactions  Synthroid [Levothyroxine] Other (comments) Confused, hallucinations CBC: 
Recent Labs 02/01/20 
0216 01/31/20 
0451 01/30/20 
3645 WBC 14.0* 16.0* 13.5* HGB 8.5* 8.9* 9.7* HCT 27.0* 27.8* 30.0*  
 210 200 Metabolic Panel: 
Recent Labs 02/01/20 
0216 01/31/20 
0451 01/30/20 
9980  140 145  
K 4.3 4.1 3.8  107 111* CO2 27 25 26 BUN 71* 71* 68* CREA 2.96* 2.81* 2.70* * 195* 206* CA 8.2* 8.3* 8.2* MG 2.1 2.0 2.1 PHOS 4.0 3.9 3.9 ALB 2.4* 2.4* 2.4* SGOT 45* 40* 59* ALT 41 43 49 For Billing Chief Complaint Patient presents with  Fall  
 Skin Problem Hospital Problems  Date Reviewed: 12/19/2019 Codes Class Noted POA Rhabdomyolysis ICD-10-CM: R59.60 ICD-9-CM: 728.88  1/27/2020 Unknown Sepsis ([place] [address].) ICD-10-CM: A41.9 ICD-9-CM: 038.9, 995.91  1/26/2020 Unknown Fall ICD-10-CM: W19. Omer Hashimoto ICD-9-CM: G209.6  1/26/2020 Unknown NSTEMI (non-ST elevated myocardial infarction) ([facility].) ICD-10-CM: I21.4 ICD-9-CM: 410.70  1/26/2020 Unknown Weakness ICD-10-CM: R53.1 ICD-9-CM: 780.79  1/25/2020 Unknown * (Principal) HAP (hospital-acquired pneumonia) ICD-10-CM: J18.9, Y95 
ICD-9-CM: 486  1/25/2020 Unknown AMS (altered mental status) ICD-10-CM: R41.82 
ICD-9-CM: 780.97  12/14/2019 Yes CKD (chronic kidney disease) stage 4, GFR 15-29 ml/min (HCC) ICD-10-CM: N18.4 ICD-9-CM: 585.4  4/22/2019 Yes Dementia without behavioral disturbance (HCC) ICD-10-CM: F03.90 ICD-9-CM: 294.20  8/16/2016 Yes WILTON (acute kidney injury) ([place] [address].) ICD-10-CM: N17.9 ICD-9-CM: 584.9  8/2/2014 Yes Pernicious anemia ICD-10-CM: D51.0 ICD-9-CM: 281.0  7/31/2012 Yes Acquired hypothyroidism ICD-10-CM: E03.9 ICD-9-CM: 244.9  4/26/2012 Yes Diabetes mellitus type 2, controlled ([place] [address].) ICD-10-CM: E11.9 ICD-9-CM: 250.00  2/9/2012 Yes Essential hypertension, benign ICD-10-CM: I10 
ICD-9-CM: 401.1  2/9/2012 Yes HLD (hyperlipidemia) ICD-10-CM: E78.5 ICD-9-CM: 272.4  2/9/2012 Unknown Esophageal reflux ICD-10-CM: K21.9 ICD-9-CM: 530.81  2/9/2012 Yes

## 2020-02-01 NOTE — PROGRESS NOTES
Patient straight cathed for a urine sample per doctor order due to incontinence. 300 cc of clear yellow urine noted. Patient noted with restricted foreskin and very narrow urethra and meatal opening, resistance with insertion noted but no symptoms of trauma, clear yellow urine output.

## 2020-02-01 NOTE — PROGRESS NOTES
Shift Change: Assumed care of pt at 2300. Bedside and Verbal shift change report given to Josie Ryan RN orienting with Cassi Silverman RN (oncoming nurse) by Sharon Jacob nurse). Report included the following information SBAR, Kardex, and Quality Measures. Shift Summary: 
Pt resting in bed. Pt A&Ox4. Denies pain. No bowel movement overnight.  Remains SR.

## 2020-02-01 NOTE — ROUTINE PROCESS
Bedside and Verbal shift change report given to Amy Rose (oncoming nurse) by Evelin Brito (offgoing nurse). Report included the following information SBAR, Kardex, ED Summary, Procedure Summary, Intake/Output, MAR, Recent Results, Med Rec Status and Cardiac Rhythm NSR.

## 2020-02-02 NOTE — PROGRESS NOTES
Bedside and Verbal shift change report given to Subhash Tong (oncoming nurse) by Tanmay Henry (offgoing nurse). Report included the following information SBAR, Kardex, Accordion and Recent Results. 2030 Abdomin rigid and distended, Unable to auscultate bowel sounds, Abdomin tender during palpation, No nausea and vomiting. Call to family practice to make aware, no new orders [address] Patients tongue is black in color. Patient does chew on medication if not crushed and he is on Ferrous Sulfate tablets BID. Call to family practice to make aware, no new orders 2/2/20 
0730 Bedside and Verbal shift change report given to Fady Cohen (oncoming nurse) by Subhash Tong (offgoing nurse). Report included the following information SBAR, Kardex, Accordion and Recent Results.

## 2020-02-02 NOTE — PROGRESS NOTES
Jake Herrera [address] Office [phone] Fax [phone] Assessment and Plan Donnie Jeong is a 80 y.o. male admitted for sepsis 2/2 HAP. Also found to acute metabolic encephalopathy and NSTEMI/Type II MI in the setting of Grade 1 diastolic dysfunction. 24 Hour Events: Noted to have distended and tender abdomen. Had BM yesterday. Will obtain KUB. ACUTE PROBLEMS Sepsis 2/2 HAP vs concern for Aspiration PNA: 2/4 SIRS (WBC 17.6, RR26). RUL PNA on (CXR,CT C-spine, CT abd/pelvis). Concern for aspiration per Speech Eval.  LA down-trended. Procal wnl. RVP neg. ^ESR 61. ^CRP >9.7. Bcx NG 5 days (final), Ucx neg. V/Q scan neg. Legionella neg. CT chest w/ CHF w/ B/l edema/effusions and underlying  atelectasis. - po Augmentin + Doxy (for 7-day course) - Daily CBC, CMP, Mag and phos - Duonebs q6h  & prn q4H  
  
Anemia: pernicious/anemia of chronic disease (low TIBC, high ferritin) POC 8.5 (BL 12-13). .5. Haptoglobin 370, B12/folate wnl.  
- Continue home ferrous sulfate and thera-MV 
- Will obtain FOBT 
- Repeat CBC this afternoon. Abdominal distension: Abdomen distended with tenderness.  
- Continue miralax daily 
- KUB pending. Leukocytosis: IMPROVED. WBC down to 10.5 from 16.0. CT chest does not show infectious process and points towards CHF with pleural effusion and overlying atelectasis. Repeat UA no wbc, LE, or nitrites. Likely a stress reaction.   
-daily CBC & monitor for sx of infection Acute Metabolic Encephalopathy in the setting of Dementia w/ Fall: IMPROVING per family. Likely 2/2 HAP. CT head neg. Stroke work-up last month showed chronic white matter disease and superficial siderosis of R frontal lobe, EEG gen slowing. Carotid dopplers in Dec'19 showed chronic mild-mod L ICA stenoses. Pt was supposed to follow-up w/ neurology early Jan but never did. Fall evidenced by R arm abrasion. Family declined. - Tx of HAP as above - Family wants does not want DOAC b/c concern for bleeding.- cont Plavix - Fall precautions, wound care - F/u outpt Neuro NSTEMI/Type II MI in setting of Mod Systolic Dysfunction: likely 2/2 rhabdomyolysis s/p fall. POA Trop 3.81, NT pro-BNP 38982. ^CK 7656 ^CKMB 42.3. ECHO:  EF 52-56%, mod systolic dysf, mod pul HTN.  
- HOLD home Bumex 1mg daily - Strict I &Os - Cards recs: Russ Peraza likely 2/2 to Rhabdo, poor cath or outpt cardiac rehab candidate (dementia & severe CKD3) & family declines. - Continue tele monitoring Paroxysmal Afib: EKG (1/27) w/ Afib w/ RVR (117). Currently in sinus rhythm. - Continue home Metoprolol 25mg daily - Cards recs: discussed stopping plavix and starting DOAC, but family decided not do d/t increased risk of bleeding. Rhabdomyolysis: RESOLVED. likely 2/2 to fall. CK down-trending POA CK 2322-->downtrended-->288. Pt was down for possibly >1hour. Sustained R arm abrasion, neg (CT C-spine, XR R forearm x2, hand). ARF on CKD4: IMPROVING. POA Cr 3.1 (BL ~2.5). Likely 2/2 sepsis and poor hydration.  
- Monitor UOP 
- Avoid nephrotoxic agents. - Daily CMP 
- Nephro c/s: not candidate for HD. HOLD home Bumex for one dose. Elevated AST: POA likely 2/2 shock liver. Hepatitis panel neg.  
- Daily CMP  
  
CHRONIC PROBLEMS  
 
HTN: Stable. - Continue home norvasc 10mg daily, losartan 25mg daily 
- Continue Metoprolol 25mg daily 
- Hold home lasix until renal functions improve. 
   
DMII: POA glucose 293; HA1c 12/2019 (6.5) - Resumed home Tradjenta 5mg. Hold home Amaryl 5mg 
- SSI, q6hrs POC gluc - will consider adjusting regimen  
- Diabetic mechanical soft diet 
  
HLD: Last lipid panel 12/19 [Tchol 185, , HDL 33, ]. Hepatitis panel neg.  
- Continue home niacin & Lipitor 40mg daily  
  
GERD: Switch home prevacid to protonix 
  
Hypothyroidism: TSH low 0.15, T4 wnl, T3 low 1.5 - Continue home armour thyroid 120mg--> consider reduce to 100mcg in OP 
- F/u outpt PCP  
  
Dementia: Continue home aricept 
  
Obesity: Body mass index is 27.34 kg/m². - Encouraging lifestyle modifications and further follow up outpatient.  
  
  
FEN/GI - diabetic mechanical soft, MIVF Activity - Ambulate with assistance DVT prophylaxis - SC Heparin. GI prophylaxis - Protonix Fall prophylaxis - Fall precautions ordered. Disposition - Telemetry. Plan to d/c to Home per family's wishes. C/s PT/OT/CM Code Status - DNR, discussed with patient / caregivers. Next of Isis [address] (Son) [phone], Toon Vargas (dtr) [phone], Lebron Mcghee (son in law) [phone] I appreciate the opportunity to participate in the care of this patient, Ike Bryan DO Family Medicine Resident Subjective / Objective Subjective : Complains of mild abd pain. No CP/palp/N/V/fever/chills. Temp (24hrs), Av.6 °F (37 °C), Min:98 °F (36.7 °C), Max:99 °F (37.2 °C) Visit Vitals /50 (BP 1 Location: Left arm, BP Patient Position: At rest) Pulse 67 Temp 98 °F (36.7 °C) Resp 23 Ht 6' (1.829 m) Wt 195 lb 3.2 oz (88.5 kg) SpO2 99% BMI 26.47 kg/m² Physical Exam 
General:   Alert, cooperative, no acute distress Head:   Atraumatic Eyes:   Conjunctivae clear ENT:  Oral mucosa normal. +black tongue Neck:  Supple, trachea midline, no adenopathy No JVD Back:    No CVA tenderness Lungs:   +mild Expiratory wheeze BL. No resp distress/chest retractions. Heart:   Regular rhythm, no murmur Abdomen:    Soft, non-tender No masses or organomegaly Extremities:  No edema or DVT signs Skin:  Warm and dry. Right arm wrapped.  +greco cath Neurologic:  A&Ox 1. No focal deficits I/O: 
Date 20 0700 - 20 9680 20 07 - 20 9297 Shift 1189-3778 5300-2163 24 Hour Total 5536-2322 4545-1448 24 Hour Total  
INTAKE  
P.O.  120 120     
  P. O.  120 120 Shift Total(mL/kg)  120(1.4) 120(1.4) OUTPUT Urine(mL/kg/hr) Urine Occurrence(s) 1 x 1 x 2 x Stool Stool Occurrence(s) 1 x  1 x Shift Total(mL/kg) NET  120 120 Weight (kg) 87.3 88.5 88.5 88.5 88.5 88.5 Inpatient Medications Current Facility-Administered Medications Medication Dose Route Frequency  ferrous sulfate tablet 325 mg  1 Tab Oral BID WITH MEALS  polyethylene glycol (MIRALAX) packet 17 g  17 g Oral DAILY  docusate sodium (COLACE) capsule 100 mg  100 mg Oral DAILY  pantoprazole (PROTONIX) tablet 40 mg  40 mg Oral ACB  heparin (porcine) injection 5,000 Units  5,000 Units SubCUTAneous Q8H  
 albuterol (PROVENTIL VENTOLIN) nebulizer solution 2.5 mg  2.5 mg Nebulization Q4H PRN  
 albuterol-ipratropium (DUO-NEB) 2.5 MG-0.5 MG/3 ML  3 mL Nebulization Q6H RT  
 amoxicillin-clavulanate (AUGMENTIN) 500-125 mg per tablet 1 Tab  1 Tab Oral Q12H  
 doxycycline (VIBRA-TABS) tablet 100 mg  100 mg Oral Q12H  
 linaGLIPtin (TRADJENTA) tablet 5 mg  5 mg Oral ACB  aspirin chewable tablet 81 mg  81 mg Oral DAILY  metoprolol tartrate (LOPRESSOR) tablet 25 mg  25 mg Oral Q12H  
 losartan (COZAAR) tablet 25 mg  25 mg Oral DAILY  atorvastatin (LIPITOR) tablet 40 mg  40 mg Oral DAILY  clopidogreL (PLAVIX) tablet 75 mg  75 mg Oral DAILY  amLODIPine (NORVASC) tablet 10 mg  10 mg Oral DAILY  insulin lispro (HUMALOG) injection   SubCUTAneous AC&HS  sodium chloride (NS) flush 5-40 mL  5-40 mL IntraVENous Q8H  
 sodium chloride (NS) flush 5-40 mL  5-40 mL IntraVENous PRN  
 glucose chewable tablet 16 g  4 Tab Oral PRN  
 dextrose (D50W) injection syrg 12.5-25 g  12.5-25 g IntraVENous PRN  
 glucagon (GLUCAGEN) injection 1 mg  1 mg IntraMUSCular PRN  
 dextrose 10% infusion 0-250 mL  0-250 mL IntraVENous PRN Allergies Allergies Allergen Reactions  Synthroid [Levothyroxine] Other (comments) Confused, hallucinations CBC: 
Recent Labs 02/02/20 
8849 02/01/20 
0216 01/31/20 
0451 WBC 10.5 14.0* 16.0* HGB 8.2* 8.5* 8.9* HCT 26.1* 27.0* 27.8*  217 210 Metabolic Panel: 
Recent Labs 02/02/20 
8192 02/01/20 
0216 01/31/20 
0451  142 140  
K 4.3 4.3 4.1 * 107 107 CO2 25 27 25 BUN 72* 71* 71* CREA 3.13* 2.96* 2.81* * 178* 195* CA 7.9* 8.2* 8.3*  
MG 1.9 2.1 2.0 PHOS 4.1 4.0 3.9 ALB 2.2* 2.4* 2.4* SGOT 36 45* 40* ALT 36 41 43 For Billing Chief Complaint Patient presents with  Fall  
 Skin Problem Hospital Problems  Date Reviewed: 12/19/2019 Codes Class Noted POA Rhabdomyolysis ICD-10-CM: U26.36 ICD-9-CM: 728.88  1/27/2020 Unknown [facility]eps[facility] ([facility] ICD-10-CM: A41.9 ICD-9-CM: 038.9, 995.91  1/26/2020 Unknown Fall ICD-10-CM: W19. Carolin Camilo ICD-9-CM: S435.9  1/26/2020 Unknown NSTEMI (non-ST elevated myocardial infarction) ([facility].) ICD-10-CM: I21.4 ICD-9-CM: 410.70  1/26/2020 Unknown Weakness ICD-10-CM: R53.1 ICD-9-CM: 780.79  1/25/2020 Unknown * (Principal) HAP (hospital-acquired pneumonia) ICD-10-CM: J18.9, Y95 
ICD-9-CM: 486  1/25/2020 Unknown AMS (altered mental status) ICD-10-CM: R41.82 
ICD-9-CM: 780.97  12/14/2019 Yes CKD (chronic kidney disease) stage 4, GFR 15-29 ml/min (HCC) ICD-10-CM: N18.4 ICD-9-CM: 585.4  4/22/2019 Yes Dementia without behavioral disturbance (HCC) ICD-10-CM: F03.90 ICD-9-CM: 294.20  8/16/2016 Yes WILTON (acute kidney injury) ([facility] 75.) ICD-10-CM: N17.9 ICD-9-CM: 584.9  8/2/2014 Yes Pernicious anemia ICD-10-CM: D51.0 ICD-9-CM: 281.0  7/31/2012 Yes Acquired hypothyroidism ICD-10-CM: E03.9 ICD-9-CM: 244.9  4/26/2012 Yes Diabetes mellitus type 2, controlled ([facility] 75.) ICD-10-CM: E11.9 ICD-9-CM: 250.00  2/9/2012 Yes Essential hypertension, benign ICD-10-CM: I10 
ICD-9-CM: 401.1  2/9/2012 Yes HLD (hyperlipidemia) ICD-10-CM: E78.5 ICD-9-CM: 272.4  2/9/2012 Unknown Esophageal reflux ICD-10-CM: K21.9 ICD-9-CM: 530.81  2/9/2012 Yes

## 2020-02-02 NOTE — PROGRESS NOTES
TRANSFER - OUT REPORT: 
 
Verbal report given to Tatyana Parra on Starlene Mu.  being transferred to room 414 for routine progression of care Report consisted of patients Situation, Background, Assessment and  
Recommendations(SBAR). Information from the following report(s) SBAR, Kardex, ED Summary, Procedure Summary, Intake/Output, MAR, Accordion, Recent Results, Med Rec Status and Cardiac Rhythm NSR was reviewed with the receiving nurse. Lines:  
Peripheral IV 01/25/20 Right Antecubital (Active) Site Assessment Clean, dry, & intact 2/2/2020  4:00 AM  
Phlebitis Assessment 0 2/2/2020  4:00 AM  
Infiltration Assessment 0 2/2/2020  4:00 AM  
Dressing Status Clean, dry, & intact 2/2/2020  4:00 AM  
Dressing Type Tape;Transparent 2/2/2020  4:00 AM  
Hub Color/Line Status Pink;Capped 2/2/2020  4:00 AM  
Action Taken Open ports on tubing capped 2/2/2020  4:00 AM  
Alcohol Cap Used Yes 2/2/2020  4:00 AM  
  
 
Opportunity for questions and clarification was provided. Patient transported with: 
 [email or web address]

## 2020-02-02 NOTE — PROGRESS NOTES
Family Practice team asked me to review case: Insurance authorization will need to be initiated tomorrow. (humana to [employer]) Pt is transferring to the 4th floor.  
 
Suellen Nuñez 
 yes

## 2020-02-02 NOTE — PROGRESS NOTES
Client off unit for KUB and US. Client will be transported to from these studies to room 414 directly.

## 2020-02-02 NOTE — PROGRESS NOTES
Abdirahman Soto [facility] 79 [address]. YOB: 1931 Assessment & Plan:  
CKD 4, Cr rising with diuresis NSTEMI 
CVA Dementia HTN 
PNA 
DM2 Rec: 
Hold Bumex today. May need lower dose Not a candidate for dialysis Avoid nephrotoxins Subjective:  
CC: f/u CKD HPI: On Bumex. Walters in place. Creat up to 3.1 ROS: denies sob/n/v, limited due to dementia Current Facility-Administered Medications Medication Dose Route Frequency  ferrous sulfate tablet 325 mg  1 Tab Oral BID WITH MEALS  polyethylene glycol (MIRALAX) packet 17 g  17 g Oral DAILY  docusate sodium (COLACE) capsule 100 mg  100 mg Oral DAILY  pantoprazole (PROTONIX) tablet 40 mg  40 mg Oral ACB  heparin (porcine) injection 5,000 Units  5,000 Units SubCUTAneous Q8H  
 albuterol (PROVENTIL VENTOLIN) nebulizer solution 2.5 mg  2.5 mg Nebulization Q4H PRN  
 albuterol-ipratropium (DUO-NEB) 2.5 MG-0.5 MG/3 ML  3 mL Nebulization Q6H RT  
 amoxicillin-clavulanate (AUGMENTIN) 500-125 mg per tablet 1 Tab  1 Tab Oral Q12H  
 doxycycline (VIBRA-TABS) tablet 100 mg  100 mg Oral Q12H  
 linaGLIPtin (TRADJENTA) tablet 5 mg  5 mg Oral ACB  aspirin chewable tablet 81 mg  81 mg Oral DAILY  metoprolol tartrate (LOPRESSOR) tablet 25 mg  25 mg Oral Q12H  
 losartan (COZAAR) tablet 25 mg  25 mg Oral DAILY  atorvastatin (LIPITOR) tablet 40 mg  40 mg Oral DAILY  clopidogreL (PLAVIX) tablet 75 mg  75 mg Oral DAILY  amLODIPine (NORVASC) tablet 10 mg  10 mg Oral DAILY  insulin lispro (HUMALOG) injection   SubCUTAneous AC&HS  sodium chloride (NS) flush 5-40 mL  5-40 mL IntraVENous Q8H  
 sodium chloride (NS) flush 5-40 mL  5-40 mL IntraVENous PRN  
 glucose chewable tablet 16 g  4 Tab Oral PRN  
 dextrose (D50W) injection syrg 12.5-25 g  12.5-25 g IntraVENous PRN  
 glucagon (GLUCAGEN) injection 1 mg  1 mg IntraMUSCular PRN  
  dextrose 10% infusion 0-250 mL  0-250 mL IntraVENous PRN Objective:  
 
Vitals: 
Blood pressure 127/56, pulse 75, temperature 99 °F (37.2 °C), resp. rate 18, height 6' (1.829 m), weight 88.5 kg (195 lb 3.2 oz), SpO2 99 %. Temp (24hrs), Av.5 °F (36.9 °C), Min:97.9 °F (36.6 °C), Max:99 °F (37.2 °C) Intake and Output: 
No intake/output data recorded.  1901 -  0700 In: 120 [P.O.:120] Out: - Physical Exam:              
GENERAL ASSESSMENT: chronically ill CHEST: CTA HEART: S1S2 ABDOMEN: Soft,NT 
EXTREMITY: no EDEMA 
: +greco ECG/rhythm: 
 
Data Review No results for input(s): TNIPOC in the last 72 hours. No lab exists for component: ITNL Recent Labs 20 
0216 20 
0451 * 263 Recent Labs 20 
6147 206 20 
0451  142 140  
K 4.3 4.3 4.1 * 107 107 CO2 25 27 25 BUN 72* 71* 71* CREA 3.13* 2.96* 2.81* * 178* 195* PHOS 4.1 4.0 3.9 MG 1.9 2.1 2.0  
CA 7.9* 8.2* 8.3* ALB 2.2* 2.4* 2.4* WBC 10.5 14.0* 16.0* HGB 8.2* 8.5* 8.9* HCT 26.1* 27.0* 27.8*  217 210 No results for input(s): INR, PTP, APTT, INREXT in the last 72 hours. Needs: urine analysis, urine sodium, protein and creatinine Lab Results Component Value Date/Time Sodium,urine random 58 2014 05:30 PM  
 Creatinine, urine 201.46 2014 05:30 PM  
 
 
 
: Tomasa Johnston MD 
2020 [facility]: 
[email or web address] [facility] [place] [facility] office: 
[address] [place] [address], [address] [place], [address] [facility] Phone: [phone] Fax :     [phone] [place] office: 
[address] [facility] Andie Hobson Phone - [phone] Fax - [phone]

## 2020-02-03 PROBLEM — R19.5 OCCULT BLOOD POSITIVE STOOL: Status: ACTIVE | Noted: 2020-01-01

## 2020-02-03 NOTE — CONSULTS
Gastroenterology Consultation Note NAME: Wilfredo Wallace. : 3/8/1931 MRN: [id number] ATTG: @TY@ PCP: Heber Carney MD 
Date/Time:  2/3/2020 11:00 AM 
Subjective:  
REASON FOR CONSULT:     
Karie Salmeron is a 80 y.o. We have been asked to see for heme pos stool and anemia. He is currently admitted for sepsis due to HAP on ABX. He has hx of NSTEMI but he has previously declined cath and plans for medical mgt only, Afib with RVR (not on AC), dementia, CKD stage 4, pernicious anemia. Also hx of stage I colon CA and is s/p LAR in 2012 after his 2012 colon showed cancer in the rectosigmoid colon. He had repeat colon one year later with Dr. Mikel Cruz which was normal. One year repeat was recommended, but his dtr, Calin Covington, tells me he did not want any more colonoscopies so these were not done. His Hgb has trended downward during admission from 8.9 to 7.8. His iron profile and ferritin does not suggest INOCENCIA though he is occult pos and is taking oral iron. Had a good BM last night which was the first one in a \"while\" after a suppository. Nursing denies any visible bleeding. He had a CT scan early in admission which was done without contrast, but showed GB sludge/stones. Family is making all his medical decisions. History also colonic diverticulosis. Discussed daughter; father had already indicated did not wish any additional invasive procedure Past Medical History:  
Diagnosis Date  WILTON (acute kidney injury) ([facility] 2014  Cancer [facility])   
 colon  Chronic kidney disease  Delirium 10/11/2018  Diabetes ([facility] 2008  
 type 2  Dyslipidemia  Esophageal reflux  Esophageal reflux 2012  Hypertension 2008  Incontinence  Memory loss  Mixed hyperlipidemia 2012  
 NSTEMI (non-ST elevated myocardial infarction) ([facility] 2020  Other ill-defined conditions(799.89) broken neck   Pernicious anemia 2012  Psychiatric disorder dementia  Snoring  Stroke ([facility] 10/18/2018  Unspecified hypothyroidism 2012  Urinary tract infection 10/11/2018  UTI (urinary tract infection) 2019 Past Surgical History:  
Procedure Laterality Date  COLONOSCOPY  12 Rectosigmoid adenocarcinoma  HX CATARACT REMOVAL    
 HX HERNIA REPAIR Bilateral inguinal  
 HX OTHER SURGICAL    
 colon resection  HX SMALL BOWEL RESECTION    HX TONSIL AND ADENOIDECTOMY  HX TONSILLECTOMY  193 Social History Tobacco Use  Smoking status: Former Smoker Packs/day: 1.50 Years: 30.00 Pack years: 45.00 Last attempt to quit: 1982 Years since quittin.5  Smokeless tobacco: Never Used Substance Use Topics  Alcohol use: No  
  Alcohol/week: 0.0 standard drinks Types: 1 - 2 Standard drinks or equivalent per week Family History Problem Relation Age of Onset  Heart Disease Mother  Hypertension Mother  Heart Disease Father  Hypertension Father  Cancer Paternal Aunt Allergies Allergen Reactions  Synthroid [Levothyroxine] Other (comments) Confused, hallucinations Home Medications: 
Prior to Admission Medications Prescriptions Last Dose Informant Patient Reported? Taking? TRADJENTA 5 mg tablet 2020 at Unknown time  No Yes Sig: TAKE 1 TABLET EVERY DAY FOR DIABETES Thyroid, Pork, (ARMOUR THYROID) 120 mg tab 2020 at Unknown time  No Yes Sig: Take 1 Tab by mouth daily. amLODIPine (NORVASC) 10 mg tablet 2020 at Unknown time  No Yes Sig: Take 1 Tab by mouth daily. atorvastatin (LIPITOR) 40 mg tablet 2020 at Unknown time  No Yes Sig: Take 1 Tab by mouth daily. cetirizine (ZYRTEC) 10 mg tablet 2020 at Unknown time Family Member No Yes Sig: TAKE 1 TABLET EVERY DAY AS NEEDED FOR ALLERGIES  
cholecalciferol (VITAMIN D3) 50,000 unit capsule 2020 at Unknown time  No Yes Sig: TAKE 1 CAPSULE EVERY 7 DAYS  
clopidogrel (PLAVIX) 75 mg tab 1/25/2020 at Unknown time  No Yes Sig: Take 1 Tab by mouth daily. donepezil (ARICEPT) 10 mg tablet 1/24/2020 at Unknown time  No Yes Sig: Take 1 Tab by mouth nightly. ferrous sulfate (IRON) 325 mg (65 mg iron) tablet 1/24/2020 at Unknown time Family Member No Yes Sig: Take 1 Tab by mouth Daily (before breakfast). furosemide (LASIX) 20 mg tablet 1/25/2020 at Unknown time  No Yes Sig: TAKE 1 TABLET EVERY DAY AS DIRECTED  
glimepiride (AMARYL) 4 mg tablet 1/25/2020 at Unknown time  No Yes Sig: TAKE 1 TABLET EVERY MORNING  
lansoprazole (PREVACID) 30 mg capsule 1/25/2020 at Unknown time  No Yes Sig: Take 1 Cap by mouth Daily (before breakfast). loperamide (IMODIUM) 2 mg capsule 1/25/2020 at Unknown time  No Yes Sig: TAKE 1 CAPSULE BEFORE BREAKFAST, LUNCH, DINNER AND AT BEDTIME FOR DIARRHEA  
losartan (COZAAR) 25 mg tablet 1/25/2020 at Unknown time  No Yes Sig: TAKE 1 TABLET EVERY DAY  
multivitamin, tx-iron-ca-min (THERA-M W/ IRON) 9 mg iron-400 mcg tab tablet   Yes Yes Sig: Take 1 Tab by mouth daily. multivitamins-minerals-lutein (CENTRUM SILVER) tab tablet  Family Member Yes No  
Sig: Take 1 Tab by mouth daily. nicotinic acid (NIACIN) 500 mg tablet 1/25/2020 at Unknown time Family Member Yes Yes Sig: Take 500 mg by mouth Daily (before breakfast). Facility-Administered Medications: None Hospital medications: 
Current Facility-Administered Medications Medication Dose Route Frequency  ferrous sulfate 300 mg (60 mg iron)/5 mL oral syrup 300 mg  300 mg Oral BID WITH MEALS  pantoprazole (PROTONIX) 40 mg in 0.9% sodium chloride 10 mL injection  40 mg IntraVENous Q12H  
 0.9% sodium chloride infusion  125 mL/hr IntraVENous CONTINUOUS  
 polyethylene glycol (MIRALAX) packet 17 g  17 g Oral DAILY  docusate sodium (COLACE) capsule 100 mg  100 mg Oral DAILY  albuterol (PROVENTIL VENTOLIN) nebulizer solution 2.5 mg  2.5 mg Nebulization Q4H PRN  
 albuterol-ipratropium (DUO-NEB) 2.5 MG-0.5 MG/3 ML  3 mL Nebulization Q6H RT  
 amoxicillin-clavulanate (AUGMENTIN) 500-125 mg per tablet 1 Tab  1 Tab Oral Q12H  
 doxycycline (VIBRA-TABS) tablet 100 mg  100 mg Oral Q12H  
 linaGLIPtin (TRADJENTA) tablet 5 mg  5 mg Oral ACB  metoprolol tartrate (LOPRESSOR) tablet 25 mg  25 mg Oral Q12H  
 losartan (COZAAR) tablet 25 mg  25 mg Oral DAILY  atorvastatin (LIPITOR) tablet 40 mg  40 mg Oral DAILY  amLODIPine (NORVASC) tablet 10 mg  10 mg Oral DAILY  insulin lispro (HUMALOG) injection   SubCUTAneous AC&HS  sodium chloride (NS) flush 5-40 mL  5-40 mL IntraVENous Q8H  
 sodium chloride (NS) flush 5-40 mL  5-40 mL IntraVENous PRN  
 glucose chewable tablet 16 g  4 Tab Oral PRN  
 dextrose (D50W) injection syrg 12.5-25 g  12.5-25 g IntraVENous PRN  
 glucagon (GLUCAGEN) injection 1 mg  1 mg IntraMUSCular PRN  
 dextrose 10% infusion 0-250 mL  0-250 mL IntraVENous PRN REVIEW OF SYSTEMS:   
 [x]     Unable to obtain  ROS due to  [x]    mental status change  []    sedated   []    intubated Objective: VITALS:   
Visit Vitals /63 (BP 1 Location: Left arm, BP Patient Position: At rest) Pulse 72 Temp 97.8 °F (36.6 °C) Resp 18 Ht 6' (1.829 m) Wt 88.5 kg (195 lb 3.2 oz) SpO2 96% BMI 26.47 kg/m² Temp (24hrs), Av °F (36.7 °C), Min:97.5 °F (36.4 °C), Max:98.4 °F (36.9 °C) PHYSICAL EXAM:  
Comfortable Lungs clear P&A Cardiac: Irreg; 2/6 systolic murmur Abdomen: mild distention, mild lower abd tenderness; no mass. Normal bowel sounds. Extremities; Bilateral ankle edema Shin Ecchymosis Neuro: shuffling gait. alert LAB DATA REVIEWED:   
Lab Results Component Value Date/Time  WBC 9.0 2020 05:17 AM  
 WBC 8.3 2012 09:09 AM  
 Hemoglobin (POC) 10.9 (L) 2014 01:13 AM  
 HGB 7.8 (L) 2020 05:17 AM  
 Hematocrit (POC) 42 10/11/2018 02:38 PM  
 HCT 25.0 (L) 02/03/2020 05:17 AM  
 PLATELET 563 50/09/1538 05:17 AM  
 .7 (H) 02/03/2020 05:17 AM  
 
Lab Results Component Value Date/Time ALT (SGPT) 34 02/03/2020 05:17 AM  
 AST (SGOT) 35 02/03/2020 05:17 AM  
 Alk. phosphatase 62 02/03/2020 05:17 AM  
 Bilirubin, direct 0.2 10/11/2018 02:39 PM  
 Bilirubin, total 0.6 02/03/2020 05:17 AM  
 
Lab Results Component Value Date/Time Sodium 146 (H) 02/03/2020 05:17 AM  
 Potassium 4.3 02/03/2020 05:17 AM  
 Chloride 115 (H) 02/03/2020 05:17 AM  
 CO2 24 02/03/2020 05:17 AM  
 Anion gap 7 02/03/2020 05:17 AM  
 Glucose 137 (H) 02/03/2020 05:17 AM  
 BUN 69 (H) 02/03/2020 05:17 AM  
 Creatinine 3.11 (H) 02/03/2020 05:17 AM  
 BUN/Creatinine ratio 22 (H) 02/03/2020 05:17 AM  
 GFR est AA 23 (L) 02/03/2020 05:17 AM  
 GFR est non-AA 19 (L) 02/03/2020 05:17 AM  
 Calcium 7.7 (L) 02/03/2020 05:17 AM  
 
Lab Results Component Value Date/Time Lipase 56 (L) 07/29/2014 04:45 PM  
 
Lab Results Component Value Date/Time INR 1.1 01/25/2020 02:43 PM  
 INR 1.0 12/14/2019 03:25 PM  
 INR 1.1 07/29/2014 04:45 PM  
 Prothrombin time 11.0 01/25/2020 02:43 PM  
 Prothrombin time 10.6 12/14/2019 03:25 PM  
 Prothrombin time 11.0 07/29/2014 04:45 PM  
 
 
IMAGING RESULTS: 
 []      I have personally reviewed the actual   []    CXR  []    CT  []     US Impression: 
Principal Problem: 
  HAP (hospital-acquired pneumonia) (1/25/2020) Active Problems: 
  Diabetes mellitus type 2, controlled ([facility] (2/9/2012) Essential hypertension, benign (2/9/2012) HLD (hyperlipidemia) (2/9/2012) Esophageal reflux (2/9/2012) Acquired hypothyroidism (4/26/2012) Pernicious anemia (7/31/2012) WILTON (acute kidney injury) ([facility] (8/2/2014) Dementia without behavioral disturbance ([facility] (8/16/2016) CKD (chronic kidney disease) stage 4, GFR 15-29 ml/min ([facility]) (4/22/2019) AMS (altered mental status) (12/14/2019) Weakness (1/25/2020) Sepsis ([facility] [address] (1/26/2020) Fall (1/26/2020) NSTEMI (non-ST elevated myocardial infarction) ([facility] (1/26/2020) Rhabdomyolysis (1/27/2020) Occult blood positive stool (2/3/2020) Plan: 
  
Consult for anemia and heme pos stool. Discussed with nursing and daughter, Wilmar Villanueva. There is no overt sign of bleeding. His iron profile does not suggest INOCENCIA but is on oral iron? He has baseline dementia and is likely a poor endoscopic candidate given the other underlying medical conditions and age. He has also expressed in recent years he does not want any colonoscopies for surveillance of hx of colon CA. Recommend continue to trend Hgb and transfuse prn. No plans for endoscopic eval at this time. He may resume oral diet. Thank you for the consultation. Dr. Meg Rodrigues to follow. ___________________________________________________ Care Plan discussed with: 
  []    Patient   [x]    Family   []    Nursing   []    Attending 
 
 ___________________________________________________ GI: FIDELINA Menon Change pantoprazole to oral.  Add additional antibiotics for potential colonic diverticulitis bleeding I have interviewed and examined patient with addendum to note above and formulation care plan to reflect my evaluation Jeremy Falcon M.D.

## 2020-02-03 NOTE — PROGRESS NOTES
Problem: Dysphagia (Adult) Goal: *Acute Goals and Plan of Care (Insert Text) Description Swallowing goals initiated 1-27-20:Upgraded  2-3-20 
1) tolerate mech soft, thins without s/s aspiration by 1-30-20 
2) downgraded to dysphagia 1, nectars due to respiratory status Outcome: Not Progressing Towards Goal 
 SPEECH LANGUAGE PATHOLOGY DYSPHAGIA TREATMENT: WEEKLY REASSESSMENT Patient: Karen Modi (80 y.o. male) Date: 2/3/2020 Diagnosis: HAP (hospital-acquired pneumonia) [J18.9, Y95] Weakness [R53.1] HAP (hospital-acquired pneumonia) Precautions: aspiration ASSESSMENT: 
Patient still with wheezing when laying down and with exertion. Defer to MD to treat. For now, will continue dysphagia 1, nectars until breathing issues resolved. Patient's progression toward goals since last assessment: patient had been tolerating mech soft, thins without issues (except extended chewing-premorbid) until Friday when RR an WOB increased. His WOB overwhelmed swallow safety, so he was placed on modified diet until breathing issues. PLAN: 
Goals have been updated based on progression since last assessment. Patient continues to benefit from skilled intervention to address the above impairments. Continue to follow the patient 3 times a week to address goals. Recommendations and Planned Interventions: 
Continue dysphagia 1, nectars. Discharge Recommendations: To Be Determined SUBJECTIVE:  
Patient's daughter present. OBJECTIVE:  
Cognitive and Communication Status: 
Neurologic State: Alert Orientation Level: Oriented to person Cognition: Appropriate decision making Perception: Cues to maintain midline in sitting, Cues to maintain midline in standing Perseveration: No perseveration noted Safety/Judgement: Decreased awareness of environment, Decreased awareness of need for assistance, Decreased awareness of need for safety Dysphagia Treatment: 
Oral Assessment: P.O. Trials: Patient Position: up in chair Vocal quality prior to P.O.: No impairment Consistency Presented: Puree;Nectar thick liquid How Presented: Straw;Spoon(daughter feeding) Bolus Acceptance: (daughter reported reduced interest in PO) Bolus Formation/Control: No impairment Propulsion: No impairment Oral Residue: None Initiation of Swallow: No impairment Laryngeal Elevation: Functional 
Aspiration Signs/Symptoms: Clear throat(occasional) Exercises: 
Laryngeal Exercises: 
  
  
  
  
  
  
  
  
  
  
  
  
  
  
  
  
  
  
  
  
  
  
  
  
  
  
  
  
  
  
  
  
  
  
  
  
  
  
  
  
  
  
  
Pain: 
Pain Scale 1: Numeric (0 - 10) Pain Intensity 1: 0 After treatment patient left in no apparent distress:  
Call bell within reach and Nursing notified COMMUNICATION/EDUCATION:  
Patient was educated regarding his deficit(s) of dysphagia  as this relates to his diagnosis of PNA. He demonstrated Guarded understanding as evidenced by dementia. Daughter understood. . 
 
The patients plan of care including recommendations, planned interventions, and recommended diet changes were discussed with: Registered Nurse. dAam Power, SLP Time Calculation: 20 mins

## 2020-02-03 NOTE — PROGRESS NOTES
Palliative medicine brief note- full visit documentation pending. I met with patient, his daughter, Stephanie Samuel was at bedside. Discussed Bela Kate stated that they have been looking for SNF per PT/OT recommendations. Upon further discussion, Antoni Gonzalez stated that she is not sure having patient go to SNF, telling me that in terms of quality of life, patient would prefer to be home, sitting in his chair etc.  
 
Antoni Gonzalez stated that the patient would not really want full restorative treatments and interventions. Daughter stated that patients mentation has not returned to baseline, remains much more confused and speech is more difficult to understand. She also tells me  that his appetite/intake is poor and he is much weaker than baseline. We discussed Hospice and she verbalized her wishes for Hospice consultation, specifically requesting [facility], because they had provided excellent EOL care for another family member. Antoni Gonzalez stated that if he is found to be appropriate for Hospice, she would rather the patient be discharged to her home with the support of Hospice. Reviewed need for 24 hour care giver support, Antoni Gonzalez stated that her  is retired and is able to assist while she is at work. Order for Hospice [facility] consultation placed with CM, also spoke with BINU Reedidi directly Possible criteria for Hospice appropriateness- 
 
Patient with Severe protein calorie malnutrition, albumin 2.2, +poor appetite/intake, new dysphagia 1, mechanical soft w/ nectar thick liquids. ECHO EF 35-40%, +moderate pulmonary HTN  
CKD IV Anemia Advanced Dementia, increased confusion s/p hospitalization, not returned to baseline Increased weakness/debility, has not returned to baseline BRITT

## 2020-02-03 NOTE — PROGRESS NOTES
Jake Iyer Jesus [address] Office [phone] Fax [phone] Assessment and Plan Belvie Riedel. is a 80 y.o. male admitted for sepsis 2/2 HAP. Also found to acute metabolic encephalopathy and NSTEMI/Type II MI in the setting of Grade 1 diastolic dysfunction. 24 Hour Events:  
-family requested Hospice consult. - urine output overnight-nothing recorded x24 hrs. Nurses states Pt pulls pure wick off. Stool x1, 3 unmeasured urine today  
-GI recs- stop iron supplement, and trend HgB and transfuse prn. Poor endoscopic candidate.   
-day 7/7 doxy + augmentin 
-Started Flagyl 500 q12h yesterday ACUTE PROBLEMS Sepsis 2/2 HAP vs concern for Aspiration PNA: 2/4 SIRS (WBC 17.6, RR26). RUL PNA on (CXR,CT C-spine, CT abd/pelvis). Concern for aspiration per Speech Eval.  LA down-trended. Procal wnl. RVP neg. ^ESR 61. ^CRP >9.7. Bcx NG 5 days (final), Ucx neg. V/Q scan neg. Legionella neg. CT chest w/ CHF w/ B/l edema/effusions and underlying  atelectasis. - po Augmentin + Doxy on day 7/7 
-Flagyl 500mg q12h (started 2/3) per GI for potential colonic diverticulitis bleeding  
- Daily CBC, CMP, Mag and phos - Duonebs q6h  & prn q4H  
  
Anemia: pernicious/anemia of chronic disease (low TIBC, high ferritin) POC 8.5 (BL 12-13). .5. Haptoglobin 370, B12/folate wnl. - Most recent HgB 8.1. FOBT positive. GI rec's- no overt sign of bleeding. Stop iron supplement as this is anemia of chornic disease. Poor endoscopic candidate. Trend Hgb and transfuse as needed.  
-Continue thera-MV 
-STOP home ferrous sulfate   
-daily CBC  
-transfuse prn (threshold <8) -GI consulted- stop iron supplement, and trend HgB and transfuse prn. Poor endoscopic candidate Abdominal distension: Abdomen distended with tenderness. KUB- large amount of stool. Nonobstructive. Ab US- nothing to suggest acute cholecystitis. Common bile duct measures 7 mm, at the upper limits of normal given the patient's age. - Continue miralax & colace daily Leukocytosis: IMPROVED. WBC down to 10.5 from 16.0. CT chest does not show infectious process and points towards CHF with pleural effusion and overlying atelectasis. Repeat UA no wbc, LE, or nitrites. Likely a stress reaction.    
-daily CBC & monitor for sx of infection Acute Metabolic Encephalopathy in the setting of Dementia w/ Fall: IMPROVING per family. Likely 2/2 HAP. CT head neg. Stroke work-up last month showed chronic white matter disease and superficial siderosis of R frontal lobe, EEG gen slowing. Carotid dopplers in Dec'19 showed chronic mild-mod L ICA stenoses. Pt was supposed to follow-up w/ neurology early Jan but never did. Fall evidenced by R arm abrasion. Family declined. - Tx of HAP as above - Family does not want DOAC b/c concern for bleeding.- cont Plavix - Fall precautions, wound care - F/u outpt Neuro 
-Palliative care c/s recs appreciated  
-Hospice consult recs appreciated NSTEMI/Type II MI in setting of Mod Systolic Dysfunction: likely 2/2 rhabdomyolysis s/p fall. POA Trop 3.81, NT pro-BNP 64192. ^CK 1214 ^CKMB 42.3. ECHO:  EF 33-74%, mod systolic dysf, mod pul HTN.  
- HOLD home Bumex 1mg for one dose per nephro rec - Strict I &Os - Cards recs: Bob Naylor likely 2/2 to Rhabdo, poor cath or outpt cardiac rehab candidate (dementia & severe CKD3) & family declines. - Continue tele monitoring Paroxysmal Afib: EKG (1/27) w/ Afib w/ RVR (117). Currently in sinus rhythm. - Continue home Metoprolol 25mg daily - Cards recs: discussed stopping plavix and starting DOAC, but family decided not do d/t increased risk of bleeding. Rhabdomyolysis: RESOLVED. likely 2/2 to fall. CK down-trending POA CK 2322-->downtrended-->288. Pt was down for possibly >1hour. Sustained R arm abrasion, neg (CT C-spine, XR R forearm x2, hand). ARF on CKD4: IMPROVING. POA Cr 3.1 (BL ~2.5). Likely 2/2 sepsis and poor hydration.  
- Monitor UOP 
- Avoid nephrotoxic agents. - Daily CMP 
- Nephro c/s: not candidate for HD. HOLD home Bumex for one dose. Elevated AST: POA likely 2/2 shock liver. Hepatitis panel neg.  
- Daily CMP  
  
CHRONIC PROBLEMS  
 
HTN: Stable. - Continue home norvasc 10mg daily, losartan 25mg daily 
- Continue Metoprolol 25mg daily 
- Hold home lasix until renal functions improve. 
   
DMII: POA glucose 293; HA1c 2019 (6.5) - Resumed home Tradjenta 5mg. Hold home Amaryl 5mg 
- SSI, q6hrs POC gluc  
- Diabetic mechanical soft diet 
  
HLD: Last lipid panel  [Tchol 185, , HDL 33, ]. Hepatitis panel neg.  
- Continue home niacin & Lipitor 40mg daily  
  
GERD: Switch home prevacid to protonix 
  
Hypothyroidism: TSH low 0.15, T4 wnl, T3 low 1.5 
- Continue home armour thyroid 120mg--> consider reduce to 100mcg in OP 
- F/u outpt PCP  
  
Dementia: Continue home aricept 
  
Obesity: Body mass index is 27.34 kg/m². - Encouraging lifestyle modifications and further follow up outpatient.  
  
  
FEN/GI - Mechanical soft with nectar thick liquids,  MIVF at 125mL/hr Activity - Ambulate with assistance DVT prophylaxis - SC Heparin. GI prophylaxis - Protonix 40mg BID Fall prophylaxis - Fall precautions ordered. Disposition - Telemetry. Plan to d/c home with hospice or SNF. C/s PT/OT/CM, Palliative and  Hospice Code Status - DNR, discussed with patient / caregivers. Next of Isis [address] [facility] (Son) [phone], Gisele Quiñonez (dtr) [phone], Elio Yoder (son in law) [phone] I appreciate the opportunity to participate in the care of this patient, 
Gabriella Cadena DO Family Medicine Resident Subjective / Objective Subjective : No complaints overnigt. Awake on my arrival. No CP/palp/N/V/fever/chills. Temp (24hrs), Av.9 °F (36.6 °C), Min:97.3 °F (36.3 °C), Max:98.2 °F (36.8 °C) Visit Vitals /56 (BP 1 Location: Right arm, BP Patient Position: At rest) Pulse 66 Temp 98 °F (36.7 °C) Resp 16 Ht 6' (1.829 m) Wt 195 lb 3.2 oz (88.5 kg) SpO2 95% BMI 26.47 kg/m² Physical Exam 
General:   Alert, cooperative, no acute distress Head:   Atraumatic Eyes:   Conjunctivae clear ENT:  Oral mucosa normal. +black tongue Neck:  Supple, trachea midline, no adenopathy No JVD Back:    No CVA tenderness Lungs:   +diffuse moderate, Expiratory wheeze BL. No resp distress/chest retractions. Heart:   Regular rhythm, no murmur Abdomen:    Soft, non-tender No masses or organomegaly Extremities:  No edema or DVT signs Skin:  Warm and dry. Right arm wrapped.  +greco cath Neurologic:  A&Ox 1. No focal deficits I/O: 
 
 
Inpatient Medications Current Facility-Administered Medications Medication Dose Route Frequency  pantoprazole (PROTONIX) tablet 40 mg  40 mg Oral ACB&D  
 metroNIDAZOLE (FLAGYL) tablet 500 mg  500 mg Oral Q12H  
 0.9% sodium chloride infusion  125 mL/hr IntraVENous CONTINUOUS  
 polyethylene glycol (MIRALAX) packet 17 g  17 g Oral DAILY  docusate sodium (COLACE) capsule 100 mg  100 mg Oral DAILY  albuterol (PROVENTIL VENTOLIN) nebulizer solution 2.5 mg  2.5 mg Nebulization Q4H PRN  
 albuterol-ipratropium (DUO-NEB) 2.5 MG-0.5 MG/3 ML  3 mL Nebulization Q6H RT  
 doxycycline (VIBRA-TABS) tablet 100 mg  100 mg Oral Q12H  
 linaGLIPtin (TRADJENTA) tablet 5 mg  5 mg Oral ACB  metoprolol tartrate (LOPRESSOR) tablet 25 mg  25 mg Oral Q12H  
 losartan (COZAAR) tablet 25 mg  25 mg Oral DAILY  atorvastatin (LIPITOR) tablet 40 mg  40 mg Oral DAILY  amLODIPine (NORVASC) tablet 10 mg  10 mg Oral DAILY  insulin lispro (HUMALOG) injection   SubCUTAneous AC&HS  sodium chloride (NS) flush 5-40 mL  5-40 mL IntraVENous Q8H  
 sodium chloride (NS) flush 5-40 mL  5-40 mL IntraVENous PRN  
  glucose chewable tablet 16 g  4 Tab Oral PRN  
 dextrose (D50W) injection syrg 12.5-25 g  12.5-25 g IntraVENous PRN  
 glucagon (GLUCAGEN) injection 1 mg  1 mg IntraMUSCular PRN  
 dextrose 10% infusion 0-250 mL  0-250 mL IntraVENous PRN Allergies Allergies Allergen Reactions  Synthroid [Levothyroxine] Other (comments) Confused, hallucinations CBC: 
Recent Labs 02/04/20 
0140 02/03/20 
1216 02/03/20 
4237 WBC 9.1 9.7 9.0 HGB 8.1* 8.4* 7.8* HCT 26.0* 26.8* 25.0*  
 247 234 Metabolic Panel: 
Recent Labs 02/04/20 
0140 02/03/20 
1130 02/02/20 
5571 * 146* 144  
K 4.9 4.3 4.3 * 115* 111* CO2 24 24 25 BUN 64* 69* 72* CREA 3.10* 3.11* 3.13* * 137* 177* CA 7.9* 7.7* 7.9*  
MG 2.2 2.1 1.9 PHOS 3.7 3.7 4.1 ALB 2.3* 2.2* 2.2*  
SGOT 32 35 36 ALT 32 34 36 For Billing Chief Complaint Patient presents with  Fall  
 Skin Problem Hospital Problems  Date Reviewed: 12/19/2019 Codes Class Noted POA Occult blood positive stool ICD-10-CM: R19.5 ICD-9-CM: 792.1  2/3/2020 Unknown Rhabdomyolysis ICD-10-CM: K42.55 ICD-9-CM: 728.88  1/27/2020 Unknown Sepsis ([facility] ICD-10-CM: A41.9 ICD-9-CM: 038.9, 995.91  1/26/2020 Unknown Fall ICD-10-CM: W19. Sarah Oleary ICD-9-CM: K048.8  1/26/2020 Unknown NSTEMI (non-ST elevated myocardial infarction) ([facility] ICD-10-CM: I21.4 ICD-9-CM: 410.70  1/26/2020 Unknown Weakness ICD-10-CM: R53.1 ICD-9-CM: 780.79  1/25/2020 Unknown * (Principal) HAP (hospital-acquired pneumonia) ICD-10-CM: J18.9, Y95 
ICD-9-CM: 486  1/25/2020 Unknown AMS (altered mental status) ICD-10-CM: R41.82 
ICD-9-CM: 780.97  12/14/2019 Yes CKD (chronic kidney disease) stage 4, GFR 15-29 ml/min (HCC) ICD-10-CM: N18.4 ICD-9-CM: 585.4  4/22/2019 Yes Dementia without behavioral disturbance (HCC) ICD-10-CM: F03.90 ICD-9-CM: 294.20  8/16/2016 Yes WILTON (acute kidney injury) ([facility] ICD-10-CM: N17.9 ICD-9-CM: 584.9  8/2/2014 Yes Pernicious anemia ICD-10-CM: D51.0 ICD-9-CM: 281.0  7/31/2012 Yes Acquired hypothyroidism ICD-10-CM: E03.9 ICD-9-CM: 244.9  4/26/2012 Yes Diabetes mellitus type 2, controlled ([facility] ICD-10-CM: E11.9 ICD-9-CM: 250.00  2/9/2012 Yes Essential hypertension, benign ICD-10-CM: I10 
ICD-9-CM: 401.1  2/9/2012 Yes HLD (hyperlipidemia) ICD-10-CM: E78.5 ICD-9-CM: 272.4  2/9/2012 Unknown Esophageal reflux ICD-10-CM: K21.9 ICD-9-CM: 530.81  2/9/2012 Yes

## 2020-02-03 NOTE — PROGRESS NOTES
Abdirahman Soto Kymberly [place] 79 Analy Doing. YOB: 1931 Assessment & Plan:  
CKD 4 AMS Dementia HTN 
DM2 
CHF Rec: 
Avoid nephrotoxins No change in meds Outpt neprhology f/u Poor candidate for dialysis Subjective:  
CC: f/u CKD HPI: Renal function stable ROS: No sob/n/v Current Facility-Administered Medications Medication Dose Route Frequency  pantoprazole (PROTONIX) tablet 40 mg  40 mg Oral ACB&D  
 metroNIDAZOLE (FLAGYL) tablet 500 mg  500 mg Oral Q12H  ferrous sulfate 300 mg (60 mg iron)/5 mL oral syrup 300 mg  300 mg Oral BID WITH MEALS  
 0.9% sodium chloride infusion  125 mL/hr IntraVENous CONTINUOUS  
 polyethylene glycol (MIRALAX) packet 17 g  17 g Oral DAILY  docusate sodium (COLACE) capsule 100 mg  100 mg Oral DAILY  albuterol (PROVENTIL VENTOLIN) nebulizer solution 2.5 mg  2.5 mg Nebulization Q4H PRN  
 albuterol-ipratropium (DUO-NEB) 2.5 MG-0.5 MG/3 ML  3 mL Nebulization Q6H RT  
 amoxicillin-clavulanate (AUGMENTIN) 500-125 mg per tablet 1 Tab  1 Tab Oral Q12H  
 doxycycline (VIBRA-TABS) tablet 100 mg  100 mg Oral Q12H  
 linaGLIPtin (TRADJENTA) tablet 5 mg  5 mg Oral ACB  metoprolol tartrate (LOPRESSOR) tablet 25 mg  25 mg Oral Q12H  
 losartan (COZAAR) tablet 25 mg  25 mg Oral DAILY  atorvastatin (LIPITOR) tablet 40 mg  40 mg Oral DAILY  amLODIPine (NORVASC) tablet 10 mg  10 mg Oral DAILY  insulin lispro (HUMALOG) injection   SubCUTAneous AC&HS  sodium chloride (NS) flush 5-40 mL  5-40 mL IntraVENous Q8H  
 sodium chloride (NS) flush 5-40 mL  5-40 mL IntraVENous PRN  
 glucose chewable tablet 16 g  4 Tab Oral PRN  
 dextrose (D50W) injection syrg 12.5-25 g  12.5-25 g IntraVENous PRN  
 glucagon (GLUCAGEN) injection 1 mg  1 mg IntraMUSCular PRN  
 dextrose 10% infusion 0-250 mL  0-250 mL IntraVENous PRN Objective:  
 
Vitals: Blood pressure 143/65, pulse 63, temperature 98.1 °F (36.7 °C), resp. rate 18, height 6' (1.829 m), weight 88.5 kg (195 lb 3.2 oz), SpO2 97 %. Temp (24hrs), Av °F (36.7 °C), Min:97.5 °F (36.4 °C), Max:98.4 °F (36.9 °C) Intake and Output: 
No intake/output data recorded.  1901 -  0700 In: 370 [P.O.:120; I.V.:250] Out: 1200 [Urine:1200] Physical Exam:              
GENERAL ASSESSMENT: NAD 
CHEST: CTA HEART: S1S2 ABDOMEN: Soft,NT 
EXTREMITY: no EDEMA 
   
ECG/rhythm: 
 
Data Review No results for input(s): TNIPOC in the last 72 hours. No lab exists for component: ITNL Recent Labs 20 
0216 * Recent Labs 20 
1216 20 
5275 20 
1553 20 
0348 20 
0216 NA  --  146*  --  144 142 K  --  4.3  --  4.3 4.3 CL  --  115*  --  111* 107 CO2  --  24  --  25 27 BUN  --  69*  --  72* 71* CREA  --  3.11*  --  3.13* 2.96* GLU  --  137*  --  177* 178* PHOS  --  3.7  --  4.1 4.0 MG  --  2.1  --  1.9 2.1 CA  --  7.7*  --  7.9* 8.2* ALB  --  2.2*  --  2.2* 2.4* WBC 9.7 9.0 10.7 10.5 14.0* HGB 8.4* 7.8* 8.4* 8.2* 8.5* HCT 26.8* 25.0* 26.5* 26.1* 27.0*  
 234 245 217 217 No results for input(s): INR, PTP, APTT, INREXT in the last 72 hours. Needs: urine analysis, urine sodium, protein and creatinine Lab Results Component Value Date/Time Sodium,urine random 58 2014 05:30 PM  
 Creatinine, urine 201.46 2014 05:30 PM  
 
 
 
 
: Yared Toribio MD 
2/3/2020 [facility]: 
[email or web address] A[place] office: 
[address], [facility] [place] [address], [address] [place] [address] Phone: [phone] Fax :     [phone] [facility] office: 
[address] [facility], [address] Phone - [phone] Fax - [phone]

## 2020-02-03 NOTE — PROGRESS NOTES
visited patient, Eric English" for a palliative care follow up visit on the Post. Surgical Ortho unit. Gino was awake, alert, and sitting up in bed when the  came into the room. A family member was sitting at the bedside. Gino made eye contact with the  and noted that he was doing ok and expressed no additional needs at this time. Advised of 's availability.  will follow up as able and/or needed Dana Corcoran. [employer].  Paging Service: [phone])

## 2020-02-03 NOTE — PROGRESS NOTES
Problem: Self Care Deficits Care Plan (Adult) Goal: *Acute Goals and Plan of Care (Insert Text) Description FUNCTIONAL STATUS PRIOR TO ADMISSION: Patient was independent and active without use of DME per daughter prior to admission. HOME SUPPORT: The patient lived with granddaughter; Daughter present and reports there were times when patient was alone PTA. Outcome: Progressing Towards Goal 
  
Occupational Therapy Goals Initiated 1/28/2020 1. Patient will perform grooming with moderate assistance  within 7 day(s). 2.  Patient will perform upper body dressing and bathing with maximal assistance within 7 day(s). 3.  Patient will perform lower body dressing and bathing with maximal assistance within 7 day(s). 4.  Patient will perform toilet transfers to [facility] with moderate assistance  within 7 day(s). 5.  Patient will perform all aspects of toileting with moderate assistance  within 7 day(s). 6.  Patient will participate in upper extremity therapeutic exercise/activities with supervision/set-up for 10 minutes within 7 day(s). Outcome: Progressing Towards Goal 
 OCCUPATIONAL THERAPY TREATMENT Patient: Antoinette Sicard. (80 y.o. male) Date: 2/3/2020 Diagnosis: HAP (hospital-acquired pneumonia) [J18.9, Y95] Weakness [R53.1] HAP (hospital-acquired pneumonia) Precautions:   
Chart, occupational therapy assessment, plan of care, and goals were reviewed. ASSESSMENT Patient continues with skilled OT services and is progressing towards goals. Pt continues to be limited by decrease stand balance, decrease activity tolerance (SOB with minimal activity, but stats remained >90% on RA), decrease safety awareness and decrease independence with LB self-care/toileting. Pt ambulated to bathroom at min assist x 2 using RW and pt was unaware that he was urinating and having bowel mvmt. Total assist with hygiene.   Pt continues to be below baseline and would benefit from further therapy at discharge. Current Level of Function Impacting Discharge (ADLs): mod assist with LB self-care and total assist with hygiene after toileting Other factors to consider for discharge: PLAN : 
Patient continues to benefit from skilled intervention to address the above impairments. Continue treatment per established plan of care. to address goals. Recommend with staff: Cristhian Settle for all meals and use BSC Recommend next OT session: grooming standing at sink, increase standing tolerance Recommendation for discharge: (in order for the patient to meet his/her long term goals) Therapy up to 5 days/week in SNF setting This discharge recommendation: A follow-up discussion with the attending provider and/or case management is planned IF patient discharges home will need the following DME: TBD SUBJECTIVE:  
Patient stated OK.  OBJECTIVE DATA SUMMARY:  
Cognitive/Behavioral Status: 
Neurologic State: Alert Orientation Level: Oriented to person Cognition: Appropriate decision making Functional Mobility and Transfers for ADLs: 
Bed Mobility: 
Supine to Sit: Moderate assistance;Assist x1 Transfers: 
Sit to Stand: Minimum assistance;Assist x2 Functional Transfers Bathroom Mobility: Minimum assistance Toilet Transfer : Minimum assistance;Assist x2 Bed to Chair: Minimum assistance;Assist x1 Balance: 
Sitting: Intact Standing: Impaired; With support ADL Intervention: Lower Body Dressing Assistance Socks: Moderate assistance Leg Crossed Method Used: Yes Position Performed: Seated in chair Toileting Toileting Assistance: Maximum assistance Bowel Hygiene: Maximum assistance Adaptive Equipment: Walker;Grab bars Pain: 
No c/o pain Activity Tolerance:  
Fair Please refer to the flowsheet for vital signs taken during this treatment. After treatment patient left in no apparent distress: Sitting in chair, Call bell within reach, Bed / chair alarm activated, and Caregiver / family present COMMUNICATION/COLLABORATION:  
The patients plan of care was discussed with: Physical Therapy Assistant and Registered Nurse Elzbieta Pedroza OTR/L Time Calculation: 25 mins

## 2020-02-03 NOTE — PROGRESS NOTES
Problem: Falls - Risk of 
Goal: *Absence of Falls Description Document Sina Woods Fall Risk and appropriate interventions in the flowsheet. Outcome: Progressing Towards Goal 
Note: Fall Risk Interventions: 
Mobility Interventions: Bed/chair exit alarm Mentation Interventions: Bed/chair exit alarm Medication Interventions: Bed/chair exit alarm Elimination Interventions: Bed/chair exit alarm, Call light in reach History of Falls Interventions: Bed/chair exit alarm Problem: Patient Education: Go to Patient Education Activity Goal: Patient/Family Education Outcome: Progressing Towards Goal 
  
Problem: Pressure Injury - Risk of 
Goal: *Prevention of pressure injury Description Document Carroll Scale and appropriate interventions in the flowsheet. Outcome: Progressing Towards Goal 
Note: Pressure Injury Interventions: 
Sensory Interventions: Pressure redistribution bed/mattress (bed type) Moisture Interventions: Absorbent underpads Activity Interventions: Increase time out of bed Mobility Interventions: HOB 30 degrees or less Nutrition Interventions: Document food/fluid/supplement intake, Offer support with meals,snacks and hydration Friction and Shear Interventions: Apply protective barrier, creams and emollients, Lift sheet Problem: Patient Education: Go to Patient Education Activity Goal: Patient/Family Education Outcome: Progressing Towards Goal 
  
Problem: Patient Education: Go to Patient Education Activity Goal: Patient/Family Education Outcome: Progressing Towards Goal 
  
Problem: Sepsis: Day of Diagnosis Goal: Off Pathway (Use only if patient is Off Pathway) Outcome: Progressing Towards Goal 
Goal: *Fluid resuscitation Outcome: Progressing Towards Goal 
Goal: *Paired blood cultures prior to first dose of antibiotic Outcome: Progressing Towards Goal 
Goal: *First dose of  appropriate antibiotic within 3 hours of arrival to ED, within 1 hour of arrival to ICU Outcome: Progressing Towards Goal 
Goal: *Lactic acid with first set of blood cultures Outcome: Progressing Towards Goal 
Goal: *Pneumococcal immunization (if eligible) Outcome: Progressing Towards Goal 
Goal: *Influenza immunization (if eligible) Outcome: Progressing Towards Goal 
Goal: Activity/Safety Outcome: Progressing Towards Goal 
Goal: Consults, if ordered Outcome: Progressing Towards Goal 
Goal: Diagnostic Test/Procedures Outcome: Progressing Towards Goal 
Goal: Nutrition/Diet Outcome: Progressing Towards Goal 
Goal: Discharge Planning Outcome: Progressing Towards Goal 
Goal: Medications Outcome: Progressing Towards Goal 
Goal: Respiratory Outcome: Progressing Towards Goal 
Goal: Treatments/Interventions/Procedures Outcome: Progressing Towards Goal 
Goal: Psychosocial 
Outcome: Progressing Towards Goal 
  
Problem: Sepsis: Day 2 Goal: Off Pathway (Use only if patient is Off Pathway) Outcome: Progressing Towards Goal 
Goal: *Central Venous Pressure maintained at 8-12 mm Hg Outcome: Progressing Towards Goal 
Goal: *Hemodynamically stable Outcome: Progressing Towards Goal 
Goal: *Tolerating diet Outcome: Progressing Towards Goal 
Goal: Activity/Safety Outcome: Progressing Towards Goal 
Goal: Consults, if ordered Outcome: Progressing Towards Goal 
Goal: Diagnostic Test/Procedures Outcome: Progressing Towards Goal 
Goal: Nutrition/Diet Outcome: Progressing Towards Goal 
Goal: Discharge Planning Outcome: Progressing Towards Goal 
Goal: Medications Outcome: Progressing Towards Goal 
Goal: Respiratory Outcome: Progressing Towards Goal 
Goal: Treatments/Interventions/Procedures Outcome: Progressing Towards Goal 
Goal: Psychosocial 
Outcome: Progressing Towards Goal 
  
Problem: Sepsis: Day 3 Goal: Off Pathway (Use only if patient is Off Pathway) Outcome: Progressing Towards Goal 
Goal: *Central Venous Pressure maintained at 8-12 mm Hg Outcome: Progressing Towards Goal 
Goal: *Oxygen saturation within defined limits Outcome: Progressing Towards Goal 
Goal: *Vital sign stability Outcome: Progressing Towards Goal 
Goal: *Tolerating diet Outcome: Progressing Towards Goal 
Goal: *Demonstrates progressive activity Outcome: Progressing Towards Goal 
Goal: Activity/Safety Outcome: Progressing Towards Goal 
Goal: Consults, if ordered Outcome: Progressing Towards Goal 
Goal: Diagnostic Test/Procedures Outcome: Progressing Towards Goal 
Goal: Nutrition/Diet Outcome: Progressing Towards Goal 
Goal: Discharge Planning Outcome: Progressing Towards Goal 
Goal: Medications Outcome: Progressing Towards Goal 
Goal: Respiratory Outcome: Progressing Towards Goal 
Goal: Treatments/Interventions/Procedures Outcome: Progressing Towards Goal 
Goal: Psychosocial 
Outcome: Progressing Towards Goal 
  
Problem: Sepsis: Day 4 Goal: Off Pathway (Use only if patient is Off Pathway) Outcome: Progressing Towards Goal 
Goal: Activity/Safety Outcome: Progressing Towards Goal 
Goal: Consults, if ordered Outcome: Progressing Towards Goal 
Goal: Diagnostic Test/Procedures Outcome: Progressing Towards Goal 
Goal: Nutrition/Diet Outcome: Progressing Towards Goal 
Goal: Discharge Planning Outcome: Progressing Towards Goal 
Goal: Medications Outcome: Progressing Towards Goal 
Goal: Respiratory Outcome: Progressing Towards Goal 
Goal: Treatments/Interventions/Procedures Outcome: Progressing Towards Goal 
Goal: Psychosocial 
Outcome: Progressing Towards Goal 
Goal: *Oxygen saturation within defined limits Outcome: Progressing Towards Goal 
Goal: *Hemodynamically stable Outcome: Progressing Towards Goal 
Goal: *Vital signs within defined limits Outcome: Progressing Towards Goal 
Goal: *Tolerating diet Outcome: Progressing Towards Goal 
Goal: *Demonstrates progressive activity Outcome: Progressing Towards Goal 
Goal: *Fluid volume maintenance Outcome: Progressing Towards Goal 
  
Problem: Sepsis: Day 5 Goal: Off Pathway (Use only if patient is Off Pathway) Outcome: Progressing Towards Goal 
Goal: *Oxygen saturation within defined limits Outcome: Progressing Towards Goal 
Goal: *Vital signs within defined limits Outcome: Progressing Towards Goal 
Goal: *Tolerating diet Outcome: Progressing Towards Goal 
Goal: *Demonstrates progressive activity Outcome: Progressing Towards Goal 
Goal: *Discharge plan identified Outcome: Progressing Towards Goal 
Goal: Activity/Safety Outcome: Progressing Towards Goal 
Goal: Consults, if ordered Outcome: Progressing Towards Goal 
Goal: Diagnostic Test/Procedures Outcome: Progressing Towards Goal 
Goal: Nutrition/Diet Outcome: Progressing Towards Goal 
Goal: Discharge Planning Outcome: Progressing Towards Goal 
Goal: Medications Outcome: Progressing Towards Goal 
Goal: Respiratory Outcome: Progressing Towards Goal 
Goal: Treatments/Interventions/Procedures Outcome: Progressing Towards Goal 
Goal: Psychosocial 
Outcome: Progressing Towards Goal 
  
Problem: Sepsis: Day 6 Goal: Off Pathway (Use only if patient is Off Pathway) Outcome: Progressing Towards Goal 
Goal: *Oxygen saturation within defined limits Outcome: Progressing Towards Goal 
Goal: *Vital signs within defined limits Outcome: Progressing Towards Goal 
Goal: *Tolerating diet Outcome: Progressing Towards Goal 
Goal: *Demonstrates progressive activity Outcome: Progressing Towards Goal 
Goal: Influenza immunization Outcome: Progressing Towards Goal 
Goal: *Pneumococcal immunization Outcome: Progressing Towards Goal 
Goal: Activity/Safety Outcome: Progressing Towards Goal 
Goal: Diagnostic Test/Procedures Outcome: Progressing Towards Goal 
Goal: Nutrition/Diet Outcome: Progressing Towards Goal 
Goal: Discharge Planning Outcome: Progressing Towards Goal 
Goal: Medications Outcome: Progressing Towards Goal 
Goal: Respiratory Outcome: Progressing Towards Goal 
Goal: Treatments/Interventions/Procedures Outcome: Progressing Towards Goal 
Goal: Psychosocial 
Outcome: Progressing Towards Goal 
  
Problem: Sepsis: Discharge Outcomes Goal: *Vital signs within defined limits Outcome: Progressing Towards Goal 
Goal: *Tolerating diet Outcome: Progressing Towards Goal 
Goal: *Verbalizes understanding and describes prescribed diet Outcome: Progressing Towards Goal 
Goal: *Demonstrates progressive activity Outcome: Progressing Towards Goal 
Goal: *Describes follow-up/return visits to physicians Outcome: Progressing Towards Goal 
Goal: *Verbalizes name, dosage, time, side effects, and number of days to continue medications Outcome: Progressing Towards Goal 
Goal: *Influenza immunization (Oct-Mar only) Outcome: Progressing Towards Goal 
Goal: *Pneumococcal immunization Outcome: Progressing Towards Goal 
Goal: *Lungs clear or at baseline Outcome: Progressing Towards Goal 
Goal: *Oxygen saturation returns to baseline or 90% or better on room air Outcome: Progressing Towards Goal 
Goal: *Glycemic control Outcome: Progressing Towards Goal 
Goal: *Absence of deep venous thrombosis signs and symptoms(Stroke Metric) Outcome: Progressing Towards Goal 
Goal: *Describes available resources and support systems Outcome: Progressing Towards Goal 
Goal: *Optimal pain control at patient's stated goal 
Outcome: Progressing Towards Goal

## 2020-02-03 NOTE — PROGRESS NOTES
Problem: Mobility Impaired (Adult and Pediatric) Goal: *Acute Goals and Plan of Care (Insert Text) Description FUNCTIONAL STATUS PRIOR TO ADMISSION: Patient required minimal assistance for basic and instrumental ADLs. HOME SUPPORT PRIOR TO ADMISSION: The patient lived with family who assist patient with hand held assist. 
 
Physical Therapy Goals Initiated 1/28/2020 1. Patient will move from supine to sit and sit to supine , scoot up and down and roll side to side in bed with supervision/set-up within 7 day(s). 2.  Patient will transfer from bed to chair and chair to bed with minimal assistance/contact guard assist using the least restrictive device within 7 day(s). 3.  Patient will perform sit to stand with minimal assistance/contact guard assist within 7 day(s). 4.  Patient will ambulate with minimal assistance/contact guard assist for 50 feet with the least restrictive device within 7 day(s). Outcome: Progressing Towards Goal 
Note: PHYSICAL THERAPY TREATMENT Patient: Antoinette Sicard. (80 y.o. male) Date: 2/3/2020 Diagnosis: HAP (hospital-acquired pneumonia) [J18.9, Y95] Weakness [R53.1] HAP (hospital-acquired pneumonia) Precautions:   
Chart, physical therapy assessment, plan of care and goals were reviewed. ASSESSMENT Patient continues with skilled PT services and progressing towards goals. Increased time and upto Mod A for mobility tasks With Rw Incontinent of bowel upon standing. Limited by increased need for assistance and decreased activity toleracne  Decreased safety awareness demonstrating 50% command following. Audible wheezing throughout session O2 sat 95%. Pt answers all questions with \"ok\" Current Level of Function Impacting Discharge (mobility/balance): up to Mod A Other factors to consider for discharge: PLAN : 
Patient continues to benefit from skilled intervention to address the above impairments. Continue treatment per established plan of care. to address goals. Recommendation for discharge: (in order for the patient to meet his/her long term goals) Therapy up to 5 days/week in SNF setting This discharge recommendation: 
Has been made in collaboration with the attending provider and/or case management IF patient discharges home will need the following DME: to be determined (TBD) SUBJECTIVE:  
Patient stated ok.  OBJECTIVE DATA SUMMARY:  
Critical Behavior: 
Neurologic State: Alert Orientation Level: Oriented to person Cognition: Appropriate decision making Safety/Judgement: Decreased awareness of environment, Decreased awareness of need for assistance, Decreased awareness of need for safety Functional Mobility Training: 
Bed Mobility: 
  
Supine to Sit: Moderate assistance;Assist x1 Transfers: 
Sit to Stand: Minimum assistance;Assist x2 Stand to Sit: Minimum assistance;Assist x1 Bed to Chair: Minimum assistance;Assist x1 Balance: 
Sitting: Intact Standing: Impaired; With support Ambulation/Gait Training: 
Distance (ft): 20 Feet (ft) Assistive Device: Walker, rolling;Gait belt Ambulation - Level of Assistance: Moderate assistance;Assist x1 Gait Abnormalities: Path deviations;Decreased step clearance Base of Support: Widened Speed/Mckenna: Shuffled; Slow Stairs: Therapeutic Exercises:  
 
Pain Rating: 
 
 
Activity Tolerance:  
Fair and observed SOB with activity Please refer to the flowsheet for vital signs taken during this treatment. After treatment patient left in no apparent distress:  
Sitting in chair, Call bell within reach, Bed / chair alarm activated, and Caregiver / family present COMMUNICATION/COLLABORATION:  
The patients plan of care was discussed with: Registered Nurse Nydia Estrada Time Calculation: 26 mins

## 2020-02-03 NOTE — PROGRESS NOTES
Jake Herrera [address] Office [phone] Fax [phone] Assessment and Plan Karen Arredondo. is a 80 y.o. male admitted for sepsis 2/2 HAP. Also found to acute metabolic encephalopathy and NSTEMI/Type II MI in the setting of Grade 1 diastolic dysfunction. 24 Hour Events:  
- KUB- large stool, no obstruction. Ab US no evidence of cholecystitis  
-d/c'd heparin and Plavix and made NPO since midnight  
-urine output overnight 1200 with 700cc in greco this AM  
-FOBT + for blood -> GI consulted   
-day 6/7 doxy + augmentin ACUTE PROBLEMS Sepsis 2/2 HAP vs concern for Aspiration PNA: 2/4 SIRS (WBC 17.6, RR26). RUL PNA on (CXR,CT C-spine, CT abd/pelvis). Concern for aspiration per Speech Eval.  LA down-trended. Procal wnl. RVP neg. ^ESR 61. ^CRP >9.7. Bcx NG 5 days (final), Ucx neg. V/Q scan neg. Legionella neg. CT chest w/ CHF w/ B/l edema/effusions and underlying  atelectasis. - po Augmentin + Doxy on day 6/7 
- Daily CBC, CMP, Mag and phos - Duonebs q6h  & prn q4H  
  
Anemia: pernicious/anemia of chronic disease (low TIBC, high ferritin) POC 8.5 (BL 12-13). .5. Haptoglobin 370, B12/folate wnl. - Repeat CBC- HgB 7.8 
- Continue home ferrous sulfate and thera-MV 
- FOBT positive  
-GI consulted- appreciate recs Abdominal distension: Abdomen distended with tenderness.  
- Continue miralax daily 
- KUB- large amount of stool. Nonobstructive  
-Ab US- nothing to suggest acute cholecystitis. Common bile duct measures 7 mm, at the upper limits of normal given the patient's age. -FOBT pos 
-GI consulted- appreciate recs Leukocytosis: IMPROVED. WBC down to 10.5 from 16.0. CT chest does not show infectious process and points towards CHF with pleural effusion and overlying atelectasis. Repeat UA no wbc, LE, or nitrites. Likely a stress reaction.   
-daily CBC & monitor for sx of infection Acute Metabolic Encephalopathy in the setting of Dementia w/ Fall: IMPROVING per family. Likely 2/2 HAP. CT head neg. Stroke work-up last month showed chronic white matter disease and superficial siderosis of R frontal lobe, EEG gen slowing. Carotid dopplers in Dec'19 showed chronic mild-mod L ICA stenoses. Pt was supposed to follow-up w/ neurology early Jan but never did. Fall evidenced by R arm abrasion. Family declined. - Tx of HAP as above - Family does not want DOAC b/c concern for bleeding.- cont Plavix - Fall precautions, wound care - F/u outpt Neuro NSTEMI/Type II MI in setting of Mod Systolic Dysfunction: likely 2/2 rhabdomyolysis s/p fall. POA Trop 3.81, NT pro-BNP 07221. ^CK 7496 ^CKMB 42.3. ECHO:  EF 02-49%, mod systolic dysf, mod pul HTN.  
- HOLD home Bumex 1mg for one dose per nephro rec - Strict I &Os - Cards recs: Dalia Number likely 2/2 to Rhabdo, poor cath or outpt cardiac rehab candidate (dementia & severe CKD3) & family declines. - Continue tele monitoring Paroxysmal Afib: EKG (1/27) w/ Afib w/ RVR (117). Currently in sinus rhythm. - Continue home Metoprolol 25mg daily - Cards recs: discussed stopping plavix and starting DOAC, but family decided not do d/t increased risk of bleeding. Rhabdomyolysis: RESOLVED. likely 2/2 to fall. CK down-trending POA CK 2322-->downtrended-->288. Pt was down for possibly >1hour. Sustained R arm abrasion, neg (CT C-spine, XR R forearm x2, hand). ARF on CKD4: IMPROVING. POA Cr 3.1 (BL ~2.5). Likely 2/2 sepsis and poor hydration.  
- Monitor UOP 
- Avoid nephrotoxic agents. - Daily CMP 
- Nephro c/s: not candidate for HD. HOLD home Bumex for one dose. Elevated AST: POA likely 2/2 shock liver. Hepatitis panel neg.  
- Daily CMP  
  
CHRONIC PROBLEMS  
 
HTN: Stable.   
- Continue home norvasc 10mg daily, losartan 25mg daily 
- Continue Metoprolol 25mg daily 
- Hold home lasix until renal functions improve. 
   
 DMII: POA glucose 293; HA1c 2019 (6.5) - Resumed home Tradjenta 5mg. Hold home Amaryl 5mg 
- SSI, q6hrs POC gluc  
- Diabetic mechanical soft diet 
  
HLD: Last lipid panel  [Tchol 185, , HDL 33, ]. Hepatitis panel neg.  
- Continue home niacin & Lipitor 40mg daily  
  
GERD: Switch home prevacid to protonix 
  
Hypothyroidism: TSH low 0.15, T4 wnl, T3 low 1.5 
- Continue home armour thyroid 120mg--> consider reduce to 100mcg in OP 
- F/u outpt PCP  
  
Dementia: Continue home aricept 
  
Obesity: Body mass index is 27.34 kg/m². - Encouraging lifestyle modifications and further follow up outpatient.  
  
  
FEN/GI - NPO since midnight  MIVF at 125mL/hr Activity - Ambulate with assistance DVT prophylaxis - SC Heparin. GI prophylaxis - Protonix Fall prophylaxis - Fall precautions ordered. Disposition - Telemetry. Plan to d/c to [facility] . C/s PT/OT/CM Code Status - DNR, discussed with patient / caregivers. Next of Isis 69 [address] and [phone] [facility] Asher (Son) [phone], Ervin Sharif (dtr) [phone], Aldo Wylie (son in law) [phone] I appreciate the opportunity to participate in the care of this patient, 
Louis Rodriguez DO Family Medicine Resident Subjective / Objective Subjective : No complaints overnigt. No CP/palp/N/V/fever/chills. Temp (24hrs), Av °F (36.7 °C), Min:97.5 °F (36.4 °C), Max:98.4 °F (36.9 °C) Visit Vitals /63 (BP 1 Location: Left arm, BP Patient Position: At rest) Pulse 72 Temp 97.8 °F (36.6 °C) Resp 18 Ht 6' (1.829 m) Wt 195 lb 3.2 oz (88.5 kg) SpO2 93% BMI 26.47 kg/m² Physical Exam 
General:   Alert, cooperative, no acute distress Head:   Atraumatic Eyes:   Conjunctivae clear ENT:  Oral mucosa normal. +black tongue Neck:  Supple, trachea midline, no adenopathy No JVD Back:    No CVA tenderness Lungs:   +diffuse moderate, Expiratory wheeze BL. No resp distress/chest retractions. Heart:   Regular rhythm, no murmur Abdomen:    Soft, non-tender No masses or organomegaly Extremities:  No edema or DVT signs Skin:  Warm and dry. Right arm wrapped.  +greco cath Neurologic:  A&Ox 1. No focal deficits I/O: 
Date 02/02/20 0700 - 02/03/20 6095 02/03/20 0700 - 02/04/20 6491 Shift 8095-8227 1502-5262 24 Hour Total 6531-8802 3428-7121 24 Hour Total  
INTAKE  
I.V.(mL/kg/hr)  250(0.2) 250(0.1) Volume (0.9% sodium chloride infusion)  250 250 Shift Total(mL/kg)  250(2.8) 250(2.8) OUTPUT Urine(mL/kg/hr) 1200(1.1)  1200(0.6) Urine Voided 600  600 Urine Output (mL) (Condom Catheter 02/02/20) 600  600 Stool Stool Occurrence(s) 1 x 1 x 2 x Shift Total(mL/kg) 1200(13.6)  1200(13.6) NET -1200 250 -950 Weight (kg) 88.5 88.5 88.5 88.5 88.5 88.5 Inpatient Medications Current Facility-Administered Medications Medication Dose Route Frequency  ferrous sulfate 300 mg (60 mg iron)/5 mL oral syrup 300 mg  300 mg Oral BID WITH MEALS  pantoprazole (PROTONIX) 40 mg in 0.9% sodium chloride 10 mL injection  40 mg IntraVENous Q12H  
 0.9% sodium chloride infusion  125 mL/hr IntraVENous CONTINUOUS  
 polyethylene glycol (MIRALAX) packet 17 g  17 g Oral DAILY  docusate sodium (COLACE) capsule 100 mg  100 mg Oral DAILY  albuterol (PROVENTIL VENTOLIN) nebulizer solution 2.5 mg  2.5 mg Nebulization Q4H PRN  
 albuterol-ipratropium (DUO-NEB) 2.5 MG-0.5 MG/3 ML  3 mL Nebulization Q6H RT  
 amoxicillin-clavulanate (AUGMENTIN) 500-125 mg per tablet 1 Tab  1 Tab Oral Q12H  
 doxycycline (VIBRA-TABS) tablet 100 mg  100 mg Oral Q12H  
 linaGLIPtin (TRADJENTA) tablet 5 mg  5 mg Oral ACB  metoprolol tartrate (LOPRESSOR) tablet 25 mg  25 mg Oral Q12H  
 losartan (COZAAR) tablet 25 mg  25 mg Oral DAILY  atorvastatin (LIPITOR) tablet 40 mg  40 mg Oral DAILY  amLODIPine (NORVASC) tablet 10 mg  10 mg Oral DAILY  insulin lispro (HUMALOG) injection   SubCUTAneous AC&HS  sodium chloride (NS) flush 5-40 mL  5-40 mL IntraVENous Q8H  
 sodium chloride (NS) flush 5-40 mL  5-40 mL IntraVENous PRN  
 glucose chewable tablet 16 g  4 Tab Oral PRN  
 dextrose (D50W) injection syrg 12.5-25 g  12.5-25 g IntraVENous PRN  
 glucagon (GLUCAGEN) injection 1 mg  1 mg IntraMUSCular PRN  
 dextrose 10% infusion 0-250 mL  0-250 mL IntraVENous PRN Allergies Allergies Allergen Reactions  Synthroid [Levothyroxine] Other (comments) Confused, hallucinations CBC: 
Recent Labs 02/03/20 
8190 02/02/20 
1553 02/02/20 
5927 WBC 9.0 10.7 10.5 HGB 7.8* 8.4* 8.2* HCT 25.0* 26.5* 26.1*  
 245 217 Metabolic Panel: 
Recent Labs 02/03/20 
6093 02/02/20 
3293 02/01/20 
0216 * 144 142  
K 4.3 4.3 4.3 * 111* 107 CO2 24 25 27 BUN 69* 72* 71* CREA 3.11* 3.13* 2.96* * 177* 178* CA 7.7* 7.9* 8.2* MG 2.1 1.9 2.1 PHOS 3.7 4.1 4.0 ALB 2.2* 2.2* 2.4* SGOT 35 36 45* ALT 34 36 41 For Billing Chief Complaint Patient presents with  Fall  
 Skin Problem Hospital Problems  Date Reviewed: 12/19/2019 Codes Class Noted POA Rhabdomyolysis ICD-10-CM: G65.44 ICD-9-CM: 728.88  1/27/2020 Unknown Sepsis ([facility] ICD-10-CM: A41.9 ICD-9-CM: 038.9, 995.91  1/26/2020 Unknown Fall ICD-10-CM: W19. [address] Jimenez ICD-9-CM: C811.8  1/26/2020 Unknown NSTEMI (non-ST elevated myocardial infarction) ([facility] ICD-10-CM: I21.4 ICD-9-CM: 410.70  1/26/2020 Unknown Weakness ICD-10-CM: R53.1 ICD-9-CM: 780.79  1/25/2020 Unknown * (Principal) HAP (hospital-acquired pneumonia) ICD-10-CM: J18.9, Y95 
ICD-9-CM: 486  1/25/2020 Unknown AMS (altered mental status) ICD-10-CM: R41.82 
ICD-9-CM: 780.97  12/14/2019 Yes CKD (chronic kidney disease) stage 4, GFR 15-29 ml/min (HCC) ICD-10-CM: N18.4 ICD-9-CM: 585.4  4/22/2019 Yes Dementia without behavioral disturbance (HCC) ICD-10-CM: F03.90 ICD-9-CM: 294.20  8/16/2016 Yes WILTON (acute kidney injury) ([facility] ICD-10-CM: N17.9 ICD-9-CM: 584.9  8/2/2014 Yes Pernicious anemia ICD-10-CM: D51.0 ICD-9-CM: 281.0  7/31/2012 Yes Acquired hypothyroidism ICD-10-CM: E03.9 ICD-9-CM: 244.9  4/26/2012 Yes Diabetes mellitus type 2, controlled ([facility] ICD-10-CM: E11.9 ICD-9-CM: 250.00  2/9/2012 Yes Essential hypertension, benign ICD-10-CM: I10 
ICD-9-CM: 401.1  2/9/2012 Yes HLD (hyperlipidemia) ICD-10-CM: E78.5 ICD-9-CM: 272.4  2/9/2012 Unknown Esophageal reflux ICD-10-CM: K21.9 ICD-9-CM: 530.81  2/9/2012 Yes

## 2020-02-03 NOTE — PROGRESS NOTES
BINU placed a call to [facility] to see if pt has been accepted. BINU spoke with [facility]. She will review and let me know. Holland Godinez LCSW 
 
1:50pm:  BINU received notification from [facility] that they are NOT able to accept pt. BINU met with pt's daughter, Negrita Nolen, to obtain additional SNF choices. Choice letter has been signed for [facility] and a referral was placed in [employer]. Pt will require authorization if accepted. BINU also met with Ms. Denzel Velasco and Jose Bundy, CARMELA with Palliative Care about the possibility of hospice. Order received for information session received and referral placed in [facility] Holland Godinez LCSW

## 2020-02-03 NOTE — PROGRESS NOTES
Problem: Falls - Risk of 
Goal: *Absence of Falls Description Document Deion Emmanuel Fall Risk and appropriate interventions in the flowsheet. Outcome: Progressing Towards Goal 
Note: Fall Risk Interventions: 
Mobility Interventions: Bed/chair exit alarm, Patient to call before getting OOB, Utilize walker, cane, or other assistive device Mentation Interventions: Door open when patient unattended, Adequate sleep, hydration, pain control, Bed/chair exit alarm, Eyeglasses and hearing aids, Reorient patient Medication Interventions: Patient to call before getting OOB, Teach patient to arise slowly, Utilize gait belt for transfers/ambulation, Bed/chair exit alarm Elimination Interventions: Call light in reach, Stay With Me (per policy), Toilet paper/wipes in reach, Toileting schedule/hourly rounds, Patient to call for help with toileting needs History of Falls Interventions: Bed/chair exit alarm, Door open when patient unattended, Investigate reason for fall, Room close to nurse's station, Utilize gait belt for transfer/ambulation Problem: Patient Education: Go to Patient Education Activity Goal: Patient/Family Education Outcome: Progressing Towards Goal 
  
Problem: Pressure Injury - Risk of 
Goal: *Prevention of pressure injury Description Document Carroll Scale and appropriate interventions in the flowsheet. Outcome: Progressing Towards Goal 
Note: Pressure Injury Interventions: 
Sensory Interventions: Assess changes in LOC, Assess need for specialty bed, Check visual cues for pain, Keep linens dry and wrinkle-free, Minimize linen layers, Monitor skin under medical devices Moisture Interventions: Check for incontinence Q2 hours and as needed, Absorbent underpads, Internal/External urinary devices, Minimize layers Activity Interventions: Increase time out of bed Mobility Interventions: HOB 30 degrees or less Nutrition Interventions: Document food/fluid/supplement intake, Offer support with meals,snacks and hydration Friction and Shear Interventions: Lift sheet, Minimize layers Problem: Patient Education: Go to Patient Education Activity Goal: Patient/Family Education Outcome: Progressing Towards Goal 
  
Problem: Patient Education: Go to Patient Education Activity Goal: Patient/Family Education Outcome: Progressing Towards Goal 
  
Problem: Sepsis: Day of Diagnosis Goal: Off Pathway (Use only if patient is Off Pathway) Outcome: Progressing Towards Goal 
Goal: *Fluid resuscitation Outcome: Progressing Towards Goal 
Goal: *Paired blood cultures prior to first dose of antibiotic Outcome: Progressing Towards Goal 
Goal: *First dose of  appropriate antibiotic within 3 hours of arrival to ED, within 1 hour of arrival to ICU Outcome: Progressing Towards Goal 
Goal: *Lactic acid with first set of blood cultures Outcome: Progressing Towards Goal 
Goal: *Pneumococcal immunization (if eligible) Outcome: Progressing Towards Goal 
Goal: *Influenza immunization (if eligible) Outcome: Progressing Towards Goal 
Goal: Activity/Safety Outcome: Progressing Towards Goal 
Goal: Consults, if ordered Outcome: Progressing Towards Goal 
Goal: Diagnostic Test/Procedures Outcome: Progressing Towards Goal 
Goal: Nutrition/Diet Outcome: Progressing Towards Goal 
Goal: Discharge Planning Outcome: Progressing Towards Goal 
Goal: Medications Outcome: Progressing Towards Goal 
Goal: Respiratory Outcome: Progressing Towards Goal 
Goal: Treatments/Interventions/Procedures Outcome: Progressing Towards Goal 
Goal: Psychosocial 
Outcome: Progressing Towards Goal 
  
Problem: Sepsis: Day 2 Goal: Off Pathway (Use only if patient is Off Pathway) Outcome: Progressing Towards Goal 
Goal: *Central Venous Pressure maintained at 8-12 mm Hg Outcome: Progressing Towards Goal 
Goal: *Hemodynamically stable Outcome: Progressing Towards Goal 
Goal: *Tolerating diet Outcome: Progressing Towards Goal 
Goal: Activity/Safety Outcome: Progressing Towards Goal 
Goal: Consults, if ordered Outcome: Progressing Towards Goal 
Goal: Diagnostic Test/Procedures Outcome: Progressing Towards Goal 
Goal: Nutrition/Diet Outcome: Progressing Towards Goal 
Goal: Discharge Planning Outcome: Progressing Towards Goal 
Goal: Medications Outcome: Progressing Towards Goal 
Goal: Respiratory Outcome: Progressing Towards Goal 
Goal: Treatments/Interventions/Procedures Outcome: Progressing Towards Goal 
Goal: Psychosocial 
Outcome: Progressing Towards Goal 
  
Problem: Sepsis: Day 3 Goal: Off Pathway (Use only if patient is Off Pathway) Outcome: Progressing Towards Goal 
Goal: *Central Venous Pressure maintained at 8-12 mm Hg Outcome: Progressing Towards Goal 
Goal: *Oxygen saturation within defined limits Outcome: Progressing Towards Goal 
Goal: *Vital sign stability Outcome: Progressing Towards Goal 
Goal: *Tolerating diet Outcome: Progressing Towards Goal 
Goal: *Demonstrates progressive activity Outcome: Progressing Towards Goal 
Goal: Activity/Safety Outcome: Progressing Towards Goal 
Goal: Consults, if ordered Outcome: Progressing Towards Goal 
Goal: Diagnostic Test/Procedures Outcome: Progressing Towards Goal 
Goal: Nutrition/Diet Outcome: Progressing Towards Goal 
Goal: Discharge Planning Outcome: Progressing Towards Goal 
Goal: Medications Outcome: Progressing Towards Goal 
Goal: Respiratory Outcome: Progressing Towards Goal 
Goal: Treatments/Interventions/Procedures Outcome: Progressing Towards Goal 
Goal: Psychosocial 
Outcome: Progressing Towards Goal 
  
Problem: Sepsis: Day 4 Goal: Off Pathway (Use only if patient is Off Pathway) Outcome: Progressing Towards Goal 
Goal: Activity/Safety Outcome: Progressing Towards Goal 
Goal: Consults, if ordered Outcome: Progressing Towards Goal 
Goal: Diagnostic Test/Procedures Outcome: Progressing Towards Goal 
 Goal: Nutrition/Diet Outcome: Progressing Towards Goal 
Goal: Discharge Planning Outcome: Progressing Towards Goal 
Goal: Medications Outcome: Progressing Towards Goal 
Goal: Respiratory Outcome: Progressing Towards Goal 
Goal: Treatments/Interventions/Procedures Outcome: Progressing Towards Goal 
Goal: Psychosocial 
Outcome: Progressing Towards Goal 
Goal: *Oxygen saturation within defined limits Outcome: Progressing Towards Goal 
Goal: *Hemodynamically stable Outcome: Progressing Towards Goal 
Goal: *Vital signs within defined limits Outcome: Progressing Towards Goal 
Goal: *Tolerating diet Outcome: Progressing Towards Goal 
Goal: *Demonstrates progressive activity Outcome: Progressing Towards Goal 
Goal: *Fluid volume maintenance Outcome: Progressing Towards Goal 
  
Problem: Sepsis: Day 5 Goal: Off Pathway (Use only if patient is Off Pathway) Outcome: Progressing Towards Goal 
Goal: *Oxygen saturation within defined limits Outcome: Progressing Towards Goal 
Goal: *Vital signs within defined limits Outcome: Progressing Towards Goal 
Goal: *Tolerating diet Outcome: Progressing Towards Goal 
Goal: *Demonstrates progressive activity Outcome: Progressing Towards Goal 
Goal: *Discharge plan identified Outcome: Progressing Towards Goal 
Goal: Activity/Safety Outcome: Progressing Towards Goal 
Goal: Consults, if ordered Outcome: Progressing Towards Goal 
Goal: Diagnostic Test/Procedures Outcome: Progressing Towards Goal 
Goal: Nutrition/Diet Outcome: Progressing Towards Goal 
Goal: Discharge Planning Outcome: Progressing Towards Goal 
Goal: Medications Outcome: Progressing Towards Goal 
Goal: Respiratory Outcome: Progressing Towards Goal 
Goal: Treatments/Interventions/Procedures Outcome: Progressing Towards Goal 
Goal: Psychosocial 
Outcome: Progressing Towards Goal 
  
Problem: Sepsis: Day 6 Goal: Off Pathway (Use only if patient is Off Pathway) Outcome: Progressing Towards Goal 
 Goal: *Oxygen saturation within defined limits Outcome: Progressing Towards Goal 
Goal: *Vital signs within defined limits Outcome: Progressing Towards Goal 
Goal: *Tolerating diet Outcome: Progressing Towards Goal 
Goal: *Demonstrates progressive activity Outcome: Progressing Towards Goal 
Goal: Influenza immunization Outcome: Progressing Towards Goal 
Goal: *Pneumococcal immunization Outcome: Progressing Towards Goal 
Goal: Activity/Safety Outcome: Progressing Towards Goal 
Goal: Diagnostic Test/Procedures Outcome: Progressing Towards Goal 
Goal: Nutrition/Diet Outcome: Progressing Towards Goal 
Goal: Discharge Planning Outcome: Progressing Towards Goal 
Goal: Medications Outcome: Progressing Towards Goal 
Goal: Respiratory Outcome: Progressing Towards Goal 
Goal: Treatments/Interventions/Procedures Outcome: Progressing Towards Goal 
Goal: Psychosocial 
Outcome: Progressing Towards Goal 
  
Problem: Sepsis: Discharge Outcomes Goal: *Vital signs within defined limits Outcome: Progressing Towards Goal 
Goal: *Tolerating diet Outcome: Progressing Towards Goal 
Goal: *Verbalizes understanding and describes prescribed diet Outcome: Progressing Towards Goal 
Goal: *Demonstrates progressive activity Outcome: Progressing Towards Goal 
Goal: *Describes follow-up/return visits to physicians Outcome: Progressing Towards Goal 
Goal: *Verbalizes name, dosage, time, side effects, and number of days to continue medications Outcome: Progressing Towards Goal 
Goal: *Influenza immunization (Oct-Mar only) Outcome: Progressing Towards Goal 
Goal: *Pneumococcal immunization Outcome: Progressing Towards Goal 
Goal: *Lungs clear or at baseline Outcome: Progressing Towards Goal 
Goal: *Oxygen saturation returns to baseline or 90% or better on room air Outcome: Progressing Towards Goal 
Goal: *Glycemic control Outcome: Progressing Towards Goal 
 Goal: *Absence of deep venous thrombosis signs and symptoms(Stroke Metric) Outcome: Progressing Towards Goal 
Goal: *Describes available resources and support systems Outcome: Progressing Towards Goal 
Goal: *Optimal pain control at patient's stated goal 
Outcome: Progressing Towards Goal

## 2020-02-03 NOTE — PROGRESS NOTES
Palliative Medicine Consult Regino: [phone] ([phone]) Patient Name: Najma Rivera. YOB: 1931 Date of Initial Consult: 2020 Reason for Consult: Goals of care discussion Requesting Provider: Dr. Essence Sage Primary Care Physician: Juju Souza MD 
 
 SUMMARY:  
Najma Michelle is a 80 y.o. with a past history of TIAs, DM 2, CKD 4, HTN, hx: CA status post resection, UTIs, falls, memory deficit, who was admitted on 2020 from home with a diagnosis of AMS, rhabdo and  NSTEMI. Current medical issues leading to Palliative Medicine involvement include: Goals of care discussion and setting of advanced age, altered mental status, and multiple comorbidities. Patient presented to the ER with CC of unwitnessed fall and altered mental status, apparently patient was down for unknown period of time. In ER patient confused, BNP 14 K, troponin 3.8. CXR PNA. Cardiology consulted, likely NSTEMI, patient admitted. Course of hospitalization: Patients mentation has improved some, though has not returned to baseline, family reports significantly more confused and speech at times unintelligible. Dependent on all ADLs. GI has been following, hemoglobin trended down since admission, has history of colon CA, option of colonoscopy, however family declined, stated patient would not want invasive treatments or interventions. Speech pathologist consulted, + new dysphagia, seems to be tolerating nectar thick liquids, mechanical soft, though he has overall decline in appetite and intake, + protein calorie malnutrition with an albumin of 2.2.   echo + EF 36%,+ moderate pulmonary hypertension. PT/OT report patient making slow progress. Psychosocial assessment: Patient was a  many years, retired approximately 26 years ago. Is , wife  in . Patient has 2 children, 1 daughter and 1 son.  Patient lives in his own home, his granddaughter and 2 of her friends live with him. Everyone except the patient's son-in-law works, weekday mornings until approximately 1 or 2 he is alone. Patient has 2 children, 1 daughter and 1 son. Family reports he has lived a very sedentary life since long-term, mostly enjoys watching TV. Baseline cognitive and functional status: Prior to hospitalization patient had been mostly independent, has a walker but does not use it, consequently he has had multiple falls. Patient is reportedly able to bathe and toilet self, but daughter stated he did not change his clothes very often. She stated that he did not prepare his meals, but was able to feed himself, pour himself a cup of coffee etc. he new medications and one colored box were for morning and took them appropriately, sometimes he would go to bed early and missed taking his nighttime medication. Daughter stated patient not formally diagnosed with dementia, however he has not recalled her name over the past 2 months, thinks he is his sister, has asked her about his own mother. PALLIATIVE DIAGNOSES:  
 
1. Goals of care discussion 2. DNR discussion 3. Altered mental status, unspecified 4. Debility 5. Protein calorie malnutrition, unspecified PLAN:  
1. Prior to visit completed chart review for updated information. 2. Met with patient, his daughter Gavin Harris was at bedside. 3. Goals of care discussion-patient continues to be unable to participate in the discussion, confused, does not demonstrate insight regarding health or hospitalization. Spent time reviewing hospitalization, including multiple comorbidities in setting of advanced dementia. Daughter stated they are working on possible discharge to SNF, however she had questions regarding hospice. Gavin Harris asked for hospice consultation, specifically with hospice [facility], which has served multiple family members during EOL. · Daughter stated that if hospice finds patient to be appropriate for services, likely she would have him discharged to her home with the support of hospice. She stated that her  is retired and is able to assist with patient's care while she is at work. · Daughter stated that if patient is not appropriate for hospice, then she would probably have him go to a SNF while she gets her home ready for him to move in. 
· Order for case management to consult hospice given, also spoke with unit care  Mau. 4. DNR discussion-patient has a DNR CODE STATUS, will need to obtain a durable DNR prior to discharge. 5. Altered mental status, unspecified-unresolved, improved somewhat, but not at baseline, advanced dementia 6. Debility-unresolved, needs significant assistance with transitions from sit to stand as well as with ambulation, below baseline. PT/OT working with patient, + slow progress, continues to require moderate two-person assist.  Though daughter recognizes therapy might help him temporarily, she ultimately thinks patient would want to go to SNF, would rather go home, focus on his comfort. 7. Protein calorie malnutrition, unspecified-hypoalbuminemia with albumin 2.2, poor appetite/intake, dysphagia w/ mechanical soft and nectar thick liquids. Sedentary and weak 8. Team will follow up with patient's daughter regarding [address] and attempt to obtain durable DNR 
 
9. Communicated plan of care with: Palliative [facility] Team, Dirk May RN 
 
 GOALS OF CARE / TREATMENT PREFERENCES:  
 
GOALS OF CARE: 
Patient/Health Care Proxy Stated Goals: Prolong life TREATMENT PREFERENCES:  
Code Status: DNR Advance Care Planning: 
[x] The [facility] Interdisciplinary Team has updated the ACP Navigator with Allen and Patient Capacity Advance Care Planning 1/27/2020 Patient's Healthcare Decision Maker is: Named in scanned ACP document Primary Decision Maker Name -  
 Primary Decision Maker Phone Number -  
Primary Decision Maker Relationship to Patient -  
Confirm Advance Directive Yes, on file Does the patient have other document types Do Not Resuscitate Medical Interventions: Limited additional interventions Other Instructions:  
Artificially Administered Nutrition: No feeding tube Other: As far as possible, the palliative care team has discussed with patient / health care proxy about goals of care / treatment preferences for patient. HISTORY:  
 
History obtained from: Medical records/family CHIEF COMPLAINT: N/A 
 
HPI/SUBJECTIVE: The patient is:  
[] Verbal and participatory [x] Non-participatory due to:  
Patient alert, awake, does answer some yes no type questions however confused, not able to provide ROS Clinical Pain Assessment (nonverbal scale for severity on nonverbal patients):  
Clinical Pain Assessment Severity: 0 Location: denied Character: denied Duration: denied Effect: denied Factors: denied Frequency: denied Activity (Movement): Lying quietly, normal position Duration: for how long has pt been experiencing pain (e.g., 2 days, 1 month, years) Frequency: how often pain is an issue (e.g., several times per day, once every few days, constant) FUNCTIONAL ASSESSMENT:  
 
Palliative Performance Scale (PPS): PPS: 30 
 
 
 PSYCHOSOCIAL/SPIRITUAL SCREENING:  
 
Palliative IDT has assessed this patient for cultural preferences / practices and a referral made as appropriate to needs (Cultural Services, Patient Advocacy, Ethics, etc.) Any spiritual / Jew concerns: Unable to assess[] Yes /  [x] No 
 
Caregiver Burnout: 
[] Yes /  [x] No /  [] No Caregiver Present Anticipatory grief assessment:  
[x] Normal  / [] Maladaptive ESAS Anxiety: Anxiety: 0 
 
ESAS Depression:    
 
 
 REVIEW OF SYSTEMS:  
 
Positive and pertinent negative findings in ROS are noted above in HPI. The following systems were [] reviewed / [x] unable to be reviewed as noted in HPI Other findings are noted below. Systems: constitutional, ears/nose/mouth/throat, respiratory, gastrointestinal, genitourinary, musculoskeletal, integumentary, neurologic, psychiatric, endocrine. Positive findings noted below. Modified ESAS Completed by: provider Fatigue: 8 Drowsiness: 0 Pain: 0 Anxiety: 0 Nausea: 0 Anorexia: 4 Dyspnea: 0 Stool Occurrence(s): 1 PHYSICAL EXAM:  
 
From RN flowsheet: 
Wt Readings from Last 3 Encounters:  
02/02/20 195 lb 3.2 oz (88.5 kg) 12/19/19 196 lb (88.9 kg) 12/15/19 194 lb (88 kg) Blood pressure 143/65, pulse 63, temperature 98.1 °F (36.7 °C), resp. rate 18, height 6' (1.829 m), weight 195 lb 3.2 oz (88.5 kg), SpO2 97 %. Pain Scale 1: Numeric (0 - 10) Pain Intensity 1: 0 Last bowel movement, if known:  
 
Constitutional: Elderly, well-nourished, disheveled, fatigued, restless, though denied anxiety Eyes: pupils equal, anicteric ENMT: no nasal discharge, moist mucous membranes Cardiovascular: regular rhythm, distal pulses intact Respiratory: breathing not labored, symmetric, Gastrointestinal: Large, round, soft non-tender, +bowel sounds Musculoskeletal: no deformity, no tenderness to palpation Skin: warm, dry Neurologic: Patient is weak, confused, not oriented to time or place, poor insight to hospitalization, difficulty following simple commands, speech was clearer than it had been during initial visit, however only gives 1-2 word responses to questions,Psychiatric: Restricted affect, unable to assess for hallucinations Other: 
 
 
 HISTORY:  
 
Principal Problem: 
  HAP (hospital-acquired pneumonia) (1/25/2020) Active Problems: 
  Diabetes mellitus type 2, controlled ([facility] [address] (2/9/2012) Essential hypertension, benign (2/9/2012) HLD (hyperlipidemia) (2/9/2012) Esophageal reflux (2/9/2012) Acquired hypothyroidism (4/26/2012) Pernicious anemia (7/31/2012) WILTON (acute kidney injury) ([address] (8/2/2014) Dementia without behavioral disturbance ([address] (8/16/2016) CKD (chronic kidney disease) stage 4, GFR 15-29 ml/min ([facility]) (4/22/2019) AMS (altered mental status) (12/14/2019) Weakness (1/25/2020) Sepsis ([address] (1/26/2020) Fall (1/26/2020) NSTEMI (non-ST elevated myocardial infarction) ([address] (1/26/2020) Rhabdomyolysis (1/27/2020) Occult blood positive stool (2/3/2020) Past Medical History:  
Diagnosis Date  WILTON (acute kidney injury) ([address] 8/2/2014  Cancer [facility])   
 colon  Chronic kidney disease  Delirium 10/11/2018  Diabetes ([address] 2008  
 type 2  Dyslipidemia  Esophageal reflux  Esophageal reflux 2/9/2012  Hypertension 2008  Incontinence  Memory loss  Mixed hyperlipidemia 2/9/2012  
 NSTEMI (non-ST elevated myocardial infarction) ([address] 1/26/2020  Other ill-defined conditions(799.89) broken neck 2012  Pernicious anemia 7/31/2012  Psychiatric disorder   
 dementia  Snoring  Stroke ([address] 10/18/2018  Unspecified hypothyroidism 4/26/2012  Urinary tract infection 10/11/2018  UTI (urinary tract infection) 12/14/2019 Past Surgical History:  
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    
 HX HERNIA REPAIR Bilateral inguinal  
 HX OTHER SURGICAL    
 colon resection  HX SMALL BOWEL RESECTION  2010  HX TONSIL AND ADENOIDECTOMY  HX TONSILLECTOMY  1936 Family History Problem Relation Age of Onset  Heart Disease Mother  Hypertension Mother  Heart Disease Father  Hypertension Father  Cancer Paternal Aunt History reviewed, no pertinent family history. Social History Tobacco Use  Smoking status: Former Smoker Packs/day: 1.50 Years: 30.00   Pack years: 45.00  
 Last attempt to quit: 1982 Years since quittin.5  Smokeless tobacco: Never Used Substance Use Topics  Alcohol use: No  
  Alcohol/week: 0.0 standard drinks Types: 1 - 2 Standard drinks or equivalent per week Allergies Allergen Reactions  Synthroid [Levothyroxine] Other (comments) Confused, hallucinations Current Facility-Administered Medications Medication Dose Route Frequency  pantoprazole (PROTONIX) tablet 40 mg  40 mg Oral ACB&D  
 metroNIDAZOLE (FLAGYL) tablet 500 mg  500 mg Oral Q12H  
 0.9% sodium chloride infusion  125 mL/hr IntraVENous CONTINUOUS  
 polyethylene glycol (MIRALAX) packet 17 g  17 g Oral DAILY  docusate sodium (COLACE) capsule 100 mg  100 mg Oral DAILY  albuterol (PROVENTIL VENTOLIN) nebulizer solution 2.5 mg  2.5 mg Nebulization Q4H PRN  
 albuterol-ipratropium (DUO-NEB) 2.5 MG-0.5 MG/3 ML  3 mL Nebulization Q6H RT  
 amoxicillin-clavulanate (AUGMENTIN) 500-125 mg per tablet 1 Tab  1 Tab Oral Q12H  
 doxycycline (VIBRA-TABS) tablet 100 mg  100 mg Oral Q12H  
 linaGLIPtin (TRADJENTA) tablet 5 mg  5 mg Oral ACB  metoprolol tartrate (LOPRESSOR) tablet 25 mg  25 mg Oral Q12H  
 losartan (COZAAR) tablet 25 mg  25 mg Oral DAILY  atorvastatin (LIPITOR) tablet 40 mg  40 mg Oral DAILY  amLODIPine (NORVASC) tablet 10 mg  10 mg Oral DAILY  insulin lispro (HUMALOG) injection   SubCUTAneous AC&HS  sodium chloride (NS) flush 5-40 mL  5-40 mL IntraVENous Q8H  
 sodium chloride (NS) flush 5-40 mL  5-40 mL IntraVENous PRN  
 glucose chewable tablet 16 g  4 Tab Oral PRN  
 dextrose (D50W) injection syrg 12.5-25 g  12.5-25 g IntraVENous PRN  
 glucagon (GLUCAGEN) injection 1 mg  1 mg IntraMUSCular PRN  
 dextrose 10% infusion 0-250 mL  0-250 mL IntraVENous PRN  
 
 
 
 LAB AND IMAGING FINDINGS:  
 
Lab Results Component Value Date/Time  WBC 9.7 2020 12:16 PM  
 HGB 8.4 (L) 2020 12:16 PM  
 PLATELET 592 84/88/2063 12:16 PM  
 
Lab Results Component Value Date/Time Sodium 146 (H) 02/03/2020 05:17 AM  
 Potassium 4.3 02/03/2020 05:17 AM  
 Chloride 115 (H) 02/03/2020 05:17 AM  
 CO2 24 02/03/2020 05:17 AM  
 BUN 69 (H) 02/03/2020 05:17 AM  
 Creatinine 3.11 (H) 02/03/2020 05:17 AM  
 Calcium 7.7 (L) 02/03/2020 05:17 AM  
 Magnesium 2.1 02/03/2020 05:17 AM  
 Phosphorus 3.7 02/03/2020 05:17 AM  
  
Lab Results Component Value Date/Time AST (SGOT) 35 02/03/2020 05:17 AM  
 Alk. phosphatase 62 02/03/2020 05:17 AM  
 Protein, total 5.6 (L) 02/03/2020 05:17 AM  
 Albumin 2.2 (L) 02/03/2020 05:17 AM  
 Globulin 3.4 02/03/2020 05:17 AM  
 
Lab Results Component Value Date/Time INR 1.1 01/25/2020 02:43 PM  
 Prothrombin time 11.0 01/25/2020 02:43 PM  
 aPTT 53.3 (H) 01/27/2020 08:44 AM  
  
Lab Results Component Value Date/Time Iron 48 02/01/2020 12:11 PM  
 TIBC 169 (L) 02/01/2020 12:11 PM  
 Iron % saturation 28 02/01/2020 12:11 PM  
 Ferritin 597 (H) 02/01/2020 12:11 PM  
  
No results found for: PH, PCO2, PO2 No components found for: [place] Lab Results Component Value Date/Time  (H) 02/01/2020 02:16 AM  
 CK - MB 42.3 (H) 01/26/2020 01:02 AM  
  
 
 
   
 
Total time:Counseling / coordination time, 35min 
spent as noted above: 25 min 
> 50% counseling / coordination?: yes Prolonged service was provided for  []30 min   []75 min in face to face time in the presence of the patient, spent as noted above. Time Start:  
Time End:  
Note: this can only be billed with 21247 (initial) or 57145 (follow up). If multiple start / stop times, list each separately.

## 2020-02-04 NOTE — PROGRESS NOTES
Palliative Medicine Consult Regino: [phone] ([phone]) Patient Name: Analy Persaud YOB: 1931 Date of Initial Consult: 2020 Reason for Consult: Goals of care discussion Requesting Provider: Dr. Praful Maza Primary Care Physician: Larisa Machado MD 
 
 SUMMARY:  
Analy Persaud is a 80 y.o. with a past history of TIAs, DM 2, CKD 4, HTN, hx: CA status post resection, UTIs, falls, memory deficit, who was admitted on 2020 from home with a diagnosis of AMS, rhabdo and  NSTEMI. Current medical issues leading to Palliative Medicine involvement include: Goals of care discussion and setting of advanced age, altered mental status, and multiple comorbidities. Patient presented to the ER with CC of unwitnessed fall and altered mental status, apparently patient was down for unknown period of time. In ER patient confused, BNP 14 K, troponin 3.8. CXR PNA. Cardiology consulted, likely NSTEMI, patient admitted. Course of hospitalization: Patients mentation has improved some, though has not returned to baseline, family reports significantly more confused and speech at times unintelligible. Dependent on all ADLs. GI has been following, hemoglobin trended down since admission, has history of colon CA, option of colonoscopy, however family declined, stated patient would not want invasive treatments or interventions. Speech pathologist consulted, + new dysphagia, seems to be tolerating nectar thick liquids, mechanical soft, though he has overall decline in appetite and intake, + protein calorie malnutrition with an albumin of 2.2.   echo + EF 36%,+ moderate pulmonary hypertension. PT/OT report patient making slow progress. Psychosocial assessment: Patient was a  many years, retired approximately 26 years ago. Is , wife  in . Patient has 2 children, 1 daughter and 1 son.  Patient lives in his own home, his granddaughter and 2 of her friends live with him. Everyone except the patient's son-in-law works, weekday mornings until approximately 1 or 2 he is alone. Patient has 2 children, 1 daughter and 1 son. Family reports he has lived a very sedentary life since nursing home, mostly enjoys watching TV. Baseline cognitive and functional status: Prior to hospitalization patient had been mostly independent, has a walker but does not use it, consequently he has had multiple falls. Patient is reportedly able to bathe and toilet self, but daughter stated he did not change his clothes very often. She stated that he did not prepare his meals, but was able to feed himself, pour himself a cup of coffee etc. he new medications and one colored box were for morning and took them appropriately, sometimes he would go to bed early and missed taking his nighttime medication. Daughter stated patient not formally diagnosed with dementia, however he has not recalled her name over the past 2 months, thinks he is his sister, has asked her about his own mother. PALLIATIVE DIAGNOSES:  
 
1. Goals of care discussion 2. DNR discussion 3. Altered mental status, unspecified 4. Debility 5. Protein calorie malnutrition, unspecified PLAN:  
1. Prior to visit completed chart review for updated information and spoke with care manager Taty Walton. 2. Met with patient, his son-in-law was at bedside. Patient sitting up in chair, fatigued, alert, oriented to self and his son-in-law, however not situation, does not demonstrate insight to health or hospitalization. · Goals of care discussion-[facility] patient, found him appropriate for admission. Plan is to discharge patient to his daughter's home with [facility]. Son-in-law stated that when was at home working on the house, making space for DME to be delivered. Plan is for patient to be discharged home on Wednesday 2/5. 3. DNR discussion-patient has a DNR CODE STATUS. I gave son-in-law a durable DNR, asked for him to show his wife/patient's daughter, would like to have either the patient's daughter or son, sign before he is discharged. 4. Altered mental status, unspecified-unresolved, not back to baseline. Patient's daughter electing for comfort focused care. 5. Debility-unresolved, needs significant assistance with transitions from sit to stand as well as with ambulation, below baseline. PT/OT working with patient, + making progress. Family has decided to focus on patient's comfort, not continue to work on building strength. Plan to discharge home with hospice 6. Protein calorie malnutrition, unspecified-hypoalbuminemia with albumin 2.2, poor appetite/intake, dysphagia w/ mechanical soft and nectar thick liquids. Sedentary and weak. Patient transitioning to comfort focused care, with hospice services, comfort feedings. 7.  
 
8. Communicated plan of care with: Palliative [facility] Team, Raya Barboza unit care manager GOALS OF CARE / TREATMENT PREFERENCES:  
 
GOALS OF CARE: 
Patient/Health Care Proxy Stated Goals: Comfort TREATMENT PREFERENCES:  
Code Status: DNR Advance Care Planning: 
[x] The [facility] Interdisciplinary Team has updated the ACP Navigator with Allen and Patient Capacity Advance Care Planning 1/27/2020 Patient's Healthcare Decision Maker is: Named in scanned ACP document Primary Decision Maker Name -  
Primary Decision Maker Phone Number -  
Primary Decision Maker Relationship to Patient -  
Confirm Advance Directive Yes, on file Does the patient have other document types Do Not Resuscitate Medical Interventions: Comfort measures Other Instructions:  
Artificially Administered Nutrition: No feeding tube Other: As far as possible, the palliative care team has discussed with patient / health care proxy about goals of care / treatment preferences for patient. HISTORY:  
 
History obtained from: Medical records/family CHIEF COMPLAINT: N/A 
 
HPI/SUBJECTIVE: The patient is:  
[] Verbal and participatory [x] Non-participatory due to:  
Patient alert, awake, does answer some yes no type questions, denies pain, however confused, not able to provide ROS Clinical Pain Assessment (nonverbal scale for severity on nonverbal patients):  
Clinical Pain Assessment Severity: 0 Location: denied Character: denied Duration: denied Effect: denied Factors: denied Frequency: denied Activity (Movement): Lying quietly, normal position Duration: for how long has pt been experiencing pain (e.g., 2 days, 1 month, years) Frequency: how often pain is an issue (e.g., several times per day, once every few days, constant) FUNCTIONAL ASSESSMENT:  
 
Palliative Performance Scale (PPS): PPS: 40 PSYCHOSOCIAL/SPIRITUAL SCREENING:  
 
Palliative IDT has assessed this patient for cultural preferences / practices and a referral made as appropriate to needs (Cultural Services, Patient Advocacy, Ethics, etc.) Any spiritual / Denominational concerns: Unable to assess[] Yes /  [x] No 
 
Caregiver Burnout: 
[] Yes /  [x] No /  [] No Caregiver Present Anticipatory grief assessment:  
[x] Normal  / [] Maladaptive ESAS Anxiety: Anxiety: 0 
 
ESAS Depression: Depression: 0 REVIEW OF SYSTEMS:  
 
Positive and pertinent negative findings in ROS are noted above in HPI. The following systems were [] reviewed / [x] unable to be reviewed as noted in HPI Other findings are noted below. Systems: constitutional, ears/nose/mouth/throat, respiratory, gastrointestinal, genitourinary, musculoskeletal, integumentary, neurologic, psychiatric, endocrine. Positive findings noted below. Modified ESAS Completed by: provider Fatigue: 4 Drowsiness: 0 Depression: 0 Pain: 0 Anxiety: 0 Nausea: 0 Anorexia: 6 Dyspnea: 0 Stool Occurrence(s): 1 PHYSICAL EXAM:  
 
From RN flowsheet: 
Wt Readings from Last 3 Encounters:  
02/04/20 193 lb (87.5 kg) 12/19/19 196 lb (88.9 kg) 12/15/19 194 lb (88 kg) Blood pressure 116/59, pulse 60, temperature 98.2 °F (36.8 °C), resp. rate 16, height 6' (1.829 m), weight 193 lb (87.5 kg), SpO2 98 %. Pain Scale 1: Numeric (0 - 10) Pain Intensity 1: 0 Last bowel movement, if known:  
 
Constitutional: Elderly, well-nourished, disheveled, fatigued, restless, though denied anxiety Eyes: pupils equal, anicteric ENMT: no nasal discharge, moist mucous membranes Cardiovascular: regular rhythm, distal pulses intact Respiratory: breathing not labored, symmetric, Gastrointestinal: Large, round, soft non-tender, +bowel sounds Musculoskeletal: no deformity, no tenderness to palpation Skin: warm, dry Neurologic: Patient is weak, confused, not oriented to time or place, poor insight to hospitalization, difficulty following simple commands, speech was mostly intelligible, pleasant, unable to assess hallucinations Other: 
 
 
 HISTORY:  
 
Principal Problem: 
  HAP (hospital-acquired pneumonia) (1/25/2020) Active Problems: 
  Diabetes mellitus type 2, controlled ([address] (2/9/2012) Essential hypertension, benign (2/9/2012) HLD (hyperlipidemia) (2/9/2012) Esophageal reflux (2/9/2012) Acquired hypothyroidism (4/26/2012) Pernicious anemia (7/31/2012) WILTON (acute kidney injury) ([address] (8/2/2014) Dementia without behavioral disturbance ([address] (8/16/2016) CKD (chronic kidney disease) stage 4, GFR 15-29 ml/min ([facility]) (4/22/2019) AMS (altered mental status) (12/14/2019) Weakness (1/25/2020) Sepsis ([address] (1/26/2020) Fall (1/26/2020) NSTEMI (non-ST elevated myocardial infarction) ([address] (1/26/2020) Rhabdomyolysis (1/27/2020) Occult blood positive stool (2/3/2020) Past Medical History:  
Diagnosis Date  WILTON (acute kidney injury) ([facility] 2014  Cancer [facility])   
 colon  Chronic kidney disease  Delirium 10/11/2018  Diabetes ([facility] 2008  
 type 2  Dyslipidemia  Esophageal reflux  Esophageal reflux 2012  Hypertension 2008  Incontinence  Memory loss  Mixed hyperlipidemia 2012  
 NSTEMI (non-ST elevated myocardial infarction) ([facility] 2020  Other ill-defined conditions(799.89) broken neck   Pernicious anemia 2012  Psychiatric disorder   
 dementia  Snoring  Stroke ([facility] 10/18/2018  Unspecified hypothyroidism 2012  Urinary tract infection 10/11/2018  UTI (urinary tract infection) 2019 Past Surgical History:  
Procedure Laterality Date  COLONOSCOPY  12 Rectosigmoid adenocarcinoma  HX CATARACT REMOVAL    
 HX HERNIA REPAIR Bilateral inguinal  
 HX OTHER SURGICAL    
 colon resection  HX SMALL BOWEL RESECTION    HX TONSIL AND ADENOIDECTOMY  HX TONSILLECTOMY  1936 Family History Problem Relation Age of Onset  Heart Disease Mother  Hypertension Mother  Heart Disease Father  Hypertension Father  Cancer Paternal Aunt History reviewed, no pertinent family history. Social History Tobacco Use  Smoking status: Former Smoker Packs/day: 1.50 Years: 30.00 Pack years: 45.00 Last attempt to quit: 1982 Years since quittin.5  Smokeless tobacco: Never Used Substance Use Topics  Alcohol use: No  
  Alcohol/week: 0.0 standard drinks Types: 1 - 2 Standard drinks or equivalent per week Allergies Allergen Reactions  Synthroid [Levothyroxine] Other (comments) Confused, hallucinations Current Facility-Administered Medications Medication Dose Route Frequency  pantoprazole (PROTONIX) tablet 40 mg  40 mg Oral ACB&D  
  metroNIDAZOLE (FLAGYL) tablet 500 mg  500 mg Oral Q12H  
 0.9% sodium chloride infusion  125 mL/hr IntraVENous CONTINUOUS  
 polyethylene glycol (MIRALAX) packet 17 g  17 g Oral DAILY  docusate sodium (COLACE) capsule 100 mg  100 mg Oral DAILY  albuterol (PROVENTIL VENTOLIN) nebulizer solution 2.5 mg  2.5 mg Nebulization Q4H PRN  
 albuterol-ipratropium (DUO-NEB) 2.5 MG-0.5 MG/3 ML  3 mL Nebulization Q6H RT  
 linaGLIPtin (TRADJENTA) tablet 5 mg  5 mg Oral ACB  metoprolol tartrate (LOPRESSOR) tablet 25 mg  25 mg Oral Q12H  
 losartan (COZAAR) tablet 25 mg  25 mg Oral DAILY  atorvastatin (LIPITOR) tablet 40 mg  40 mg Oral DAILY  amLODIPine (NORVASC) tablet 10 mg  10 mg Oral DAILY  insulin lispro (HUMALOG) injection   SubCUTAneous AC&HS  sodium chloride (NS) flush 5-40 mL  5-40 mL IntraVENous Q8H  
 sodium chloride (NS) flush 5-40 mL  5-40 mL IntraVENous PRN  
 glucose chewable tablet 16 g  4 Tab Oral PRN  
 dextrose (D50W) injection syrg 12.5-25 g  12.5-25 g IntraVENous PRN  
 glucagon (GLUCAGEN) injection 1 mg  1 mg IntraMUSCular PRN  
 dextrose 10% infusion 0-250 mL  0-250 mL IntraVENous PRN  
 
 
 
 LAB AND IMAGING FINDINGS:  
 
Lab Results Component Value Date/Time WBC 9.1 02/04/2020 01:40 AM  
 HGB 8.1 (L) 02/04/2020 01:40 AM  
 PLATELET 557 05/92/4541 01:40 AM  
 
Lab Results Component Value Date/Time Sodium 147 (H) 02/04/2020 01:40 AM  
 Potassium 4.9 02/04/2020 01:40 AM  
 Chloride 117 (H) 02/04/2020 01:40 AM  
 CO2 24 02/04/2020 01:40 AM  
 BUN 64 (H) 02/04/2020 01:40 AM  
 Creatinine 3.10 (H) 02/04/2020 01:40 AM  
 Calcium 7.9 (L) 02/04/2020 01:40 AM  
 Magnesium 2.2 02/04/2020 01:40 AM  
 Phosphorus 3.7 02/04/2020 01:40 AM  
  
Lab Results Component Value Date/Time AST (SGOT) 32 02/04/2020 01:40 AM  
 Alk.  phosphatase 60 02/04/2020 01:40 AM  
 Protein, total 5.6 (L) 02/04/2020 01:40 AM  
 Albumin 2.3 (L) 02/04/2020 01:40 AM  
 Globulin 3.3 02/04/2020 01:40 AM  
 
Lab Results Component Value Date/Time INR 1.1 01/25/2020 02:43 PM  
 Prothrombin time 11.0 01/25/2020 02:43 PM  
 aPTT 53.3 (H) 01/27/2020 08:44 AM  
  
Lab Results Component Value Date/Time Iron 48 02/01/2020 12:11 PM  
 TIBC 169 (L) 02/01/2020 12:11 PM  
 Iron % saturation 28 02/01/2020 12:11 PM  
 Ferritin 597 (H) 02/01/2020 12:11 PM  
  
No results found for: PH, PCO2, PO2 No components found for: [place] Lab Results Component Value Date/Time  (H) 02/01/2020 02:16 AM  
 CK - MB 42.3 (H) 01/26/2020 01:02 AM  
  
 
 
   
 
Total time:Counseling / coordination time, 25 min 
spent as noted above: 20min 
> 50% counseling / coordination?: yes Prolonged service was provided for  []30 min   []75 min in face to face time in the presence of the patient, spent as noted above. Time Start:  
Time End:  
Note: this can only be billed with 35597 (initial) or [id number] (follow up). If multiple start / stop times, list each separately.

## 2020-02-04 NOTE — PROGRESS NOTES
Problem: Falls - Risk of 
Goal: *Absence of Falls Description Document Micky Loco Fall Risk and appropriate interventions in the flowsheet. Outcome: Progressing Towards Goal 
Note: Fall Risk Interventions: 
Mobility Interventions: Bed/chair exit alarm Mentation Interventions: Bed/chair exit alarm Medication Interventions: Bed/chair exit alarm Elimination Interventions: Call light in reach, Bed/chair exit alarm History of Falls Interventions: Bed/chair exit alarm Problem: Patient Education: Go to Patient Education Activity Goal: Patient/Family Education Outcome: Progressing Towards Goal 
  
Problem: Pressure Injury - Risk of 
Goal: *Prevention of pressure injury Description Document Carroll Scale and appropriate interventions in the flowsheet. Outcome: Progressing Towards Goal 
Note: Pressure Injury Interventions: 
Sensory Interventions: Assess changes in LOC Moisture Interventions: Absorbent underpads Activity Interventions: Increase time out of bed Mobility Interventions: HOB 30 degrees or less Nutrition Interventions: Document food/fluid/supplement intake, Offer support with meals,snacks and hydration Friction and Shear Interventions: Apply protective barrier, creams and emollients Problem: Patient Education: Go to Patient Education Activity Goal: Patient/Family Education Outcome: Progressing Towards Goal 
  
Problem: Patient Education: Go to Patient Education Activity Goal: Patient/Family Education Outcome: Progressing Towards Goal 
  
Problem: Sepsis: Day of Diagnosis Goal: Off Pathway (Use only if patient is Off Pathway) Outcome: Progressing Towards Goal 
Goal: *Fluid resuscitation Outcome: Progressing Towards Goal 
Goal: *Paired blood cultures prior to first dose of antibiotic Outcome: Progressing Towards Goal 
Goal: *First dose of  appropriate antibiotic within 3 hours of arrival to ED, within 1 hour of arrival to ICU 
 Outcome: Progressing Towards Goal 
Goal: *Lactic acid with first set of blood cultures Outcome: Progressing Towards Goal 
Goal: *Pneumococcal immunization (if eligible) Outcome: Progressing Towards Goal 
Goal: *Influenza immunization (if eligible) Outcome: Progressing Towards Goal 
Goal: Activity/Safety Outcome: Progressing Towards Goal 
Goal: Consults, if ordered Outcome: Progressing Towards Goal 
Goal: Diagnostic Test/Procedures Outcome: Progressing Towards Goal 
Goal: Nutrition/Diet Outcome: Progressing Towards Goal 
Goal: Discharge Planning Outcome: Progressing Towards Goal 
Goal: Medications Outcome: Progressing Towards Goal 
Goal: Respiratory Outcome: Progressing Towards Goal 
Goal: Treatments/Interventions/Procedures Outcome: Progressing Towards Goal 
Goal: Psychosocial 
Outcome: Progressing Towards Goal 
  
Problem: Sepsis: Day 2 Goal: Off Pathway (Use only if patient is Off Pathway) Outcome: Progressing Towards Goal 
Goal: *Central Venous Pressure maintained at 8-12 mm Hg Outcome: Progressing Towards Goal 
Goal: *Hemodynamically stable Outcome: Progressing Towards Goal 
Goal: *Tolerating diet Outcome: Progressing Towards Goal 
Goal: Activity/Safety Outcome: Progressing Towards Goal 
Goal: Consults, if ordered Outcome: Progressing Towards Goal 
Goal: Diagnostic Test/Procedures Outcome: Progressing Towards Goal 
Goal: Nutrition/Diet Outcome: Progressing Towards Goal 
Goal: Discharge Planning Outcome: Progressing Towards Goal 
Goal: Medications Outcome: Progressing Towards Goal 
Goal: Respiratory Outcome: Progressing Towards Goal 
Goal: Treatments/Interventions/Procedures Outcome: Progressing Towards Goal 
Goal: Psychosocial 
Outcome: Progressing Towards Goal 
  
Problem: Sepsis: Day 3 Goal: Off Pathway (Use only if patient is Off Pathway) Outcome: Progressing Towards Goal 
Goal: *Central Venous Pressure maintained at 8-12 mm Hg Outcome: Progressing Towards Goal 
 Goal: *Oxygen saturation within defined limits Outcome: Progressing Towards Goal 
Goal: *Vital sign stability Outcome: Progressing Towards Goal 
Goal: *Tolerating diet Outcome: Progressing Towards Goal 
Goal: *Demonstrates progressive activity Outcome: Progressing Towards Goal 
Goal: Activity/Safety Outcome: Progressing Towards Goal 
Goal: Consults, if ordered Outcome: Progressing Towards Goal 
Goal: Diagnostic Test/Procedures Outcome: Progressing Towards Goal 
Goal: Nutrition/Diet Outcome: Progressing Towards Goal 
Goal: Discharge Planning Outcome: Progressing Towards Goal 
Goal: Medications Outcome: Progressing Towards Goal 
Goal: Respiratory Outcome: Progressing Towards Goal 
Goal: Treatments/Interventions/Procedures Outcome: Progressing Towards Goal 
Goal: Psychosocial 
Outcome: Progressing Towards Goal 
  
Problem: Sepsis: Day 4 Goal: Off Pathway (Use only if patient is Off Pathway) Outcome: Progressing Towards Goal 
Goal: Activity/Safety Outcome: Progressing Towards Goal 
Goal: Consults, if ordered Outcome: Progressing Towards Goal 
Goal: Diagnostic Test/Procedures Outcome: Progressing Towards Goal 
Goal: Nutrition/Diet Outcome: Progressing Towards Goal 
Goal: Discharge Planning Outcome: Progressing Towards Goal 
Goal: Medications Outcome: Progressing Towards Goal 
Goal: Respiratory Outcome: Progressing Towards Goal 
Goal: Treatments/Interventions/Procedures Outcome: Progressing Towards Goal 
Goal: Psychosocial 
Outcome: Progressing Towards Goal 
Goal: *Oxygen saturation within defined limits Outcome: Progressing Towards Goal 
Goal: *Hemodynamically stable Outcome: Progressing Towards Goal 
Goal: *Vital signs within defined limits Outcome: Progressing Towards Goal 
Goal: *Tolerating diet Outcome: Progressing Towards Goal 
Goal: *Demonstrates progressive activity Outcome: Progressing Towards Goal 
Goal: *Fluid volume maintenance Outcome: Progressing Towards Goal 
  
 Problem: Sepsis: Day 5 Goal: Off Pathway (Use only if patient is Off Pathway) Outcome: Progressing Towards Goal 
Goal: *Oxygen saturation within defined limits Outcome: Progressing Towards Goal 
Goal: *Vital signs within defined limits Outcome: Progressing Towards Goal 
Goal: *Tolerating diet Outcome: Progressing Towards Goal 
Goal: *Demonstrates progressive activity Outcome: Progressing Towards Goal 
Goal: *Discharge plan identified Outcome: Progressing Towards Goal 
Goal: Activity/Safety Outcome: Progressing Towards Goal 
Goal: Consults, if ordered Outcome: Progressing Towards Goal 
Goal: Diagnostic Test/Procedures Outcome: Progressing Towards Goal 
Goal: Nutrition/Diet Outcome: Progressing Towards Goal 
Goal: Discharge Planning Outcome: Progressing Towards Goal 
Goal: Medications Outcome: Progressing Towards Goal 
Goal: Respiratory Outcome: Progressing Towards Goal 
Goal: Treatments/Interventions/Procedures Outcome: Progressing Towards Goal 
Goal: Psychosocial 
Outcome: Progressing Towards Goal 
  
Problem: Sepsis: Day 6 Goal: Off Pathway (Use only if patient is Off Pathway) Outcome: Progressing Towards Goal 
Goal: *Oxygen saturation within defined limits Outcome: Progressing Towards Goal 
Goal: *Vital signs within defined limits Outcome: Progressing Towards Goal 
Goal: *Tolerating diet Outcome: Progressing Towards Goal 
Goal: *Demonstrates progressive activity Outcome: Progressing Towards Goal 
Goal: Influenza immunization Outcome: Progressing Towards Goal 
Goal: *Pneumococcal immunization Outcome: Progressing Towards Goal 
Goal: Activity/Safety Outcome: Progressing Towards Goal 
Goal: Diagnostic Test/Procedures Outcome: Progressing Towards Goal 
Goal: Nutrition/Diet Outcome: Progressing Towards Goal 
Goal: Discharge Planning Outcome: Progressing Towards Goal 
Goal: Medications Outcome: Progressing Towards Goal 
Goal: Respiratory Outcome: Progressing Towards Goal 
 Goal: Treatments/Interventions/Procedures Outcome: Progressing Towards Goal 
Goal: Psychosocial 
Outcome: Progressing Towards Goal 
  
Problem: Sepsis: Discharge Outcomes Goal: *Vital signs within defined limits Outcome: Progressing Towards Goal 
Goal: *Tolerating diet Outcome: Progressing Towards Goal 
Goal: *Verbalizes understanding and describes prescribed diet Outcome: Progressing Towards Goal 
Goal: *Demonstrates progressive activity Outcome: Progressing Towards Goal 
Goal: *Describes follow-up/return visits to physicians Outcome: Progressing Towards Goal 
Goal: *Verbalizes name, dosage, time, side effects, and number of days to continue medications Outcome: Progressing Towards Goal 
Goal: *Influenza immunization (Oct-Mar only) Outcome: Progressing Towards Goal 
Goal: *Pneumococcal immunization Outcome: Progressing Towards Goal 
Goal: *Lungs clear or at baseline Outcome: Progressing Towards Goal 
Goal: *Oxygen saturation returns to baseline or 90% or better on room air Outcome: Progressing Towards Goal 
Goal: *Glycemic control Outcome: Progressing Towards Goal 
Goal: *Absence of deep venous thrombosis signs and symptoms(Stroke Metric) Outcome: Progressing Towards Goal 
Goal: *Describes available resources and support systems Outcome: Progressing Towards Goal 
Goal: *Optimal pain control at patient's stated goal 
Outcome: Progressing Towards Goal

## 2020-02-04 NOTE — PROGRESS NOTES
2/4/2020  
12:25 PM 
Pt's family will not be able to transport pt. CM arranged for transportation via [facility]. Sent request via [employer], pending confirmation. 11:43 AM 
CM received update from Marcos mckenna/[facility]. They will deliver equipment this evening and pt can be admitted on 2/5 . Plan for pt to discharge in the morning. Pt's family will provide transportation home. FP notified of discharge plan. 11:08 AM 
CM received call back from pt's daughter. Provided daughter with update and is agreeable to plan for pt to discharge home with hospice. Pt will be going to daughter's house: [address], [address]. CM called Soheila with [facility] and she will order equipment to have it delivered to daughter's house. Marcos Yadav will provide update on when equipment will be delivered. Pt's family can provide transportation at discharge. CM will follow up.  
 
9:31 AM 
EMR Review: Sepsis 2/2 HAP vs concern for [facility] P[facility]. CM received call from Marcos Yadav ([phone]) with [facility], she met with pt's family and family would like to pursue pt returning home with hospice. CM provided update to [facility], plan would be for pt to discharge tomorrow with [facility], as equipment would need to be ordered today and delivered. CM called pt's daughter Angelo Webb" [id number] [phone] and left msg for return call. CM will continue to follow. Dav Ellis

## 2020-02-04 NOTE — PROGRESS NOTES
Bedside and Verbal shift change report given to Nicole (oncoming nurse) by Elza Rainey (offgoing nurse).  Report included the following information SBAR, Kardex and Recent Results.

## 2020-02-04 NOTE — PROGRESS NOTES
Problem: Mobility Impaired (Adult and Pediatric) Goal: *Acute Goals and Plan of Care (Insert Text) Description FUNCTIONAL STATUS PRIOR TO ADMISSION: Patient required minimal assistance for basic and instrumental ADLs. HOME SUPPORT PRIOR TO ADMISSION: The patient lived with family who assist patient with hand held assist. 
 
Physical Therapy Goals Initiated 1/28/2020 1. Patient will move from supine to sit and sit to supine , scoot up and down and roll side to side in bed with supervision/set-up within 7 day(s). 2.  Patient will transfer from bed to chair and chair to bed with minimal assistance/contact guard assist using the least restrictive device within 7 day(s). 3.  Patient will perform sit to stand with minimal assistance/contact guard assist within 7 day(s). 4.  Patient will ambulate with minimal assistance/contact guard assist for 50 feet with the least restrictive device within 7 day(s). Outcome: Progressing Towards Goal 
Note: PHYSICAL THERAPY TREATMENT Patient: Lucía Vila (80 y.o. male) Date: 2/4/2020 Diagnosis: HAP (hospital-acquired pneumonia) [J18.9, Y95] Weakness [R53.1] HAP (hospital-acquired pneumonia) Precautions:   
Chart, physical therapy assessment, plan of care and goals were reviewed. ASSESSMENT Patient continues with skilled PT services and progressing towards goals. Increased time for command following for functional transfers to [facility] then to chair. Audiblel wheezing throughout session O2 sat 96% Current Level of Function Impacting Discharge (mobility/balance): Mod A Other factors to consider for discharge: home with hospice PLAN : 
Patient continues to benefit from skilled intervention to address the above impairments. Continue treatment per established plan of care. to address goals. Recommendation for discharge: (in order for the patient to meet his/her long term goals) Home with hospice This discharge recommendation: 
Has been made in collaboration with the attending provider and/or case management IF patient discharges home will need the following DME: to be determined (TBD) SUBJECTIVE:  
Patient stated . OBJECTIVE DATA SUMMARY:  
Critical Behavior: 
Neurologic State: Alert Orientation Level: Disoriented to place, Disoriented to situation, Disoriented to time Cognition: Follows commands Safety/Judgement: Decreased awareness of environment, Decreased awareness of need for assistance, Decreased awareness of need for safety Functional Mobility Training: 
Bed Mobility: 
  
Supine to Sit: Moderate assistance; Additional time;Assist x1 Transfers: 
Sit to Stand: Moderate assistance; Additional time;Assist x1 Stand to Sit: Moderate assistance;Assist x1;Additional time Bed to Chair: Moderate assistance Balance: 
Sitting: Intact Standing: Impaired; With support Standing - Static: Fair Standing - Dynamic : Fair Ambulation/Gait Training: 
Distance (ft): 5 Feet (ft) Assistive Device: Walker, rolling;Gait belt Ambulation - Level of Assistance: Moderate assistance Gait Abnormalities: Path deviations Base of Support: Widened Speed/Mckenna: Slow;Shuffled Stairs: Therapeutic Exercises:  
 
Pain Rating: 
Denies pain Activity Tolerance:  
Fair Please refer to the flowsheet for vital signs taken during this treatment. After treatment patient left in no apparent distress:  
Sitting in chair, Call bell within reach, Bed / chair alarm activated, and Caregiver / family present COMMUNICATION/COLLABORATION:  
The patients plan of care was discussed with: Registered Nurse Jace Whitaker Time Calculation: 25 mins

## 2020-02-04 NOTE — PROGRESS NOTES
Gastrointestinal Progress Note NAME: Karen Arredondo. : 3/8/1931 MRN: [id number] ATTG: [unfilled] PCP: Casper Koyanagi, MD 
Date/Time:  2020 10:16 AM 
 
Subjective: Karen Arredondo. is seen on rounds this a.m. Has no complaints. Quite talkative actually, which is an improvement from yesterday per JEANNIE in the room. Didn't eat breakfast.  Denies any abdominal pain. Discussed with JEANNIE and nursing. No sign of bleeding. PPI and ABX added yesterday. Hgb stable overall and has fluctuated throughout admission. Objective:  
 
Current Facility-Administered Medications Medication Dose Route Frequency  pantoprazole (PROTONIX) tablet 40 mg  40 mg Oral ACB&D  
 metroNIDAZOLE (FLAGYL) tablet 500 mg  500 mg Oral Q12H  
 0.9% sodium chloride infusion  125 mL/hr IntraVENous CONTINUOUS  
 polyethylene glycol (MIRALAX) packet 17 g  17 g Oral DAILY  docusate sodium (COLACE) capsule 100 mg  100 mg Oral DAILY  albuterol (PROVENTIL VENTOLIN) nebulizer solution 2.5 mg  2.5 mg Nebulization Q4H PRN  
 albuterol-ipratropium (DUO-NEB) 2.5 MG-0.5 MG/3 ML  3 mL Nebulization Q6H RT  
 linaGLIPtin (TRADJENTA) tablet 5 mg  5 mg Oral ACB  metoprolol tartrate (LOPRESSOR) tablet 25 mg  25 mg Oral Q12H  
 losartan (COZAAR) tablet 25 mg  25 mg Oral DAILY  atorvastatin (LIPITOR) tablet 40 mg  40 mg Oral DAILY  amLODIPine (NORVASC) tablet 10 mg  10 mg Oral DAILY  insulin lispro (HUMALOG) injection   SubCUTAneous AC&HS  sodium chloride (NS) flush 5-40 mL  5-40 mL IntraVENous Q8H  
 sodium chloride (NS) flush 5-40 mL  5-40 mL IntraVENous PRN  
 glucose chewable tablet 16 g  4 Tab Oral PRN  
 dextrose (D50W) injection syrg 12.5-25 g  12.5-25 g IntraVENous PRN  
 glucagon (GLUCAGEN) injection 1 mg  1 mg IntraMUSCular PRN  
 dextrose 10% infusion 0-250 mL  0-250 mL IntraVENous PRN Blood pressure 132/66, pulse 65, temperature 98.2 °F (36.8 °C), resp.  rate 16, height 6' (1.829 m), weight 87.5 kg (193 lb), SpO2 96 %. Recent Results (from the past 24 hour(s)) GLUCOSE, POC Collection Time: 02/03/20 12:10 PM  
Result Value Ref Range Glucose (POC) 170 (H) 65 - 100 mg/dL Performed by Jannette Nash (PCT) CBC W/O DIFF Collection Time: 02/03/20 12:16 PM  
Result Value Ref Range WBC 9.7 4.1 - 11.1 K/uL  
 RBC 2.58 (L) 4.10 - 5.70 M/uL HGB 8.4 (L) 12.1 - 17.0 g/dL HCT 26.8 (L) 36.6 - 50.3 % .9 (H) 80.0 - 99.0 FL  
 MCH 32.6 26.0 - 34.0 PG  
 MCHC 31.3 30.0 - 36.5 g/dL  
 RDW 12.8 11.5 - 14.5 % PLATELET 875 238 - 536 K/uL MPV 10.1 8.9 - 12.9 FL  
 NRBC 0.0 0  WBC ABSOLUTE NRBC 0.00 0.00 - 0.01 K/uL GLUCOSE, POC Collection Time: 02/03/20  4:36 PM  
Result Value Ref Range Glucose (POC) 214 (H) 65 - 100 mg/dL Performed by Jannette Nash (PCT) GLUCOSE, POC Collection Time: 02/03/20  9:32 PM  
Result Value Ref Range Glucose (POC) 203 (H) 65 - 100 mg/dL Performed by Leno Landa (PCT) LACTIC ACID Collection Time: 02/04/20  1:40 AM  
Result Value Ref Range Lactic acid 0.8 0.4 - 2.0 MMOL/L  
CBC WITH AUTOMATED DIFF Collection Time: 02/04/20  1:40 AM  
Result Value Ref Range WBC 9.1 4.1 - 11.1 K/uL  
 RBC 2.50 (L) 4.10 - 5.70 M/uL HGB 8.1 (L) 12.1 - 17.0 g/dL HCT 26.0 (L) 36.6 - 50.3 % .0 (H) 80.0 - 99.0 FL  
 MCH 32.4 26.0 - 34.0 PG  
 MCHC 31.2 30.0 - 36.5 g/dL  
 RDW 12.7 11.5 - 14.5 % PLATELET 196 505 - 699 K/uL MPV 10.5 8.9 - 12.9 FL  
 NRBC 0.0 0  WBC ABSOLUTE NRBC 0.00 0.00 - 0.01 K/uL NEUTROPHILS 65 32 - 75 % LYMPHOCYTES 20 12 - 49 % MONOCYTES 9 5 - 13 % EOSINOPHILS 4 0 - 7 % BASOPHILS 1 0 - 1 % IMMATURE GRANULOCYTES 1 (H) 0.0 - 0.5 % ABS. NEUTROPHILS 6.0 1.8 - 8.0 K/UL  
 ABS. LYMPHOCYTES 1.8 0.8 - 3.5 K/UL  
 ABS. MONOCYTES 0.8 0.0 - 1.0 K/UL  
 ABS. EOSINOPHILS 0.3 0.0 - 0.4 K/UL  
 ABS. BASOPHILS 0.1 0.0 - 0.1 K/UL ABS. IMM. GRANS. 0.1 (H) 0.00 - 0.04 K/UL  
 DF AUTOMATED MAGNESIUM Collection Time: 02/04/20  1:40 AM  
Result Value Ref Range Magnesium 2.2 1.6 - 2.4 mg/dL PHOSPHORUS Collection Time: 02/04/20  1:40 AM  
Result Value Ref Range Phosphorus 3.7 2.6 - 4.7 MG/DL  
METABOLIC PANEL, COMPREHENSIVE Collection Time: 02/04/20  1:40 AM  
Result Value Ref Range Sodium 147 (H) 136 - 145 mmol/L Potassium 4.9 3.5 - 5.1 mmol/L Chloride 117 (H) 97 - 108 mmol/L  
 CO2 24 21 - 32 mmol/L Anion gap 6 5 - 15 mmol/L Glucose 140 (H) 65 - 100 mg/dL BUN 64 (H) 6 - 20 MG/DL Creatinine 3.10 (H) 0.70 - 1.30 MG/DL  
 BUN/Creatinine ratio 21 (H) 12 - 20 GFR est AA 23 (L) >60 ml/min/1.73m2 GFR est non-AA 19 (L) >60 ml/min/1.73m2 Calcium 7.9 (L) 8.5 - 10.1 MG/DL Bilirubin, total 0.6 0.2 - 1.0 MG/DL  
 ALT (SGPT) 32 12 - 78 U/L  
 AST (SGOT) 32 15 - 37 U/L Alk. phosphatase 60 45 - 117 U/L Protein, total 5.6 (L) 6.4 - 8.2 g/dL Albumin 2.3 (L) 3.5 - 5.0 g/dL Globulin 3.3 2.0 - 4.0 g/dL A-G Ratio 0.7 (L) 1.1 - 2.2 GLUCOSE, POC Collection Time: 02/04/20  6:50 AM  
Result Value Ref Range Glucose (POC) 184 (H) 65 - 100 mg/dL Performed by Lorenzo Mae (PCT)   
 
02/02 1901 - 02/04 0700 In: 450 [P.O.:200; I.V.:250] Out: 200 [Urine:200] Difficult to obtain ROS from patient given underlying dementia EXAM:   
GENERAL: cooperative this a.m. LUNG: no distress, symmetric expansion ABDOMEN: soft, nontender, nondistended abdomen, +BS NEURO: alert, no slurred speech Assessment:  
 
Principal Problem: 
  HAP (hospital-acquired pneumonia) (1/25/2020) Active Problems: 
  Diabetes mellitus type 2, controlled ([facility] (2/9/2012) Essential hypertension, benign (2/9/2012) HLD (hyperlipidemia) (2/9/2012) Esophageal reflux (2/9/2012) Acquired hypothyroidism (4/26/2012) Pernicious anemia (7/31/2012) WILTON (acute kidney injury) ([facility] (8/2/2014) Dementia without behavioral disturbance ([facility] (8/16/2016) CKD (chronic kidney disease) stage 4, GFR 15-29 ml/min ([facility]) (4/22/2019) AMS (altered mental status) (12/14/2019) Weakness (1/25/2020) Sepsis ([facility] (1/26/2020) Fall (1/26/2020) NSTEMI (non-ST elevated myocardial infarction) ([facility] (1/26/2020) Rhabdomyolysis (1/27/2020) Occult blood positive stool (2/3/2020) Plan: No reports of bleeding. Hgb stable overall though he remains anemic. Continue PPI and ABX per attending. No plans for GI intervention. Will see again as needed FIDELINA Kc I have interviewed and examined patient with addendum to note above and formulation care plan to reflect my evaluation. Without specific intestinal evaluation I cannot assess his risk for additional bleeding with or without anticoagulants. I have discussed this in detail with patient's daughter at the time of the decision notification of declining any invasive evaluation. Emelyn Copeland M.D. 
2/4/2020

## 2020-02-04 NOTE — PROGRESS NOTES
Noted plans to transition to comfort care and d/c home with hospice tomorrow. Will continue to f/u should complete care in-house be warranted. Josr Gonzalez RDN Office: [phone]

## 2020-02-04 NOTE — PROGRESS NOTES
2:06 PM 
CM placed 2nd IMM letter in patients room, pt was sleeping and daughter not present. Pt will be discharged tomorrow with [facility] at 10:00AM to pt's daughter's home. No further dc needs at this time. Jennifer Gibbons

## 2020-02-04 NOTE — DISCHARGE SUMMARY
Jake Iyer Jesus [address]  Office [phone] Fax [phone] Discharge / Transfer / Off-Service Note Name: Salena Carrel. MRN: [id number]  Sex: Male YOB: 1931  Age: 80 y.o. PCP: Amanda Zaidi MD  
 
Date of admission: 1/25/2020 Date of discharge/transfer: 2/5/2020 Attending physician at admission: Dr. Randi Hodgkins Attending physician at discharge/transfer: Dr. Rasheeda Tirado Resident physician at discharge/transfer: Chalino Minaya,  Consultants during hospitalization 
-Dr. Shahram Martin (GI)  
-Demetrice Wells NP (Palliative)  
-Dr. Roberto Bartholomew & Dr. Maricarmen Brennan (Nephro)  
-Dr. Mario Ugalde (Cardiology) Admission diagnoses HAP (hospital-acquired pneumonia) [J18.9, Y39] Weakness [R53.1] Recommended follow-up after discharge · PER HOSPICE RECOMMENDATIONS Follow up tests after discharge · PER HOSPICE RECOMMENDATIONS History of Present Illness Per Dr. Lazarus Marseille H&P Renny Colon. is a 80 y.o. male with known hx TIA, UTI, CKD4, dementia, DMII, HTN, HLD, hypothyroidism, pernicious anemia, hx colon CA s/p bowel resection, GERD, who presents to the ER via EMS after pt was found on the floor this afternoon and has had AMS since then. Hx provided by pt's daughter and son and son-in-law as pt is a poor historian. Pt lives at home with his granddaughter who is his primary care taker. At 10AM she left the house and at noon, pt's granddaughter returned home and found him on the floor confused. He could not tell her what time he fell or how long he was laying on the floor. Per pt's daughter, he likely fell b/w 10am-noon. He was  \"forgetful\" and family notes baseline he is usually oriented to person and place. The last time he was like this he had a UTI. Patient only see's his PCP and does not follow with any specialists.  
  
  
In the Er:  
vital signs were remarkable for RR 26. Labs were remarkable for WBC 16.1, BUN 38, Cr 3.1, AST 79, gluc 293, Mg 1.7. Trop 3.81, NT pro-BNP: 14,236. LA 2.0.. EKG showed NSR, junctional ST depression probably normal.  
R hand + forearm XRs - no acute process. CXR - RUL PNA. CT abd/pelvis - no acute traumatic injury, +gall bladder sludge/stones, +RUL grand glass opacities incompletely visualized. CT C spine showed old odontoid fx and multilevel degenerative changes. CT head showed no acute process.  
  
Pt was treated with w/ 500cc NS bolus, 1x IV rocephin + azithromycin.  \" Hospital course ACUTE PROBLEMS  
  
Sepsis 2/2 HAP: 2/4 SIRS (WBC 17.6, RR26). RUL PNA on (CXR,CT C-spine, CT abd/pelvis). Concern for aspiration per Speech Eval.  LA down-trended. Procal wnl. RVP neg. ^ESR 61. ^CRP >9.7. Bcx NG 5 days (final), Ucx neg. V/Q scan neg. Legionella neg. CT chest w/ CHF w/ B/l edema/effusions and underlying  atelectasis. Treated with Duonebs q6h  & prn q4H. Sp 7d PO Augmentin + Doxy. Flagyl 500mg q12h (started 2/3) per GI for potential colonic diverticulitis bleeding.   
- Continue flagyl 500mg x 7 days or as determined by hospice 
   
Anemia: pernicious/anemia of chronic disease (low TIBC, high ferritin) POC 8.5 (BL 12-13). .5. Haptoglobin 370, B12/folate wnl. FOBT positive. GI rec's- no overt sign of bleeding. Stop iron supplement as this is anemia of chornic disease. Continued Thera-MV. Poor endoscopic candidate. Trended Hgb and transfuse as needed (threshold <8). Hgb at discharge 8.1 
  
Abdominal distension: Abdomen distended with tenderness. KUB- large amount of stool. Nonobstructive. Ab US- nothing to suggest acute cholecystitis. Common bile duct measures 7 mm, at the upper limits of normal given the patient's age. Treated with Miralax and Colace while admitted - Continue bowel reigmen 
  
Leukocytosis: RESOLVED. WBC down to 9.1 from 16.0.   CT chest does not show infectious process and points towards CHF with pleural effusion and overlying atelectasis. Repeat UA no wbc, LE, or nitrites. Likely a stress reaction.    
  
Acute Metabolic Encephalopathy in the setting of Dementia w/ Fall: IMPROVING per family. Likely 2/2 HAP.  CT head neg. Stroke work-up last month showed chronic white matter disease and superficial siderosis of R frontal lobe, EEG gen slowing. Carotid dopplers in Dec'19 showed chronic mild-mod L ICA stenoses. Pt was supposed to follow-up w/ neurology early Jan but never did. Fall evidenced by R arm abrasion. Family declined. Treatment of HAP as above Family does not want DOAC b/c concern for bleeding and instead wishes for continued Plavix. Held Plavix and ASA d/t concern for GI bleed as stated above. - Discuss risks and benefits of DAPT with hospice team 
  
NSTEMI/Type II MI in setting of Mod Systolic Dysfunction: likely 2/2 rhabdomyolysis s/p fall. POA Trop 3.81, NT pro-BNP 70831. ^CK 1545 ^CKMB 42.3. ECHO:  EF 80-23%, mod systolic dysf, mod pul HTN. Cardiology felt elevated Trops likely 2/2 to Rhabdo, poor cath or outpt cardiac rehab candidate (dementia & severe CKD3) & family declines - Continue metoprolol XL 25mg daily 
  
Paroxysmal Afib: EKG (1/27) w/ Afib w/ RVR (117). Currently in sinus rhythm. Continued home Metoprolol 25mg daily. Cardiology discussed stopping plavix and starting DOAC, but family declined d/t bleeding risk. - Continue metoprolol XL 25mg daily Rhabdomyolysis: RESOLVED. likely 2/2 to fall. POA CK 2322-->downtrended-->288. Pt was down for possibly >1hour.  
  
ARF on CKD4: IMPROVING. POA Cr 3.1 (BL ~2.5). Likely 2/2 sepsis and poor hydration. Avoided nephrotoxic agents while admitted. -Nephro was consulted and felt he wa not candidate for hemodialysis. Cr at discharge 3.1.   
  
Elevated AST: RESOLVED-  POA likely 2/2 shock liver.  Hepatitis panel neg.  
  
CHRONIC PROBLEMS  
  
 HTN: Stable during admission. continued home norvasc 10mg daily, losartan 25mg daily, Metoprolol 25mg daily. Held home Lasix 2/2 poor renal function. -CONTINUING TREATMENT PER HOSPICE 
   
DMII: POA glucose 293; HA1c 12/2019 (6.5). Sugars during admission remained stable with SSI and continuing home Tradjenta 5mg. Held home Amaryl 5mg. Diabetic mechanical soft diet given. -CONTINUING TREATMENT PER HOSPICE 
  
HLD: Last lipid panel 12/19 [Tchol 185, , HDL 33, ]. Hepatitis panel neg. Continued  niacin & Lipitor 40mg daily -CONTINUING TREATMENT PER HOSPICE 
  
GERD: Switch home prevacid to protonix 
-CONTINUING TREATMENT PER HOSPICE 
  
Hypothyroidism: TSH low 0.15, T4 wnl, T3 low 1.5. Continued home armour thyroid 120mg. Discussed possibly decreasing to 100mcg in outpatient setting. -CONTINUING TREATMENT PER HOSPICE 
  
 Dementia: Continued home aricept -CONTINUING TREATMENT PER HOSPICE 
  
Normal BMI: Body mass index is 27.34 kg/m². Encouraging lifestyle modifications and further follow up outpatient. -CONTINUING TREATMENT PER HOSPICE Physical exam at discharge: 
 
Vitals Reviewed. General Oriented to person, place, and time and well-developed. Appears well-nourished, no distress. Not diaphoretic. HENT Head Normocephalic and atraumatic. Eyes Conjunctivae are normal, no discharge. No scleral icterus. Nose Nose normal, clear turbinates. Oral Oropharynx is clear and moist. No oropharyngeal exudate. Cardio Normal rate, regular rhythm. Exam reveals no gallop and no friction rub. No murmur heard. No chest wall tenderness. Pulmonary Chronic diffuse rhonchi posterior fields. Improved expiratory wheezing. Does not appear in respiratory distress. Abdominal Soft. Bowel sounds normal. No distension. No tenderness.  Deferred. Extremities No edema of lower extremities. No tenderness. Distal pulses intact. Neurological A&O x2 (back to baseline) . Dermatology Skin is warm and dry. No rash noted. No erythema or pallor. Condition at discharge: Stable. Labs Recent Labs 02/05/20 
9030 02/04/20 
0140 02/03/20 
1216 WBC 9.7 9.1 9.7 HGB 8.4* 8.1* 8.4* HCT 26.9* 26.0* 26.8*  
 242 247 Recent Labs 02/05/20 
8971 02/04/20 
0140 02/03/20 
6145 * 147* 146*  
K 4.4 4.9 4.3 * 117* 115* CO2 22 24 24 BUN 61* 64* 69* CREA 2.95* 3.10* 3.11* * 140* 137* CA 7.9* 7.9* 7.7* MG 2.2 2.2 2.1 PHOS 3.5 3.7 3.7 Recent Labs 02/05/20 
1666 02/04/20 
0140 02/03/20 
0605 SGOT 35 32 35 ALT 32 32 34 AP 62 60 62 TBILI 0.7 0.6 0.6 TP 5.7* 5.6* 5.6* ALB 2.4* 2.3* 2.2*  
GLOB 3.3 3.3 3.4 Recent Labs 02/04/20 
2122 02/04/20 
1701 02/04/20 
1101 02/04/20 
0650 02/03/20 
2132 GLUCPOC 111* 250* 145* 184* 203* Cultures · Blood Cx- No Growth x5 days · Respiratory viral panel- negative · Legionella AG- neg · Urine Cx- negative Procedures / Diagnostic Studies EKG showed NSR, junctional ST depression probably normal.  
R hand + forearm XRs - no acute process. CXR - RUL PNA. CT abd/pelvis - no acute traumatic injury, +gall bladder sludge/stones, +RUL grand glass opacities incompletely visualized. CT C spine showed old odontoid fx and multilevel degenerative changes. CT head showed no acute process. Imaging Results from [facility] [place] Encounter encounter on 01/25/20 XR ABD (KUB) Narrative INDICATION: abdominal distention EXAMINATION:  ABDOMEN KUB 
 
COMPARISON: None FINDINGS: 
 
AP radiograph of the abdomen demonstrates a nonobstructive bowel gas pattern. Large amount of colonic stool. No abnormal calcifications are identified. The 
osseous structures are normal. 
  
 Impression IMPRESSION: 
Nonobstructive bowel gas pattern. Large amount of colonic stool. Results from [facility] Encounter encounter on 01/25/20  ABD LTD Narrative INDICATION:   biliary sludge, abdominal distension COMPARISON:  CT January 25, 2020 FINDINGS:  
 
Limited right upper quadrant ultrasound was performed. Gallbladder:   Several small, mobile gallstones. No wall thickening. Negative 
sonographic David's sign. Patient could not tolerate full decubitus position 
however. Common Bile Duct: Upper normal for patient's age, measuring 7 mm. Liver:  Normal in size and echotexture. Main Portal Vein:  Patent and appropriate hepatopetal flow. Pancreas Head (Body and Tail not evaluated): Obscured by bowel gas. Right kidney:  10.7 cm in length. No nephrolithiasis or hydronephrosis. Other:  None. Impression IMPRESSION:  
Cholelithiasis with no gallbladder wall thickening, pericholecystic fluid, or 
sonographic David sign to suggest acute cholecystitis. Common bile duct 
measures 7 mm, at the upper limits of normal given the patient's age. Results from [facility] [place] Encounter encounter on 01/25/20 CT CHEST WO CONT Narrative INDICATION: Follow-up pneumonia COMPARISON: Chest x-ray 1/30/2020 and CT abdomen 1/25/2020. CONTRAST: None. TECHNIQUE:  5 mm axial images were obtained through the thorax. Coronal and 
sagittal reconstructions were generated. CT dose reduction was achieved through 
use of a standardized protocol tailored for this examination and automatic 
exposure control for dose modulation. The absence of intravenous contrast reduces the sensitivity for evaluation of 
the mediastinum and upper abdominal organs. FINDINGS: 
 
THYROID: No nodule. MEDIASTINUM: No mass or lymphadenopathy. DINORAH: No mass or lymphadenopathy. THORACIC AORTA: Atherosclerotic calcification without aneurysm. MAIN PULMONARY ARTERY: Normal in caliber. TRACHEA/BRONCHI: Patent. ESOPHAGUS: No wall thickening or dilatation. HEART: The heart is normal in size without pericardial effusion. Coronary artery 
calcifications are noted. PLEURA: Right greater than left small to moderate pleural effusions. No 
pneumothorax. LUNGS: Mild interstitial edema with thickening of subpleural intralobular septal 
lines. Compressive atelectasis overlies effusions. Aerated lungs are clear. INCIDENTALLY IMAGED UPPER ABDOMEN: Fluid-filled distended stomach. Radiodense 
gallstones within the gallbladder dependently. Otherwise unremarkable. BONES: Degenerative spine change. No acute fracture or aggressive lesion. Impression IMPRESSION: 
 
1. Suspect congestive failure with mild interstitial edema with bilateral 
pleural effusions with overlying compressive atelectasis. 2. Cholecystolithiasis. 3. Fluid-filled gastric distention. 4. Coronary artery disease. 5. Additional incidental findings as above. No procedure found. Chronic diagnoses Problem List as of 2/5/2020 Date Reviewed: 12/19/2019 Codes Class Noted - Resolved Occult blood positive stool ICD-10-CM: R19.5 ICD-9-CM: 792.1  2/3/2020 - Present Rhabdomyolysis ICD-10-CM: D43.39 ICD-9-CM: 728.88  1/27/2020 - Present Sepsis ([facility] ICD-10-CM: A41.9 ICD-9-CM: 038.9, 995.91  1/26/2020 - Present Fall ICD-10-CM: W19. Jessica Lay ICD-9-CM: E888.9  1/26/2020 - Present NSTEMI (non-ST elevated myocardial infarction) ([facility] ICD-10-CM: I21.4 ICD-9-CM: 410.70  1/26/2020 - Present Weakness ICD-10-CM: R53.1 ICD-9-CM: 780.79  1/25/2020 - Present * (Principal) HAP (hospital-acquired pneumonia) ICD-10-CM: J18.9, Y95 
ICD-9-CM: 399  1/25/2020 - Present AMS (altered mental status) ICD-10-CM: E71.14 
ICD-9-CM: 780.97  12/14/2019 - Present CKD (chronic kidney disease) stage 4, GFR 15-29 ml/min ([facility]) ICD-10-CM: N18.4 ICD-9-CM: 585.4  4/22/2019 - Present Type 2 diabetes with nephropathy ([facility] ICD-10-CM: E11.21 
ICD-9-CM: 250.40, 583.81  3/28/2018 - Present Dementia without behavioral disturbance (HCC) ICD-10-CM: F03.90 ICD-9-CM: 294.20  8/16/2016 - Present WILTON (acute kidney injury) ([facility] ICD-10-CM: N17.9 ICD-9-CM: 584.9  8/2/2014 - Present Unspecified vitamin D deficiency ICD-10-CM: E55.9 ICD-9-CM: 268.9  9/11/2013 - Present Colon cancer [facility]) ICD-10-CM: C18.9 ICD-9-CM: 153.9  8/23/2012 - Present Pernicious anemia ICD-10-CM: D51.0 ICD-9-CM: 281.0  7/31/2012 - Present Acquired hypothyroidism ICD-10-CM: E03.9 ICD-9-CM: 244.9  4/26/2012 - Present Microalbuminuria ICD-10-CM: R80.9 ICD-9-CM: 791.0  3/6/2012 - Present Diabetes mellitus type 2, controlled ([facility] ICD-10-CM: E11.9 ICD-9-CM: 250.00  2/9/2012 - Present Essential hypertension, benign ICD-10-CM: I10 
ICD-9-CM: 401.1  2/9/2012 - Present HLD (hyperlipidemia) ICD-10-CM: E78.5 ICD-9-CM: 272.4  2/9/2012 - Present Allergic rhinitis due to other allergen ICD-10-CM: J30.89 ICD-9-CM: 477.8  2/9/2012 - Present Esophageal reflux ICD-10-CM: K21.9 ICD-9-CM: 530.81  2/9/2012 - Present RESOLVED: UTI (urinary tract infection) ICD-10-CM: N39.0 ICD-9-CM: 599.0  12/14/2019 - 1/26/2020 RESOLVED: Delirium ICD-10-CM: R41.0 ICD-9-CM: 780.09  10/11/2018 - 1/26/2020 RESOLVED: Urinary tract infection ICD-10-CM: N39.0 ICD-9-CM: 599.0  10/11/2018 - 1/26/2020 RESOLVED: UTI (lower urinary tract infection) ICD-10-CM: N39.0 ICD-9-CM: 599.0  4/1/2015 - 3/28/2018 RESOLVED: Altered mental status ICD-10-CM: R41.82 
ICD-9-CM: 780.97  8/28/2014 - 3/28/2018 RESOLVED: Urosepsis ICD-10-CM: A41.9, N39.0 ICD-9-CM: 038.9, 995.91, 599.0  7/29/2014 - 3/28/2018 RESOLVED: C2 cervical fracture (HCC) ICD-10-CM: S12.100A ICD-9-CM: 805.02  9/24/2012 - 9/24/2012 RESOLVED: Odontoid fracture ([facility] ICD-10-CM: Y99.054L 
ICD-9-CM: 805.02  9/10/2012 - 3/28/2018 RESOLVED: Altered mental status ICD-10-CM: R41.82 
ICD-9-CM: 780.97  6/20/2012 - 6/20/2012 Overview Signed 6/20/2012  4:20 PM by Vidal Rosenthal MD  
  Patient admitted for altered mental status changes. Normal neurological imaging, no significant finding on biochemical, and hematologic labs  to support a definitive diagnoses. Patient regained his base level of functionality prior to discharge. Patient Vit. B12 deficiency treated. Discharge/Transfer Medications Current Discharge Medication List  
  
START taking these medications Details  
metoprolol tartrate (LOPRESSOR) 25 mg tablet Take 1 Tab by mouth every twelve (12) hours for 30 days. Qty: 60 Tab, Refills: 0  
  
albuterol-ipratropium (DUO-NEB) 2.5 mg-0.5 mg/3 ml nebu 3 mL by Nebulization route every six (6) hours as needed for Wheezing for up to 30 days. Qty: 30 Nebule, Refills: 0  
  
aspirin 81 mg chewable tablet Take 1 Tab by mouth daily for 30 days. Qty: 30 Tab, Refills: 0 CONTINUE these medications which have NOT CHANGED Details  
multivitamin, tx-iron-ca-min (THERA-M W/ IRON) 9 mg iron-400 mcg tab tablet Take 1 Tab by mouth daily. clopidogrel (PLAVIX) 75 mg tab Take 1 Tab by mouth daily. Qty: 90 Tab, Refills: 3  
  
atorvastatin (LIPITOR) 40 mg tablet Take 1 Tab by mouth daily. Qty: 90 Tab, Refills: 3  
  
loperamide (IMODIUM) 2 mg capsule TAKE 1 CAPSULE BEFORE BREAKFAST, LUNCH, DINNER AND AT BEDTIME FOR DIARRHEA Qty: 360 Cap, Refills: 0  
  
amLODIPine (NORVASC) 10 mg tablet Take 1 Tab by mouth daily. Qty: 30 Tab, Refills: 0 Thyroid, Pork, (ARMOUR THYROID) 120 mg tab Take 1 Tab by mouth daily. Qty: 90 Tab, Refills: 1 Associated Diagnoses: Acquired hypothyroidism  
  
furosemide (LASIX) 20 mg tablet TAKE 1 TABLET EVERY DAY AS DIRECTED Qty: 90 Tab, Refills: 3 Associated Diagnoses: Essential hypertension, benign  
  
lansoprazole (PREVACID) 30 mg capsule Take 1 Cap by mouth Daily (before breakfast). Qty: 90 Cap, Refills: 1 Associated Diagnoses: Gastroesophageal reflux disease, esophagitis presence not specified  
  
losartan (COZAAR) 25 mg tablet TAKE 1 TABLET EVERY DAY Qty: 90 Tab, Refills: 1 Associated Diagnoses: Essential hypertension, benign  
  
donepezil (ARICEPT) 10 mg tablet Take 1 Tab by mouth nightly. Qty: 90 Tab, Refills: 1 Associated Diagnoses: Dementia without behavioral disturbance, unspecified dementia type ([address] TRADJENTA 5 mg tablet TAKE 1 TABLET EVERY DAY FOR DIABETES Qty: 90 Tab, Refills: 1 Associated Diagnoses: Uncontrolled type 2 diabetes mellitus without complication, without long-term current use of insulin (HCC)  
  
cholecalciferol (VITAMIN D3) 50,000 unit capsule TAKE 1 CAPSULE EVERY 7 DAYS Qty: 13 Cap, Refills: 3  
  
ferrous sulfate (IRON) 325 mg (65 mg iron) tablet Take 1 Tab by mouth Daily (before breakfast). Qty: 30 Tab, Refills: 5  
  
nicotinic acid (NIACIN) 500 mg tablet Take 500 mg by mouth Daily (before breakfast). multivitamins-minerals-lutein (CENTRUM SILVER) tab tablet Take 1 Tab by mouth daily. STOP taking these medications  
  
 glimepiride (AMARYL) 4 mg tablet Comments:  
Reason for Stopping:   
   
 cetirizine (ZYRTEC) 10 mg tablet Comments:  
Reason for Stopping:   
   
  
 
  
Diet:PER Joaquin Martin Activity:  PER HOSPICE REC Disposition: [address] Discharge instructions to patient/family Please seek medical attention for any new or worsening symptoms particularly fever, chest pain, shortness of breath, abdominal pain, nausea, vomiting Follow up plans/appointments Follow-up Information None Marcel Brooks DO Family Medicine Resident For Billing Chief Complaint Patient presents with  Fall  
 Skin Problem Hospital Problems  Date Reviewed: 12/19/2019 Codes Class Noted POA Occult blood positive stool ICD-10-CM: R19.5 ICD-9-CM: 792.1  2/3/2020 Unknown Rhabdomyolysis ICD-10-CM: H61.38 ICD-9-CM: 728.88  1/27/2020 Unknown Sepsis ([place] [address].) ICD-10-CM: A41.9 ICD-9-CM: 038.9, 995.91  1/26/2020 Unknown Fall ICD-10-CM: W19. Mehdi Ego ICD-9-CM: J966.6  1/26/2020 Unknown NSTEMI (non-ST elevated myocardial infarction) ([facility] ICD-10-CM: I21.4 ICD-9-CM: 410.70  1/26/2020 Unknown Weakness ICD-10-CM: R53.1 ICD-9-CM: 780.79  1/25/2020 Unknown * (Principal) HAP (hospital-acquired pneumonia) ICD-10-CM: J18.9, Y95 
ICD-9-CM: 486  1/25/2020 Unknown AMS (altered mental status) ICD-10-CM: R41.82 
ICD-9-CM: 780.97  12/14/2019 Yes CKD (chronic kidney disease) stage 4, GFR 15-29 ml/min (HCC) ICD-10-CM: N18.4 ICD-9-CM: 585.4  4/22/2019 Yes Dementia without behavioral disturbance (HCC) ICD-10-CM: F03.90 ICD-9-CM: 294.20  8/16/2016 Yes WILTON (acute kidney injury) ([place] [address] ICD-10-CM: N17.9 ICD-9-CM: 584.9  8/2/2014 Yes Pernicious anemia ICD-10-CM: D51.0 ICD-9-CM: 281.0  7/31/2012 Yes Acquired hypothyroidism ICD-10-CM: E03.9 ICD-9-CM: 244.9  4/26/2012 Yes Diabetes mellitus type 2, controlled ([facility] ICD-10-CM: E11.9 ICD-9-CM: 250.00  2/9/2012 Yes Essential hypertension, benign ICD-10-CM: I10 
ICD-9-CM: 401.1  2/9/2012 Yes HLD (hyperlipidemia) ICD-10-CM: E78.5 ICD-9-CM: 272.4  2/9/2012 Unknown Esophageal reflux ICD-10-CM: K21.9 ICD-9-CM: 530.81  2/9/2012 Yes [address] Medicine Residency Attending Addendum: 
 
I was NOT present with the resident during the interview & examination of the patient. I personally interviewed the patient & repeated the critical or key portions of the exam.  I agree with the resident's note with addendums made: 
 
A&Ox2 this am 
Abd soft, nontender Agree with resident note and stable for dc home with hospice today See resident note for detailed assessment and plan. Ji Rosas MD  
Pt seen and examined on 2/5/2020

## 2020-02-04 NOTE — PROGRESS NOTES
Abdirahman Soto kaela [place] 79 [address] Sharif. YOB: 1931 Assessment & Plan:  
CKD 4, previously followed by Dr. Masood Stoner AMS Dementia HTN 
DM2 
CHF Anemia Rec: 
Avoid nephrotoxins No change in meds Outpt neprhology f/u Poor candidate for dialysis Subjective:  
CC: f/u CKD HPI: Cr stable at 3.1. Poss home tomorrow. Not eating well ROS: No sob/n/v Current Facility-Administered Medications Medication Dose Route Frequency  pantoprazole (PROTONIX) tablet 40 mg  40 mg Oral ACB&D  
 metroNIDAZOLE (FLAGYL) tablet 500 mg  500 mg Oral Q12H  
 0.9% sodium chloride infusion  125 mL/hr IntraVENous CONTINUOUS  
 polyethylene glycol (MIRALAX) packet 17 g  17 g Oral DAILY  docusate sodium (COLACE) capsule 100 mg  100 mg Oral DAILY  albuterol (PROVENTIL VENTOLIN) nebulizer solution 2.5 mg  2.5 mg Nebulization Q4H PRN  
 albuterol-ipratropium (DUO-NEB) 2.5 MG-0.5 MG/3 ML  3 mL Nebulization Q6H RT  
 linaGLIPtin (TRADJENTA) tablet 5 mg  5 mg Oral ACB  metoprolol tartrate (LOPRESSOR) tablet 25 mg  25 mg Oral Q12H  
 losartan (COZAAR) tablet 25 mg  25 mg Oral DAILY  atorvastatin (LIPITOR) tablet 40 mg  40 mg Oral DAILY  amLODIPine (NORVASC) tablet 10 mg  10 mg Oral DAILY  insulin lispro (HUMALOG) injection   SubCUTAneous AC&HS  sodium chloride (NS) flush 5-40 mL  5-40 mL IntraVENous Q8H  
 sodium chloride (NS) flush 5-40 mL  5-40 mL IntraVENous PRN  
 glucose chewable tablet 16 g  4 Tab Oral PRN  
 dextrose (D50W) injection syrg 12.5-25 g  12.5-25 g IntraVENous PRN  
 glucagon (GLUCAGEN) injection 1 mg  1 mg IntraMUSCular PRN  
 dextrose 10% infusion 0-250 mL  0-250 mL IntraVENous PRN Objective:  
 
Vitals: 
Blood pressure 116/59, pulse 60, temperature 98.2 °F (36.8 °C), resp. rate 16, height 6' (1.829 m), weight 87.5 kg (193 lb), SpO2 98 %. Temp (24hrs), Av °F (36.7 °C), Min:97.3 °F (36.3 °C), Max:98.2 °F (36.8 °C) Intake and Output: 
No intake/output data recorded.  1901 -  0700 In: 450 [P.O.:200; I.V.:250] Out: 200 [Urine:200] Physical Exam:              
GENERAL ASSESSMENT: Debilitated, NAD 
CHEST: CTA HEART: S1S2 ABDOMEN: Soft,NT 
EXTREMITY: no EDEMA 
   
ECG/rhythm: 
 
Data Review No results for input(s): TNIPOC in the last 72 hours. No lab exists for component: ITNL No results for input(s): CPK, CKMB, TROIQ in the last 72 hours. Recent Labs 20 
0140 20 
1216 20 
8603  20 
8170 *  --  146*  --  144  
K 4.9  --  4.3  --  4.3 *  --  115*  --  111* CO2 24  --  24  --  25 BUN 64*  --  69*  --  72* CREA 3.10*  --  3.11*  --  3.13* *  --  137*  --  177* PHOS 3.7  --  3.7  --  4.1 MG 2.2  --  2.1  --  1.9 CA 7.9*  --  7.7*  --  7.9* ALB 2.3*  --  2.2*  --  2.2* WBC 9.1 9.7 9.0   < > 10.5 HGB 8.1* 8.4* 7.8*   < > 8.2* HCT 26.0* 26.8* 25.0*   < > 26.1*  
 247 234   < > 217  
 < > = values in this interval not displayed. No results for input(s): INR, PTP, APTT, INREXT, INREXT in the last 72 hours. Needs: urine analysis, urine sodium, protein and creatinine Lab Results Component Value Date/Time Sodium,urine random 58 2014 05:30 PM  
 Creatinine, urine 201.46 2014 05:30 PM  
 
 
 
 
: Peggy Samson MD 
2020 [facility]: 
[email or web address] [place] office: 
[address], [facility] [address] Phone: [phone] Fax :     [phone] [place] office: 
[address] Phone - [phone] Fax - [phone]

## 2020-02-05 NOTE — PROGRESS NOTES
2/5/2020 10:55 AM Pt was discharged without hard scripts. CM spoke with Margareth Patino at [place] [address] who requested CM fax these scripts to [facility] [place] [address] at [phone]. Margareth Patino reported this is the pharmacy they work with and will have these filled for pt. CM faxed scripts to [facility] [place] [address] at [phone]. CM called and notified pt's daughter scripts were faxed to [facility] [place] [address].  
 
2/5/2020 8:10 AM Pt to discharge to his daughter's home([address] [place] [address], [address] [place] [address]) today via [facility] and be admitted to home hospice under [facility]. Ambulance transport is arranged for 10am, pt's RN was made aware. BINU called and spoke with Shaka Robles at [place] [address] who confirmed they will admit pt at home today between 11am-12pm.  
CM called and spoke with pt's daughter, Riley Esparza who confirmed she will be coming to [facility] this morning and plans to ride in the ambulance with pt home. CM called to [facility] to confirm pt's daughter will be able to ride with pt home. Hue Tom at [facility] made documentation for ambulance crew that pt's daughter will be riding with pt. Connor Coffman, BSW

## 2020-02-05 NOTE — PALLIATIVE CARE DISCHARGE
Goals of Care/Treatment Preferences The Palliative Medicine team was consulted as part of your/your loved one's care in the hospital. Our team is a supportive service; we strive to relieve suffering and improve quality of life. We reviewed advance care planning information, which includes the following: 
Patient's Healthcare Decision Maker is[de-identified] Named in scanned ACP document:  Rigoberto Steel and son Shannan Landeros are health care decision makers Confirm Advance Directive: Yes, on file Does the patient have other document types: Do Not Resuscitate Patient/Health Care Proxy Stated Goals: Comfort We reviewed / discussed your code status as: DNR 
   Full Code means perform CPR in the event of cardiac arrest. 
    DNR means do NOT perform CPR in the event of cardiac arrest. 
    Partial Code means you have specific preferences, please discuss with your healthcare team. 
    Starr Noel means this issue was not addressed / resolved during your stay Medical Interventions: Comfort measures Other Instructions: You will be returning home with support from [facility]. You have a Durable Do Not Resuscitate Order in place, which should travel with you. When you are in a facility, this form should be placed on your chart. Once you are home, it is recommended that the [facility] form be placed in a visible location such as on the refrigerator or bedroom door. Artificially Administered Nutrition: No feeding tube Because of the importance of this information, we are providing you with a printed copy to share with other healthcare providers after this hospitalization is complete.

## 2020-02-05 NOTE — DISCHARGE INSTRUCTIONS
[facility] / Sabra Brown. / [id number] : 3/8/1931    Admission date: 2020 Discharge date: 2020       Primary care provider: Lucas Rollins MD    Discharging provider:  Ayana Roberto, [address] [place] [address] - Attending, Family Medicine   . . . . . . . . . . . . . . . . . . . . . . . . . . . . . . . . . . . . . . . . . . . . . . . . . . . . . . . . . . . . . . . . . . . . . . . Roman Coelho FINAL DIAGNOSES & HOSPITAL COURSE:    ICD-10-CM ICD-9-CM    1. Weakness R53.1 780.79    2. Pneumonia of right upper lobe due to infectious organism ([facility] J18.1 486    3. CKD (chronic kidney disease) stage 4, GFR 15-29 ml/min (HCC) N18.4 585.4    4. Elevated troponin R79.89 790.6    5. DNR (do not resuscitate) discussion Z71.89 V65.49    6. Dementia without behavioral disturbance, unspecified dementia type ([facility] F03.90 294.20    7. Tear of skin of right wrist, initial encounter S61.511A 881.02    8. Advanced care planning/counseling discussion Z71.89 V65.49    9. Goals of care, counseling/discussion Z71.89 V65.49    10. Altered mental status, unspecified altered mental status type R41.82 780.97    11. Debility R53.81 799.3    12. Protein-calorie malnutrition, unspecified severity ([facility] E46 263.9          FOLLOW-UP CARE RECOMMENDATIONS:  Follow-up Information     Follow up With Specialties Details Why Contact Info    Lucas Rollins, [address]  [phone]              It is very important that you keep follow-up appointment(s). Bring discharge papers, medication list (and/or medication bottles) to follow-up appointments for review by outpatient provider(s). FOLLOW-UP TESTS RECOMMENDED:   ·  PER HOSPICE RECOMMENDATIONS      ONGOING TREATMENT PLAN:   ACUTE PROBLEMS      Sepsis 2/2 HAP: 2/4 SIRS (WBC 17.6, RR26).  RUL PNA on (CXR,CT C-spine, CT abd/pelvis).  Concern for aspiration per Speech Eval.  LA down-trended. Procal wnl. RVP neg.  ^ESR 60. ^CRP >9.7. Bcx NG 5 days (final), Ucx neg. V/Q scan neg. Legionella neg.  CT chest w/ CHF w/ B/l edema/effusions and underlying  atelectasis. Treated with Duonebs q6h  & prn q4H. Sp 7d PO Augmentin + Doxy. Flagyl 500mg q12h (started 2/3) per GI for potential colonic diverticulitis bleeding.    - Continue flagyl 500mg x 7 days or as determined by hospice      Anemia: pernicious/anemia of chronic disease (low TIBC, high ferritin) POC 8.5 (BL 12-13).  .5. Haptoglobin 370, B12/folate wnl. FOBT positive. GI rec's- no overt sign of bleeding. Stop iron supplement as this is anemia of chornic disease. Continued Thera-MV.  Poor endoscopic candidate.  Trended Hgb and transfuse as needed (threshold <8). Hgb at discharge 8.1     Abdominal distension: Abdomen distended with tenderness. KUB- large amount of stool. Nonobstructive. Ab US- nothing to suggest acute cholecystitis. Common bile duct measures 7 mm, at the upper limits of normal given the patient's age. Treated with Miralax and Colace while admitted  - Continue bowel reigmen     Leukocytosis: RESOLVED. WBC down to 9.1 from 16.0.  CT chest does not show infectious process and points towards CHF with pleural effusion and overlying atelectasis.  Repeat UA no wbc, LE, or nitrites.  Likely a stress reaction.        Acute Metabolic Encephalopathy in the setting of Dementia w/ Fall: IMPROVING per family. Likely 2/2 HAP.  CT head neg. Stroke work-up last month showed chronic white matter disease and superficial siderosis of R frontal lobe, EEG gen slowing. Carotid dopplers in Dec'19 showed chronic mild-mod L ICA stenoses. Pt was supposed to follow-up w/ neurology early Jan but never did. Fall evidenced by R arm abrasion. Family declined. Treatment of HAP as above Family does not want DOAC b/c concern for bleeding and instead wishes for continued Plavix. Held Plavix and ASA d/t concern for GI bleed as stated above. - Discuss risks and benefits of DAPT with hospice team     NSTEMI/Type II MI in setting of Mod Systolic Dysfunction: likely 2/2 rhabdomyolysis s/p fall. POA Trop 3.81, NT pro-BNP 12260. ^CK 7626 ^CKMB 42.3. ECHO:  EF 23-88%, mod systolic dysf, mod pul HTN. Cardiology felt elevated Trops likely 2/2 to Rhabdo, poor cath or outpt cardiac rehab candidate (dementia & severe CKD3) & family declines  - Continue metoprolol XL 25mg daily     Paroxysmal Afib: EKG (1/27) w/ Afib w/ RVR (117). Currently in sinus rhythm. Continued home Metoprolol 25mg daily. Cardiology discussed stopping plavix and starting DOAC, but family declined d/t bleeding risk. - Continue metoprolol XL 25mg daily     Rhabdomyolysis: RESOLVED. likely 2/2 to fall. POA CK 2322-->downtrended-->288. Pt was down for possibly >1hour.      ARF on CKD4: IMPROVING. POA Cr 3.1 (BL ~2.5). Likely 2/2 sepsis and poor hydration. Avoided nephrotoxic agents while admitted. -Nephro was consulted and felt he wa not candidate for hemodialysis. Cr at discharge 3.1.       Elevated AST: RESOLVED-  POA likely 2/2 shock liver. Hepatitis panel neg.      CHRONIC PROBLEMS      HTN: Stable during admission. continued home norvasc 10mg daily, losartan 25mg daily, Metoprolol 25mg daily. Held home Lasix 2/2 poor renal function. -CONTINUING TREATMENT PER HOSPICE      DMII: POA glucose 293; HA1c 12/2019 (6.5). Sugars during admission remained stable with SSI and continuing home Tradjenta 5mg. Held home Amaryl 5mg. Diabetic mechanical soft diet given. -CONTINUING TREATMENT PER HOSPICE     HLD: Last lipid panel 12/19 [Tchol 185, , HDL 33, ]. Hepatitis panel neg. Continued  niacin & Lipitor 40mg daily   -CONTINUING TREATMENT PER HOSPICE     GERD: Switch home prevacid to protonix  -CONTINUING TREATMENT PER HOSPICE     Hypothyroidism: TSH low 0.15, T4 wnl, T3 low 1.5. Continued home armour thyroid 120mg.   Discussed possibly decreasing to 100mcg in outpatient setting. -CONTINUING TREATMENT PER HOSPICE      Dementia: Continued home aricept  -CONTINUING TREATMENT PER HOSPICE     Normal BMI: Body mass index is 27.34 kg/m². Encouraging lifestyle modifications and further follow up outpatient. -CONTINUING TREATMENT PER HOSPICE     PENDING TEST RESULTS:  At the time of discharge the following test results are still pending: none. Please review these results as they become available. Specific symptoms to watch for: chest pain, shortness of breath, fever, chills, nausea, vomiting, diarrhea, change in mentation, falling, weakness, bleeding. DIET:  Cardiac diet or per hospice recommendations      ACTIVITY:  Activity as tolerated or per hospice recommendations      WOUND CARE: none or per hospice recommendations      EQUIPMENT needed:  Wheelchair or walker or per hospice recommendations      INCIDENTAL FINDINGS:  NONE    GOALS OF CARE:    Eventual return to home/independent/assisted living     Long term SNF    X  Hospice     No rehospitalization     Patient condition at discharge:   Functional status  X  Poor      Deconditioned      Independent   Cognition    Lucid     Forgetful (some sensescence)   X  Dementia   Catheters/lines (plus indication)    Walters     PICC      PEG    X     Code status    Full code    X  DNR    . . . . . . . . . . . . . . . . . . . . . . . . . . . . . . . . . . . . . . . . . . . . . . . . . . . . . . . . . . . . . . . . . . . . . . . Rubi Steward CHRONIC MEDICAL CONDITIONS:  Problem List as of 2/5/2020 Date Reviewed: 12/19/2019          Codes Class Noted - Resolved    Occult blood positive stool ICD-10-CM: R19.5  ICD-9-CM: 792.1  2/3/2020 - Present        Rhabdomyolysis ICD-10-CM: M62.82  ICD-9-CM: 728.88  1/27/2020 - Present        Sepsis ([facility] ICD-10-CM: A41.9  ICD-9-CM: 038.9, 995.91  1/26/2020 - Present        Fall ICD-10-CM: W19. Lupe Morales  ICD-9-CM: U704.6  1/26/2020 - Present        NSTEMI (non-ST elevated myocardial infarction) ([facility] ICD-10-CM: I21.4  ICD-9-CM: 410.70  1/26/2020 - Present        Weakness ICD-10-CM: R53.1  ICD-9-CM: 780.79  1/25/2020 - Present        * (Principal) HAP (hospital-acquired pneumonia) ICD-10-CM: J18.9, Y95  ICD-9-CM: 355  1/25/2020 - Present        AMS (altered mental status) ICD-10-CM: R41.82  ICD-9-CM: 780.97  12/14/2019 - Present        CKD (chronic kidney disease) stage 4, GFR 15-29 ml/min ([facility]) ICD-10-CM: N18.4  ICD-9-CM: 585.4  4/22/2019 - Present        Type 2 diabetes with nephropathy ([facility] ICD-10-CM: E11.21  ICD-9-CM: 250.40, 583.81  3/28/2018 - Present        Dementia without behavioral disturbance ([facility] ICD-10-CM: F03.90  ICD-9-CM: 294.20  8/16/2016 - Present        WILTON (acute kidney injury) ([facility] ICD-10-CM: N17.9  ICD-9-CM: 584.9  8/2/2014 - Present        Unspecified vitamin D deficiency ICD-10-CM: E55.9  ICD-9-CM: 268.9  9/11/2013 - Present        Colon cancer ([facility] ICD-10-CM: C18.9  ICD-9-CM: 153.9  8/23/2012 - Present        Pernicious anemia ICD-10-CM: D51.0  ICD-9-CM: 281.0  7/31/2012 - Present        Acquired hypothyroidism ICD-10-CM: E03.9  ICD-9-CM: 244.9  4/26/2012 - Present        Microalbuminuria ICD-10-CM: R80.9  ICD-9-CM: 791.0  3/6/2012 - Present        Diabetes mellitus type 2, controlled ([facility] ICD-10-CM: E11.9  ICD-9-CM: 250.00  2/9/2012 - Present        Essential hypertension, benign ICD-10-CM: I10  ICD-9-CM: 401.1  2/9/2012 - Present        HLD (hyperlipidemia) ICD-10-CM: E78.5  ICD-9-CM: 272.4  2/9/2012 - Present        Allergic rhinitis due to other allergen ICD-10-CM: J30.89  ICD-9-CM: 477.8  2/9/2012 - Present        Esophageal reflux ICD-10-CM: K21.9  ICD-9-CM: 530.81  2/9/2012 - Present        RESOLVED: UTI (urinary tract infection) ICD-10-CM: N39.0  ICD-9-CM: 599.0  12/14/2019 - 1/26/2020        RESOLVED: Delirium ICD-10-CM: R41.0  ICD-9-CM: 780.09  10/11/2018 - 1/26/2020        RESOLVED: Urinary tract infection ICD-10-CM: N39.0  ICD-9-CM: 599.0  10/11/2018 - 1/26/2020        RESOLVED: UTI (lower urinary tract infection) ICD-10-CM: N39.0  ICD-9-CM: 599.0  4/1/2015 - 3/28/2018        RESOLVED: Altered mental status ICD-10-CM: R41.82  ICD-9-CM: 780.97  8/28/2014 - 3/28/2018        RESOLVED: Urosepsis ICD-10-CM: A41.9, N39.0  ICD-9-CM: 038.9, 995.91, 599.0  7/29/2014 - 3/28/2018        RESOLVED: C2 cervical fracture ([facility] ICD-10-CM: S12.100A  ICD-9-CM: 805.02  9/24/2012 - 9/24/2012        RESOLVED: Odontoid fracture ([facility] ICD-10-CM: S81.580K  ICD-9-CM: 805.02  9/10/2012 - 3/28/2018        RESOLVED: Altered mental status ICD-10-CM: Q65.32  ICD-9-CM: 780.97  6/20/2012 - 6/20/2012    Overview Signed 6/20/2012  4:20 PM by Adeel Schroeder MD     Patient admitted for altered mental status changes. Normal neurological imaging, no significant finding on biochemical, and hematologic labs  to support a definitive diagnoses. Patient regained his base level of functionality prior to discharge. Patient Vit. B12 deficiency treated. Information obtained by :   I understand that if any problems occur once I am at home I am to contact my physician. I understand and acknowledge receipt of the instructions indicated above.                                                                                                                                              Physician's or R.N.'s Signature                                                                  Date/Time                                                                                                                                              Patient or Representative Signature                                                          Date/Time

## 2020-02-05 NOTE — TELEPHONE ENCOUNTER
Harsh mckenna/ Tamara Oropeza is calling to see if Dr Maggie Johnson will sign orders for hospice care.     Best contact: [phone]

## 2020-02-05 NOTE — PROGRESS NOTES
Problem: Falls - Risk of 
Goal: *Absence of Falls Description Document Faith Tavarez Fall Risk and appropriate interventions in the flowsheet. Outcome: Progressing Towards Goal 
Note: Fall Risk Interventions: 
Mobility Interventions: Bed/chair exit alarm Mentation Interventions: Bed/chair exit alarm Medication Interventions: Bed/chair exit alarm Elimination Interventions: Toileting schedule/hourly rounds History of Falls Interventions: Bed/chair exit alarm Problem: Patient Education: Go to Patient Education Activity Goal: Patient/Family Education Outcome: Progressing Towards Goal 
  
Problem: Pressure Injury - Risk of 
Goal: *Prevention of pressure injury Description Document Carroll Scale and appropriate interventions in the flowsheet. Outcome: Progressing Towards Goal 
Note: Pressure Injury Interventions: 
Sensory Interventions: Assess changes in LOC Moisture Interventions: Absorbent underpads Activity Interventions: Assess need for specialty bed Mobility Interventions: PT/OT evaluation Nutrition Interventions: Document food/fluid/supplement intake Friction and Shear Interventions: Lift team/patient mobility team 
 
  
 
 
 
  
Problem: Patient Education: Go to Patient Education Activity Goal: Patient/Family Education Outcome: Progressing Towards Goal 
  
Problem: Patient Education: Go to Patient Education Activity Goal: Patient/Family Education Outcome: Progressing Towards Goal 
  
Problem: Sepsis: Day of Diagnosis Goal: Off Pathway (Use only if patient is Off Pathway) Outcome: Progressing Towards Goal 
Goal: *Fluid resuscitation Outcome: Progressing Towards Goal 
Goal: *Paired blood cultures prior to first dose of antibiotic Outcome: Progressing Towards Goal 
Goal: *First dose of  appropriate antibiotic within 3 hours of arrival to ED, within 1 hour of arrival to ICU Outcome: Progressing Towards Goal 
Goal: *Lactic acid with first set of blood cultures Outcome: Progressing Towards Goal 
Goal: *Pneumococcal immunization (if eligible) Outcome: Progressing Towards Goal 
Goal: *Influenza immunization (if eligible) Outcome: Progressing Towards Goal 
Goal: Activity/Safety Outcome: Progressing Towards Goal 
Goal: Consults, if ordered Outcome: Progressing Towards Goal 
Goal: Diagnostic Test/Procedures Outcome: Progressing Towards Goal 
Goal: Nutrition/Diet Outcome: Progressing Towards Goal 
Goal: Discharge Planning Outcome: Progressing Towards Goal 
Goal: Medications Outcome: Progressing Towards Goal 
Goal: Respiratory Outcome: Progressing Towards Goal 
Goal: Treatments/Interventions/Procedures Outcome: Progressing Towards Goal 
Goal: Psychosocial 
Outcome: Progressing Towards Goal 
  
Problem: Sepsis: Day 2 Goal: Off Pathway (Use only if patient is Off Pathway) Outcome: Progressing Towards Goal 
Goal: *Central Venous Pressure maintained at 8-12 mm Hg Outcome: Progressing Towards Goal 
Goal: *Hemodynamically stable Outcome: Progressing Towards Goal 
Goal: *Tolerating diet Outcome: Progressing Towards Goal 
Goal: Activity/Safety Outcome: Progressing Towards Goal 
Goal: Consults, if ordered Outcome: Progressing Towards Goal 
Goal: Diagnostic Test/Procedures Outcome: Progressing Towards Goal 
Goal: Nutrition/Diet Outcome: Progressing Towards Goal 
Goal: Discharge Planning Outcome: Progressing Towards Goal 
Goal: Medications Outcome: Progressing Towards Goal 
Goal: Respiratory Outcome: Progressing Towards Goal 
Goal: Treatments/Interventions/Procedures Outcome: Progressing Towards Goal 
Goal: Psychosocial 
Outcome: Progressing Towards Goal 
  
Problem: Sepsis: Day 3 Goal: Off Pathway (Use only if patient is Off Pathway) Outcome: Progressing Towards Goal 
Goal: *Central Venous Pressure maintained at 8-12 mm Hg Outcome: Progressing Towards Goal 
Goal: *Oxygen saturation within defined limits Outcome: Progressing Towards Goal 
 Goal: *Vital sign stability Outcome: Progressing Towards Goal 
Goal: *Tolerating diet Outcome: Progressing Towards Goal 
Goal: *Demonstrates progressive activity Outcome: Progressing Towards Goal 
Goal: Activity/Safety Outcome: Progressing Towards Goal 
Goal: Consults, if ordered Outcome: Progressing Towards Goal 
Goal: Diagnostic Test/Procedures Outcome: Progressing Towards Goal 
Goal: Nutrition/Diet Outcome: Progressing Towards Goal 
Goal: Discharge Planning Outcome: Progressing Towards Goal 
Goal: Medications Outcome: Progressing Towards Goal 
Goal: Respiratory Outcome: Progressing Towards Goal 
Goal: Treatments/Interventions/Procedures Outcome: Progressing Towards Goal 
Goal: Psychosocial 
Outcome: Progressing Towards Goal 
  
Problem: Sepsis: Day 4 Goal: Off Pathway (Use only if patient is Off Pathway) Outcome: Progressing Towards Goal 
Goal: Activity/Safety Outcome: Progressing Towards Goal 
Goal: Consults, if ordered Outcome: Progressing Towards Goal 
Goal: Diagnostic Test/Procedures Outcome: Progressing Towards Goal 
Goal: Nutrition/Diet Outcome: Progressing Towards Goal 
Goal: Discharge Planning Outcome: Progressing Towards Goal 
Goal: Medications Outcome: Progressing Towards Goal 
Goal: Respiratory Outcome: Progressing Towards Goal 
Goal: Treatments/Interventions/Procedures Outcome: Progressing Towards Goal 
Goal: Psychosocial 
Outcome: Progressing Towards Goal 
Goal: *Oxygen saturation within defined limits Outcome: Progressing Towards Goal 
Goal: *Hemodynamically stable Outcome: Progressing Towards Goal 
Goal: *Vital signs within defined limits Outcome: Progressing Towards Goal 
Goal: *Tolerating diet Outcome: Progressing Towards Goal 
Goal: *Demonstrates progressive activity Outcome: Progressing Towards Goal 
Goal: *Fluid volume maintenance Outcome: Progressing Towards Goal 
  
Problem: Sepsis: Day 5 Goal: Off Pathway (Use only if patient is Off Pathway) Outcome: Progressing Towards Goal 
Goal: *Oxygen saturation within defined limits Outcome: Progressing Towards Goal 
Goal: *Vital signs within defined limits Outcome: Progressing Towards Goal 
Goal: *Tolerating diet Outcome: Progressing Towards Goal 
Goal: *Demonstrates progressive activity Outcome: Progressing Towards Goal 
Goal: *Discharge plan identified Outcome: Progressing Towards Goal 
Goal: Activity/Safety Outcome: Progressing Towards Goal 
Goal: Consults, if ordered Outcome: Progressing Towards Goal 
Goal: Diagnostic Test/Procedures Outcome: Progressing Towards Goal 
Goal: Nutrition/Diet Outcome: Progressing Towards Goal 
Goal: Discharge Planning Outcome: Progressing Towards Goal 
Goal: Medications Outcome: Progressing Towards Goal 
Goal: Respiratory Outcome: Progressing Towards Goal 
Goal: Treatments/Interventions/Procedures Outcome: Progressing Towards Goal 
Goal: Psychosocial 
Outcome: Progressing Towards Goal 
  
Problem: Sepsis: Day 6 Goal: Off Pathway (Use only if patient is Off Pathway) Outcome: Progressing Towards Goal 
Goal: *Oxygen saturation within defined limits Outcome: Progressing Towards Goal 
Goal: *Vital signs within defined limits Outcome: Progressing Towards Goal 
Goal: *Tolerating diet Outcome: Progressing Towards Goal 
Goal: *Demonstrates progressive activity Outcome: Progressing Towards Goal 
Goal: Influenza immunization Outcome: Progressing Towards Goal 
Goal: *Pneumococcal immunization Outcome: Progressing Towards Goal 
Goal: Activity/Safety Outcome: Progressing Towards Goal 
Goal: Diagnostic Test/Procedures Outcome: Progressing Towards Goal 
Goal: Nutrition/Diet Outcome: Progressing Towards Goal 
Goal: Discharge Planning Outcome: Progressing Towards Goal 
Goal: Medications Outcome: Progressing Towards Goal 
Goal: Respiratory Outcome: Progressing Towards Goal 
Goal: Treatments/Interventions/Procedures Outcome: Progressing Towards Goal 
Goal: Psychosocial 
 Outcome: Progressing Towards Goal 
  
Problem: Sepsis: Discharge Outcomes Goal: *Vital signs within defined limits Outcome: Progressing Towards Goal 
Goal: *Tolerating diet Outcome: Progressing Towards Goal 
Goal: *Verbalizes understanding and describes prescribed diet Outcome: Progressing Towards Goal 
Goal: *Demonstrates progressive activity Outcome: Progressing Towards Goal 
Goal: *Describes follow-up/return visits to physicians Outcome: Progressing Towards Goal 
Goal: *Verbalizes name, dosage, time, side effects, and number of days to continue medications Outcome: Progressing Towards Goal 
Goal: *Influenza immunization (Oct-Mar only) Outcome: Progressing Towards Goal 
Goal: *Pneumococcal immunization Outcome: Progressing Towards Goal 
Goal: *Lungs clear or at baseline Outcome: Progressing Towards Goal 
Goal: *Oxygen saturation returns to baseline or 90% or better on room air Outcome: Progressing Towards Goal 
Goal: *Glycemic control Outcome: Progressing Towards Goal 
Goal: *Absence of deep venous thrombosis signs and symptoms(Stroke Metric) Outcome: Progressing Towards Goal 
Goal: *Describes available resources and support systems Outcome: Progressing Towards Goal 
Goal: *Optimal pain control at patient's stated goal 
Outcome: Progressing Towards Goal

## 2020-02-06 NOTE — TELEPHONE ENCOUNTER
The pt's daughter is calling to see of Destinee Clark has gotten a chance to fill out her [facility] paperwork.     [phone]

## 2020-02-06 NOTE — TELEPHONE ENCOUNTER
I spoke with Antoni Gonzalez about [facility] papers. Forms completed.  in AM.  eloise tim under Hospice Care.

## 2020-03-23 ENCOUNTER — TELEPHONE (OUTPATIENT)
Dept: FAMILY MEDICINE CLINIC | Age: 85
End: 2020-03-23

## 2020-03-23 NOTE — TELEPHONE ENCOUNTER
[facility] [place] would like to know if Dr Isaias Wise will sign the death certificate for the patient.  Patient passed on 3/19/2020.     [phone]

## 2020-03-24 NOTE — TELEPHONE ENCOUNTER
Death Certificate dropped off. I will place in provider bin up front for  and let Wes Knows know. **I've put it on your desk Dr Jose Shah, it is in a blue folder.

## 2020-03-25 NOTE — TELEPHONE ENCOUNTER
Signed off on [facility] certificate. [facility]

## 2020-12-31 ENCOUNTER — TELEPHONE (OUTPATIENT)
Dept: FAMILY MEDICINE CLINIC | Age: 85
End: 2020-12-31

## 2020-12-31 NOTE — TELEPHONE ENCOUNTER
----- Message from Danuta Emery sent at 12/30/2020  3:51 PM EST -----  Regarding: /Telephone  Contact: [phone]  General Message/Vendor Calls    Caller's first and last name: Moriah Hancock with [facility]      Reason for call:faxed over outstanding orders on 12/10/2020 from Feb 2020 and needs them signed right away. Callback required yes/no and why: Yes, to confirm orders will be received.       Best contact number(s):[phone]       Details to clarify the request:N/A      Danuta Emery

## 2021-01-04 ENCOUNTER — TELEPHONE (OUTPATIENT)
Dept: FAMILY MEDICINE CLINIC | Age: 86
End: 2021-01-04

## 2021-01-04 NOTE — TELEPHONE ENCOUNTER
Message  Received: Today  Message Contents   Jazmine Higgins 39 Office Pool             Patient return call     Caller's first and last name and relationship (if not the patient): 44 [facility] of [address] [facility] contact number(s): [phone]       Whose call is being returned: Nurse Ariane Carbajal       Details to clarify the request: Requesting to speak with Ariane Carbajal in regards to pt who has passed. States she just now has had time to call back.        Verta Jose Luis

## 2023-04-28 NOTE — PROGRESS NOTES
Dr. Yesenia Lopez,     Pt is having labs drawn per PCP. Do you want to add any labs?     Thanks,  Carlota Santamaria Problem: Mobility Impaired (Adult and Pediatric) Goal: *Acute Goals and Plan of Care (Insert Text) Description FUNCTIONAL STATUS PRIOR TO ADMISSION: Patient required minimal assistance for basic and instrumental ADLs. HOME SUPPORT PRIOR TO ADMISSION: The patient lived with family who assist patient with hand held assist. 
 
Physical Therapy Goals Initiated 1/28/2020 1. Patient will move from supine to sit and sit to supine , scoot up and down and roll side to side in bed with supervision/set-up within 7 day(s). 2.  Patient will transfer from bed to chair and chair to bed with minimal assistance/contact guard assist using the least restrictive device within 7 day(s). 3.  Patient will perform sit to stand with minimal assistance/contact guard assist within 7 day(s). 4.  Patient will ambulate with minimal assistance/contact guard assist for 50 feet with the least restrictive device within 7 day(s). 1/31/2020 1607 by Jas Gee, PT Outcome: Progressing Towards Goal 
1/31/2020 1605 by Jas Gee, PT Outcome: Progressing Towards Goal 
 PHYSICAL THERAPY TREATMENT Patient: Consuelo England (80 y.o. male) Date: 1/31/2020 Diagnosis: HAP (hospital-acquired pneumonia) [J18.9, Y95] Weakness [R53.1] HAP (hospital-acquired pneumonia) Precautions:  fall Chart, physical therapy assessment, plan of care and goals were reviewed. ASSESSMENT Patient continues with skilled PT services and is progressing towards goals. Sit on the edge of bed mod assist, sit to stand mod assist, ambulate with rolling walker mod assist x 2 towards the recliner. Performed some active range of motion exercise on both LE all planes. OOB to chair as tolerated. Communicated with nurse who agreed to monitor patient. Current Level of Function Impacting Discharge (mobility/balance): mod assist x 2 with ambulation using a rolling walker Other factors to consider for discharge: fall PLAN : 
Patient continues to benefit from skilled intervention to address the above impairments. Continue treatment per established plan of care. to address goals. Recommendation for discharge: (in order for the patient to meet his/her long term goals) Therapy up to 5 days/week in SNF setting This discharge recommendation: 
Has been made in collaboration with the attending provider and/or case management IF patient discharges home will need the following DME: patient owns DME required for discharge SUBJECTIVE:  
Patient stated ok.  OBJECTIVE DATA SUMMARY:  
Critical Behavior: 
Neurologic State: Alert Orientation Level: Unable to verbalize Cognition: Follows commands, Other (comment)(slow in following commands ) Safety/Judgement: Decreased insight into deficits Functional Mobility Training: 
Bed Mobility: 
Rolling: Moderate assistance Supine to Sit: Moderate assistance Sit to Supine: Moderate assistance Scooting: Moderate assistance Transfers: 
Sit to Stand: Moderate assistance Stand to Sit: Moderate assistance Stand Pivot Transfers: Moderate assistance Bed to Chair: Moderate assistance Balance: 
Sitting: Intact Sitting - Static: Fair (occasional) Sitting - Dynamic: Fair (occasional) Standing: Impaired;Pull to stand; With support Standing - Static: Fair Standing - Dynamic : Fair Ambulation/Gait Training: 
Distance (ft): 8 Feet (ft) Assistive Device: Walker, rolling;Gait belt Ambulation - Level of Assistance: Moderate assistance; Additional time;Assist x2 Gait Description (WDL): Exceptions to [facility] Gait Abnormalities: Path deviations Base of Support: Widened Speed/Mckenna: Slow;Fluctuations Step Length: Right shortened;Left shortened Therapeutic Exercises:  
 Instructed patient to continue active range of motion exercise on both legs while up on chair or on bed. Pain Ratin/10 Activity Tolerance:  
Good Please refer to the flowsheet for vital signs taken during this treatment. After treatment patient left in no apparent distress:  
Sitting in chair, Heels elevated for pressure relief, Call bell within reach, Bed / chair alarm activated, and Caregiver / family present COMMUNICATION/COLLABORATION:  
The patients plan of care was discussed with: Occupational Therapist and Registered Nurse Reddy Simmons PT,WCC. Time Calculation: 24 mins